# Patient Record
Sex: FEMALE | Race: WHITE | NOT HISPANIC OR LATINO | Employment: OTHER | URBAN - METROPOLITAN AREA
[De-identification: names, ages, dates, MRNs, and addresses within clinical notes are randomized per-mention and may not be internally consistent; named-entity substitution may affect disease eponyms.]

---

## 2017-01-17 ENCOUNTER — OFFICE VISIT (OUTPATIENT)
Dept: DERMATOLOGY | Facility: CLINIC | Age: 67
End: 2017-01-17
Payer: COMMERCIAL

## 2017-01-17 VITALS — HEART RATE: 74 BPM | DIASTOLIC BLOOD PRESSURE: 81 MMHG | SYSTOLIC BLOOD PRESSURE: 158 MMHG | OXYGEN SATURATION: 96 %

## 2017-01-17 DIAGNOSIS — L82.1 SK (SEBORRHEIC KERATOSIS): ICD-10-CM

## 2017-01-17 DIAGNOSIS — L91.0 HYPERTROPHIC SCAR: Primary | ICD-10-CM

## 2017-01-17 DIAGNOSIS — Z85.828 HISTORY OF SKIN CANCER: ICD-10-CM

## 2017-01-17 DIAGNOSIS — L81.4 LENTIGO: ICD-10-CM

## 2017-01-17 PROCEDURE — 99213 OFFICE O/P EST LOW 20 MIN: CPT | Mod: 25 | Performed by: DERMATOLOGY

## 2017-01-17 PROCEDURE — 11900 INJECT SKIN LESIONS </W 7: CPT | Performed by: DERMATOLOGY

## 2017-01-17 RX ORDER — LOSARTAN POTASSIUM 50 MG/1
50 TABLET ORAL 2 TIMES DAILY
COMMUNITY
Start: 2017-01-06 | End: 2024-02-05

## 2017-01-17 NOTE — PATIENT INSTRUCTIONS
Proper skin care from Dr. Saab- [facility]     Eliminate harsh soaps, i.e. Dial, Zest, Marla Spring;   Use mild soaps such as Cetaphil or Dove Sensitive Skin   Avoid hot or cold showers   After showering, lightly dry off.    Aggressive use of a moisturizer (including Vanicream, Cetaphil, Aquafor or Cerave)   We recommend using a tub that needs to be scooped out, not a pump. This has more of an oil base. It will hold moisture in your skin much better than a water base moisturizer. The ones recommended are non- pore clogging.       If you have any questions call [phone] and follow the prompts to Dr. Saab's office.

## 2017-01-17 NOTE — NURSING NOTE
"Initial /81 mmHg  Pulse 74  SpO2 96% Estimated body mass index is 26.64 kg/(m^2) as calculated from the following:    Height as of 3/22/16: 1.676 m (5' 6\").    Weight as of 3/22/16: 74.844 kg (165 lb). .      "

## 2017-01-17 NOTE — MR AVS SNAPSHOT
After Visit Summary   1/17/2017    Siomara Hansen    MRN: [id number]           Patient Information     Date Of Birth          1950        Visit Information        Provider Department      1/17/2017 1:15 PM Markus Saab MD [facility]        Today's Diagnoses     Hypertrophic scar    -  1     SK (seborrheic keratosis)         Lentigo         History of skin cancer           Care Instructions    Proper skin care from Dr. Saab- [facility]     Eliminate harsh soaps, i.e. Dial, Zest, [place];   Use mild soaps such as Cetaphil or Dove Sensitive Skin   Avoid hot or cold showers   After showering, lightly dry off.    Aggressive use of a moisturizer (including Vanicream, Cetaphil, Aquafor or Cerave)   We recommend using a tub that needs to be scooped out, not a pump. This has more of an oil base. It will hold moisture in your skin much better than a water base moisturizer. The ones recommended are non- pore clogging.       If you have any questions call [phone] and follow the prompts to Dr. Saab's office.           Follow-ups after your visit        Your next 10 appointments already scheduled     Apr 10, 2017  2:30 PM   Return Visit with Markus Saab MD   [facility] ([facility])    [address] [address] [phone]              Who to contact     If you have questions or need follow up information about today's clinic visit or your schedule please contact [facility] directly at [phone].  Normal or non-critical lab and imaging results will be communicated to you by MyChart, letter or phone within 4 business days after the clinic has received the results. If you do not hear from us within 7 days, please contact the clinic through MyChart or phone. If you have a critical or abnormal lab result, we will notify you by phone as soon as possible.  Submit refill requests through  [employer] or call your pharmacy and they will forward the refill request to us. Please allow 3 business days for your refill to be completed.          Additional Information About Your Visit        [email or web address] Information     [employer] gives you secure access to your electronic health record. If you see a primary care provider, you can also send messages to your care team and make appointments. If you have questions, please call your primary care clinic.  If you do not have a primary care provider, please call [phone] and they will assist you.        Care EveryWhere ID     This is your Care EveryWhere ID. This could be used by other organizations to access your [place] medical records  [id number]        Your Vitals Were     Pulse Pulse Oximetry                74 96%           Blood Pressure from Last 3 Encounters:   01/17/17 158/81   10/10/16 152/89   08/03/16 145/95    Weight from Last 3 Encounters:   03/22/16 74.844 kg (165 lb)   10/21/13 74.844 kg (165 lb)   06/12/13 74.39 kg (164 lb)              We Performed the Following     INJECTION INTO SKIN LESIONS <=7     KENALOG PER 10 MG        Primary Care Provider    None       No address on file        Thank you!     Thank you for choosing [facility]  for your care. Our goal is always to provide you with excellent care. Hearing back from our patients is one way we can continue to improve our services. Please take a few minutes to complete the written survey that you may receive in the mail after your visit with us. Thank you!             Your Updated Medication List - Protect others around you: Learn how to safely use, store and throw away your medicines at www.disposemymeds.org.          This list is accurate as of: 1/17/17  1:19 PM.  Always use your most recent med list.                   Brand Name Dispense Instructions for use    CALCIUM 1200 PO      Take  by mouth.       celecoxib 100 MG capsule    celeBREX    180 capsule    Take 1 capsule (100 mg)  by mouth 2 times daily as needed for moderate pain       fish oil-omega-3 fatty acids 1000 MG capsule      Take 2 g by mouth daily.       FLAX PO      Take  by mouth.       HYDROcodone-acetaminophen 5-325 MG per tablet    NORCO    20 tablet    Take 1 tablet by mouth daily as needed for pain.       losartan 50 MG tablet    COZAAR         * omeprazole 20 MG tablet     30 tablet    Take 1 tablet (20 mg) by mouth daily Take 30-60 minutes before a meal.       * omeprazole 20 MG CR capsule    priLOSEC    30 capsule    Take 1 capsule (20 mg) by mouth daily 30-60 minutes before a meal  DUE FOR APPT       ONE-A-DAY 50 PLUS PO      Take  by mouth.       PERDIEM OR      Take  by mouth.       valACYclovir 500 MG tablet    VALTREX    6 tablet    Take 1 tablet by mouth 2 times daily.       * Notice:  This list has 2 medication(s) that are the same as other medications prescribed for you. Read the directions carefully, and ask your doctor or other care provider to review them with you.

## 2017-01-17 NOTE — PROGRESS NOTES
Siomara Hansen is a 66 year old year old female patient here today for itching and tender spot on surgical site on right clavicle.   Patient states this has been present for months.  Patient reports the following symptoms:  Itching and tender.  Patient reports the following previous treatments none.  Patient reports the following modifying factors none.  Associated symptoms: none.  Patient has no other skin complaints today.  Remainder of the HPI, Meds, PMH, Allergies, FH, and SH was reviewed in chart.    Pertinent Hx:   Non-melanoma skin cancer    Past Medical History   Diagnosis Date     Breast cancer (H) 2000     Colon polyp      exam every 5 years     Basal cell carcinoma        Past Surgical History   Procedure Laterality Date     Masectomy[  02/2000     Bilateral     Gyn surgery  1986     D & C        Family History   Problem Relation Age of Onset     CANCER Mother      Lung     Breast Cancer Mother      CANCER Father      Braine     CANCER Brother      Bladder     HEART DISEASE Brother      Breast Cancer Sister      Breast Cancer Sister      CANCER Brother      Lung     Melanoma No family hx of        Social History     Social History     Marital Status: Single     Spouse Name: N/A     Number of Children: N/A     Years of Education: N/A     Occupational History     Not on file.     Social History Main Topics     Smoking status: Former Smoker -- 0.50 packs/day for 15 years     Types: Cigarettes     Quit date: 01/05/1984     Smokeless tobacco: Never Used     Alcohol Use: Yes      Comment: Rare     Drug Use: No     Sexual Activity: No     Other Topics Concern     Not on file     Social History Narrative       Outpatient Encounter Prescriptions as of 1/17/2017   Medication Sig Dispense Refill     losartan (COZAAR) 50 MG tablet        celecoxib (CELEBREX) 100 MG capsule Take 1 capsule (100 mg) by mouth 2 times daily as needed for moderate pain 180 capsule 3     omeprazole (PRILOSEC) 20 MG capsule Take 1 capsule  (20 mg) by mouth daily 30-60 minutes before a meal  DUE FOR APPT 30 capsule 0     omeprazole 20 MG tablet Take 1 tablet (20 mg) by mouth daily Take 30-60 minutes before a meal. 30 tablet 5     HYDROcodone-acetaminophen 5-325 MG per tablet Take 1 tablet by mouth daily as needed for pain. 20 tablet 0     valACYclovir (VALTREX) 500 MG tablet Take 1 tablet by mouth 2 times daily. 6 tablet 3     Flaxseed, Linseed, (FLAX PO) Take  by mouth.       Senna-Psyllium (PERDIEM OR) Take  by mouth.       Calcium Carbonate-Vit D-Min (CALCIUM 1200 PO) Take  by mouth.       Multiple Vitamins-Minerals (ONE-A-DAY 50 PLUS PO) Take  by mouth.       fish oil-omega-3 fatty acids (FISH OIL) 1000 MG capsule Take 2 g by mouth daily.       No facility-administered encounter medications on file as of 1/17/2017.             Review Of Systems  Skin: As above  Eyes: negative  Ears/Nose/Throat: negative  Respiratory: No shortness of breath, dyspnea on exertion, cough, or hemoptysis  Cardiovascular: negative  Gastrointestinal: negative  Genitourinary: negative  Musculoskeletal: negative  Neurologic: negative  Psychiatric: negative  Hematologic/Lymphatic/Immunologic: negative  Endocrine: negative      O:   NAD, WDWN, Alert & Oriented, Mood & Affect wnl, Vitals stable   Here today alone   /81 mmHg  Pulse 74  SpO2 96%   General appearance normal   Vitals stable   Alert, oriented and in no acute distress     Stuck on papules and brown macules on trunk and ext   R clavicle firm skin colored plaque       The remainder of expanded problem focused exam was unremarkable; the following areas were examined:  scalp/hair, conjunctiva/lids, face, neck, lips      Eyes: Conjunctivae/lids:Normal     ENT: Lips, buccal mucosa, tongue: normal    MSK:Normal    Cardiovascular: peripheral edema none    Pulm: Breathing Normal    Neuro/Psych: Orientation:Normal; Mood/Affect:Normal      A/P:  1. Hx of non-melanoma skin cancer, seborrheic keratosis, lentigo  2.  Hypertrophic scar  IL TAC: PGACAC discussed.  Risks including but not limited to injection site reaction, bruising, no resolution.  All questions answered and entertained to patient s satisfaction.  Informed consent obtained.  IL TAC in concentration of 40mg/ml was injected ID to KAMERON sun.  Total injected was  0.1 ml.  Patient tolerated without complications and given wound care instructions, including not to move product around.  Return in 4 weeks for follow-up and possible additional IL TAC.      BENIGN LESIONS DISCUSSED WITH PATIENT:  I discussed the specifics of tumor, prognosis, and genetics of benign lesions.  I explained that treatment of these lesions would be purely cosmetic and not medically neccessary.  I discussed with patient different removal options including excision, cautery and /or laser.      Nature and genetics of benign skin lesions dicussed with patient.  Signs and Symptoms of skin cancer discussed with patient.  Patient encouraged to perform monthly skin exams.  UV precautions reviewed with patient.  Skin care regimen reviewed with patient: Eliminate harsh soaps, i.e. Dial, zest, irsih spring; Mild soaps such as Cetaphil or Dove sensitive skin, avoid hot or cold showers, aggressive use of emollients including vanicream, cetaphil or cerave discussed with patient.    Risks of non-melanoma skin cancer discussed with patient   Return to clinic 2 months

## 2018-10-18 ENCOUNTER — OFFICE VISIT (OUTPATIENT)
Dept: DERMATOLOGY | Facility: CLINIC | Age: 68
End: 2018-10-18
Payer: COMMERCIAL

## 2018-10-18 VITALS — HEART RATE: 75 BPM | SYSTOLIC BLOOD PRESSURE: 117 MMHG | DIASTOLIC BLOOD PRESSURE: 68 MMHG | OXYGEN SATURATION: 92 %

## 2018-10-18 DIAGNOSIS — L81.4 LENTIGO: ICD-10-CM

## 2018-10-18 DIAGNOSIS — L82.0 INFLAMED SEBORRHEIC KERATOSIS: ICD-10-CM

## 2018-10-18 DIAGNOSIS — D48.5 NEOPLASM OF UNCERTAIN BEHAVIOR OF SKIN: Primary | ICD-10-CM

## 2018-10-18 DIAGNOSIS — D22.9 NEVUS: ICD-10-CM

## 2018-10-18 DIAGNOSIS — D18.00 ANGIOMA: ICD-10-CM

## 2018-10-18 DIAGNOSIS — L82.1 SEBORRHEIC KERATOSIS: ICD-10-CM

## 2018-10-18 DIAGNOSIS — Z85.828 HISTORY OF BASAL CELL CARCINOMA OF SKIN: ICD-10-CM

## 2018-10-18 PROCEDURE — 11101 HC DESTRUCT BENIGN LESION, UP TO 14: CPT | Performed by: PHYSICIAN ASSISTANT

## 2018-10-18 PROCEDURE — 11100 HC BIOPSY SKIN/SUBQ/MUC MEM, SINGLE LESION: CPT | Mod: 59 | Performed by: PHYSICIAN ASSISTANT

## 2018-10-18 PROCEDURE — 17110 DESTRUCTION B9 LES UP TO 14: CPT | Mod: 51 | Performed by: PHYSICIAN ASSISTANT

## 2018-10-18 PROCEDURE — 88305 TISSUE EXAM BY PATHOLOGIST: CPT | Mod: TC | Performed by: PHYSICIAN ASSISTANT

## 2018-10-18 PROCEDURE — 99203 OFFICE O/P NEW LOW 30 MIN: CPT | Mod: 25 | Performed by: PHYSICIAN ASSISTANT

## 2018-10-18 RX ORDER — CITALOPRAM HYDROBROMIDE 20 MG/1
20 TABLET ORAL
COMMUNITY
Start: 2018-10-11 | End: 2024-02-05

## 2018-10-18 RX ORDER — KETOCONAZOLE 20 MG/G
CREAM TOPICAL
COMMUNITY
Start: 2018-03-29 | End: 2023-05-04

## 2018-10-18 RX ORDER — ACYCLOVIR 200 MG/1
CAPSULE ORAL
COMMUNITY
Start: 2018-04-24 | End: 2023-05-04

## 2018-10-18 RX ORDER — ALBUTEROL SULFATE 90 UG/1
1-2 AEROSOL, METERED RESPIRATORY (INHALATION)
COMMUNITY
Start: 2018-06-25 | End: 2023-09-11

## 2018-10-18 NOTE — PROGRESS NOTES
HPI:   Siomara Hansen is a 68 year old female who presents for Full skin cancer screening.  chief complaint  Last Skin Exam: 1 year ago      1st Baseline: no  Personal HX of Skin Cancer: Yes BCC    Personal HX of Malignant Melanoma: no   Family HX of Skin Cancer / Malignant Melanoma: no  Personal HX of Atypical Moles:   no  Risk factors: history of frequent sun exposure  New / Changing lesions:no  Social History:   On review of systems, there are no further skin complaints, patient is feeling otherwise well.  See patient intake sheet.  ROS of the following were done and are negative: Constitutional, Eyes, Ears, Nose,   Mouth, Throat, Cardiovascular, Respiratory, GI, Genitourinary, Musculoskeletal,   Psychiatric, Endocrine, Allergic/Immunologic.    PHYSICAL EXAM:   /68  Pulse 75  SpO2 92%  Skin exam performed as follows: Type 2 skin. Mood appropriate  Alert and Oriented X 3. Well developed, well nourished in no distress.  General appearance: Normal  Head including face: Normal  Eyes: conjunctiva and lids: Normal  Mouth: Lips, teeth, gums: Normal  Neck: Normal  Chest-breast/axillae: Normal  Back: Normal  Spleen and liver: Normal  Cardiovascular: Exam of peripheral vascular system by observation for swelling, varicosities, edema: Normal  Genitalia: groin, buttocks: Normal  Extremities: digits/nails (clubbing): Normal  Eccrine and Apocrine glands: Normal  Right upper extremity: Normal  Left upper extremity: Normal  Right lower extremity: Normal  Left lower extremity: Normal  Skin: Scalp and body hair: See below    Pt deferred exam of breasts, groin, buttocks: No    Other physical findings:  1. Multiple pigmented macules on extremities and trunk  2. Multiple pigmented macules on face, trunk and extremities  3. Multiple vascular papules on trunk, arms and legs  4. Multiple scattered keratotic plaques  5. 4 mm pink shiny papule on the left posterior shoulder  6. 6 mm pink macule on the right posterior shoulder  7.  Inflamed keratotic papule on right upper inner arm x 1; left shoulder x 1, right cheek x 1       Except as noted above, no other signs of skin cancer or melanoma.     ASSESSMENT/PLAN:   Benign Full skin cancer screening today. . Patient with history of BCC  Advised on monthly self exams and 1 year  Patient Education: Appropriate brochures given.    Multiple benign appearing nevi on arms, legs and trunk. Discussed ABCDEs of melanoma and sunscreen.   Multiple lentigos on arms, legs and trunk. Advised benign, no treatment needed.  Multiple scattered angiomas. Advised benign, no treatment needed.   Seborrheic keratosis on arms, legs and trunk. Advised benign, no treatment needed.  R/o BCC on the left posterior shoulder, right posterior shoulder. Shave bx in typical fashion .  Area cleaned with betadyne and anesthetized with 1% lidocaine with epi .  Dermablade used to remove the lesion and sent to pathology. Bleeding was cauterized. Pt tolerated procedure well.  Inflamed seborrheic keratosis on right upper inner arm x 1; left shoulder x 1, right cheek x 1. As physically tender cryosurgery performed. Advised on post op care.   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 Advised to make an appt with Dr Saab to discuss options for revision.           Follow-up: pending path/fse/PRN sooner    1.) Patient was asked about new and changing moles. YES  2.) Patient received a complete physical skin examination: YES  3.) Patient was counseled to perform a monthly self skin examination: YES  Scribed By: Suellen Small, MS, PAGLENN

## 2018-10-18 NOTE — MR AVS SNAPSHOT
After Visit Summary   10/18/2018    Siomara Hansen    MRN: [id number]           Patient Information     Date Of Birth          1950        Visit Information        Provider Department      10/18/2018 12:45 PM Suellen Small PA-C [facility]        Care Instructions          Wound Care Instructions     FOR SUPERFICIAL WOUNDS     [facility] [phone]    [facility] [phone]  Right posterior shoulder, Left posterior shoulder.                       AFTER 24 HOURS YOU SHOULD REMOVE THE BANDAGE AND BEGIN DAILY DRESSING CHANGES AS FOLLOWS:     1) Remove Dressing.     2) Clean and dry the area with tap water using a Q-tip or sterile gauze pad.     3) Apply Vaseline, Aquaphor, Polysporin ointment or Bacitracin ointment over entire wound.  Do NOT use Neosporin ointment.     4) Cover the wound with a band-aid, or a sterile non-stick gauze pad and micropore paper tape      REPEAT THESE INSTRUCTIONS AT LEAST ONCE A DAY UNTIL THE WOUND HAS COMPLETELY HEALED.    It is an old wives tale that a wound heals better when it is exposed to air and allowed to dry out. The wound will heal faster with a better cosmetic result if it is kept moist with ointment and covered with a bandage.    **Do not let the wound dry out.**      Supplies Needed:      *Cotton tipped applicators (Q-tips)    *Polysporin Ointment or Bacitracin Ointment (NOT NEOSPORIN)    *Band-aids or non-stick gauze pads and micropore paper tape.      PATIENT INFORMATION:    During the healing process you will notice a number of changes. All wounds develop a small halo of redness surrounding the wound.  This means healing is occurring. Severe itching with extensive redness usually indicates sensitivity to the ointment or bandage tape used to dress the wound.  You should call our office if this develops.      Swelling  and/or discoloration around your surgical site is common, particularly when performed around  the eye.    All wounds normally drain.  The larger the wound the more drainage there will be.  After 7-10 days, you will notice the wound beginning to shrink and new skin will begin to grow.  The wound is healed when you can see skin has formed over the entire area.  A healed wound has a healthy, shiny look to the surface and is red to dark pink in color to normalize.  Wounds may take approximately 4-6 weeks to heal.  Larger wounds may take 6-8 weeks.  After the wound is healed you may discontinue dressing changes.    You may experience a sensation of tightness as your wound heals. This is normal and will gradually subside.    Your healed wound may be sensitive to temperature changes. This sensitivity improves with time, but if you re having a lot of discomfort, try to avoid temperature extremes.    Patients frequently experience itching after their wound appears to have healed because of the continue healing under the skin.  Plain Vaseline will help relieve the itching.        POSSIBLE COMPLICATIONS    BLEEDIN. Leave the bandage in place.  2. Use tightly rolled up gauze or a cloth to apply direct pressure over the bandage for 30  minutes.  3. Reapply pressure for an additional 30 minutes if necessary  4. Use additional gauze and tape to maintain pressure once the bleeding has stopped.    WOUND CARE INSTRUCTIONS   FOR CRYOSURGERY   This area treated with liquid nitrogen should form a blister (areas treated may or may not blister-skin may just turn dark and slough off). You do not need to bandage the area unless a blister forms and breaks (which may be a few days). When the blister breaks, begin daily dressing changes as follows:  1) Clean and dry the area with tap water using clean Q-tip or sterile gauze pad.   2) Apply Polysporin ointment or Bacitracin ointment over entire wound. Do NOT use Neosporin ointment.   3) Cover the wound with a band-aid or sterile non-stick gauze pad and micropore paper tape.    REPEAT THESE INSTRUCTIONS AT LEAST ONCE A DAY UNTIL THE WOUND HAS COMPLETELY HEALED.   It is an old wives tale that a wound heals better when it is exposed to air and allowed to dry out. The wound will heal faster with a better cosmetic result if it is kept moist with ointment and covered with a bandage.   Do not let the wound dry out.   IMPORTANT INFORMATION ON REVERSE SIDE   Supplies Needed:   *Cotton tipped applicators (Q-tips)   *Polysporin ointment or Bacitracin ointment (NOT NEOSPORIN)   *Band-aids, or non stick gauze pads and micropore paper tape   PATIENT INFORMATION   During the healing process you will notice a number of changes. All wounds develop a small halo of redness surrounding the wound. This means healing is occurring. Severe itching with extensive redness usually indicates sensitivity to the ointment or bandage tape used to dress the wound. You should call our office if this develops.   Swelling and/or discoloration around your surgical site is common, particularly when performed around the eye.   All wounds normally drain. The larger the wound the more drainage there will be. After 7-10 days, you will notice the wound beginning to shrink and new skin will begin to grow. The wound is healed when you can see skin has formed over the entire area. A healed wound has a healthy, shiny look to the surface and is red to dark pink in color to normalize. Wounds may take approximately 4-6 weeks to heal. Larger wounds may take 6-8 weeks. After the wound is healed you may discontinue dressing changes.   You may experience a sensation of tightness as your wound heals. This is normal and will gradually subside.   Your healed wound may be sensitive to temperature changes. This sensitivity improves with time, but if you re having a lot of discomfort, try to avoid temperature extremes.   Patients frequently experience itching after their wound appears to have healed because of the continue healing under the  skin. Plain Vaseline will help relieve the itching.                   Follow-ups after your visit        Who to contact     If you have questions or need follow up information about today's clinic visit or your schedule please contact [facility] directly at [phone].  Normal or non-critical lab and imaging results will be communicated to you by MyChart, letter or phone within 4 business days after the clinic has received the results. If you do not hear from us within 7 days, please contact the clinic through [employer] or phone. If you have a critical or abnormal lab result, we will notify you by phone as soon as possible.  Submit refill requests through [employer] or call your pharmacy and they will forward the refill request to us. Please allow 3 business days for your refill to be completed.          Additional Information About Your Visit        MyChart Information     [employer] gives you secure access to your electronic health record. If you see a primary care provider, you can also send messages to your care team and make appointments. If you have questions, please call your primary care clinic.  If you do not have a primary care provider, please call [phone] and they will assist you.        Care EveryWhere ID     This is your Care EveryWhere ID. This could be used by other organizations to access your [place] medical records  [id number]        Your Vitals Were     Pulse Pulse Oximetry                75 92%           Blood Pressure from Last 3 Encounters:   10/18/18 117/68   01/17/17 158/81   10/10/16 152/89    Weight from Last 3 Encounters:   03/22/16 74.8 kg (165 lb)   10/21/13 74.8 kg (165 lb)   06/12/13 74.4 kg (164 lb)              Today, you had the following     No orders found for display       Primary Care Provider Office Phone # Fax #    Ludy Landrum [phone]       [facility] [address]        Equal Access to Services      SEMAJ DON : Hadii aad ku attila Mckeonali, waaxda luqadaha, qaybta kaalmada adeafshin, niles cherise inocentemago eubanks florentinitzel paniagua . So [facility] [phone].    ATENCIÓN: Si priyankala ashly, tiene a barahona disposición servicios gratuitos de asistencia lingüística. Hyacinthame al [phone].    We comply with applicable federal civil rights laws and [place] laws. We do not discriminate on the basis of race, color, national origin, age, disability, sex, sexual orientation, or gender identity.            Thank you!     Thank you for choosing [facility]  for your care. Our goal is always to provide you with excellent care. Hearing back from our patients is one way we can continue to improve our services. Please take a few minutes to complete the written survey that you may receive in the mail after your visit with us. Thank you!             Your Updated Medication List - Protect others around you: Learn how to safely use, store and throw away your medicines at www.disposemymeds.org.          This list is accurate as of 10/18/18  1:00 PM.  Always use your most recent med list.                   Brand Name Dispense Instructions for use Diagnosis    acyclovir 200 MG capsule    ZOVIRAX          albuterol 108 (90 Base) MCG/ACT inhaler    PROAIR HFA/PROVENTIL HFA/VENTOLIN HFA     Inhale 1-2 puffs into the lungs        CALCIUM 1200 PO      Take  by mouth.        citalopram 20 MG tablet    celeXA     Take 20 mg by mouth        diclofenac 1 % Gel topical gel    VOLTAREN     Apply nickel size amount, 4g, to left hand and neck 2 times daily        fish oil-omega-3 fatty acids 1000 MG capsule      Take 2 g by mouth daily.        FLAX PO      Take  by mouth.        ketoconazole 2 % cream    NIZORAL     Apply thin coat to forehead and bridge of nose        losartan 50 MG tablet    COZAAR      Hypertrophic scar, SK (seborrheic keratosis), Lentigo, History of skin cancer       omeprazole 20 MG tablet     30 tablet    Take 1 tablet  (20 mg) by mouth daily Take 30-60 minutes before a meal.    GERD (gastroesophageal reflux disease)       PERDIEM OR      Take  by mouth.        valACYclovir 500 MG tablet    VALTREX    6 tablet    Take 1 tablet by mouth 2 times daily.    HSV-2 (herpes simplex virus 2) infection

## 2018-10-18 NOTE — PATIENT INSTRUCTIONS
Wound Care Instructions     FOR SUPERFICIAL WOUNDS     [facility] [phone]    [facility] [phone]  Right posterior shoulder, Left posterior shoulder.                       AFTER 24 HOURS YOU SHOULD REMOVE THE BANDAGE AND BEGIN DAILY DRESSING CHANGES AS FOLLOWS:     1) Remove Dressing.     2) Clean and dry the area with tap water using a Q-tip or sterile gauze pad.     3) Apply Vaseline, Aquaphor, Polysporin ointment or Bacitracin ointment over entire wound.  Do NOT use Neosporin ointment.     4) Cover the wound with a band-aid, or a sterile non-stick gauze pad and micropore paper tape      REPEAT THESE INSTRUCTIONS AT LEAST ONCE A DAY UNTIL THE WOUND HAS COMPLETELY HEALED.    It is an old wives tale that a wound heals better when it is exposed to air and allowed to dry out. The wound will heal faster with a better cosmetic result if it is kept moist with ointment and covered with a bandage.    **Do not let the wound dry out.**      Supplies Needed:      *Cotton tipped applicators (Q-tips)    *Polysporin Ointment or Bacitracin Ointment (NOT NEOSPORIN)    *Band-aids or non-stick gauze pads and micropore paper tape.      PATIENT INFORMATION:    During the healing process you will notice a number of changes. All wounds develop a small halo of redness surrounding the wound.  This means healing is occurring. Severe itching with extensive redness usually indicates sensitivity to the ointment or bandage tape used to dress the wound.  You should call our office if this develops.      Swelling  and/or discoloration around your surgical site is common, particularly when performed around the eye.    All wounds normally drain.  The larger the wound the more drainage there will be.  After 7-10 days, you will notice the wound beginning to shrink and new skin will begin to grow.  The wound is healed when you can see skin has formed over the entire area.  A healed wound has a healthy, shiny  look to the surface and is red to dark pink in color to normalize.  Wounds may take approximately 4-6 weeks to heal.  Larger wounds may take 6-8 weeks.  After the wound is healed you may discontinue dressing changes.    You may experience a sensation of tightness as your wound heals. This is normal and will gradually subside.    Your healed wound may be sensitive to temperature changes. This sensitivity improves with time, but if you re having a lot of discomfort, try to avoid temperature extremes.    Patients frequently experience itching after their wound appears to have healed because of the continue healing under the skin.  Plain Vaseline will help relieve the itching.        POSSIBLE COMPLICATIONS    BLEEDIN. Leave the bandage in place.  2. Use tightly rolled up gauze or a cloth to apply direct pressure over the bandage for 30  minutes.  3. Reapply pressure for an additional 30 minutes if necessary  4. Use additional gauze and tape to maintain pressure once the bleeding has stopped.    WOUND CARE INSTRUCTIONS   FOR CRYOSURGERY   This area treated with liquid nitrogen should form a blister (areas treated may or may not blister-skin may just turn dark and slough off). You do not need to bandage the area unless a blister forms and breaks (which may be a few days). When the blister breaks, begin daily dressing changes as follows:  1) Clean and dry the area with tap water using clean Q-tip or sterile gauze pad.   2) Apply Polysporin ointment or Bacitracin ointment over entire wound. Do NOT use Neosporin ointment.   3) Cover the wound with a band-aid or sterile non-stick gauze pad and micropore paper tape.   REPEAT THESE INSTRUCTIONS AT LEAST ONCE A DAY UNTIL THE WOUND HAS COMPLETELY HEALED.   It is an old wives tale that a wound heals better when it is exposed to air and allowed to dry out. The wound will heal faster with a better cosmetic result if it is kept moist with ointment and covered with a bandage.    Do not let the wound dry out.   IMPORTANT INFORMATION ON REVERSE SIDE   Supplies Needed:   *Cotton tipped applicators (Q-tips)   *Polysporin ointment or Bacitracin ointment (NOT NEOSPORIN)   *Band-aids, or non stick gauze pads and micropore paper tape   PATIENT INFORMATION   During the healing process you will notice a number of changes. All wounds develop a small halo of redness surrounding the wound. This means healing is occurring. Severe itching with extensive redness usually indicates sensitivity to the ointment or bandage tape used to dress the wound. You should call our office if this develops.   Swelling and/or discoloration around your surgical site is common, particularly when performed around the eye.   All wounds normally drain. The larger the wound the more drainage there will be. After 7-10 days, you will notice the wound beginning to shrink and new skin will begin to grow. The wound is healed when you can see skin has formed over the entire area. A healed wound has a healthy, shiny look to the surface and is red to dark pink in color to normalize. Wounds may take approximately 4-6 weeks to heal. Larger wounds may take 6-8 weeks. After the wound is healed you may discontinue dressing changes.   You may experience a sensation of tightness as your wound heals. This is normal and will gradually subside.   Your healed wound may be sensitive to temperature changes. This sensitivity improves with time, but if you re having a lot of discomfort, try to avoid temperature extremes.   Patients frequently experience itching after their wound appears to have healed because of the continue healing under the skin. Plain Vaseline will help relieve the itching.

## 2018-10-18 NOTE — LETTER
[facility]  [address] [address]  Phone: [phone]       October 25, 2018         Siomara Hansen  [address]  [place] [address]      Dear Siomara:  You are scheduled for Mohs Surgery on  November 28th @ 7:30 am.  AND  Dec 5th @ 8:00am.    Please check in at Dermatology Clinic.   (2nd Floor, last  Clinic on right up staircase or elevator -past OB/GYN clinic)    You don't need to arrive more than 5-10 minutes prior to your appointment time.     Be sure to eat a good breakfast and bathe and wash your hair prior to Surgery.    If you are taking any anti-coagulants that are prescribed by your Doctor (such as Coumadin/warfarin, Plavix, Aspirin, Ibuprofen), please continue taking them.     However, If you are taking anti-coagulants over the counter without  a Doctor's order for a Medical condition, please discontinue them 10 days prior to Procedure.      Please wear loose comfortable clothing as it could possibly be 4-6 hours until your surgery is completed depending upon how many layers of tissue need to be removed.     Wi-fi access is available.         Thank you,      Markus Saab MD  [phone]

## 2018-10-18 NOTE — LETTER
10/18/2018         RE: Siomara Hansen  [address]  [place] [address]        Dear Colleague,    Thank you for referring your patient, Siomara Hansen, to the [facility]. Please see a copy of my visit note below.    HPI:   Siomara Hansen is a 68 year old female who presents for Full skin cancer screening.  chief complaint  Last Skin Exam: 1 year ago      1st Baseline: no  Personal HX of Skin Cancer: Yes BCC    Personal HX of Malignant Melanoma: no   Family HX of Skin Cancer / Malignant Melanoma: no  Personal HX of Atypical Moles:   no  Risk factors: history of frequent sun exposure  New / Changing lesions:no  Social History:   On review of systems, there are no further skin complaints, patient is feeling otherwise well.  See patient intake sheet.  ROS of the following were done and are negative: Constitutional, Eyes, Ears, Nose,   Mouth, Throat, Cardiovascular, Respiratory, GI, Genitourinary, Musculoskeletal,   Psychiatric, Endocrine, Allergic/Immunologic.    PHYSICAL EXAM:   /68  Pulse 75  SpO2 92%  Skin exam performed as follows: Type 2 skin. Mood appropriate  Alert and Oriented X 3. Well developed, well nourished in no distress.  General appearance: Normal  Head including face: Normal  Eyes: conjunctiva and lids: Normal  Mouth: Lips, teeth, gums: Normal  Neck: Normal  Chest-breast/axillae: Normal  Back: Normal  Spleen and liver: Normal  Cardiovascular: Exam of peripheral vascular system by observation for swelling, varicosities, edema: Normal  Genitalia: groin, buttocks: Normal  Extremities: digits/nails (clubbing): Normal  Eccrine and Apocrine glands: Normal  Right upper extremity: Normal  Left upper extremity: Normal  Right lower extremity: Normal  Left lower extremity: Normal  Skin: Scalp and body hair: See below    Pt deferred exam of breasts, groin, buttocks: No    Other physical findings:  1. Multiple pigmented macules on extremities and trunk  2. Multiple pigmented  macules on face, trunk and extremities  3. Multiple vascular papules on trunk, arms and legs  4. Multiple scattered keratotic plaques  5. 4 mm pink shiny papule on the left posterior shoulder  6. 6 mm pink macule on the right posterior shoulder  7. Inflamed keratotic papule on right upper inner arm x 1; left shoulder x 1, right cheek x 1       Except as noted above, no other signs of skin cancer or melanoma.     ASSESSMENT/PLAN:   Benign Full skin cancer screening today. . Patient with history of BCC  Advised on monthly self exams and 1 year  Patient Education: Appropriate brochures given.    Multiple benign appearing nevi on arms, legs and trunk. Discussed ABCDEs of melanoma and sunscreen.   Multiple lentigos on arms, legs and trunk. Advised benign, no treatment needed.  Multiple scattered angiomas. Advised benign, no treatment needed.   Seborrheic keratosis on arms, legs and trunk. Advised benign, no treatment needed.  R/o BCC on the left posterior shoulder, right posterior shoulder. Shave bx in typical fashion .  Area cleaned with betadyne and anesthetized with 1% lidocaine with epi .  Dermablade used to remove the lesion and sent to pathology. Bleeding was cauterized. Pt tolerated procedure well.  Inflamed seborrheic keratosis on right upper inner arm x 1; left shoulder x 1, right cheek x 1. As physically tender cryosurgery performed. Advised on post op care.   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 Advised to make an appt with Dr Saab to discuss options for revision.           Follow-up: pending path/fse/PRN sooner    1.) Patient was asked about new and changing moles. YES  2.) Patient received a complete physical skin examination: YES  3.) Patient was counseled to perform a monthly self skin examination: YES  Scribed By: Suellen Small, MS, PAGLENN      Again, thank you for allowing me to participate in the care of your patient.        Sincerely,        Suellen Small PA-C

## 2018-10-18 NOTE — NURSING NOTE
"Initial /68  Pulse 75  SpO2 92% Estimated body mass index is 26.63 kg/(m^2) as calculated from the following:    Height as of 3/22/16: 1.676 m (5' 6\").    Weight as of 3/22/16: 74.8 kg (165 lb). .    Lawanda Scales LPN    "

## 2018-10-24 LAB — COPATH REPORT: NORMAL

## 2018-11-28 ENCOUNTER — OFFICE VISIT (OUTPATIENT)
Dept: DERMATOLOGY | Facility: CLINIC | Age: 68
End: 2018-11-28
Payer: COMMERCIAL

## 2018-11-28 VITALS — SYSTOLIC BLOOD PRESSURE: 129 MMHG | DIASTOLIC BLOOD PRESSURE: 72 MMHG | HEART RATE: 77 BPM | OXYGEN SATURATION: 98 %

## 2018-11-28 DIAGNOSIS — C44.612 BASAL CELL CARCINOMA OF RIGHT SHOULDER: Primary | ICD-10-CM

## 2018-11-28 PROCEDURE — 11602 EXC TR-EXT MAL+MARG 1.1-2 CM: CPT | Performed by: DERMATOLOGY

## 2018-11-28 PROCEDURE — 88331 PATH CONSLTJ SURG 1 BLK 1SPC: CPT | Performed by: DERMATOLOGY

## 2018-11-28 NOTE — PROGRESS NOTES
Siomara Hansen is a 68 year old year old female patient here today for evaluation and managment of basal cell carcinoma on right shoulder.  Patient reports the following modifying factors none.  Associated symptoms: none.  Patient has no other skin complaints today.  Remainder of the HPI, Meds, PMH, Allergies, FH, and SH was reviewed in chart.      Past Medical History:   Diagnosis Date     Basal cell carcinoma      Breast cancer (H) 2000     Colon polyp     exam every 5 years       Past Surgical History:   Procedure Laterality Date     GYN SURGERY  1986    D & C     masectomy[  02/2000    Bilateral        Family History   Problem Relation Age of Onset     Cancer Mother      Lung     Breast Cancer Mother      Cancer Father      Braine     Cancer Brother      Bladder     Heart Disease Brother      Breast Cancer Sister      Breast Cancer Sister      Cancer Brother      Lung     Melanoma No family hx of        Social History     Social History     Marital status: Single     Spouse name: N/A     Number of children: N/A     Years of education: N/A     Occupational History     Not on file.     Social History Main Topics     Smoking status: Former Smoker     Packs/day: 0.50     Years: 15.00     Types: Cigarettes     Quit date: 1/5/1984     Smokeless tobacco: Never Used     Alcohol use Yes      Comment: Rare     Drug use: No     Sexual activity: No     Other Topics Concern     Not on file     Social History Narrative       Outpatient Encounter Prescriptions as of 11/28/2018   Medication Sig Dispense Refill     acyclovir (ZOVIRAX) 200 MG capsule        albuterol (PROAIR HFA/PROVENTIL HFA/VENTOLIN HFA) 108 (90 Base) MCG/ACT inhaler Inhale 1-2 puffs into the lungs       Calcium Carbonate-Vit D-Min (CALCIUM 1200 PO) Take  by mouth.       citalopram (CELEXA) 20 MG tablet Take 20 mg by mouth       diclofenac (VOLTAREN) 1 % GEL topical gel Apply nickel size amount, 4g, to left hand and neck 2 times daily       fish oil-omega-3  fatty acids (FISH OIL) 1000 MG capsule Take 2 g by mouth daily.       Flaxseed, Linseed, (FLAX PO) Take  by mouth.       ketoconazole (NIZORAL) 2 % cream Apply thin coat to forehead and bridge of nose       losartan (COZAAR) 50 MG tablet        omeprazole 20 MG tablet Take 1 tablet (20 mg) by mouth daily Take 30-60 minutes before a meal. 30 tablet 5     Senna-Psyllium (PERDIEM OR) Take  by mouth.       valACYclovir (VALTREX) 500 MG tablet Take 1 tablet by mouth 2 times daily. (Patient not taking: Reported on 10/18/2018) 6 tablet 3     No facility-administered encounter medications on file as of 11/28/2018.              Review Of Systems  Skin: As above  Eyes: negative  Ears/Nose/Throat: negative  Respiratory: No shortness of breath, dyspnea on exertion, cough, or hemoptysis  Cardiovascular: negative  Gastrointestinal: negative  Genitourinary: negative  Musculoskeletal: negative  Neurologic: negative  Psychiatric: negative  Hematologic/Lymphatic/Immunologic: negative  Endocrine: negative      O:   NAD, WDWN, Alert & Oriented, Mood & Affect wnl, Vitals stable   Here today alone   /72 (BP Location: Left arm, Patient Position: Chair, Cuff Size: Adult Regular)  Pulse 77  SpO2 98%   General appearance normal   Vitals stable   Alert, oriented and in no acute distress     R shoulder 6mm scaly papule       Eyes: Conjunctivae/lids:Normal     ENT: Lips, buccal mucosa, tongue: normal    MSK:Normal    Cardiovascular: peripheral edema none    Pulm: Breathing Normal    Neuro/Psych: Orientation:Normal; Mood/Affect:Normal      MICRO:   R shouldeR:Unremarkable epidermis with parallel bundles of cellular collagen within the superficial dermis.  No concerning areas for malignancy.     A/P:  1. R shoulder basal cell carcinoma   EXCISION OF BASAL CELL CARCINOMA, Margins confirmed with FROZEN SECTIONS AND Second intent: After thorough discussion of PGACAC, consent obtained, anesthesia and prep, the margins of the lesion were  identified and an elliptical incision was made encompassing the lesion with 4mm margin. The incisions were made through the skin and down to and including the superficial dermis.  The lesion was removed en bloc and submitted for frozen section pathologic review. Clear margins obtained (1.2cm).    REPAIR SECOND INTENT: We discussed the options for wound management in full with the patient including risks/benefits/possible outcomes. Decision made to allow the wound to heal by second intention. EBL minimal; complications none; wound care routine.  The patient was discharged in good condition and will return in one month or prn for wound evaluation.       BENIGN LESIONS DISCUSSED WITH PATIENT:  I discussed the specifics of tumor, prognosis, and genetics of benign lesions.  I explained that treatment of these lesions would be purely cosmetic and not medically neccessary.  I discussed with patient different removal options including excision, cautery and /or laser.      Nature and genetics of benign skin lesions dicussed with patient.  Signs and Symptoms of skin cancer discussed with patient.  Patient encouraged to perform monthly skin exams.  UV precautions reviewed with patient.  Patient to follow up with Primary Care provider regarding elevated blood pressure.  Skin care regimen reviewed with patient: Eliminate harsh soaps, i.e. Dial, zest, irsih spring; Mild soaps such as Cetaphil or Dove sensitive skin, avoid hot or cold showers, aggressive use of emollients including vanicream, cetaphil or cerave discussed with patient.    Risks of non-melanoma skin cancer discussed with patient   Return to clinic 6 months  Patient to follow up with Primary Care provider regarding elevated blood pressure.

## 2018-11-28 NOTE — MR AVS SNAPSHOT
After Visit Summary   2018    Siomara Hansen    MRN: [id number]           Patient Information     Date Of Birth          1950        Visit Information        Provider Department      2018 7:30 AM Markus Saab MD [facility]        Care Instructions    Open Wound Care     for ____Right shoulder__________        ? No strenuous activity for 48 hours    ? Take Tylenol as needed for discomfort.                                                .         ? Do not drink alcoholic beverages for 48 hours.    ? Keep the pressure bandage in place for 24 hours. If the bandage becomes blood tinged or loose, reinforce it with gauze and tape.        (Refer to the reverse side of this page for management of bleeding).    ? Remove bandage in 24 hours and begin wound care as follows:     1. Clean area with tap water using a Q tip or gauze pad, (shower / bathe normally)  2. Dry wound with Q tip or gauze pad  3. Apply Aquaphor, Vaseline, Polysporin or Bacitracin Ointment with a Q tip    Do NOT use Neosporin Ointment *  4. Cover the wound with a band-aid or nonstick gauze pad and paper tape.  5. Repeat wound care once a day until wound is completely healed.    It is an old wives tale that a wound heals better when it is exposed to air and allowed to dry out. The wound will heal faster with a better cosmetic result if it is kept moist with ointment and covered with a bandage.  Do not let the wound dry out.      Supplies Needed:                Qtips or gauze pads                Polysporin or Bacitracin Ointment                Bandaids or nonstick gauze pads and paper tape    Wound care kits and brown paper tape are available for purchase at   the pharmacy.    BLEEDIN. Use tightly rolled up gauze or cloth to apply direct pressure over the bandage for 20   minutes.  2. Reapply pressure for an additional 20 minutes if necessary  3. Call the office or go to the nearest emergency  room if pressure fails to stop the bleeding.  4. Use additional gauze and tape to maintain pressure once the bleeding has stopped.  5. Begin wound care 24 hours after surgery as directed.                  WOUND HEALING    1. One week after surgery a pink / red halo will form around the outside of the wound.   This is new skin.  2. The center of the wound will appear yellowish white and produce some drainage.  3. The pink halo will slowly migrate in toward the center of the wound until the wound is covered with new shiny pink skin.  4. There will be no more drainage when the wound is completely healed.  5. It will take six months to one year for the redness to fade.  6. The scar may be itchy, tight and sensitive to extreme temperatures for a year after the surgery.  7. Massaging the area several times a day for several minutes after the wound is completely healed will help the scar soften and normalize faster. Begin massage only after healing is complete.      In case of emergency call: Dr Saab: [phone]     [facility]: [phone]    [facility]: [phone]              Follow-ups after your visit        Your next 10 appointments already scheduled     Dec 05, 2018  8:00 AM CST   [facility]    [address] [address] [phone]              Who to contact     If you have questions or need follow up information about today's clinic visit or your schedule please contact [facility] directly at [phone].  Normal or non-critical lab and imaging results will be communicated to you by MyChart, letter or phone within 4 business days after the clinic has received the results. If you do not hear from us within 7 days, please contact the clinic through MyChart or phone. If you have a critical or abnormal lab result, we will notify you by phone as soon as possible.  Submit  refill requests through [employer] or call your pharmacy and they will forward the refill request to us. Please allow 3 business days for your refill to be completed.          Additional Information About Your Visit        [employer] Information     [employer] gives you secure access to your electronic health record. If you see a primary care provider, you can also send messages to your care team and make appointments. If you have questions, please call your primary care clinic.  If you do not have a primary care provider, please call [phone] and they will assist you.        Care EveryWhere ID     This is your Care EveryWhere ID. This could be used by other organizations to access your [place] medical records  [id number]        Your Vitals Were     Pulse Pulse Oximetry                77 98%           Blood Pressure from Last 3 Encounters:   11/28/18 129/72   10/18/18 117/68   01/17/17 158/81    Weight from Last 3 Encounters:   03/22/16 74.8 kg (165 lb)   10/21/13 74.8 kg (165 lb)   06/12/13 74.4 kg (164 lb)              Today, you had the following     No orders found for display       Primary Care Provider Office Phone # Fax #    Ludy HOLDEN Tadeo Diallo [phone]       [facility] [address] [address]        Equal Access to Services     SEMAJ DON AH: Hadii armin ku hadasho Soomaali, waaxda luqadaha, qaybta kaalmada adeegyada, waxay cherise mario. So [facility] [phone].    ATENCIÓN: Si habla español, tiene a barahona disposición servicios gratuitos de asistencia lingüística. Llame al [phone].    We comply with applicable federal civil rights laws and [place] laws. We do not discriminate on the basis of race, color, national origin, age, disability, sex, sexual orientation, or gender identity.            Thank you!     Thank you for choosing [facility]  for your care. Our goal is always to provide you with excellent care. Hearing back from our  patients is one way we can continue to improve our services. Please take a few minutes to complete the written survey that you may receive in the mail after your visit with us. Thank you!             Your Updated Medication List - Protect others around you: Learn how to safely use, store and throw away your medicines at www.disposemymeds.org.          This list is accurate as of 11/28/18  9:02 AM.  Always use your most recent med list.                   Brand Name Dispense Instructions for use Diagnosis    acyclovir 200 MG capsule    ZOVIRAX          albuterol 108 (90 Base) MCG/ACT inhaler    PROAIR HFA/PROVENTIL HFA/VENTOLIN HFA     Inhale 1-2 puffs into the lungs        CALCIUM 1200 PO      Take  by mouth.        citalopram 20 MG tablet    celeXA     Take 20 mg by mouth        diclofenac 1 % topical gel    VOLTAREN     Apply nickel size amount, 4g, to left hand and neck 2 times daily        fish oil-omega-3 fatty acids 1000 MG capsule      Take 2 g by mouth daily.        FLAX PO      Take  by mouth.        ketoconazole 2 % external cream    NIZORAL     Apply thin coat to forehead and bridge of nose        losartan 50 MG tablet    COZAAR      Hypertrophic scar, SK (seborrheic keratosis), Lentigo, History of skin cancer       omeprazole 20 MG tablet     30 tablet    Take 1 tablet (20 mg) by mouth daily Take 30-60 minutes before a meal.    GERD (gastroesophageal reflux disease)       PERDIEM OR      Take  by mouth.        valACYclovir 500 MG tablet    VALTREX    6 tablet    Take 1 tablet by mouth 2 times daily.    HSV-2 (herpes simplex virus 2) infection

## 2018-11-28 NOTE — LETTER
11/28/2018         RE: Siomara Hansen  [address] [place] [id number]        Dear Colleague,    Thank you for referring your patient, Siomara Hansen, to the [facility]. Please see a copy of my visit note below.    Siomara Hansen is a 68 year old year old female patient here today for evaluation and managment of basal cell carcinoma on right shoulder.  Patient reports the following modifying factors none.  Associated symptoms: none.  Patient has no other skin complaints today.  Remainder of the HPI, Meds, PMH, Allergies, FH, and SH was reviewed in chart.      Past Medical History:   Diagnosis Date     Basal cell carcinoma      Breast cancer (H) 2000     Colon polyp     exam every 5 years       Past Surgical History:   Procedure Laterality Date     GYN SURGERY  1986    D & C     masectomy[  02/2000    Bilateral        Family History   Problem Relation Age of Onset     Cancer Mother      Lung     Breast Cancer Mother      Cancer Father      Braine     Cancer Brother      Bladder     Heart Disease Brother      Breast Cancer Sister      Breast Cancer Sister      Cancer Brother      Lung     Melanoma No family hx of        Social History     Social History     Marital status: Single     Spouse name: N/A     Number of children: N/A     Years of education: N/A     Occupational History     Not on file.     Social History Main Topics     Smoking status: Former Smoker     Packs/day: 0.50     Years: 15.00     Types: Cigarettes     Quit date: 1/5/1984     Smokeless tobacco: Never Used     Alcohol use Yes      Comment: Rare     Drug use: No     Sexual activity: No     Other Topics Concern     Not on file     Social History Narrative       Outpatient Encounter Prescriptions as of 11/28/2018   Medication Sig Dispense Refill     acyclovir (ZOVIRAX) 200 MG capsule        albuterol (PROAIR HFA/PROVENTIL HFA/VENTOLIN HFA) 108 (90 Base) MCG/ACT inhaler Inhale 1-2 puffs into the lungs       Calcium  Carbonate-Vit D-Min (CALCIUM 1200 PO) Take  by mouth.       citalopram (CELEXA) 20 MG tablet Take 20 mg by mouth       diclofenac (VOLTAREN) 1 % GEL topical gel Apply nickel size amount, 4g, to left hand and neck 2 times daily       fish oil-omega-3 fatty acids (FISH OIL) 1000 MG capsule Take 2 g by mouth daily.       Flaxseed, Linseed, (FLAX PO) Take  by mouth.       ketoconazole (NIZORAL) 2 % cream Apply thin coat to forehead and bridge of nose       losartan (COZAAR) 50 MG tablet        omeprazole 20 MG tablet Take 1 tablet (20 mg) by mouth daily Take 30-60 minutes before a meal. 30 tablet 5     Senna-Psyllium (PERDIEM OR) Take  by mouth.       valACYclovir (VALTREX) 500 MG tablet Take 1 tablet by mouth 2 times daily. (Patient not taking: Reported on 10/18/2018) 6 tablet 3     No facility-administered encounter medications on file as of 11/28/2018.              Review Of Systems  Skin: As above  Eyes: negative  Ears/Nose/Throat: negative  Respiratory: No shortness of breath, dyspnea on exertion, cough, or hemoptysis  Cardiovascular: negative  Gastrointestinal: negative  Genitourinary: negative  Musculoskeletal: negative  Neurologic: negative  Psychiatric: negative  Hematologic/Lymphatic/Immunologic: negative  Endocrine: negative      O:   NAD, WDWN, Alert & Oriented, Mood & Affect wnl, Vitals stable   Here today alone   /72 (BP Location: Left arm, Patient Position: Chair, Cuff Size: Adult Regular)  Pulse 77  SpO2 98%   General appearance normal   Vitals stable   Alert, oriented and in no acute distress     R shoulder 6mm scaly papule       Eyes: Conjunctivae/lids:Normal     ENT: Lips, buccal mucosa, tongue: normal    MSK:Normal    Cardiovascular: peripheral edema none    Pulm: Breathing Normal    Neuro/Psych: Orientation:Normal; Mood/Affect:Normal      MICRO:   R shouldeR:Unremarkable epidermis with parallel bundles of cellular collagen within the superficial dermis.  No concerning areas for malignancy.      A/P:  1. R shoulder basal cell carcinoma   EXCISION OF BASAL CELL CARCINOMA, Margins confirmed with FROZEN SECTIONS AND Second intent: After thorough discussion of PGACAC, consent obtained, anesthesia and prep, the margins of the lesion were identified and an elliptical incision was made encompassing the lesion with 4mm margin. The incisions were made through the skin and down to and including the superficial dermis.  The lesion was removed en bloc and submitted for frozen section pathologic review. Clear margins obtained (1.2cm).    REPAIR SECOND INTENT: We discussed the options for wound management in full with the patient including risks/benefits/possible outcomes. Decision made to allow the wound to heal by second intention. EBL minimal; complications none; wound care routine.  The patient was discharged in good condition and will return in one month or prn for wound evaluation.       BENIGN LESIONS DISCUSSED WITH PATIENT:  I discussed the specifics of tumor, prognosis, and genetics of benign lesions.  I explained that treatment of these lesions would be purely cosmetic and not medically neccessary.  I discussed with patient different removal options including excision, cautery and /or laser.      Nature and genetics of benign skin lesions dicussed with patient.  Signs and Symptoms of skin cancer discussed with patient.  Patient encouraged to perform monthly skin exams.  UV precautions reviewed with patient.  Patient to follow up with Primary Care provider regarding elevated blood pressure.  Skin care regimen reviewed with patient: Eliminate harsh soaps, i.e. Dial, zest, irsih spring; Mild soaps such as Cetaphil or Dove sensitive skin, avoid hot or cold showers, aggressive use of emollients including vanicream, cetaphil or cerave discussed with patient.    Risks of non-melanoma skin cancer discussed with patient   Return to clinic 6 months  Patient to follow up with Primary Care provider regarding elevated  blood pressure.        Again, thank you for allowing me to participate in the care of your patient.        Sincerely,        Makrus Saab MD

## 2018-11-28 NOTE — NURSING NOTE
"Chief Complaint   Patient presents with     Derm Problem     MOHS       Initial /72 (BP Location: Left arm, Patient Position: Chair, Cuff Size: Adult Regular)  Pulse 77  SpO2 98% Estimated body mass index is 26.63 kg/(m^2) as calculated from the following:    Height as of 3/22/16: 1.676 m (5' 6\").    Weight as of 3/22/16: 74.8 kg (165 lb).  Medications and allergies reviewed.    Lacie GREGORY, CHUCK    "

## 2018-11-28 NOTE — PATIENT INSTRUCTIONS
Open Wound Care     for ____Right shoulder__________        ? No strenuous activity for 48 hours    ? Take Tylenol as needed for discomfort.                                                .         ? Do not drink alcoholic beverages for 48 hours.    ? Keep the pressure bandage in place for 24 hours. If the bandage becomes blood tinged or loose, reinforce it with gauze and tape.        (Refer to the reverse side of this page for management of bleeding).    ? Remove bandage in 24 hours and begin wound care as follows:     1. Clean area with tap water using a Q tip or gauze pad, (shower / bathe normally)  2. Dry wound with Q tip or gauze pad  3. Apply Aquaphor, Vaseline, Polysporin or Bacitracin Ointment with a Q tip    Do NOT use Neosporin Ointment *  4. Cover the wound with a band-aid or nonstick gauze pad and paper tape.  5. Repeat wound care once a day until wound is completely healed.    It is an old wives tale that a wound heals better when it is exposed to air and allowed to dry out. The wound will heal faster with a better cosmetic result if it is kept moist with ointment and covered with a bandage.  Do not let the wound dry out.      Supplies Needed:                Qtips or gauze pads                Polysporin or Bacitracin Ointment                Bandaids or nonstick gauze pads and paper tape    Wound care kits and brown paper tape are available for purchase at   the pharmacy.    BLEEDIN. Use tightly rolled up gauze or cloth to apply direct pressure over the bandage for 20   minutes.  2. Reapply pressure for an additional 20 minutes if necessary  3. Call the office or go to the nearest emergency room if pressure fails to stop the bleeding.  4. Use additional gauze and tape to maintain pressure once the bleeding has stopped.  5. Begin wound care 24 hours after surgery as directed.                  WOUND HEALING    1. One week after surgery a pink / red halo will form around the outside of the wound.    This is new skin.  2. The center of the wound will appear yellowish white and produce some drainage.  3. The pink halo will slowly migrate in toward the center of the wound until the wound is covered with new shiny pink skin.  4. There will be no more drainage when the wound is completely healed.  5. It will take six months to one year for the redness to fade.  6. The scar may be itchy, tight and sensitive to extreme temperatures for a year after the surgery.  7. Massaging the area several times a day for several minutes after the wound is completely healed will help the scar soften and normalize faster. Begin massage only after healing is complete.      In case of emergency call: Dr Saab: [phone]     [facility]: [phone]    [facility][1]: [phone]

## 2018-12-05 ENCOUNTER — OFFICE VISIT (OUTPATIENT)
Dept: DERMATOLOGY | Facility: CLINIC | Age: 68
End: 2018-12-05
Payer: COMMERCIAL

## 2018-12-05 VITALS — DIASTOLIC BLOOD PRESSURE: 71 MMHG | OXYGEN SATURATION: 97 % | SYSTOLIC BLOOD PRESSURE: 120 MMHG | HEART RATE: 67 BPM

## 2018-12-05 DIAGNOSIS — C44.619 BASAL CELL CARCINOMA OF LEFT SHOULDER: Primary | ICD-10-CM

## 2018-12-05 PROCEDURE — 88331 PATH CONSLTJ SURG 1 BLK 1SPC: CPT | Performed by: DERMATOLOGY

## 2018-12-05 PROCEDURE — 13120 CMPLX RPR S/A/L 1.1-2.5 CM: CPT | Mod: 79 | Performed by: DERMATOLOGY

## 2018-12-05 PROCEDURE — 11602 EXC TR-EXT MAL+MARG 1.1-2 CM: CPT | Mod: 79 | Performed by: DERMATOLOGY

## 2018-12-05 NOTE — MR AVS SNAPSHOT
After Visit Summary   2018    Siomara Hansen    MRN: [id number]           Patient Information     Date Of Birth          1950        Visit Information        Provider Department      2018 8:00 AM Markus Saab MD [facility]        Today's Diagnoses     Basal cell carcinoma of left shoulder    -  1      Care Instructions    Open Wound Care     for _left post shoulder_        ? No strenuous activity for 48 hours    ? Take Tylenol as needed for discomfort.                                                .         ? Do not drink alcoholic beverages for 48 hours.    ? Keep the pressure bandage in place for 24 hours. If the bandage becomes blood tinged or loose, reinforce it with gauze and tape.        (Refer to the reverse side of this page for management of bleeding).    ? Remove bandage in 24 hours and begin wound care as follows:     1. Clean area with tap water using a Q tip or gauze pad, (shower / bathe normally)  2. Dry wound with Q tip or gauze pad  3. Apply Aquaphor, Vaseline, Polysporin or Bacitracin Ointment with a Q tip    Do NOT use Neosporin Ointment *  4. Cover the wound with a band-aid or nonstick gauze pad and paper tape.  5. Repeat wound care once a day until wound is completely healed.    It is an old wives tale that a wound heals better when it is exposed to air and allowed to dry out. The wound will heal faster with a better cosmetic result if it is kept moist with ointment and covered with a bandage.  Do not let the wound dry out.      Supplies Needed:                Qtips or gauze pads                Polysporin or Bacitracin Ointment                Bandaids or nonstick gauze pads and paper tape    Wound care kits and brown paper tape are available for purchase at   the pharmacy.    BLEEDIN. Use tightly rolled up gauze or cloth to apply direct pressure over the bandage for 20   minutes.  2. Reapply pressure for an additional 20 minutes if  necessary  3. Call the office or go to the nearest emergency room if pressure fails to stop the bleeding.  4. Use additional gauze and tape to maintain pressure once the bleeding has stopped.  5. Begin wound care 24 hours after surgery as directed.                  WOUND HEALING    1. One week after surgery a pink / red halo will form around the outside of the wound.   This is new skin.  2. The center of the wound will appear yellowish white and produce some drainage.  3. The pink halo will slowly migrate in toward the center of the wound until the wound is covered with new shiny pink skin.  4. There will be no more drainage when the wound is completely healed.  5. It will take six months to one year for the redness to fade.  6. The scar may be itchy, tight and sensitive to extreme temperatures for a year after the surgery.  7. Massaging the area several times a day for several minutes after the wound is completely healed will help the scar soften and normalize faster. Begin massage only after healing is complete.      In case of emergency call: Dr Saab: [phone]     [facility]: [phone]    [facility]: [phone]            Follow-ups after your visit        Your next 10 appointments already scheduled     2019 11:00 AM CST   [facility]us [facility]ab [facility]   [facility]    [address] [address] [phone]              Who to contact     If you have questions or need follow up information about today's clinic visit or your schedule please contact [facility] directly at [phone].  Normal or non-critical lab and imaging results will be communicated to you by MyChart, letter or phone within 4 business days after the clinic has received the results. If you do not hear from us within 7 days, please contact the clinic through MyChart or phone. If you have a critical or abnormal lab  result, we will notify you by phone as soon as possible.  Submit refill requests through [employer] or call your pharmacy and they will forward the refill request to us. Please allow 3 business days for your refill to be completed.          Additional Information About Your Visit        [employer] Information     [employer] gives you secure access to your electronic health record. If you see a primary care provider, you can also send messages to your care team and make appointments. If you have questions, please call your primary care clinic.  If you do not have a primary care provider, please call [phone] and they will assist you.        Care EveryWhere ID     This is your Care EveryWhere ID. This could be used by other organizations to access your [place] medical records  [id number]        Your Vitals Were     Pulse Pulse Oximetry                67 97%           Blood Pressure from Last 3 Encounters:   12/05/18 120/71   11/28/18 129/72   10/18/18 117/68    Weight from Last 3 Encounters:   03/22/16 74.8 kg (165 lb)   10/21/13 74.8 kg (165 lb)   06/12/13 74.4 kg (164 lb)              We Performed the Following     [address] - EXC MALIG SKIN LESION TRUNK/ARM/LEG 1.1-2.0 CM     CL FROZEN SECTION FIRST SPEC        Primary Care Provider Office Phone # Fax #    Ludy HOLDEN GERALDINE Tadeo [phone]       [address] [address]        Equal Access to Services     [facility]: Hadii aad ku hadasho Soomaali, waaxda luqadaha, qaybta kaalmada adeegyada, niles paniagua . So [facility] [phone].    ATENCIÓN: Si habla español, tiene a barahona disposición servicios gratuitos de asistencia lingüística. Llame al [phone].    We comply with applicable federal civil rights laws and [place] laws. We do not discriminate on the basis of race, color, national origin, age, disability, sex, sexual orientation, or gender identity.            Thank you!     Thank you for choosing  [facility]  for your care. Our goal is always to provide you with excellent care. Hearing back from our patients is one way we can continue to improve our services. Please take a few minutes to complete the written survey that you may receive in the mail after your visit with us. Thank you!             Your Updated Medication List - Protect others around you: Learn how to safely use, store and throw away your medicines at www.disposemymeds.org.          This list is accurate as of 12/5/18  9:16 AM.  Always use your most recent med list.                   Brand Name Dispense Instructions for use Diagnosis    acyclovir 200 MG capsule    ZOVIRAX          albuterol 108 (90 Base) MCG/ACT inhaler    PROAIR HFA/PROVENTIL HFA/VENTOLIN HFA     Inhale 1-2 puffs into the lungs        CALCIUM 1200 PO      Take  by mouth.        citalopram 20 MG tablet    celeXA     Take 20 mg by mouth        diclofenac 1 % topical gel    VOLTAREN     Apply nickel size amount, 4g, to left hand and neck 2 times daily        fish oil-omega-3 fatty acids 1000 MG capsule      Take 2 g by mouth daily.        FLAX PO      Take  by mouth.        ketoconazole 2 % external cream    NIZORAL     Apply thin coat to forehead and bridge of nose        losartan 50 MG tablet    COZAAR      Hypertrophic scar, SK (seborrheic keratosis), Lentigo, History of skin cancer       omeprazole 20 MG tablet     30 tablet    Take 1 tablet (20 mg) by mouth daily Take 30-60 minutes before a meal.    GERD (gastroesophageal reflux disease)       PERDIEM OR      Take  by mouth.        valACYclovir 500 MG tablet    VALTREX    6 tablet    Take 1 tablet by mouth 2 times daily.    HSV-2 (herpes simplex virus 2) infection

## 2018-12-05 NOTE — LETTER
12/5/2018         RE: Siomara Hansen  [address] [place] [id number]        Dear Colleague,    Thank you for referring your patient, Siomara Hansen, to the [facility]. Please see a copy of my visit note below.    Siomara Hansen is a 68 year old year old female patient here today for e of basal cell carcinoma on left shoulder. Patient has no other skin complaints today.  Remainder of the HPI, Meds, PMH, Allergies, FH, and SH was reviewed in chart.      Past Medical History:   Diagnosis Date     Basal cell carcinoma      Breast cancer (H) 2000     Colon polyp     exam every 5 years       Past Surgical History:   Procedure Laterality Date     GYN SURGERY  1986    D & C     masectomy[  02/2000    Bilateral        Family History   Problem Relation Age of Onset     Cancer Mother      Lung     Breast Cancer Mother      Cancer Father      Braine     Cancer Brother      Bladder     Heart Disease Brother      Breast Cancer Sister      Breast Cancer Sister      Cancer Brother      Lung     Melanoma No family hx of        Social History     Social History     Marital status: Single     Spouse name: N/A     Number of children: N/A     Years of education: N/A     Occupational History     Not on file.     Social History Main Topics     Smoking status: Former Smoker     Packs/day: 0.50     Years: 15.00     Types: Cigarettes     Quit date: 1/5/1984     Smokeless tobacco: Never Used     Alcohol use Yes      Comment: Rare     Drug use: No     Sexual activity: No     Other Topics Concern     Not on file     Social History Narrative       Outpatient Encounter Prescriptions as of 12/5/2018   Medication Sig Dispense Refill     acyclovir (ZOVIRAX) 200 MG capsule        albuterol (PROAIR HFA/PROVENTIL HFA/VENTOLIN HFA) 108 (90 Base) MCG/ACT inhaler Inhale 1-2 puffs into the lungs       Calcium Carbonate-Vit D-Min (CALCIUM 1200 PO) Take  by mouth.       citalopram (CELEXA) 20 MG tablet Take 20 mg by mouth        diclofenac (VOLTAREN) 1 % GEL topical gel Apply nickel size amount, 4g, to left hand and neck 2 times daily       fish oil-omega-3 fatty acids (FISH OIL) 1000 MG capsule Take 2 g by mouth daily.       Flaxseed, Linseed, (FLAX PO) Take  by mouth.       ketoconazole (NIZORAL) 2 % cream Apply thin coat to forehead and bridge of nose       losartan (COZAAR) 50 MG tablet        omeprazole 20 MG tablet Take 1 tablet (20 mg) by mouth daily Take 30-60 minutes before a meal. 30 tablet 5     Senna-Psyllium (PERDIEM OR) Take  by mouth.       valACYclovir (VALTREX) 500 MG tablet Take 1 tablet by mouth 2 times daily. (Patient not taking: Reported on 10/18/2018) 6 tablet 3     No facility-administered encounter medications on file as of 12/5/2018.              Review Of Systems  Skin: As above  Eyes: negative  Ears/Nose/Throat: negative  Respiratory: No shortness of breath, dyspnea on exertion, cough, or hemoptysis  Cardiovascular: negative  Gastrointestinal: negative  Genitourinary: negative  Musculoskeletal: negative  Neurologic: negative  Psychiatric: negative  Hematologic/Lymphatic/Immunologic: negative  Endocrine: negative      O:   NAD, WDWN, Alert & Oriented, Mood & Affect wnl, Vitals stable   Here today alone   /71  Pulse 67  SpO2 97%   General appearance normal   Vitals stable   Alert, oriented and in no acute distress     L shoulder 6mm scaly papule       Eyes: Conjunctivae/lids:Normal     ENT: Lips, buccal mucosa, tongue: normal    MSK:Normal    Cardiovascular: peripheral edema none    Pulm: Breathing Normal    Lymph Nodes: No Head and Neck Lymphadenopathy     Neuro/Psych: Orientation:Normal; Mood/Affect:Normal      MICRO:   L shouldeR:Unremarkable epidermis with parallel bundles of cellular collagen within the superficial dermis.  No concerning areas for malignancy.     A/P:  1. L shoulder basal cell carcinoma   EXCISION OF BENIGN LESION AND COMPLEX: After thorough discussion of PGACAC, consent obtained,  anesthesia and prep, the margins of the lesion were identified and an elliptical incision was made encompassing the lesion. The incisions were made through the skin and down to and including the subcutaneous tissue. The lesion was removed en bloc and submitted for perms  pathologic review. The wound edges were widely undermined until adequate tissue mobility was obtained. hemostasis was achieved. The wound edges were then closed in a layered fashion, being careful not to leave any dead space. Postoperative length was 1.1 cm.   EBL minimal; complications none; wound care routine. The patient was discharged in good condition and will return in one week for wound evaluation.  BENIGN LESIONS DISCUSSED WITH PATIENT:  I discussed the specifics of tumor, prognosis, and genetics of benign lesions.  I explained that treatment of these lesions would be purely cosmetic and not medically neccessary.  I discussed with patient different removal options including excision, cautery and /or laser.      Nature and genetics of benign skin lesions dicussed with patient.  Signs and Symptoms of skin cancer discussed with patient.  ABCDEs of melanoma reviewed with patient.  Patient encouraged to perform monthly skin exams.  UV precautions reviewed with patient.  Patient to follow up with Primary Care provider regarding elevated blood pressure.  Skin care regimen reviewed with patient: Eliminate harsh soaps, i.e. Dial, zest, irsih spring; Mild soaps such as Cetaphil or Dove sensitive skin, avoid hot or cold showers, aggressive use of emollients including vanicream, cetaphil or cerave discussed with patient.    Risks of non-melanoma skin cancer discussed with patient   Return to clinic 6 months        Again, thank you for allowing me to participate in the care of your patient.        Sincerely,        Markus Saab MD

## 2018-12-05 NOTE — PROGRESS NOTES
Siomara Hansen is a 68 year old year old female patient here today for e of basal cell carcinoma on left shoulder. Patient has no other skin complaints today.  Remainder of the HPI, Meds, PMH, Allergies, FH, and SH was reviewed in chart.      Past Medical History:   Diagnosis Date     Basal cell carcinoma      Breast cancer (H) 2000     Colon polyp     exam every 5 years       Past Surgical History:   Procedure Laterality Date     GYN SURGERY  1986    D & C     masectomy[  02/2000    Bilateral        Family History   Problem Relation Age of Onset     Cancer Mother      Lung     Breast Cancer Mother      Cancer Father      Braine     Cancer Brother      Bladder     Heart Disease Brother      Breast Cancer Sister      Breast Cancer Sister      Cancer Brother      Lung     Melanoma No family hx of        Social History     Social History     Marital status: Single     Spouse name: N/A     Number of children: N/A     Years of education: N/A     Occupational History     Not on file.     Social History Main Topics     Smoking status: Former Smoker     Packs/day: 0.50     Years: 15.00     Types: Cigarettes     Quit date: 1/5/1984     Smokeless tobacco: Never Used     Alcohol use Yes      Comment: Rare     Drug use: No     Sexual activity: No     Other Topics Concern     Not on file     Social History Narrative       Outpatient Encounter Prescriptions as of 12/5/2018   Medication Sig Dispense Refill     acyclovir (ZOVIRAX) 200 MG capsule        albuterol (PROAIR HFA/PROVENTIL HFA/VENTOLIN HFA) 108 (90 Base) MCG/ACT inhaler Inhale 1-2 puffs into the lungs       Calcium Carbonate-Vit D-Min (CALCIUM 1200 PO) Take  by mouth.       citalopram (CELEXA) 20 MG tablet Take 20 mg by mouth       diclofenac (VOLTAREN) 1 % GEL topical gel Apply nickel size amount, 4g, to left hand and neck 2 times daily       fish oil-omega-3 fatty acids (FISH OIL) 1000 MG capsule Take 2 g by mouth daily.       Flaxseed, Linseed, (FLAX PO) Take  by  mouth.       ketoconazole (NIZORAL) 2 % cream Apply thin coat to forehead and bridge of nose       losartan (COZAAR) 50 MG tablet        omeprazole 20 MG tablet Take 1 tablet (20 mg) by mouth daily Take 30-60 minutes before a meal. 30 tablet 5     Senna-Psyllium (PERDIEM OR) Take  by mouth.       valACYclovir (VALTREX) 500 MG tablet Take 1 tablet by mouth 2 times daily. (Patient not taking: Reported on 10/18/2018) 6 tablet 3     No facility-administered encounter medications on file as of 12/5/2018.              Review Of Systems  Skin: As above  Eyes: negative  Ears/Nose/Throat: negative  Respiratory: No shortness of breath, dyspnea on exertion, cough, or hemoptysis  Cardiovascular: negative  Gastrointestinal: negative  Genitourinary: negative  Musculoskeletal: negative  Neurologic: negative  Psychiatric: negative  Hematologic/Lymphatic/Immunologic: negative  Endocrine: negative      O:   NAD, WDWN, Alert & Oriented, Mood & Affect wnl, Vitals stable   Here today alone   /71  Pulse 67  SpO2 97%   General appearance normal   Vitals stable   Alert, oriented and in no acute distress     L shoulder 6mm scaly papule       Eyes: Conjunctivae/lids:Normal     ENT: Lips, buccal mucosa, tongue: normal    MSK:Normal    Cardiovascular: peripheral edema none    Pulm: Breathing Normal    Lymph Nodes: No Head and Neck Lymphadenopathy     Neuro/Psych: Orientation:Normal; Mood/Affect:Normal      MICRO:   L shouldeR:Unremarkable epidermis with parallel bundles of cellular collagen within the superficial dermis.  No concerning areas for malignancy.     A/P:  1. L shoulder basal cell carcinoma   EXCISION OF BENIGN LESION AND COMPLEX: After thorough discussion of PGACAC, consent obtained, anesthesia and prep, the margins of the lesion were identified and an elliptical incision was made encompassing the lesion. The incisions were made through the skin and down to and including the subcutaneous tissue. The lesion was removed en  bloc and submitted for perms  pathologic review. The wound edges were widely undermined until adequate tissue mobility was obtained. hemostasis was achieved. The wound edges were then closed in a layered fashion, being careful not to leave any dead space. Postoperative length was 1.1 cm.   EBL minimal; complications none; wound care routine. The patient was discharged in good condition and will return in one week for wound evaluation.  BENIGN LESIONS DISCUSSED WITH PATIENT:  I discussed the specifics of tumor, prognosis, and genetics of benign lesions.  I explained that treatment of these lesions would be purely cosmetic and not medically neccessary.  I discussed with patient different removal options including excision, cautery and /or laser.      Nature and genetics of benign skin lesions dicussed with patient.  Signs and Symptoms of skin cancer discussed with patient.  ABCDEs of melanoma reviewed with patient.  Patient encouraged to perform monthly skin exams.  UV precautions reviewed with patient.  Patient to follow up with Primary Care provider regarding elevated blood pressure.  Skin care regimen reviewed with patient: Eliminate harsh soaps, i.e. Dial, zest, irsih spring; Mild soaps such as Cetaphil or Dove sensitive skin, avoid hot or cold showers, aggressive use of emollients including vanicream, cetaphil or cerave discussed with patient.    Risks of non-melanoma skin cancer discussed with patient   Return to clinic 6 months

## 2018-12-05 NOTE — PATIENT INSTRUCTIONS
Open Wound Care     for _left post shoulder_        ? No strenuous activity for 48 hours    ? Take Tylenol as needed for discomfort.                                                .         ? Do not drink alcoholic beverages for 48 hours.    ? Keep the pressure bandage in place for 24 hours. If the bandage becomes blood tinged or loose, reinforce it with gauze and tape.        (Refer to the reverse side of this page for management of bleeding).    ? Remove bandage in 24 hours and begin wound care as follows:     1. Clean area with tap water using a Q tip or gauze pad, (shower / bathe normally)  2. Dry wound with Q tip or gauze pad  3. Apply Aquaphor, Vaseline, Polysporin or Bacitracin Ointment with a Q tip    Do NOT use Neosporin Ointment *  4. Cover the wound with a band-aid or nonstick gauze pad and paper tape.  5. Repeat wound care once a day until wound is completely healed.    It is an old wives tale that a wound heals better when it is exposed to air and allowed to dry out. The wound will heal faster with a better cosmetic result if it is kept moist with ointment and covered with a bandage.  Do not let the wound dry out.      Supplies Needed:                Qtips or gauze pads                Polysporin or Bacitracin Ointment                Bandaids or nonstick gauze pads and paper tape    Wound care kits and brown paper tape are available for purchase at   the pharmacy.    BLEEDIN. Use tightly rolled up gauze or cloth to apply direct pressure over the bandage for 20   minutes.  2. Reapply pressure for an additional 20 minutes if necessary  3. Call the office or go to the nearest emergency room if pressure fails to stop the bleeding.  4. Use additional gauze and tape to maintain pressure once the bleeding has stopped.  5. Begin wound care 24 hours after surgery as directed.                  WOUND HEALING    1. One week after surgery a pink / red halo will form around the outside of the wound.   This is  new skin.  2. The center of the wound will appear yellowish white and produce some drainage.  3. The pink halo will slowly migrate in toward the center of the wound until the wound is covered with new shiny pink skin.  4. There will be no more drainage when the wound is completely healed.  5. It will take six months to one year for the redness to fade.  6. The scar may be itchy, tight and sensitive to extreme temperatures for a year after the surgery.  7. Massaging the area several times a day for several minutes after the wound is completely healed will help the scar soften and normalize faster. Begin massage only after healing is complete.      In case of emergency call: Dr Saab: [phone]     [facility]: [phone]    [facility]: [phone]

## 2018-12-05 NOTE — NURSING NOTE
"Initial /71  Pulse 67  SpO2 97% Estimated body mass index is 26.63 kg/(m^2) as calculated from the following:    Height as of 3/22/16: 1.676 m (5' 6\").    Weight as of 3/22/16: 74.8 kg (165 lb). .      "

## 2019-01-16 ENCOUNTER — OFFICE VISIT (OUTPATIENT)
Dept: DERMATOLOGY | Facility: CLINIC | Age: 69
End: 2019-01-16
Payer: COMMERCIAL

## 2019-01-16 VITALS — TEMPERATURE: 98.4 F | SYSTOLIC BLOOD PRESSURE: 136 MMHG | HEART RATE: 67 BPM | DIASTOLIC BLOOD PRESSURE: 81 MMHG

## 2019-01-16 DIAGNOSIS — C44.311 BASAL CELL CARCINOMA (BCC) OF DORSUM OF NOSE: Primary | ICD-10-CM

## 2019-01-16 PROCEDURE — 11313 SHAVE SKIN LESION >2.0 CM: CPT | Performed by: DERMATOLOGY

## 2019-01-16 NOTE — PATIENT INSTRUCTIONS
Open Wound Care     for __nose____________        ? No strenuous activity for 48 hours    ? Take Tylenol as needed for discomfort.                                                .         ? Do not drink alcoholic beverages for 48 hours.    ? Keep the pressure bandage in place for 24 hours. If the bandage becomes blood tinged or loose, reinforce it with gauze and tape.        (Refer to the reverse side of this page for management of bleeding).    ? Remove bandage in 24 hours and begin wound care as follows:     1. Clean area with tap water using a Q tip or gauze pad, (shower / bathe normally)  2. Dry wound with Q tip or gauze pad  3. Apply Aquaphor, Vaseline, Polysporin or Bacitracin Ointment with a Q tip    Do NOT use Neosporin Ointment *  4. Cover the wound with a band-aid or nonstick gauze pad and paper tape.  5. Repeat wound care once a day until wound is completely healed.    It is an old wives tale that a wound heals better when it is exposed to air and allowed to dry out. The wound will heal faster with a better cosmetic result if it is kept moist with ointment and covered with a bandage.  Do not let the wound dry out.      Supplies Needed:                Qtips or gauze pads                Polysporin or Bacitracin Ointment                Bandaids or nonstick gauze pads and paper tape    Wound care kits and brown paper tape are available for purchase at   the pharmacy.    BLEEDIN. Use tightly rolled up gauze or cloth to apply direct pressure over the bandage for 20   minutes.  2. Reapply pressure for an additional 20 minutes if necessary  3. Call the office or go to the nearest emergency room if pressure fails to stop the bleeding.  4. Use additional gauze and tape to maintain pressure once the bleeding has stopped.  5. Begin wound care 24 hours after surgery as directed.                  WOUND HEALING    1. One week after surgery a pink / red halo will form around the outside of the wound.   This is new  skin.  2. The center of the wound will appear yellowish white and produce some drainage.  3. The pink halo will slowly migrate in toward the center of the wound until the wound is covered with new shiny pink skin.  4. There will be no more drainage when the wound is completely healed.  5. It will take six months to one year for the redness to fade.  6. The scar may be itchy, tight and sensitive to extreme temperatures for a year after the surgery.  7. Massaging the area several times a day for several minutes after the wound is completely healed will help the scar soften and normalize faster. Begin massage only after healing is complete.      In case of emergency call: Dr Saab: [phone]     [facility]: [phone]    [facility]: [phone]

## 2019-01-16 NOTE — LETTER
2019         RE: Siomara Hansen  [address]  \[facility]\"" [address]        Dear Colleague,    Thank you for referring your patient, Siomara Hansen, to the [facility]. Please see a copy of my visit note below.    Siomara Hansen is a 68 year old year old female patient here today for hx of non-melanoma skin cancer here for abrasion of sebaceous hyperplasia on nose.  Patient reports the following modifying factors none.  Associated symptoms: none.  Patient has no other skin complaints today.  Remainder of the HPI, Meds, PMH, Allergies, FH, and SH was reviewed in chart.      Past Medical History:   Diagnosis Date     Basal cell carcinoma      Breast cancer (H)      Colon polyp     exam every 5 years       Past Surgical History:   Procedure Laterality Date     GYN SURGERY      D & C     masectomy[  2000    Bilateral        Family History   Problem Relation Age of Onset     Cancer Mother         Lung     Breast Cancer Mother      Cancer Father         Braine     Cancer Brother         Bladder     Heart Disease Brother      Breast Cancer Sister      Breast Cancer Sister      Cancer Brother         Lung     Melanoma No family hx of        Social History     Socioeconomic History     Marital status: Single     Spouse name: Not on file     Number of children: Not on file     Years of education: Not on file     Highest education level: Not on file   Social Needs     Financial resource strain: Not on file     Food insecurity - worry: Not on file     Food insecurity - inability: Not on file     Transportation needs - medical: Not on file     Transportation needs - non-medical: Not on file   Occupational History     Not on file   Tobacco Use     Smoking status: Former Smoker     Packs/day: 0.50     Years: 15.00     Pack years: 7.50     Types: Cigarettes     Last attempt to quit: 1984     Years since quittin.0     Smokeless tobacco: Never Used   Substance and Sexual Activity      Alcohol use: Yes     Comment: Rare     Drug use: No     Sexual activity: No   Other Topics Concern     Parent/sibling w/ CABG, MI or angioplasty before 65F 55M? Not Asked   Social History Narrative     Not on file       Outpatient Encounter Medications as of 1/16/2019   Medication Sig Dispense Refill     acyclovir (ZOVIRAX) 200 MG capsule        albuterol (PROAIR HFA/PROVENTIL HFA/VENTOLIN HFA) 108 (90 Base) MCG/ACT inhaler Inhale 1-2 puffs into the lungs       Calcium Carbonate-Vit D-Min (CALCIUM 1200 PO) Take  by mouth.       citalopram (CELEXA) 20 MG tablet Take 20 mg by mouth       diclofenac (VOLTAREN) 1 % GEL topical gel Apply nickel size amount, 4g, to left hand and neck 2 times daily       fish oil-omega-3 fatty acids (FISH OIL) 1000 MG capsule Take 2 g by mouth daily.       Flaxseed, Linseed, (FLAX PO) Take  by mouth.       ketoconazole (NIZORAL) 2 % cream Apply thin coat to forehead and bridge of nose       losartan (COZAAR) 50 MG tablet        omeprazole 20 MG tablet Take 1 tablet (20 mg) by mouth daily Take 30-60 minutes before a meal. 30 tablet 5     Senna-Psyllium (PERDIEM OR) Take  by mouth.       valACYclovir (VALTREX) 500 MG tablet Take 1 tablet by mouth 2 times daily. (Patient not taking: Reported on 10/18/2018) 6 tablet 3     No facility-administered encounter medications on file as of 1/16/2019.              Review Of Systems  Skin: As above  Eyes: negative  Ears/Nose/Throat: negative  Respiratory: No shortness of breath, dyspnea on exertion, cough, or hemoptysis  Cardiovascular: negative  Gastrointestinal: negative  Genitourinary: negative  Musculoskeletal: negative  Neurologic: negative  Psychiatric: negative  Hematologic/Lymphatic/Immunologic: negative  Endocrine: negative      O:   NAD, WDWN, Alert & Oriented, Mood & Affect wnl, Vitals stable   Here today alone   /81   Pulse 67   Temp 98.4  F (36.9  C) (Tympanic)    General appearance normal   Vitals stable   Alert, oriented and  in no acute distress     Nose well healed scar with sebaceous hyperplasia       Eyes: Conjunctivae/lids:Normal     ENT: Lips, buccal mucosa, tongue: normal    MSK:Normal    Cardiovascular: peripheral edema none    Pulm: Breathing Normal    Neuro/Psych: Orientation:Normal; Mood/Affect:Normal      A/P:  1. Hx of non-melanoma skin cancer, sebaceous hyperplasia,   DERMABRASION: After PGACAC discussed with patient, decision for tangential excision and dermabrasion was made. After anesthesia with Lido/Epi/Clinda and prep with hibiclens, hypertrophic areas were tangentially excised and entire cosmetic unit was smoothed 2.5cm. Hemostasis was obtained with pressure. Patient tolerated procedure well. There were no complications and EBL minimal. Patient advised to keep abraded surfaces covered with generous ointment until healed, approximately 2 weeks. Return to office in 3 months or prn.      BENIGN LESIONS DISCUSSED WITH PATIENT:  I discussed the specifics of tumor, prognosis, and genetics of benign lesions.  I explained that treatment of these lesions would be purely cosmetic and not medically neccessary.  I discussed with patient different removal options including excision, cautery and /or laser.      Nature and genetics of benign skin lesions dicussed with patient.  Signs and Symptoms of skin cancer discussed with patient.  Patient encouraged to perform monthly skin exams.  UV precautions reviewed with patient.  Patient to follow up with Primary Care provider regarding elevated blood pressure.  Skin care regimen reviewed with patient: Eliminate harsh soaps, i.e. Dial, zest, irsih spring; Mild soaps such as Cetaphil or Dove sensitive skin, avoid hot or cold showers, aggressive use of emollients including vanicream, cetaphil or cerave discussed with patient.    Risks of non-melanoma skin cancer discussed with patient   Return to clinic 6 months      Again, thank you for allowing me to participate in the care of your  patient.        Sincerely,        Markus Saab MD

## 2019-01-16 NOTE — PROGRESS NOTES
Siomara Hansen is a 68 year old year old female patient here today for hx of non-melanoma skin cancer here for abrasion of sebaceous hyperplasia on nose.  Patient reports the following modifying factors none.  Associated symptoms: none.  Patient has no other skin complaints today.  Remainder of the HPI, Meds, PMH, Allergies, FH, and SH was reviewed in chart.      Past Medical History:   Diagnosis Date     Basal cell carcinoma      Breast cancer (H)      Colon polyp     exam every 5 years       Past Surgical History:   Procedure Laterality Date     GYN SURGERY      D & C     masectomy[  2000    Bilateral        Family History   Problem Relation Age of Onset     Cancer Mother         Lung     Breast Cancer Mother      Cancer Father         Braine     Cancer Brother         Bladder     Heart Disease Brother      Breast Cancer Sister      Breast Cancer Sister      Cancer Brother         Lung     Melanoma No family hx of        Social History     Socioeconomic History     Marital status: Single     Spouse name: Not on file     Number of children: Not on file     Years of education: Not on file     Highest education level: Not on file   Social Needs     Financial resource strain: Not on file     Food insecurity - worry: Not on file     Food insecurity - inability: Not on file     Transportation needs - medical: Not on file     Transportation needs - non-medical: Not on file   Occupational History     Not on file   Tobacco Use     Smoking status: Former Smoker     Packs/day: 0.50     Years: 15.00     Pack years: 7.50     Types: Cigarettes     Last attempt to quit: 1984     Years since quittin.0     Smokeless tobacco: Never Used   Substance and Sexual Activity     Alcohol use: Yes     Comment: Rare     Drug use: No     Sexual activity: No   Other Topics Concern     Parent/sibling w/ CABG, MI or angioplasty before 65F 55M? Not Asked   Social History Narrative     Not on file       Outpatient Encounter  Medications as of 1/16/2019   Medication Sig Dispense Refill     acyclovir (ZOVIRAX) 200 MG capsule        albuterol (PROAIR HFA/PROVENTIL HFA/VENTOLIN HFA) 108 (90 Base) MCG/ACT inhaler Inhale 1-2 puffs into the lungs       Calcium Carbonate-Vit D-Min (CALCIUM 1200 PO) Take  by mouth.       citalopram (CELEXA) 20 MG tablet Take 20 mg by mouth       diclofenac (VOLTAREN) 1 % GEL topical gel Apply nickel size amount, 4g, to left hand and neck 2 times daily       fish oil-omega-3 fatty acids (FISH OIL) 1000 MG capsule Take 2 g by mouth daily.       Flaxseed, Linseed, (FLAX PO) Take  by mouth.       ketoconazole (NIZORAL) 2 % cream Apply thin coat to forehead and bridge of nose       losartan (COZAAR) 50 MG tablet        omeprazole 20 MG tablet Take 1 tablet (20 mg) by mouth daily Take 30-60 minutes before a meal. 30 tablet 5     Senna-Psyllium (PERDIEM OR) Take  by mouth.       valACYclovir (VALTREX) 500 MG tablet Take 1 tablet by mouth 2 times daily. (Patient not taking: Reported on 10/18/2018) 6 tablet 3     No facility-administered encounter medications on file as of 1/16/2019.              Review Of Systems  Skin: As above  Eyes: negative  Ears/Nose/Throat: negative  Respiratory: No shortness of breath, dyspnea on exertion, cough, or hemoptysis  Cardiovascular: negative  Gastrointestinal: negative  Genitourinary: negative  Musculoskeletal: negative  Neurologic: negative  Psychiatric: negative  Hematologic/Lymphatic/Immunologic: negative  Endocrine: negative      O:   NAD, WDWN, Alert & Oriented, Mood & Affect wnl, Vitals stable   Here today alone   /81   Pulse 67   Temp 98.4  F (36.9  C) (Tympanic)    General appearance normal   Vitals stable   Alert, oriented and in no acute distress     Nose well healed scar with sebaceous hyperplasia       Eyes: Conjunctivae/lids:Normal     ENT: Lips, buccal mucosa, tongue: normal    MSK:Normal    Cardiovascular: peripheral edema none    Pulm: Breathing  Normal    Neuro/Psych: Orientation:Normal; Mood/Affect:Normal      A/P:  1. Hx of non-melanoma skin cancer, sebaceous hyperplasia,   DERMABRASION: After PGACAC discussed with patient, decision for tangential excision and dermabrasion was made. After anesthesia with Lido/Epi/Clinda and prep with hibiclens, hypertrophic areas were tangentially excised and entire cosmetic unit was smoothed 2.5cm. Hemostasis was obtained with pressure. Patient tolerated procedure well. There were no complications and EBL minimal. Patient advised to keep abraded surfaces covered with generous ointment until healed, approximately 2 weeks. Return to office in 3 months or prn.      BENIGN LESIONS DISCUSSED WITH PATIENT:  I discussed the specifics of tumor, prognosis, and genetics of benign lesions.  I explained that treatment of these lesions would be purely cosmetic and not medically neccessary.  I discussed with patient different removal options including excision, cautery and /or laser.      Nature and genetics of benign skin lesions dicussed with patient.  Signs and Symptoms of skin cancer discussed with patient.  Patient encouraged to perform monthly skin exams.  UV precautions reviewed with patient.  Patient to follow up with Primary Care provider regarding elevated blood pressure.  Skin care regimen reviewed with patient: Eliminate harsh soaps, i.e. Dial, zest, irsih spring; Mild soaps such as Cetaphil or Dove sensitive skin, avoid hot or cold showers, aggressive use of emollients including vanicream, cetaphil or cerave discussed with patient.    Risks of non-melanoma skin cancer discussed with patient   Return to clinic 6 months

## 2019-01-16 NOTE — NURSING NOTE
"Initial /81   Pulse 67   Temp 98.4  F (36.9  C) (Tympanic)  Estimated body mass index is 26.63 kg/m  as calculated from the following:    Height as of 3/22/16: 1.676 m (5' 6\").    Weight as of 3/22/16: 74.8 kg (165 lb). .    Lawanda Scales LPN    "

## 2019-06-26 ENCOUNTER — OFFICE VISIT (OUTPATIENT)
Dept: DERMATOLOGY | Facility: CLINIC | Age: 69
End: 2019-06-26
Payer: COMMERCIAL

## 2019-06-26 VITALS
HEART RATE: 68 BPM | DIASTOLIC BLOOD PRESSURE: 88 MMHG | BODY MASS INDEX: 26.63 KG/M2 | SYSTOLIC BLOOD PRESSURE: 141 MMHG | HEIGHT: 66 IN

## 2019-06-26 DIAGNOSIS — Z85.828 HISTORY OF BASAL CELL CANCER: ICD-10-CM

## 2019-06-26 DIAGNOSIS — L81.4 LENTIGO: Primary | ICD-10-CM

## 2019-06-26 DIAGNOSIS — D22.9 MULTIPLE BENIGN NEVI: ICD-10-CM

## 2019-06-26 DIAGNOSIS — L82.1 SEBORRHEIC KERATOSIS: ICD-10-CM

## 2019-06-26 DIAGNOSIS — D18.01 CHERRY ANGIOMA: ICD-10-CM

## 2019-06-26 DIAGNOSIS — L82.0 INFLAMED SEBORRHEIC KERATOSIS: ICD-10-CM

## 2019-06-26 PROCEDURE — 99214 OFFICE O/P EST MOD 30 MIN: CPT | Mod: 25 | Performed by: PHYSICIAN ASSISTANT

## 2019-06-26 PROCEDURE — 17110 DESTRUCTION B9 LES UP TO 14: CPT | Performed by: PHYSICIAN ASSISTANT

## 2019-06-26 NOTE — PATIENT INSTRUCTIONS
WOUND CARE INSTRUCTIONS   FOR CRYOSURGERY (areas treated with liquid nitrogen)  This area treated with liquid nitrogen should form a blister (areas treated may or may not blister-skin may just turn dark and slough off). You do not need to bandage the area unless a blister forms and breaks (which may be a few days). When the blister breaks, begin daily dressing changes as follows:   1) Clean and dry the area with tap water using clean Q-tip or sterile gauze pad.   2) Apply Polysporin ointment or Bacitracin ointment over entire wound. Do NOT use Neosporin ointment.   3) Cover the wound with a band-aid or sterile non-stick gauze pad and micropore paper tape.   REPEAT THESE INSTRUCTIONS AT LEAST ONCE A DAY UNTIL THE WOUND HAS COMPLETELY HEALED.   It is an old wives tale that a wound heals better when it is exposed to air and allowed to dry out. The wound will heal faster with a better cosmetic result if it is kept moist with ointment and covered with a bandage.   *Do not let the wound dry out.   IMPORTANT INFORMATION ON REVERSE SIDE   Supplies Needed:   *Cotton tipped applicators (Q-tips)   *Polysporin ointment or Bacitracin ointment (NOT NEOSPORIN)   *Band-aids, or non stick gauze pads and micropore paper tape   PATIENT INFORMATION   During the healing process you will notice a number of changes. All wounds develop a small halo of redness surrounding the wound. This means healing is occurring. Severe itching with extensive redness usually indicates sensitivity to the ointment or bandage tape used to dress the wound. You should call our office if this develops.   Swelling and/or discoloration around your surgical site is common, particularly when performed around the eye.   All wounds normally drain. The larger the wound the more drainage there will be. After 7-10 days, you will notice the wound beginning to shrink and new skin will begin to grow. The wound is healed when you can see skin has formed over the entire  area. A healed wound has a healthy, shiny look to the surface and is red to dark pink in color to normalize. Wounds may take approximately 4-6 weeks to heal. Larger wounds may take 6-8 weeks. After the wound is healed you may discontinue dressing changes.   You may experience a sensation of tightness as your wound heals. This is normal and will gradually subside.   Your healed wound may be sensitive to temperature changes. This sensitivity improves with time, but if you re having a lot of discomfort, try to avoid temperature extremes.   Patients frequently experience itching after their wound appears to have healed because of the continue healing under the skin. Plain Vaseline will help relieve the itching.

## 2019-06-26 NOTE — LETTER
2019         RE: Siomara Hansen  [address]  [facility] [address]        Dear Colleague,    Thank you for referring your patient, Siomara Hansen, to the [facility]. Please see a copy of my visit note below.    Siomara Hansen is a 68 year old year old female patient here today for skin check. She notes a few itchy areas that get irritated by her bra on her shoulders. She denies any pain or bleeding. Notes redness after dermabrasion and mohs procedure on nose. Patient has no other skin complaints today.  Remainder of the HPI, Meds, PMH, Allergies, FH, and SH was reviewed in chart.    Pertinent Hx: History of BCC  Past Medical History:   Diagnosis Date     Basal cell carcinoma      Breast cancer (H)      Colon polyp     exam every 5 years       Past Surgical History:   Procedure Laterality Date     GYN SURGERY      D & C     masectomy[  2000    Bilateral        Family History   Problem Relation Age of Onset     Cancer Mother         Lung     Breast Cancer Mother      Cancer Father         Braine     Cancer Brother         Bladder     Heart Disease Brother      Breast Cancer Sister      Breast Cancer Sister      Cancer Brother         Lung     Melanoma No family hx of        Social History     Socioeconomic History     Marital status: Single     Spouse name: Not on file     Number of children: Not on file     Years of education: Not on file     Highest education level: Not on file   Occupational History     Not on file   Social Needs     Financial resource strain: Not on file     Food insecurity:     Worry: Not on file     Inability: Not on file     Transportation needs:     Medical: Not on file     Non-medical: Not on file   Tobacco Use     Smoking status: Former Smoker     Packs/day: 0.50     Years: 15.00     Pack years: 7.50     Types: Cigarettes     Last attempt to quit: 1984     Years since quittin.4     Smokeless tobacco: Never Used   Substance and Sexual  Activity     Alcohol use: Yes     Comment: Rare     Drug use: No     Sexual activity: Never   Lifestyle     Physical activity:     Days per week: Not on file     Minutes per session: Not on file     Stress: Not on file   Relationships     Social connections:     Talks on phone: Not on file     Gets together: Not on file     Attends [employer] service: Not on file     Active member of club or organization: Not on file     Attends meetings of clubs or organizations: Not on file     Relationship status: Not on file     Intimate partner violence:     Fear of current or ex partner: Not on file     Emotionally abused: Not on file     Physically abused: Not on file     Forced sexual activity: Not on file   Other Topics Concern     Parent/sibling w/ CABG, MI or angioplasty before 65F 55M? Not Asked   Social History Narrative     Not on file       Outpatient Encounter Medications as of 6/26/2019   Medication Sig Dispense Refill     acyclovir (ZOVIRAX) 200 MG capsule        albuterol (PROAIR HFA/PROVENTIL HFA/VENTOLIN HFA) 108 (90 Base) MCG/ACT inhaler Inhale 1-2 puffs into the lungs       Calcium Carbonate-Vit D-Min (CALCIUM 1200 PO) Take  by mouth.       citalopram (CELEXA) 20 MG tablet Take 20 mg by mouth       diclofenac (VOLTAREN) 1 % GEL topical gel Apply nickel size amount, 4g, to left hand and neck 2 times daily       fish oil-omega-3 fatty acids (FISH OIL) 1000 MG capsule Take 2 g by mouth daily.       Flaxseed, Linseed, (FLAX PO) Take  by mouth.       ketoconazole (NIZORAL) 2 % cream Apply thin coat to forehead and bridge of nose       losartan (COZAAR) 50 MG tablet        omeprazole 20 MG tablet Take 1 tablet (20 mg) by mouth daily Take 30-60 minutes before a meal. 30 tablet 5     Senna-Psyllium (PERDIEM OR) Take  by mouth.       valACYclovir (VALTREX) 500 MG tablet Take 1 tablet by mouth 2 times daily. (Patient not taking: Reported on 10/18/2018) 6 tablet 3     No facility-administered encounter medications on file  "as of 6/26/2019.              Review Of Systems  Skin: As above  Eyes: negative  Ears/Nose/Throat: negative  Respiratory: No shortness of breath, dyspnea on exertion, cough, or hemoptysis  Cardiovascular: negative  Gastrointestinal: negative  Genitourinary: negative  Musculoskeletal: negative  Neurologic: negative  Psychiatric: negative  Hematologic/Lymphatic/Immunologic: negative  Endocrine: negative      O:   NAD, WDWN, Alert & Oriented, Mood & Affect wnl, Vitals stable   Here today alone   /88   Pulse 68   Ht 1.676 m (5' 6\")   BMI 26.63 kg/m      General appearance normal   Vitals stable   Alert, oriented and in no acute distress      Well healed scar on nose with some telangiectasia    White papule within scar on nose  Stuck on papules and brown macules on trunk and ext   Red papules on trunk  Brown papules and macules with regular pigment network and borders on torso and extremities      The remainder of the detailed exam was unremarkable; the following areas were examined:  scalp/hair, conjunctiva/lids, face, neck, lips/teeth, oral mucosa/gingiva, chest, back, abdomen, buttocks, digits/nails, RUE, LUE, RLE, LLE.      Eyes: Conjunctivae/lids:Normal     ENT: Lips: normal    MSK:Normal    Cardiovascular: peripheral edema none    Pulm: Breathing Normal    Neuro/Psych: Orientation:Normal; Mood/Affect:Normal  A/P:  1. Inflamed seborrheic keratosis on left posterior shoulder x 3 and right anterior shoulder x 3  LN2:  Treated with LN2 for 5s for 1-2 cycles. Warned risks of blistering, pain, pigment change, scarring, and incomplete resolution.  Advised patient to return if lesions do not completely resolve.  Wound care sheet given.  2. Milia on nose x 1  With patient consent, unroofed and expressed. No charge.  3. Telangiectasia on nose after mohs  Consider IPL with Dr. Saab in fall to help with redness after mohs.  4. Seborrheic keratosis, lentigo, angioma, benign nevi. History of BCC   BENIGN LESIONS " DISCUSSED WITH PATIENT:  I discussed the specifics of tumor, prognosis, and genetics of benign lesions.  I explained that treatment of these lesions would be purely cosmetic and not medically neccessary.  I discussed with patient different removal options including excision, cautery and /or laser.      Nature and genetics of benign skin lesions dicussed with patient.  Signs and Symptoms of skin cancer discussed with patient.  ABCDEs of melanoma reviewed with patient.  Patient encouraged to perform monthly skin exams.  UV precautions reviewed with patient.  Risks of non-melanoma skin cancer discussed with patient   Return to clinic in one year or sooner if needed.   Siomara to follow up with Primary Care provider regarding elevated blood pressure.      Again, thank you for allowing me to participate in the care of your patient.        Sincerely,        Madyson Gray PA-C

## 2019-06-26 NOTE — PROGRESS NOTES
Siomara Hansen is a 68 year old year old female patient here today for skin check. She notes a few itchy areas that get irritated by her bra on her shoulders. She denies any pain or bleeding. Notes redness after dermabrasion and mohs procedure on nose. Patient has no other skin complaints today.  Remainder of the HPI, Meds, PMH, Allergies, FH, and SH was reviewed in chart.    Pertinent Hx: History of BCC  Past Medical History:   Diagnosis Date     Basal cell carcinoma      Breast cancer (H)      Colon polyp     exam every 5 years       Past Surgical History:   Procedure Laterality Date     GYN SURGERY      D & C     masectomy[  2000    Bilateral        Family History   Problem Relation Age of Onset     Cancer Mother         Lung     Breast Cancer Mother      Cancer Father         Braine     Cancer Brother         Bladder     Heart Disease Brother      Breast Cancer Sister      Breast Cancer Sister      Cancer Brother         Lung     Melanoma No family hx of        Social History     Socioeconomic History     Marital status: Single     Spouse name: Not on file     Number of children: Not on file     Years of education: Not on file     Highest education level: Not on file   Occupational History     Not on file   Social Needs     Financial resource strain: Not on file     Food insecurity:     Worry: Not on file     Inability: Not on file     Transportation needs:     Medical: Not on file     Non-medical: Not on file   Tobacco Use     Smoking status: Former Smoker     Packs/day: 0.50     Years: 15.00     Pack years: 7.50     Types: Cigarettes     Last attempt to quit: 1984     Years since quittin.4     Smokeless tobacco: Never Used   Substance and Sexual Activity     Alcohol use: Yes     Comment: Rare     Drug use: No     Sexual activity: Never   Lifestyle     Physical activity:     Days per week: Not on file     Minutes per session: Not on file     Stress: Not on file   Relationships     Social  connections:     Talks on phone: Not on file     Gets together: Not on file     Attends [employer] service: Not on file     Active member of club or organization: Not on file     Attends meetings of clubs or organizations: Not on file     Relationship status: Not on file     Intimate partner violence:     Fear of current or ex partner: Not on file     Emotionally abused: Not on file     Physically abused: Not on file     Forced sexual activity: Not on file   Other Topics Concern     Parent/sibling w/ CABG, MI or angioplasty before 65F 55M? Not Asked   Social History Narrative     Not on file       Outpatient Encounter Medications as of 6/26/2019   Medication Sig Dispense Refill     acyclovir (ZOVIRAX) 200 MG capsule        albuterol (PROAIR HFA/PROVENTIL HFA/VENTOLIN HFA) 108 (90 Base) MCG/ACT inhaler Inhale 1-2 puffs into the lungs       Calcium Carbonate-Vit D-Min (CALCIUM 1200 PO) Take  by mouth.       citalopram (CELEXA) 20 MG tablet Take 20 mg by mouth       diclofenac (VOLTAREN) 1 % GEL topical gel Apply nickel size amount, 4g, to left hand and neck 2 times daily       fish oil-omega-3 fatty acids (FISH OIL) 1000 MG capsule Take 2 g by mouth daily.       Flaxseed, Linseed, (FLAX PO) Take  by mouth.       ketoconazole (NIZORAL) 2 % cream Apply thin coat to forehead and bridge of nose       losartan (COZAAR) 50 MG tablet        omeprazole 20 MG tablet Take 1 tablet (20 mg) by mouth daily Take 30-60 minutes before a meal. 30 tablet 5     Senna-Psyllium (PERDIEM OR) Take  by mouth.       valACYclovir (VALTREX) 500 MG tablet Take 1 tablet by mouth 2 times daily. (Patient not taking: Reported on 10/18/2018) 6 tablet 3     No facility-administered encounter medications on file as of 6/26/2019.              Review Of Systems  Skin: As above  Eyes: negative  Ears/Nose/Throat: negative  Respiratory: No shortness of breath, dyspnea on exertion, cough, or hemoptysis  Cardiovascular: negative  Gastrointestinal:  "negative  Genitourinary: negative  Musculoskeletal: negative  Neurologic: negative  Psychiatric: negative  Hematologic/Lymphatic/Immunologic: negative  Endocrine: negative      O:   NAD, WDWN, Alert & Oriented, Mood & Affect wnl, Vitals stable   Here today alone   /88   Pulse 68   Ht 1.676 m (5' 6\")   BMI 26.63 kg/m     General appearance normal   Vitals stable   Alert, oriented and in no acute distress      Well healed scar on nose with some telangiectasia    White papule within scar on nose  Stuck on papules and brown macules on trunk and ext   Red papules on trunk  Brown papules and macules with regular pigment network and borders on torso and extremities      The remainder of the detailed exam was unremarkable; the following areas were examined:  scalp/hair, conjunctiva/lids, face, neck, lips/teeth, oral mucosa/gingiva, chest, back, abdomen, buttocks, digits/nails, RUE, LUE, RLE, LLE.      Eyes: Conjunctivae/lids:Normal     ENT: Lips: normal    MSK:Normal    Cardiovascular: peripheral edema none    Pulm: Breathing Normal    Neuro/Psych: Orientation:Normal; Mood/Affect:Normal  A/P:  1. Inflamed seborrheic keratosis on left posterior shoulder x 3 and right anterior shoulder x 3  LN2:  Treated with LN2 for 5s for 1-2 cycles. Warned risks of blistering, pain, pigment change, scarring, and incomplete resolution.  Advised patient to return if lesions do not completely resolve.  Wound care sheet given.  2. Milia on nose x 1  With patient consent, unroofed and expressed. No charge.  3. Telangiectasia on nose after mohs  Consider IPL with Dr. Saab in fall to help with redness after mohs.  4. Seborrheic keratosis, lentigo, angioma, benign nevi. History of BCC   BENIGN LESIONS DISCUSSED WITH PATIENT:  I discussed the specifics of tumor, prognosis, and genetics of benign lesions.  I explained that treatment of these lesions would be purely cosmetic and not medically neccessary.  I discussed with patient " different removal options including excision, cautery and /or laser.      Nature and genetics of benign skin lesions dicussed with patient.  Signs and Symptoms of skin cancer discussed with patient.  ABCDEs of melanoma reviewed with patient.  Patient encouraged to perform monthly skin exams.  UV precautions reviewed with patient.  Risks of non-melanoma skin cancer discussed with patient   Return to clinic in one year or sooner if needed.   Siomara to follow up with Primary Care provider regarding elevated blood pressure.

## 2019-07-02 ENCOUNTER — OFFICE VISIT (OUTPATIENT)
Dept: UROLOGY | Facility: CLINIC | Age: 69
End: 2019-07-02
Payer: COMMERCIAL

## 2019-07-02 VITALS
HEART RATE: 80 BPM | DIASTOLIC BLOOD PRESSURE: 80 MMHG | SYSTOLIC BLOOD PRESSURE: 121 MMHG | RESPIRATION RATE: 16 BRPM | OXYGEN SATURATION: 93 %

## 2019-07-02 DIAGNOSIS — N20.0 CALCULUS OF KIDNEY: Primary | ICD-10-CM

## 2019-07-02 PROCEDURE — 99203 OFFICE O/P NEW LOW 30 MIN: CPT | Mod: 25 | Performed by: UROLOGY

## 2019-07-02 PROCEDURE — 51798 US URINE CAPACITY MEASURE: CPT | Performed by: UROLOGY

## 2019-07-02 NOTE — NURSING NOTE
"Chief Complaint   Patient presents with     Consult     hx of kidney stone - former Dr. Petit patient       Initial /80 (BP Location: Left arm, Patient Position: Chair, Cuff Size: Adult Regular)   Pulse 80   Resp 16   SpO2 93%  Estimated body mass index is 26.63 kg/m  as calculated from the following:    Height as of 6/26/19: 1.676 m (5' 6\").    Weight as of 3/22/16: 74.8 kg (165 lb).  Medications and allergies reviewed.    Lacie GREGORY CMA (AAMA)    "

## 2019-07-02 NOTE — PATIENT INSTRUCTIONS
Per physician instructions.    If you have questions or concerns on any instructions given to you by your provider today or if you need to schedule an appointment, you can reach us at [phone].    Thank you!

## 2019-07-03 NOTE — PROGRESS NOTES
Appointment source: New Patient  Patient name: Siomara Hansen  Urology Staff: Markel Jonas MD    Subjective: This is a 68 year old year old female with a history of urolithiasis previously managed by Dr. Petit.    Requesting that care be transferred to [facility].    No current symptoms of renal colic.    CT last year without evidence of stone.    Has had prior percutaneous stone extraction.    Objective:  Examination:    Healthy male  HEENT: anicteric sclera, normal extraocular movements  Respiratory: normal, non labored breathing  Musculoskeletal:Normal muscular movements  Skin normal temperature, no rash  Psychiatric: appropriate affect    Assessment:  History of stone disease without current findings suggesting recurrence    Plan:  Return PRN. No plan to routinely obtain CT studies for stone recurrence. KUB of doubtful value.    Total time 30 minutes, counseling 20 minutes discussing urolithiasis

## 2020-03-02 ENCOUNTER — HEALTH MAINTENANCE LETTER (OUTPATIENT)
Age: 70
End: 2020-03-02

## 2020-10-19 ENCOUNTER — OFFICE VISIT (OUTPATIENT)
Dept: DERMATOLOGY | Facility: CLINIC | Age: 70
End: 2020-10-19
Payer: COMMERCIAL

## 2020-10-19 ENCOUNTER — TELEPHONE (OUTPATIENT)
Dept: DERMATOLOGY | Facility: CLINIC | Age: 70
End: 2020-10-19

## 2020-10-19 VITALS — HEART RATE: 86 BPM | DIASTOLIC BLOOD PRESSURE: 71 MMHG | OXYGEN SATURATION: 98 % | SYSTOLIC BLOOD PRESSURE: 144 MMHG

## 2020-10-19 DIAGNOSIS — L81.4 LENTIGO: ICD-10-CM

## 2020-10-19 DIAGNOSIS — D48.5 NEOPLASM OF UNCERTAIN BEHAVIOR OF SKIN: Primary | ICD-10-CM

## 2020-10-19 DIAGNOSIS — D22.9 NEVUS: ICD-10-CM

## 2020-10-19 DIAGNOSIS — D04.5 SQUAMOUS CELL CARCINOMA IN SITU (SCCIS) OF SKIN OF CHEST: Primary | ICD-10-CM

## 2020-10-19 DIAGNOSIS — D18.01 ANGIOMA OF SKIN: ICD-10-CM

## 2020-10-19 DIAGNOSIS — L82.1 SEBORRHEIC KERATOSIS: ICD-10-CM

## 2020-10-19 DIAGNOSIS — Z85.828 HISTORY OF BASAL CELL CARCINOMA (BCC) OF SKIN: ICD-10-CM

## 2020-10-19 DIAGNOSIS — L82.0 INFLAMED SEBORRHEIC KERATOSIS: ICD-10-CM

## 2020-10-19 PROCEDURE — 99214 OFFICE O/P EST MOD 30 MIN: CPT | Mod: 25 | Performed by: PHYSICIAN ASSISTANT

## 2020-10-19 PROCEDURE — 88331 PATH CONSLTJ SURG 1 BLK 1SPC: CPT | Performed by: DERMATOLOGY

## 2020-10-19 PROCEDURE — 99207 PR NO CHARGE LOS: CPT | Performed by: DERMATOLOGY

## 2020-10-19 PROCEDURE — 17110 DESTRUCTION B9 LES UP TO 14: CPT | Performed by: PHYSICIAN ASSISTANT

## 2020-10-19 PROCEDURE — 11102 TANGNTL BX SKIN SINGLE LES: CPT | Mod: 59 | Performed by: PHYSICIAN ASSISTANT

## 2020-10-19 NOTE — PATIENT INSTRUCTIONS
Wound Care Instructions     FOR SUPERFICIAL WOUNDS     [facility] [phone]    [facility] [phone]                       AFTER 24 HOURS YOU SHOULD REMOVE THE BANDAGE AND BEGIN DAILY DRESSING CHANGES AS FOLLOWS:     1) Remove Dressing.     2) Clean and dry the area with tap water using a Q-tip or sterile gauze pad.     3) Apply Vaseline, Aquaphor, Polysporin ointment or Bacitracin ointment over entire wound.  Do NOT use Neosporin ointment.     4) Cover the wound with a band-aid, or a sterile non-stick gauze pad and micropore paper tape      REPEAT THESE INSTRUCTIONS AT LEAST ONCE A DAY UNTIL THE WOUND HAS COMPLETELY HEALED.    It is an old wives tale that a wound heals better when it is exposed to air and allowed to dry out. The wound will heal faster with a better cosmetic result if it is kept moist with ointment and covered with a bandage.    **Do not let the wound dry out.**      Supplies Needed:      *Cotton tipped applicators (Q-tips)    *Polysporin Ointment or Bacitracin Ointment (NOT NEOSPORIN)    *Band-aids or non-stick gauze pads and micropore paper tape.      PATIENT INFORMATION:    During the healing process you will notice a number of changes. All wounds develop a small halo of redness surrounding the wound.  This means healing is occurring. Severe itching with extensive redness usually indicates sensitivity to the ointment or bandage tape used to dress the wound.  You should call our office if this develops.      Swelling  and/or discoloration around your surgical site is common, particularly when performed around the eye.    All wounds normally drain.  The larger the wound the more drainage there will be.  After 7-10 days, you will notice the wound beginning to shrink and new skin will begin to grow.  The wound is healed when you can see skin has formed over the entire area.  A healed wound has a healthy, shiny look to the surface and is red to dark pink in color  to normalize.  Wounds may take approximately 4-6 weeks to heal.  Larger wounds may take 6-8 weeks.  After the wound is healed you may discontinue dressing changes.    You may experience a sensation of tightness as your wound heals. This is normal and will gradually subside.    Your healed wound may be sensitive to temperature changes. This sensitivity improves with time, but if you re having a lot of discomfort, try to avoid temperature extremes.    Patients frequently experience itching after their wound appears to have healed because of the continue healing under the skin.  Plain Vaseline will help relieve the itching.        POSSIBLE COMPLICATIONS    BLEEDIN. Leave the bandage in place.  2. Use tightly rolled up gauze or a cloth to apply direct pressure over the bandage for 30  minutes.  3. Reapply pressure for an additional 30 minutes if necessary  4. Use additional gauze and tape to maintain pressure once the bleeding has stopped.

## 2020-10-19 NOTE — TELEPHONE ENCOUNTER
Left message for pt to call clinic. She will need one clinic visit with Dr. Saab.  Sadia ALONSO RN BSN PHN  Special[facility] Clin[facility]

## 2020-10-19 NOTE — TELEPHONE ENCOUNTER
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

## 2020-10-19 NOTE — LETTER
10/19/2020         RE: Siomara Hansen  [address] [place] [id number]        Dear Colleague,    Thank you for referring your patient, Siomara Hansen, to the [facility]. Please see a copy of my visit note below.    L chest:There is hyperkeratosis & parakeratosis of the epidermis, with full thickness epidermal involvement by atypical keratinocytes with rare pale vacuolated cells.  Unremarkable dermis.     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        Sincerely,        Markus Saab MD

## 2020-10-19 NOTE — PROGRESS NOTES
L chest:There is hyperkeratosis & parakeratosis of the epidermis, with full thickness epidermal involvement by atypical keratinocytes with rare pale vacuolated cells.  Unremarkable dermis.     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

## 2020-10-19 NOTE — TELEPHONE ENCOUNTER
Spoke with patient and reviewed results. Pt verbalized understanding. Appt made for laser.   Sadia ALONSO RN BSN PHN  Specialty Clinics

## 2020-10-19 NOTE — PROGRESS NOTES
HPI:   Chief complaints: Siomara Hansen is a 70 year old female who presents for Full skin cancer screening to rule out skin cancer   Last Skin Exam: 1 year ago      1st Baseline: no  Personal HX of Skin Cancer: yes BCC   Personal HX of Malignant Melanoma: no   Family HX of Skin Cancer / Malignant Melanoma: no  Personal HX of Atypical Moles:   no  Risk factors: history of sun exposure and burns  New / Changing lesions:yes one painful spot on the chest that will not heal.   Social History: Nice dec'd from covid 19   On review of systems, there are no further skin complaints, patient is feeling otherwise well.  See patient intake sheet.  ROS of the following were done and are negative: Constitutional, Eyes, Ears, Nose,   Mouth, Throat, Cardiovascular, Respiratory, GI, Genitourinary, Musculoskeletal,   Psychiatric, Endocrine, Allergic/Immunologic.    PHYSICAL EXAM:   BP (!) 144/71   Pulse 86   SpO2 98%   Skin exam performed as follows: Type 2 skin. Mood appropriate  Alert and Oriented X 3. Well developed, well nourished in no distress.  General appearance: Normal  Head including face: Normal  Eyes: conjunctiva and lids: Normal  Mouth: Lips, teeth, gums: Normal  Neck: Normal  Chest-breast/axillae: Normal  Back: Normal  Spleen and liver: Normal  Cardiovascular: Exam of peripheral vascular system by observation for swelling, varicosities, edema: Normal  Genitalia: groin, buttocks: Normal  Extremities: digits/nails (clubbing): Normal  Eccrine and Apocrine glands: Normal  Right upper extremity: Normal  Left upper extremity: Normal  Right lower extremity: Normal  Left lower extremity: Normal  Skin: Scalp and body hair: See below    Pt deferred exam of breasts, groin, buttocks: No    Other physical findings:  1. Multiple pigmented macules on extremities and trunk  2. Multiple pigmented macules on face, trunk and extremities  3. Multiple vascular papules on trunk, arms and legs  4. Multiple scattered keratotic plaques  5. 2  mm pink hyperkeratotic papule on the left chest       Except as noted above, no other signs of skin cancer or melanoma.     ASSESSMENT/PLAN:   Benign Full skin cancer screening today. . Patient with history of BCC  Advised on monthly self exams and 1 year  Patient Education: Appropriate brochures given.    1. Multiple benign appearing nevi on arms, legs and trunk. Discussed ABCDEs of melanoma and sunscreen.   2. Multiple lentigos on arms, legs and trunk. Advised benign, no treatment needed.  3. Multiple scattered angiomas. Advised benign, no treatment needed.   4. Seborrheic keratosis on arms, legs and trunk. Advised benign, no treatment needed.  5. ISK r/o SCC on the left chest. Shave biopsy performed.  Area cleaned and anesthetized with 1% lidocaine with epinephrine.  Dermablade used to remove the lesion and sent to pathology. Bleeding was cauterized. Pt tolerated procedure well with no complications.   6. Inflamed seborrheic keratosis on the mid back x 1. As physically tender cryosurgery performed. Advised on post op care.   7. Siomara to follow up with Primary Care provider regarding elevated blood pressure.            Follow-up: pending path/yearly FSE/PRN sooner    1.) Patient was asked about new and changing moles. YES  2.) Patient received a complete physical skin examination: YES  3.) Patient was counseled to perform a monthly self skin examination: YES  Scribed By: Suellen Small MS, PA-C

## 2020-10-19 NOTE — LETTER
10/19/2020         RE: Siomara Hansen  [address]  [facility] [id number]        Dear Colleague,    Thank you for referring your patient, Siomara Hansen, to the [facility]. Please see a copy of my visit note below.    HPI:   Chief complaints: Siomara Hansen is a 70 year old female who presents for Full skin cancer screening to rule out skin cancer   Last Skin Exam: 1 year ago      1st Baseline: no  Personal HX of Skin Cancer: yes BCC   Personal HX of Malignant Melanoma: no   Family HX of Skin Cancer / Malignant Melanoma: no  Personal HX of Atypical Moles:   no  Risk factors: history of sun exposure and burns  New / Changing lesions:yes one painful spot on the chest that will not heal.   Social History: [place] dec'd from covid 19   On review of systems, there are no further skin complaints, patient is feeling otherwise well.  See patient intake sheet.  ROS of the following were done and are negative: Constitutional, Eyes, Ears, Nose,   Mouth, Throat, Cardiovascular, Respiratory, GI, Genitourinary, Musculoskeletal,   Psychiatric, Endocrine, Allergic/Immunologic.    PHYSICAL EXAM:   BP (!) 144/71   Pulse 86   SpO2 98%   Skin exam performed as follows: Type 2 skin. Mood appropriate  Alert and Oriented X 3. Well developed, well nourished in no distress.  General appearance: Normal  Head including face: Normal  Eyes: conjunctiva and lids: Normal  Mouth: Lips, teeth, gums: Normal  Neck: Normal  Chest-breast/axillae: Normal  Back: Normal  Spleen and liver: Normal  Cardiovascular: Exam of peripheral vascular system by observation for swelling, varicosities, edema: Normal  Genitalia: groin, buttocks: Normal  Extremities: digits/nails (clubbing): Normal  Eccrine and Apocrine glands: Normal  Right upper extremity: Normal  Left upper extremity: Normal  Right lower extremity: Normal  Left lower extremity: Normal  Skin: Scalp and body hair: See below    Pt deferred exam of breasts, groin,  buttocks: No    Other physical findings:  1. Multiple pigmented macules on extremities and trunk  2. Multiple pigmented macules on face, trunk and extremities  3. Multiple vascular papules on trunk, arms and legs  4. Multiple scattered keratotic plaques  5. 2 mm pink hyperkeratotic papule on the left chest       Except as noted above, no other signs of skin cancer or melanoma.     ASSESSMENT/PLAN:   Benign Full skin cancer screening today. . Patient with history of BCC  Advised on monthly self exams and 1 year  Patient Education: Appropriate brochures given.    1. Multiple benign appearing nevi on arms, legs and trunk. Discussed ABCDEs of melanoma and sunscreen.   2. Multiple lentigos on arms, legs and trunk. Advised benign, no treatment needed.  3. Multiple scattered angiomas. Advised benign, no treatment needed.   4. Seborrheic keratosis on arms, legs and trunk. Advised benign, no treatment needed.  5. ISK r/o SCC on the left chest. Shave biopsy performed.  Area cleaned and anesthetized with 1% lidocaine with epinephrine.  Dermablade used to remove the lesion and sent to pathology. Bleeding was cauterized. Pt tolerated procedure well with no complications.   6. Inflamed seborrheic keratosis on the mid back x 1. As physically tender cryosurgery performed. Advised on post op care.   7. Siomara to follow up with Primary Care provider regarding elevated blood pressure.            Follow-up: pending path/yearly FSE/PRN sooner    1.) Patient was asked about new and changing moles. YES  2.) Patient received a complete physical skin examination: YES  3.) Patient was counseled to perform a monthly self skin examination: YES  Scribed By: Suellen Small, MS, PAGLENN          Again, thank you for allowing me to participate in the care of your patient.        Sincerely,        Suellen Small PA-C

## 2020-10-26 ENCOUNTER — OFFICE VISIT (OUTPATIENT)
Dept: DERMATOLOGY | Facility: CLINIC | Age: 70
End: 2020-10-26
Payer: COMMERCIAL

## 2020-10-26 VITALS — OXYGEN SATURATION: 99 % | HEART RATE: 78 BPM | DIASTOLIC BLOOD PRESSURE: 77 MMHG | SYSTOLIC BLOOD PRESSURE: 116 MMHG

## 2020-10-26 DIAGNOSIS — D04.5 SQUAMOUS CELL CARCINOMA IN SITU (SCCIS) OF SKIN OF CHEST: Primary | ICD-10-CM

## 2020-10-26 PROCEDURE — 99207 PR NO CHARGE LOS: CPT | Performed by: DERMATOLOGY

## 2020-10-26 PROCEDURE — 17261 DSTRJ MAL LES T/A/L .6-1.0CM: CPT | Performed by: DERMATOLOGY

## 2020-10-26 NOTE — PATIENT INSTRUCTIONS
Wound Care Instructions     FOR SUPERFICIAL WOUNDS     [facility] [phone]    [facility] [phone]                       AFTER 24 HOURS YOU SHOULD REMOVE THE BANDAGE AND BEGIN DAILY DRESSING CHANGES AS FOLLOWS:     1) Remove Dressing.     2) Clean and dry the area with tap water using a Q-tip or sterile gauze pad.     3) Apply Vaseline, Aquaphor, Polysporin ointment or Bacitracin ointment over entire wound.  Do NOT use Neosporin ointment.     4) Cover the wound with a band-aid, or a sterile non-stick gauze pad and micropore paper tape      REPEAT THESE INSTRUCTIONS AT LEAST ONCE A DAY UNTIL THE WOUND HAS COMPLETELY HEALED.    It is an old wives tale that a wound heals better when it is exposed to air and allowed to dry out. The wound will heal faster with a better cosmetic result if it is kept moist with ointment and covered with a bandage.    **Do not let the wound dry out.**      Supplies Needed:      *Cotton tipped applicators (Q-tips)    *Polysporin Ointment or Bacitracin Ointment (NOT NEOSPORIN)    *Band-aids or non-stick gauze pads and micropore paper tape.      PATIENT INFORMATION:    During the healing process you will notice a number of changes. All wounds develop a small halo of redness surrounding the wound.  This means healing is occurring. Severe itching with extensive redness usually indicates sensitivity to the ointment or bandage tape used to dress the wound.  You should call our office if this develops.      Swelling  and/or discoloration around your surgical site is common, particularly when performed around the eye.    All wounds normally drain.  The larger the wound the more drainage there will be.  After 7-10 days, you will notice the wound beginning to shrink and new skin will begin to grow.  The wound is healed when you can see skin has formed over the entire area.  A healed wound has a healthy, shiny look to the surface and is red to dark pink in color  to normalize.  Wounds may take approximately 4-6 weeks to heal.  Larger wounds may take 6-8 weeks.  After the wound is healed you may discontinue dressing changes.    You may experience a sensation of tightness as your wound heals. This is normal and will gradually subside.    Your healed wound may be sensitive to temperature changes. This sensitivity improves with time, but if you re having a lot of discomfort, try to avoid temperature extremes.    Patients frequently experience itching after their wound appears to have healed because of the continue healing under the skin.  Plain Vaseline will help relieve the itching.        POSSIBLE COMPLICATIONS    BLEEDIN. Leave the bandage in place.  2. Use tightly rolled up gauze or a cloth to apply direct pressure over the bandage for 30  minutes.  3. Reapply pressure for an additional 30 minutes if necessary  4. Use additional gauze and tape to maintain pressure once the bleeding has stopped.

## 2020-10-26 NOTE — NURSING NOTE
"Initial /77   Pulse 78   SpO2 99%  Estimated body mass index is 26.63 kg/m  as calculated from the following:    Height as of 6/26/19: 1.676 m (5' 6\").    Weight as of 3/22/16: 74.8 kg (165 lb). .      "

## 2020-10-26 NOTE — LETTER
10/26/2020         RE: Siomara Hansen  [address]  [facility] [address]        Dear Colleague,    Thank you for referring your patient, Siomara Hansen, to the [facility]. Please see a copy of my visit note below.    Siomara Hansen is a 70 year old year old female patient here today for evaluation and managment of sci son chest.  Patient has no other skin complaints today.  Remainder of the HPI, Meds, PMH, Allergies, FH, and SH was reviewed in chart.      Past Medical History:   Diagnosis Date     Basal cell carcinoma      Breast cancer (H)      Colon polyp     exam every 5 years       Past Surgical History:   Procedure Laterality Date     GYN SURGERY      D & C     masectomy[  2000    Bilateral        Family History   Problem Relation Age of Onset     Cancer Mother         Lung     Breast Cancer Mother      Cancer Father         Braine     Cancer Brother         Bladder     Heart Disease Brother      Breast Cancer Sister      Breast Cancer Sister      Cancer Brother         Lung     Melanoma No family hx of        Social History     Socioeconomic History     Marital status: Single     Spouse name: Not on file     Number of children: Not on file     Years of education: Not on file     Highest education level: Not on file   Occupational History     Not on file   Social Needs     Financial resource strain: Not on file     Food insecurity     Worry: Not on file     Inability: Not on file     Transportation needs     Medical: Not on file     Non-medical: Not on file   Tobacco Use     Smoking status: Former Smoker     Packs/day: 0.50     Years: 15.00     Pack years: 7.50     Types: Cigarettes     Quit date: 1984     Years since quittin.8     Smokeless tobacco: Never Used   Substance and Sexual Activity     Alcohol use: Yes     Comment: Rare     Drug use: No     Sexual activity: Never   Lifestyle     Physical activity     Days per week: Not on file     Minutes  per session: Not on file     Stress: Not on file   Relationships     Social connections     Talks on phone: Not on file     Gets together: Not on file     Attends Anabaptist service: Not on file     Active member of club or organization: Not on file     Attends meetings of clubs or organizations: Not on file     Relationship status: Not on file     Intimate partner violence     Fear of current or ex partner: Not on file     Emotionally abused: Not on file     Physically abused: Not on file     Forced sexual activity: Not on file   Other Topics Concern     Parent/sibling w/ CABG, MI or angioplasty before 65F 55M? Not Asked   Social History Narrative     Not on file       Outpatient Encounter Medications as of 10/26/2020   Medication Sig Dispense Refill     acyclovir (ZOVIRAX) 200 MG capsule        albuterol (PROAIR HFA/PROVENTIL HFA/VENTOLIN HFA) 108 (90 Base) MCG/ACT inhaler Inhale 1-2 puffs into the lungs       Calcium Carbonate-Vit D-Min (CALCIUM 1200 PO) Take  by mouth.       citalopram (CELEXA) 20 MG tablet Take 20 mg by mouth       diclofenac (VOLTAREN) 1 % GEL topical gel Apply nickel size amount, 4g, to left hand and neck 2 times daily       fish oil-omega-3 fatty acids (FISH OIL) 1000 MG capsule Take 2 g by mouth daily.       Flaxseed, Linseed, (FLAX PO) Take  by mouth.       ketoconazole (NIZORAL) 2 % cream Apply thin coat to forehead and bridge of nose       losartan (COZAAR) 50 MG tablet        omeprazole 20 MG tablet Take 1 tablet (20 mg) by mouth daily Take 30-60 minutes before a meal. 30 tablet 5     Senna-Psyllium (PERDIEM OR) Take  by mouth.       valACYclovir (VALTREX) 500 MG tablet Take 1 tablet by mouth 2 times daily. 6 tablet 3     No facility-administered encounter medications on file as of 10/26/2020.              Review Of Systems  Skin: As above  Eyes: negative  Ears/Nose/Throat: negative  Respiratory: No shortness of breath, dyspnea on exertion, cough, or hemoptysis  Cardiovascular:  negative  Gastrointestinal: negative  Genitourinary: negative  Musculoskeletal: negative  Neurologic: negative  Psychiatric: negative  Hematologic/Lymphatic/Immunologic: negative  Endocrine: negative      O:   NAD, WDWN, Alert & Oriented, Mood & Affect wnl, Vitals stable   Here today alone   /77   Pulse 78   SpO2 99%    General appearance normal   Vitals stable   Alert, oriented and in no acute distress     Mid chest 6mm scaly papule       Eyes: Conjunctivae/lids:Normal     ENT: Lips, buccal mucosa, tongue: normal    MSK:Normal    Cardiovascular: peripheral edema none    Pulm: Breathing Normal    Neuro/Psych: Orientation:Alert and Orientedx3 ; Mood/Affect:normal       A/P:  1`. 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 Clinical lesion marked under good light. Lesion plus 2mm margin of clinically normal skin treated with curetment x 2 passes. Patient tolerated procedure well. Routine wound care.        Again, thank you for allowing me to participate in the care of your patient.        Sincerely,        Markus Saab MD

## 2020-10-26 NOTE — PROGRESS NOTES
Siomara Hansen is a 70 year old year old female patient here today for evaluation and managment of sci son chest.  Patient has no other skin complaints today.  Remainder of the HPI, Meds, PMH, Allergies, FH, and SH was reviewed in chart.      Past Medical History:   Diagnosis Date     Basal cell carcinoma      Breast cancer (H)      Colon polyp     exam every 5 years       Past Surgical History:   Procedure Laterality Date     GYN SURGERY      D & C     masectomy[  2000    Bilateral        Family History   Problem Relation Age of Onset     Cancer Mother         Lung     Breast Cancer Mother      Cancer Father         Braine     Cancer Brother         Bladder     Heart Disease Brother      Breast Cancer Sister      Breast Cancer Sister      Cancer Brother         Lung     Melanoma No family hx of        Social History     Socioeconomic History     Marital status: Single     Spouse name: Not on file     Number of children: Not on file     Years of education: Not on file     Highest education level: Not on file   Occupational History     Not on file   Social Needs     Financial resource strain: Not on file     Food insecurity     Worry: Not on file     Inability: Not on file     Transportation needs     Medical: Not on file     Non-medical: Not on file   Tobacco Use     Smoking status: Former Smoker     Packs/day: 0.50     Years: 15.00     Pack years: 7.50     Types: Cigarettes     Quit date: 1984     Years since quittin.8     Smokeless tobacco: Never Used   Substance and Sexual Activity     Alcohol use: Yes     Comment: Rare     Drug use: No     Sexual activity: Never   Lifestyle     Physical activity     Days per week: Not on file     Minutes per session: Not on file     Stress: Not on file   Relationships     Social connections     Talks on phone: Not on file     Gets together: Not on file     Attends Religion service: Not on file     Active member of club or organization: Not on file      Attends meetings of clubs or organizations: Not on file     Relationship status: Not on file     Intimate partner violence     Fear of current or ex partner: Not on file     Emotionally abused: Not on file     Physically abused: Not on file     Forced sexual activity: Not on file   Other Topics Concern     Parent/sibling w/ CABG, MI or angioplasty before 65F 55M? Not Asked   Social History Narrative     Not on file       Outpatient Encounter Medications as of 10/26/2020   Medication Sig Dispense Refill     acyclovir (ZOVIRAX) 200 MG capsule        albuterol (PROAIR HFA/PROVENTIL HFA/VENTOLIN HFA) 108 (90 Base) MCG/ACT inhaler Inhale 1-2 puffs into the lungs       Calcium Carbonate-Vit D-Min (CALCIUM 1200 PO) Take  by mouth.       citalopram (CELEXA) 20 MG tablet Take 20 mg by mouth       diclofenac (VOLTAREN) 1 % GEL topical gel Apply nickel size amount, 4g, to left hand and neck 2 times daily       fish oil-omega-3 fatty acids (FISH OIL) 1000 MG capsule Take 2 g by mouth daily.       Flaxseed, Linseed, (FLAX PO) Take  by mouth.       ketoconazole (NIZORAL) 2 % cream Apply thin coat to forehead and bridge of nose       losartan (COZAAR) 50 MG tablet        omeprazole 20 MG tablet Take 1 tablet (20 mg) by mouth daily Take 30-60 minutes before a meal. 30 tablet 5     Senna-Psyllium (PERDIEM OR) Take  by mouth.       valACYclovir (VALTREX) 500 MG tablet Take 1 tablet by mouth 2 times daily. 6 tablet 3     No facility-administered encounter medications on file as of 10/26/2020.              Review Of Systems  Skin: As above  Eyes: negative  Ears/Nose/Throat: negative  Respiratory: No shortness of breath, dyspnea on exertion, cough, or hemoptysis  Cardiovascular: negative  Gastrointestinal: negative  Genitourinary: negative  Musculoskeletal: negative  Neurologic: negative  Psychiatric: negative  Hematologic/Lymphatic/Immunologic: negative  Endocrine: negative      O:   NAD, WDWN, Alert & Oriented, Mood & Affect wnl,  Vitals stable   Here today alone   /77   Pulse 78   SpO2 99%    General appearance normal   Vitals stable   Alert, oriented and in no acute distress     Mid chest 6mm scaly papule       Eyes: Conjunctivae/lids:Normal     ENT: Lips, buccal mucosa, tongue: normal    MSK:Normal    Cardiovascular: peripheral edema none    Pulm: Breathing Normal    Neuro/Psych: Orientation:Alert and Orientedx3 ; Mood/Affect:normal       A/P:  1`. 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 Clinical lesion marked under good light. Lesion plus 2mm margin of clinically normal skin treated with curetment x 2 passes. Patient tolerated procedure well. Routine wound care.

## 2021-04-18 ENCOUNTER — HEALTH MAINTENANCE LETTER (OUTPATIENT)
Age: 71
End: 2021-04-18

## 2021-10-02 ENCOUNTER — HEALTH MAINTENANCE LETTER (OUTPATIENT)
Age: 71
End: 2021-10-02

## 2022-03-19 ENCOUNTER — HEALTH MAINTENANCE LETTER (OUTPATIENT)
Age: 72
End: 2022-03-19

## 2022-05-14 ENCOUNTER — HEALTH MAINTENANCE LETTER (OUTPATIENT)
Age: 72
End: 2022-05-14

## 2022-06-06 ENCOUNTER — OFFICE VISIT (OUTPATIENT)
Dept: DERMATOLOGY | Facility: CLINIC | Age: 72
End: 2022-06-06
Payer: COMMERCIAL

## 2022-06-06 ENCOUNTER — TELEPHONE (OUTPATIENT)
Dept: DERMATOLOGY | Facility: CLINIC | Age: 72
End: 2022-06-06

## 2022-06-06 VITALS — DIASTOLIC BLOOD PRESSURE: 77 MMHG | OXYGEN SATURATION: 95 % | SYSTOLIC BLOOD PRESSURE: 138 MMHG | HEART RATE: 69 BPM

## 2022-06-06 DIAGNOSIS — L82.0 INFLAMED SEBORRHEIC KERATOSIS: ICD-10-CM

## 2022-06-06 DIAGNOSIS — C44.719 BASAL CELL CARCINOMA (BCC) OF LEFT LOWER LEG: Primary | ICD-10-CM

## 2022-06-06 DIAGNOSIS — D48.5 NEOPLASM OF UNCERTAIN BEHAVIOR OF SKIN: ICD-10-CM

## 2022-06-06 DIAGNOSIS — D18.01 ANGIOMA OF SKIN: ICD-10-CM

## 2022-06-06 DIAGNOSIS — L81.4 LENTIGO: ICD-10-CM

## 2022-06-06 DIAGNOSIS — L82.1 SEBORRHEIC KERATOSIS: ICD-10-CM

## 2022-06-06 DIAGNOSIS — D22.9 NEVUS: Primary | ICD-10-CM

## 2022-06-06 PROCEDURE — 17110 DESTRUCTION B9 LES UP TO 14: CPT | Performed by: PHYSICIAN ASSISTANT

## 2022-06-06 PROCEDURE — 88331 PATH CONSLTJ SURG 1 BLK 1SPC: CPT | Performed by: DERMATOLOGY

## 2022-06-06 PROCEDURE — 99213 OFFICE O/P EST LOW 20 MIN: CPT | Mod: 25 | Performed by: PHYSICIAN ASSISTANT

## 2022-06-06 PROCEDURE — 11102 TANGNTL BX SKIN SINGLE LES: CPT | Mod: 59 | Performed by: PHYSICIAN ASSISTANT

## 2022-06-06 NOTE — LETTER
[facility] [place]  [address] [address]  Phone: [phone]    June 7, 2022    Siomara Hansen                                                                                                                 [address]  [place] [address]            Dear Ms. Hansen,    You are scheduled for Mohs Surgery on:     Tuesday July 19 th at 8:00 am.    Please check in at Dermatology Clinic.   (2nd Floor, last  Clinic on right up staircase or elevator -past OB/GYN clinic)    You don't need to arrive more than 5-10 minutes prior to your appointment time.     Be sure to eat a good breakfast and bathe and wash your hair prior to Surgery.    If you are taking any anti-coagulants that are prescribed by your Doctor (such as Coumadin/warfarin, Plavix, Aspirin, Ibuprofen), please continue taking them.     However, If you are taking anti-coagulants over the counter without  a Doctor's order for a Medical condition, please discontinue them 10 days prior to Surgery.      Please wear loose comfortable clothing as it could possibly be 4-6 hours until your surgery is completed depending upon how many layers of tissue need to be removed.     Wi-fi access is available.     Thank you,      Markus Saab MD/ Alicia Bansal RN

## 2022-06-06 NOTE — PROGRESS NOTES
L lower leg:Orthokeratosis of epidermis with a proliferation of nests of basaloid cells, with peripheral palisading and a haphazard arrangement in the center extending into the dermis.  The tumor cells have hyperchromatic nuclei. Poor cytoplasm and intercellular bridging.        L lower leg:bcc schedule excision

## 2022-06-06 NOTE — LETTER
6/6/2022         RE: Siomara Hansen  [address]  [facility] [facility] [address]        Dear Colleague,    Thank you for referring your patient, Siomara Hansen, to the [facility]. Please see a copy of my visit note below.    HPI:   Chief complaints: Siomara Hansen is a 71 year old female who presents for Full skin cancer screening to rule out skin cancer   Last Skin Exam: 1 year ago      1st Baseline: no  Personal HX of Skin Cancer: yes BCC and SCC   Personal HX of Malignant Melanoma: no   Family HX of Skin Cancer / Malignant Melanoma: no  Personal HX of Atypical Moles:   no  Risk factors: history of sun exposure and burns  New / Changing lesions:yes spot on the top of the shoulder  Social History: Nice dec'd from covid 19   On review of systems, there are no further skin complaints, patient is feeling otherwise well.  See patient intake sheet.  ROS of the following were done and are negative: Constitutional, Eyes, Ears, Nose,   Mouth, Throat, Cardiovascular, Respiratory, GI, Genitourinary, Musculoskeletal,   Psychiatric, Endocrine, Allergic/Immunologic.    PHYSICAL EXAM:   /77 (BP Location: Left arm, Cuff Size: Adult Large)   Pulse 69   SpO2 95%   Skin exam performed as follows: Type 2 skin. Mood appropriate  Alert and Oriented X 3. Well developed, well nourished in no distress.  General appearance: Normal  Head including face: Normal  Eyes: conjunctiva and lids: Normal  Mouth: Lips, teeth, gums: Normal  Neck: Normal  Chest-breast/axillae: Normal  Back: Normal  Spleen and liver: Normal  Cardiovascular: Exam of peripheral vascular system by observation for swelling, varicosities, edema: Normal  Genitalia: groin, buttocks: Normal  Extremities: digits/nails (clubbing): Normal  Eccrine and Apocrine glands: Normal  Right upper extremity: Normal  Left upper extremity: Normal  Right lower extremity: Normal  Left lower extremity: Normal  Skin: Scalp and body hair: See below    Pt  deferred exam of breasts, groin, buttocks: No    Other physical findings:  1. Multiple pigmented macules on extremities and trunk  2. Multiple pigmented macules on face, trunk and extremities  3. Multiple vascular papules on trunk, arms and legs  4. Multiple scattered keratotic plaques  5. Inflamed keratotic plaque on the right superior shoulder, left axilla x 2, right axilla x 2  6. 5 mm pink macule on the left lower leg         Except as noted above, no other signs of skin cancer or melanoma.     ASSESSMENT/PLAN:   Benign Full skin cancer screening today. . Patient with history of BCC and SCC   Advised on monthly self exams and 1 year  Patient Education: Appropriate brochures given.    1. Multiple benign appearing nevi on arms, legs and trunk. Discussed ABCDEs of melanoma and sunscreen.   2. Multiple lentigos on arms, legs and trunk. Advised benign, no treatment needed.  3. Multiple scattered angiomas. Advised benign, no treatment needed.   4. Seborrheic keratosis on arms, legs and trunk. Advised benign, no treatment needed.  5. Inflamed seborrheic keratosis on the right superior shoulder, left axilla x 2, right axilla x 2. As physically tender cryosurgery performed. Advised on post op care.   6. R/o superficial BCC on the left lower leg. Shave biopsy performed.  Area cleaned and anesthetized with 1% lidocaine with epinephrine.  Dermablade used to remove the lesion and sent to pathology. Bleeding was cauterized. Pt tolerated procedure well with no complications.                 Follow-up: pending path/yearly FSE/PRN sooner    1.) Patient was asked about new and changing moles. YES  2.) Patient received a complete physical skin examination: YES  3.) Patient was counseled to perform a monthly self skin examination: YES  Scribed By: Suellen Small, MS, ALEX          Again, thank you for allowing me to participate in the care of your patient.        Sincerely,        Suellen Small PA-C

## 2022-06-06 NOTE — PATIENT INSTRUCTIONS
Wound Care Instructions     FOR SUPERFICIAL WOUNDS     [facility]  [phone]                 AFTER 24 HOURS YOU SHOULD REMOVE THE BANDAGE AND BEGIN DAILY DRESSING CHANGES AS FOLLOWS:     1) Remove Dressing.     2) Clean and dry the area with tap water using a Q-tip or sterile gauze pad.     3) Apply Vaseline, Aquaphor, Polysporin ointment or Bacitracin ointment over entire wound.  Do NOT use Neosporin ointment.     4) Cover the wound with a band-aid, or a sterile non-stick gauze pad and micropore paper tape    REPEAT THESE INSTRUCTIONS AT LEAST ONCE A DAY UNTIL THE WOUND HAS COMPLETELY HEALED.    It is an old wives tale that a wound heals better when it is exposed to air and allowed to dry out. The wound will heal faster with a better cosmetic result if it is kept moist with ointment and covered with a bandage.    **Do not let the wound dry out.**    Supplies Needed:      *Cotton tipped applicators (Q-tips)    *Vaseline, Aquaphor, Polysporin or Bacitracin Ointment (NOT NEOSPORIN)    *Band-aids or non-stick gauze pads and micropore paper tape.      PATIENT INFORMATION:    During the healing process you will notice a number of changes. All wounds develop a small halo of redness surrounding the wound.  This means healing is occurring. Severe itching with extensive redness usually indicates sensitivity to the ointment or bandage tape used to dress the wound.  You should call our office if this develops.      Swelling  and/or discoloration around your surgical site is common, particularly when performed around the eye.    All wounds normally drain.  The larger the wound the more drainage there will be.  After 7-10 days, you will notice the wound beginning to shrink and new skin will begin to grow.  The wound is healed when you can see skin has formed over the entire area.  A healed wound has a healthy, shiny look to the surface and is red to dark pink in color to normalize.  Wounds may  take approximately 4-6 weeks to heal.  Larger wounds may take 6-8 weeks.  After the wound is healed you may discontinue dressing changes.    You may experience a sensation of tightness as your wound heals. This is normal and will gradually subside.    Your healed wound may be sensitive to temperature changes. This sensitivity improves with time, but if you re having a lot of discomfort, try to avoid temperature extremes.    Patients frequently experience itching after their wound appears to have healed because of the continue healing under the skin.  Plain Vaseline will help relieve the itching.      POSSIBLE COMPLICATIONS    BLEEDING:    Leave the bandage in place.  Use tightly rolled up gauze or a cloth to apply direct pressure over the bandage for 30  minutes.  Reapply pressure for an additional 30 minutes if necessary  Use additional gauze and tape to maintain pressure once the bleeding has stopped.

## 2022-06-06 NOTE — TELEPHONE ENCOUNTER
----- Message from Markus Saab MD sent at 6/6/2022  2:33 PM CDT -----  L lower leg:bcc schedule excision

## 2022-06-06 NOTE — PROGRESS NOTES
HPI:   Chief complaints: Siomara Hansen is a 71 year old female who presents for Full skin cancer screening to rule out skin cancer   Last Skin Exam: 1 year ago      1st Baseline: no  Personal HX of Skin Cancer: yes BCC and SCC   Personal HX of Malignant Melanoma: no   Family HX of Skin Cancer / Malignant Melanoma: no  Personal HX of Atypical Moles:   no  Risk factors: history of sun exposure and burns  New / Changing lesions:yes spot on the top of the shoulder  Social History: Nice dec'd from covid 19   On review of systems, there are no further skin complaints, patient is feeling otherwise well.  See patient intake sheet.  ROS of the following were done and are negative: Constitutional, Eyes, Ears, Nose,   Mouth, Throat, Cardiovascular, Respiratory, GI, Genitourinary, Musculoskeletal,   Psychiatric, Endocrine, Allergic/Immunologic.    PHYSICAL EXAM:   /77 (BP Location: Left arm, Cuff Size: Adult Large)   Pulse 69   SpO2 95%   Skin exam performed as follows: Type 2 skin. Mood appropriate  Alert and Oriented X 3. Well developed, well nourished in no distress.  General appearance: Normal  Head including face: Normal  Eyes: conjunctiva and lids: Normal  Mouth: Lips, teeth, gums: Normal  Neck: Normal  Chest-breast/axillae: Normal  Back: Normal  Spleen and liver: Normal  Cardiovascular: Exam of peripheral vascular system by observation for swelling, varicosities, edema: Normal  Genitalia: groin, buttocks: Normal  Extremities: digits/nails (clubbing): Normal  Eccrine and Apocrine glands: Normal  Right upper extremity: Normal  Left upper extremity: Normal  Right lower extremity: Normal  Left lower extremity: Normal  Skin: Scalp and body hair: See below    Pt deferred exam of breasts, groin, buttocks: No    Other physical findings:  1. Multiple pigmented macules on extremities and trunk  2. Multiple pigmented macules on face, trunk and extremities  3. Multiple vascular papules on trunk, arms and legs  4. Multiple  scattered keratotic plaques  5. Inflamed keratotic plaque on the right superior shoulder, left axilla x 2, right axilla x 2  6. 5 mm pink macule on the left lower leg         Except as noted above, no other signs of skin cancer or melanoma.     ASSESSMENT/PLAN:   Benign Full skin cancer screening today. . Patient with history of BCC and SCC   Advised on monthly self exams and 1 year  Patient Education: Appropriate brochures given.    1. Multiple benign appearing nevi on arms, legs and trunk. Discussed ABCDEs of melanoma and sunscreen.   2. Multiple lentigos on arms, legs and trunk. Advised benign, no treatment needed.  3. Multiple scattered angiomas. Advised benign, no treatment needed.   4. Seborrheic keratosis on arms, legs and trunk. Advised benign, no treatment needed.  5. Inflamed seborrheic keratosis on the right superior shoulder, left axilla x 2, right axilla x 2. As physically tender cryosurgery performed. Advised on post op care.   6. R/o superficial BCC on the left lower leg. Shave biopsy performed.  Area cleaned and anesthetized with 1% lidocaine with epinephrine.  Dermablade used to remove the lesion and sent to pathology. Bleeding was cauterized. Pt tolerated procedure well with no complications.                 Follow-up: pending path/yearly FSE/PRN sooner    1.) Patient was asked about new and changing moles. YES  2.) Patient received a complete physical skin examination: YES  3.) Patient was counseled to perform a monthly self skin examination: YES  Scribed By: Suellen Small, MS, PAGLENN

## 2022-06-06 NOTE — LETTER
6/6/2022         RE: Siomara Hansen  [address] [place] [id number]        Dear Colleague,    Thank you for referring your patient, Siomara Hansen, to the [facility]. Please see a copy of my visit note below.    L lower leg:Orthokeratosis of epidermis with a proliferation of nests of basaloid cells, with peripheral palisading and a haphazard arrangement in the center extending into the dermis.  The tumor cells have hyperchromatic nuclei. Poor cytoplasm and intercellular bridging.        L lower leg:bcc schedule excision       Again, thank you for allowing me to participate in the care of your patient.        Sincerely,        Markus Saab MD

## 2022-06-07 NOTE — TELEPHONE ENCOUNTER
Patient notified. Patient verbalized understanding. Scheduled for MOHS Surgery. Mohs brochure/Pre-op letter sent via US mail.   Alicia Bansal RN

## 2022-07-19 ENCOUNTER — OFFICE VISIT (OUTPATIENT)
Dept: DERMATOLOGY | Facility: CLINIC | Age: 72
End: 2022-07-19
Payer: COMMERCIAL

## 2022-07-19 VITALS — HEART RATE: 71 BPM | OXYGEN SATURATION: 97 % | DIASTOLIC BLOOD PRESSURE: 86 MMHG | SYSTOLIC BLOOD PRESSURE: 152 MMHG

## 2022-07-19 DIAGNOSIS — C44.719 BASAL CELL CARCINOMA OF LEFT LOWER LEG: Primary | ICD-10-CM

## 2022-07-19 PROCEDURE — 17313 MOHS 1 STAGE T/A/L: CPT | Performed by: DERMATOLOGY

## 2022-07-19 PROCEDURE — 99207 PR NO CHARGE LOS: CPT | Performed by: DERMATOLOGY

## 2022-07-19 ASSESSMENT — PAIN SCALES - GENERAL: PAINLEVEL: NO PAIN (0)

## 2022-07-19 NOTE — PROGRESS NOTES
Siomara Hansen is an extremely pleasant 71 year old year old female patient here today for evaluation and managment of basal cell carcinoma on left shin.  Patient has no other skin complaints today.  Remainder of the HPI, Meds, PMH, Allergies, FH, and SH was reviewed in chart.      Past Medical History:   Diagnosis Date     Basal cell carcinoma      Breast cancer (H)      Colon polyp     exam every 5 years       Past Surgical History:   Procedure Laterality Date     GYN SURGERY      D & C     masectomy[  2000    Bilateral        Family History   Problem Relation Age of Onset     Cancer Mother         Lung     Breast Cancer Mother      Cancer Father         Braine     Cancer Brother         Bladder     Heart Disease Brother      Breast Cancer Sister      Breast Cancer Sister      Cancer Brother         Lung     Melanoma No family hx of        Social History     Socioeconomic History     Marital status: Single     Spouse name: Not on file     Number of children: Not on file     Years of education: Not on file     Highest education level: Not on file   Occupational History     Not on file   Tobacco Use     Smoking status: Former Smoker     Packs/day: 0.50     Years: 15.00     Pack years: 7.50     Types: Cigarettes     Quit date: 1984     Years since quittin.5     Smokeless tobacco: Never Used   Substance and Sexual Activity     Alcohol use: Yes     Comment: Rare     Drug use: No     Sexual activity: Never   Other Topics Concern     Parent/sibling w/ CABG, MI or angioplasty before 65F 55M? Not Asked   Social History Narrative     Not on file     Social Determinants of Health     Financial Resource Strain: Not on file   Food Insecurity: Not on file   Transportation Needs: Not on file   Physical Activity: Not on file   Stress: Not on file   Social Connections: Not on file   Intimate Partner Violence: Not on file   Housing Stability: Not on file       Outpatient Encounter Medications as of 2022    Medication Sig Dispense Refill     acyclovir (ZOVIRAX) 200 MG capsule        albuterol (PROAIR HFA/PROVENTIL HFA/VENTOLIN HFA) 108 (90 Base) MCG/ACT inhaler Inhale 1-2 puffs into the lungs       Calcium Carbonate-Vit D-Min (CALCIUM 1200 PO) Take  by mouth.       citalopram (CELEXA) 20 MG tablet Take 20 mg by mouth       diclofenac (VOLTAREN) 1 % GEL topical gel Apply nickel size amount, 4g, to left hand and neck 2 times daily       fish oil-omega-3 fatty acids 1000 MG capsule Take 2 g by mouth daily.       Flaxseed, Linseed, (FLAX PO) Take  by mouth.       ketoconazole (NIZORAL) 2 % cream        losartan (COZAAR) 50 MG tablet        omeprazole 20 MG tablet Take 1 tablet (20 mg) by mouth daily Take 30-60 minutes before a meal. 30 tablet 5     Senna-Psyllium (PERDIEM OR) Take  by mouth.       valACYclovir (VALTREX) 500 MG tablet Take 1 tablet by mouth 2 times daily. 6 tablet 3     No facility-administered encounter medications on file as of 7/19/2022.             O:   NAD, WDWN, Alert & Oriented, Mood & Affect wnl, Vitals stable   Here today alone   BP (!) 152/86   Pulse 71   SpO2 97%    General appearance normal   Vitals stable   Alert, oriented and in no acute distress     L [id number] pink pearly papule       Eyes: Conjunctivae/lids:Normal     ENT: Lips, buccal mucosa, tongue: normal    MSK:Normal    Cardiovascular: peripheral edema none    Pulm: Breathing Normal    Neuro/Psych: Orientation:Alert and Orientedx3 ; Mood/Affect:normal       A/P:  1. L shin basal cell carcinoma   MOHS:   Location    The rationale for Mohs surgery was discussed with the patient and consent was obtained.  The risks and benefits as well as alternatives to therapy were discussed, in detail.  Specifically, the risks of infection, scarring, bleeding, prolonged wound healing, incomplete removal, allergy to anesthesia, nerve injury and recurrence were addressed.  Indication for Mohs was Location. Prior to the procedure, the treatment site was  clearly identified and, if available, confirmed with previous photos and confirmed by the patient   All components of the Universal Protocol/PAUSE rule were completed.  The Mohs surgeon operated in two distinct and integrated capacities as the surgeon and pathologist.      The area was prepped with Betasept.  A rim of normal appearing skin was marked circumferentially around the lesion.  The area was infiltrated with local anesthesia.  The tumor was first debulked to remove all clinically apparent tumor.  An incision following the standard Mohs approach was done and the specimen was oriented,mapped and placed in 1 block(s).  Each specimen was then chromacoded and processed in the Mohs laboratory using standard Mohs technique and submitted for frozen section histology.  Frozen section analysis showed no residual tumor but CLEAR MARGINS.      The tumor was excised using standard Mohs technique in 1 stages(s).  CLEAR MARGINS OBTAINED and Final defect size was 1.1 cm.     We discussed the options for wound management in full with the patient including risks/benefits/ possible outcomes.        REPAIR SECOND INTENT: We discussed the options for wound management in full with the patient including risks/benefits/possible outcomes. Decision made to allow the wound to heal by second intention. Cautery was used for for hemostasis. EBL minimal; complications none; wound care routine.  The patient was discharged in good condition and will return in one month or prn for wound evaluation.  It was a pleasure speaking to Siomara Hansen today.  Previous clinic notes and pertinent laboratory tests were reviewed prior to Siomara Hansen's visit.  Patient encouraged to perform monthly skin exams.  UV precautions reviewed with patient.  Return to clinic 6 months

## 2022-07-19 NOTE — PATIENT INSTRUCTIONS
Open Wound Care     For left lower leg  No strenuous activity for 48 hours    Take Tylenol as needed for discomfort.                                                .         Do not drink alcoholic beverages for 48 hours.    Keep the pressure bandage in place for 24 hours. If the bandage becomes blood tinged or loose, reinforce it with gauze and tape.        (Refer to the reverse side of this page for management of bleeding).    Remove bandage in 24 hours and begin wound care as follows:     Clean area with tap water using a Q tip or gauze pad, (shower / bathe normally)  Dry wound with Q tip or gauze pad  Apply Aquaphor, Vaseline, Polysporin or Bacitracin Ointment with a Q tip  Do NOT use Neosporin Ointment *  Cover the wound with a band-aid or nonstick gauze pad and paper tape.  Repeat wound care once a day until wound is completely healed.    It is an old wives tale that a wound heals better when it is exposed to air and allowed to dry out. The wound will heal faster with a better cosmetic result if it is kept moist with ointment and covered with a bandage.  Do not let the wound dry out.      Supplies Needed:                Qtips or gauze pads                Polysporin or Bacitracin Ointment                Bandaids or nonstick gauze pads and paper tape    Wound care kits and brown paper tape are available for purchase at   the pharmacy.    BLEEDING:    Use tightly rolled up gauze or cloth to apply direct pressure over the bandage for 20   minutes.  Reapply pressure for an additional 20 minutes if necessary  Call the office or go to the nearest emergency room if pressure fails to stop the bleeding.  Use additional gauze and tape to maintain pressure once the bleeding has stopped.  Begin wound care 24 hours after surgery as directed.                  WOUND HEALING    One week after surgery a pink / red halo will form around the outside of the wound.   This is new skin.  The center of the wound will appear yellowish  white and produce some drainage.  The pink halo will slowly migrate in toward the center of the wound until the wound is covered with new shiny pink skin.  There will be no more drainage when the wound is completely healed.  It will take six months to one year for the redness to fade.  The scar may be itchy, tight and sensitive to extreme temperatures for a year after the surgery.  Massaging the area several times a day for several minutes after the wound is completely healed will help the scar soften and normalize faster. Begin massage only after healing is complete.      In case of emergency call: Dr Saab: [phone]    [facility]: [phone]    [facility]:[phone]

## 2022-09-03 ENCOUNTER — HEALTH MAINTENANCE LETTER (OUTPATIENT)
Age: 72
End: 2022-09-03

## 2023-01-18 NOTE — NURSING NOTE
Active order to obtain bladder scan? Yes   Name of ordering provider:  Dr. Jonsa  Bladder scan preformed post void Yes  Bladder scan revealed 176ML  Provider notified?  Yes    Lacie GREGORY CMA       No

## 2023-05-04 ENCOUNTER — OFFICE VISIT (OUTPATIENT)
Dept: UROLOGY | Facility: CLINIC | Age: 73
End: 2023-05-04
Payer: COMMERCIAL

## 2023-05-04 VITALS — HEART RATE: 76 BPM | OXYGEN SATURATION: 95 % | DIASTOLIC BLOOD PRESSURE: 76 MMHG | SYSTOLIC BLOOD PRESSURE: 128 MMHG

## 2023-05-04 DIAGNOSIS — Z87.442 HISTORY OF KIDNEY STONES: ICD-10-CM

## 2023-05-04 DIAGNOSIS — R39.9 LOWER URINARY TRACT SYMPTOMS (LUTS): Primary | ICD-10-CM

## 2023-05-04 LAB
ALBUMIN UR-MCNC: NEGATIVE MG/DL
APPEARANCE UR: ABNORMAL
BACTERIA #/AREA URNS HPF: ABNORMAL /HPF
BILIRUB UR QL STRIP: NEGATIVE
COLOR UR AUTO: YELLOW
GLUCOSE UR STRIP-MCNC: NEGATIVE MG/DL
HGB UR QL STRIP: ABNORMAL
KETONES UR STRIP-MCNC: NEGATIVE MG/DL
LEUKOCYTE ESTERASE UR QL STRIP: ABNORMAL
MUCOUS THREADS #/AREA URNS LPF: PRESENT /LPF
NITRATE UR QL: NEGATIVE
PH UR STRIP: 6 [PH] (ref 5–7)
RBC #/AREA URNS AUTO: ABNORMAL /HPF
SP GR UR STRIP: <=1.005 (ref 1–1.03)
SQUAMOUS #/AREA URNS AUTO: ABNORMAL /LPF
UROBILINOGEN UR STRIP-ACNC: 0.2 E.U./DL
WBC #/AREA URNS AUTO: ABNORMAL /HPF

## 2023-05-04 PROCEDURE — 81001 URINALYSIS AUTO W/SCOPE: CPT | Performed by: STUDENT IN AN ORGANIZED HEALTH CARE EDUCATION/TRAINING PROGRAM

## 2023-05-04 PROCEDURE — 87086 URINE CULTURE/COLONY COUNT: CPT | Performed by: STUDENT IN AN ORGANIZED HEALTH CARE EDUCATION/TRAINING PROGRAM

## 2023-05-04 PROCEDURE — 51798 US URINE CAPACITY MEASURE: CPT | Performed by: STUDENT IN AN ORGANIZED HEALTH CARE EDUCATION/TRAINING PROGRAM

## 2023-05-04 PROCEDURE — 99203 OFFICE O/P NEW LOW 30 MIN: CPT | Mod: 25 | Performed by: STUDENT IN AN ORGANIZED HEALTH CARE EDUCATION/TRAINING PROGRAM

## 2023-05-04 RX ORDER — VALACYCLOVIR HYDROCHLORIDE 500 MG/1
500 TABLET, FILM COATED ORAL 2 TIMES DAILY
COMMUNITY
Start: 2022-11-01 | End: 2023-05-04

## 2023-05-04 NOTE — NURSING NOTE
"Initial /76 (BP Location: Left arm, Patient Position: Chair, Cuff Size: Adult Regular)   Pulse 76   SpO2 95%  Estimated body mass index is 26.63 kg/m  as calculated from the following:    Height as of 6/26/19: 1.676 m (5' 6\").    Weight as of 3/22/16: 74.8 kg (165 lb). .    Paula Crowder MA on 5/4/2023 at 11:26 AM  "

## 2023-05-04 NOTE — PROGRESS NOTES
Chief Complaint:   Kidney stones         History of Present Illness:   Siomara Hansen is a 72 year old female presenting for an evaluation of kidney stones.     The patient reports a history of kidney stones. They were calcium oxalate and calcium phosphate, per chart review. She underwent PCNL in 2018. She has not had a known recurrence.     She reports abdominal pain, flank pain, intermittent slow stream, and sensation of incomplete bladder emptying. These symptoms are consistent with previous kidney stones. She denies dysuria and gross hematuria. She denies fevers and chills.          Past Medical History:     Past Medical History:   Diagnosis Date     Basal cell carcinoma      Breast cancer (H) 2000     Colon polyp     exam every 5 years            Past Surgical History:     Past Surgical History:   Procedure Laterality Date     GYN SURGERY  1986    D & C     masectomy[  02/2000    Bilateral            Medications     Current Outpatient Medications   Medication     citalopram (CELEXA) 20 MG tablet     losartan (COZAAR) 50 MG tablet     omeprazole 20 MG tablet     valACYclovir (VALTREX) 500 MG tablet     albuterol (PROAIR HFA/PROVENTIL HFA/VENTOLIN HFA) 108 (90 Base) MCG/ACT inhaler     No current facility-administered medications for this visit.            Allergies:   Hydrocortisone, Pollen extract, and Zoster vaccine live         Review of Systems:  From intake questionnaire   Negative 14 system review except as noted on HPI, nurse's note.         Physical Exam:   Patient is a 72 year old  female   Vitals: Blood pressure 128/76, pulse 76, SpO2 95 %.  General Appearance Adult: Alert, no acute distress, oriented.  Lungs: Non-labored breathing.  Heart: No obvious jugular venous distension present.  Neuro: Alert, oriented, speech and mentation normal    PVR: 28 mL           Assessment and Plan:   Assessment: Siomara Hansen is a 72 year old female seen in evaluation for kidney stones. She has a history of  multiple kidney stones requiring PCNL. Her last episode of kidney stones was in 2018.     Today, she reports abdominal pain, flank pain, intermittent slow stream, and sensation of incomplete bladder emptying, which is consistent with previous kidney stone episodes. She will leave a urine sample today and have a non-contrast CT scan.     Plan:  1. UA and urine culture today.   2. CT abdomen pelvis without contrast to evaluate current stone burden. Follow up per results.       aMckenzie Massey PA-C  Department of Urology

## 2023-05-05 LAB — BACTERIA UR CULT: NORMAL

## 2023-05-08 ENCOUNTER — TELEPHONE (OUTPATIENT)
Dept: UROLOGY | Facility: CLINIC | Age: 73
End: 2023-05-08
Payer: COMMERCIAL

## 2023-05-08 NOTE — TELEPHONE ENCOUNTER
Per Mackenzie Massey, urine sample looks good, no infection. Patient informed and told to go ahead and schedule her CT scan.    Paula Crowder MA on 5/8/2023 at 12:49 PM

## 2023-05-08 NOTE — TELEPHONE ENCOUNTER
Reason for Call:  Other returning call    Detailed comments: Patient awaiting lab results before she schedules any imaging. Please reach out to patient when results are available.     Phone Number Patient can be reached at: Home number on file [phone] (home)    Best Time: any    Can we leave a detailed message on this number? YES    Call taken on 5/8/2023 at 9:54 AM by Adam Goss

## 2023-05-21 ENCOUNTER — HOSPITAL ENCOUNTER (OUTPATIENT)
Dept: CT IMAGING | Facility: CLINIC | Age: 73
Discharge: HOME | End: 2023-05-21
Attending: STUDENT IN AN ORGANIZED HEALTH CARE EDUCATION/TRAINING PROGRAM | Admitting: STUDENT IN AN ORGANIZED HEALTH CARE EDUCATION/TRAINING PROGRAM
Payer: COMMERCIAL

## 2023-05-21 DIAGNOSIS — Z87.442 HISTORY OF KIDNEY STONES: ICD-10-CM

## 2023-05-21 PROCEDURE — G1010 CDSM STANSON: HCPCS

## 2023-05-22 ENCOUNTER — TELEPHONE (OUTPATIENT)
Dept: UROLOGY | Facility: CLINIC | Age: 73
End: 2023-05-22
Payer: COMMERCIAL

## 2023-05-22 NOTE — RESULT ENCOUNTER NOTE
Can you call patient and let her know she did not have any kidney stones? There were a few nodules in her lungs as well as a cyst on her right ovary that she needs to follow up with her PCP about (PCP is out of network).

## 2023-05-22 NOTE — TELEPHONE ENCOUNTER
[facility]    Phone Message    May a detailed message be left on voicemail: yes     Reason for Call: Requesting Results   Name/type of test: CT  Date of test: 5/21/23  Was test done at a location other than [facility] (Please fill in the location if not [facility])?: No    The patient returned call regarding results. Please contact patient again. Thank you.     Action Taken: Message routed to:  Other: Urology    Travel Screening: Not Applicable

## 2023-05-22 NOTE — TELEPHONE ENCOUNTER
Called patient back.   Informed of results.   No further questions    Jose LAZARO RN  [facility]

## 2023-06-03 ENCOUNTER — HEALTH MAINTENANCE LETTER (OUTPATIENT)
Age: 73
End: 2023-06-03

## 2023-06-07 ENCOUNTER — OFFICE VISIT (OUTPATIENT)
Dept: FAMILY MEDICINE | Facility: CLINIC | Age: 73
End: 2023-06-07
Payer: COMMERCIAL

## 2023-06-07 ENCOUNTER — PATIENT OUTREACH (OUTPATIENT)
Dept: ONCOLOGY | Facility: CLINIC | Age: 73
End: 2023-06-07

## 2023-06-07 ENCOUNTER — PRE VISIT (OUTPATIENT)
Dept: ONCOLOGY | Facility: CLINIC | Age: 73
End: 2023-06-07

## 2023-06-07 VITALS
WEIGHT: 168 LBS | HEIGHT: 66 IN | BODY MASS INDEX: 27 KG/M2 | SYSTOLIC BLOOD PRESSURE: 110 MMHG | TEMPERATURE: 97 F | HEART RATE: 66 BPM | OXYGEN SATURATION: 98 % | DIASTOLIC BLOOD PRESSURE: 70 MMHG | RESPIRATION RATE: 14 BRPM

## 2023-06-07 DIAGNOSIS — Z13.6 CARDIOVASCULAR SCREENING; LDL GOAL LESS THAN 100: ICD-10-CM

## 2023-06-07 DIAGNOSIS — R94.5 ABNORMAL RESULTS OF LIVER FUNCTION STUDIES: ICD-10-CM

## 2023-06-07 DIAGNOSIS — N83.201 CYST OF RIGHT OVARY: ICD-10-CM

## 2023-06-07 DIAGNOSIS — R91.8 PULMONARY NODULES: Primary | ICD-10-CM

## 2023-06-07 PROBLEM — Z86.19 HISTORY OF SHINGLES: Status: ACTIVE | Noted: 2022-05-09

## 2023-06-07 PROBLEM — I10 ESSENTIAL HYPERTENSION: Status: ACTIVE | Noted: 2018-03-29

## 2023-06-07 PROBLEM — F48.9 MOOD PROBLEM: Status: ACTIVE | Noted: 2019-10-02

## 2023-06-07 PROBLEM — H02.833 DERMATOCHALASIS, BILATERAL: Status: ACTIVE | Noted: 2023-06-07

## 2023-06-07 PROBLEM — Z87.81 HISTORY OF VERTEBRAL COMPRESSION FRACTURE: Status: ACTIVE | Noted: 2023-06-07

## 2023-06-07 PROBLEM — C44.91 BASAL CELL CARCINOMA (BCC): Status: ACTIVE | Noted: 2018-11-09

## 2023-06-07 PROBLEM — H02.836 DERMATOCHALASIS, BILATERAL: Status: ACTIVE | Noted: 2023-06-07

## 2023-06-07 LAB
ALBUMIN SERPL BCG-MCNC: 4.2 G/DL (ref 3.5–5.2)
ALP SERPL-CCNC: 113 U/L (ref 35–104)
ALT SERPL W P-5'-P-CCNC: 21 U/L (ref 10–35)
ANION GAP SERPL CALCULATED.3IONS-SCNC: 13 MMOL/L (ref 7–15)
AST SERPL W P-5'-P-CCNC: 27 U/L (ref 10–35)
BILIRUB DIRECT SERPL-MCNC: <0.2 MG/DL (ref 0–0.3)
BILIRUB SERPL-MCNC: 0.4 MG/DL
BUN SERPL-MCNC: 15.7 MG/DL (ref 8–23)
CALCIUM SERPL-MCNC: 9.3 MG/DL (ref 8.8–10.2)
CHLORIDE SERPL-SCNC: 105 MMOL/L (ref 98–107)
CHOLEST SERPL-MCNC: 207 MG/DL
CREAT SERPL-MCNC: 0.9 MG/DL (ref 0.51–0.95)
DEPRECATED HCO3 PLAS-SCNC: 23 MMOL/L (ref 22–29)
ERYTHROCYTE [DISTWIDTH] IN BLOOD BY AUTOMATED COUNT: 12.9 % (ref 10–15)
GFR SERPL CREATININE-BSD FRML MDRD: 67 ML/MIN/1.73M2
GLUCOSE SERPL-MCNC: 104 MG/DL (ref 70–99)
HCT VFR BLD AUTO: 42 % (ref 35–47)
HDLC SERPL-MCNC: 57 MG/DL
HGB BLD-MCNC: 13.8 G/DL (ref 11.7–15.7)
INR PPP: 0.96 (ref 0.85–1.15)
LDLC SERPL CALC-MCNC: 128 MG/DL
MCH RBC QN AUTO: 31.8 PG (ref 26.5–33)
MCHC RBC AUTO-ENTMCNC: 32.9 G/DL (ref 31.5–36.5)
MCV RBC AUTO: 97 FL (ref 78–100)
NONHDLC SERPL-MCNC: 150 MG/DL
PLATELET # BLD AUTO: 237 10E3/UL (ref 150–450)
POTASSIUM SERPL-SCNC: 4.1 MMOL/L (ref 3.4–5.3)
PROT SERPL-MCNC: 7 G/DL (ref 6.4–8.3)
RBC # BLD AUTO: 4.34 10E6/UL (ref 3.8–5.2)
SODIUM SERPL-SCNC: 141 MMOL/L (ref 136–145)
TRIGL SERPL-MCNC: 109 MG/DL
WBC # BLD AUTO: 7.8 10E3/UL (ref 4–11)

## 2023-06-07 PROCEDURE — 80053 COMPREHEN METABOLIC PANEL: CPT | Performed by: INTERNAL MEDICINE

## 2023-06-07 PROCEDURE — 99204 OFFICE O/P NEW MOD 45 MIN: CPT | Performed by: INTERNAL MEDICINE

## 2023-06-07 PROCEDURE — 82248 BILIRUBIN DIRECT: CPT | Performed by: INTERNAL MEDICINE

## 2023-06-07 PROCEDURE — 85610 PROTHROMBIN TIME: CPT | Performed by: INTERNAL MEDICINE

## 2023-06-07 PROCEDURE — 80061 LIPID PANEL: CPT | Performed by: INTERNAL MEDICINE

## 2023-06-07 PROCEDURE — 85027 COMPLETE CBC AUTOMATED: CPT | Performed by: INTERNAL MEDICINE

## 2023-06-07 PROCEDURE — 36415 COLL VENOUS BLD VENIPUNCTURE: CPT | Performed by: INTERNAL MEDICINE

## 2023-06-07 ASSESSMENT — PAIN SCALES - GENERAL: PAINLEVEL: WORST PAIN (10)

## 2023-06-07 NOTE — TELEPHONE ENCOUNTER
Imaging Received  Kavitha 15, 2023 5:27 PM    Action: Images from [facility] received and resolved to PACS.     RECORDS NEEDED:  Last FIVE years chest imaging pushed from [facility] to PACS--thank you!!    Action June 7, 2023 3:11 PM    Action Taken Spoke to Joya at [facility]. Req for Chest imgs to be push to PACS. Joya confirmed imgs and will push.       RECORDS STATUS - ALL OTHER DIAGNOSIS      RECORDS RECEIVED FROM: [facility]   DATE RECEIVED:    NOTES STATUS DETAILS   OFFICE NOTE from referring provider Epic 6/7/23- Dr. Yesy Tovar   MEDICATION LIST [facility] 6/7/23   LABS     ANYTHING RELATED TO DIAGNOSIS Epic Most recent 6/7/23   IMAGING (NEED IMAGES & REPORT)     CT SCANS PACS 6/13/18- CT Chest   XRAYS PAcs 6/12/18- XR Chest

## 2023-06-07 NOTE — RESULT ENCOUNTER NOTE
The cholesterol is reasonably well controlled.  Kidney function, nonfasting blood sugar, electrolytes, INR, blood counts are all normal.  The liver enzymes are normal except the alkaline phosphatase (often a gallbladder or bone enzyme) is very slightly elevated.  Because the other liver enzymes are normal, this is of no clinical significance and no further work-up is needed.  Due to all normal labs, the CT finding of the cirrhosis is a false positive

## 2023-06-07 NOTE — PROGRESS NOTES
New Patient: Interventional Pulmonary (Lung nodule) Nurse Navigator Note    Referring provider: Yesy Tovar, [facility]     Referred to (specialty): Interventional Pulmonary (Lung nodule)    Requested provider (if applicable): n/a    Date Referral Received: 6/7/2023    Evaluation for :  Lung nodule    Clinical History (per Nurse review of records provided):    **BOOK MARKED**    5/21/23:  CT Abdomen Pelvis w/o Contrast   LOWER CHEST: Indeterminate groundglass nodule within the left lower lobe, which could reflect a low-grade adenocarcinoma. This measures 11 x 14 mm, axial image 5. Additional scattered solid pulmonary nodules are additionally noted, measuring no greater   than 6 to 7 mm.  IMPRESSION:   1.  No appreciable nephroureterolithiasis. No hydronephrosis.  2.  Subtle liver surface nodularity, suggesting underlying cirrhosis.  3.  Bilateral, indeterminate groundglass and solid pulmonary nodules. Largest groundglass nodule measures 14 mm within the left lower lobe. Largest solid pulmonary nodule measures up to 7 mm within the left lower lobe, as well. Follow-up criteria as   listed below; pulmonology consultation could also be considered for further evaluation and management.        Jun 13 2018:  CT CHEST WO  [facility]   Impression    1. No acute pulmonary abnormality. No pneumonia.   2. Stable, small bilateral pulmonary nodules, likely benign.   Please note that all CT scans at this facility use dose modulation,   iterative reconstruction, and/or weight-based dosing when appropriate to   reduce radiation dose to as low as reasonably achievable.       Records Location (Care Everywhere, Media, etc.): Epic     RECORDS NEEDED: Last FIVE years chest imaging pushed from [facility] to PACS--thank you!!     Additional testing needed prior to consult: CT Chest and PFT;s

## 2023-06-07 NOTE — PROGRESS NOTES
Assessment & Plan     Pulmonary nodules -most concerning finding as it is grown significantly since previous CT 5 years ago.  Referral to the lung nodule clinic given her history of breast cancer and the large increase in size.  I do not feel that serial monitoring as the initial step is appropriate without expert consultation  - Adult Oncology/Hematology  Referral; Future    Cyst of right ovary -not significantly changed in size.  Recommend nonurgent ultrasound  - US Pelvic Complete with Transvaginal; Future    Abnormal results of liver function studies -likely false positive.  BMI slightly elevated, would not expect significant fatty liver given normal liver enzymes in the past.  If lab testing is normal, this is likely false positive  - Hepatic panel (Albumin, ALT, AST, Bili, Alk Phos, TP); Future  - CBC with platelets; Future  - Basic metabolic panel  (Ca, Cl, CO2, Creat, Gluc, K, Na, BUN); Future  - INR; Future  - Hepatic panel (Albumin, ALT, AST, Bili, Alk Phos, TP)  - CBC with platelets  - Basic metabolic panel  (Ca, Cl, CO2, Creat, Gluc, K, Na, BUN)  - INR    CARDIOVASCULAR SCREENING; LDL GOAL LESS THAN 100  - Lipid panel reflex to direct LDL Non-fasting; Future  - Lipid panel reflex to direct LDL Non-fasting      Patient Instructions   Right ovarian cyst  1. Recommend updating the ultrasound. Radiology test was ordered.  Please call [phone] to schedule.    Lung nodule  1. Referral to lung nodule clinic - they will call you to schedule    Cirrhosis?  1. Will check lab tests today; if all normal, then the abnormality seen on the CT scan is 'false positive' and you do not have cirrhosis      Yesy Tovar, DO  [facility]    Karen Stringer is a 72 year old, presenting for the following health issues:  Follow Up (CT done 5/21/23/Would like suggestion who you think would be good for PCP ) and Health Maintenance (Will do AWV with new PCP )        6/7/2023     7:27  "AM   Additional Questions   Roomed by henna regan   Accompanied by self         2023     7:27 AM   Patient Reported Additional Medications   Patient reports taking the following new medications none     HPI     Chief Complaint   Patient presents with     Follow Up     CT done 23  Would like suggestion who you think would be good for PCP      Health Maintenance     Will do AWV with new PCP      Establish care - was not told my practice is closed    ?cirrhosis:  --very rare EtOH use; no prior history of heavy EtOH use  --mild hyperTG    Lung nodules:  --she reports seen on prior images  --this year \" Indeterminate groundglass nodule within the left lower lobe, which could reflect a low-grade adenocarcinoma. This measures 11 x 14 mm, axial image 5. Additional scattered solid pulmonary nodules are additionally noted, measuring no greater   than 6 to 7 mm.\"  --in 2018 \"The previously-described multiple, small pulmonary nodules are unchanged   and are visible on image 25 of series 2 in the posterior aspect of the   right lower lobe, where there is a 3 mm noncalcified nodule. Additional   noncalcified 4-5 mm nodules are seen on images 44 and 45 within the left   lung adjacent to the major fissure, and small nodules are present in the   inferior left lower lobe on image 62 and image 59. Small area of   ground-glass opacity right lung on image 19 is stable, stable nodule,   pleural-based in the lateral inferior right middle lobe on image 66. \"  --several family members  of lung cancer  --former smoker, quit 40 years ago;  Estimates 1/2 ppd for ~15 yrs.    Right ovary  --abnormality seen; she reports seen on prior images    COMPARISON:   Pelvic ultrasound 2018.     FINDINGS:   The uterus measures 2.2 x 3.1 x 5 cm. Myometrial calcifications are seen. The endometrium is normal measuring 3 mm.   The left ovary is not visualized. No left ovarian mass or cyst is seen.   The right ovary measures 2.5 x 2.6 x 2.9 " cm. It contains a cyst which measures approximately 2.1 x 2.4 x 2.6 cm.   Color Doppler shows blood flow in the right ovary.   There is no free fluid.     Impression:   Right ovarian cyst measures approximately 2.1 x 2.4 x 2.6 cm. Considering the difference in measuring technique, this cyst appears to be stable or at most minimally increased in size since 09/27/2018.           CT 5/2023    FINDINGS:       Absence of intravenous contrast limits the sensitivity of this examination for detection of infectious/inflammatory change, post traumatic abnormalities, vascular abnormalities, and visceral lesions.     LOWER CHEST: Indeterminate groundglass nodule within the left lower lobe, which could reflect a low-grade adenocarcinoma. This measures 11 x 14 mm, axial image 5. Additional scattered solid pulmonary nodules are additionally noted, measuring no greater   than 6 to 7 mm.     Bilateral breast implants. Atherosclerosis of coronary arteries.     HEPATOBILIARY: Subtle liver surface nodularity and widening the periportal spaces, suggesting underlying cirrhosis.     Cholelithiasis.     No intrahepatic or intrahepatic biliary ductal dilatation.     PANCREAS: Normal attenuation. No peripancreatic inflammatory fat stranding.     SPLEEN: Normal attenuation. Normal size.     ADRENAL GLANDS: Normal.     KIDNEYS: No hydronephrosis.     No nephroureterolithiasis.     Urinary bladder is unremarkable.     PELVIC ORGANS: Indeterminate right adnexal cystic lesion, measuring up to 3.1 cm. Nonemergent pelvic sonography follow-up recommended.     BOWEL: No evidence of acute gastrointestinal inflammation or obstruction. Normal appendix.     No intraperitoneal free fluid or free air.     LYMPH NODES: No suspicious abdominal or pelvic lymphadenopathy.     VASCULATURE: No abdominal aortic aneurysm. Mild-to-moderate atherosclerotic calcifications.     MUSCULOSKELETAL: No suspicious abnormality.     OTHER: No additionally significant  abnormalities.                                                                      IMPRESSION:   1.  No appreciable nephroureterolithiasis. No hydronephrosis.  2.  Subtle liver surface nodularity, suggesting underlying cirrhosis.  3.  Bilateral, indeterminate groundglass and solid pulmonary nodules. Largest groundglass nodule measures 14 mm within the left lower lobe. Largest solid pulmonary nodule measures up to 7 mm within the left lower lobe, as well. Follow-up criteria as   listed below; pulmonology consultation could also be considered for further evaluation and management.     2017 Fleischner Society Recommendations for Solid Pulmonary Nodules     Nodule size greater than 6 mm up to 8 mm:     Single nodule:     Low risk: CT at 6-12 months, then consider CT at 18-24 months  High risk: CT at 6-12 months, then CT at 18-24 months     Multiple nodules:     Low risk: CT at 3-6 months, then consider CT at 18-24 months  High risk: CT at 3-6 months, then CT at 18-24 months        MANAGEMENT OF SUBSOLID PULMONARY NODULES            NONSOLID (GROUND GLASS) NODULES     SOLITARY  <6mm.......................... No followup                                      (Track None)   >=6............................12 mo, 36 mo, 60 mo                                      (Track 12, Track 24, Track 24)               MULTIPLE  <6mm..........................6 mo, 24 mo, 48 mo                                      (Track 6, Track 18, Track 24)  >=6.............................6 mo, 24 mo, 48 mo                                      (Track 6, Track 18, Track 24)        [Recommend Follow Up: Consideration of pulmonary consultation and CT imaging follow-up, as detailed above.]     This report will be copied to the [facility] to ensure a provider acknowledges the finding.       Current Outpatient Medications   Medication Sig Dispense Refill     albuterol (PROAIR HFA/PROVENTIL HFA/VENTOLIN HFA) 108 (90 Base) MCG/ACT inhaler Inhale  "1-2 puffs into the lungs       citalopram (CELEXA) 20 MG tablet Take 20 mg by mouth       losartan (COZAAR) 50 MG tablet Take 50 mg by mouth 2 times daily       omeprazole 20 MG tablet Take 1 tablet (20 mg) by mouth daily Take 30-60 minutes before a meal. 30 tablet 5     valACYclovir (VALTREX) 500 MG tablet Take 1 tablet by mouth 2 times daily. 6 tablet 3           Review of Systems   Constitutional, HEENT, cardiovascular, pulmonary, gi and gu systems are negative, except as otherwise noted.      Objective    /70 (BP Location: Right arm, Patient Position: Sitting, Cuff Size: Adult Regular)   Pulse 66   Temp 97  F (36.1  C) (Tympanic)   Resp 14   Ht 1.676 m (5' 5.98\")   Wt 76.2 kg (168 lb)   SpO2 98%   BMI 27.13 kg/m    Body mass index is 27.13 kg/m .  Physical Exam   GENERAL APPEARANCE: healthy, alert and no distress  RESP: lungs clear to auscultation - no rales, rhonchi or wheezes  CV: regular rates and rhythm, normal S1 S2, no S3 or S4 and no murmur, click or rub  ABDOMEN: soft, nontender, without hepatosplenomegaly or masses and bowel sounds normal                    "

## 2023-06-07 NOTE — PATIENT INSTRUCTIONS
Right ovarian cyst  Recommend updating the ultrasound. Radiology test was ordered.  Please call [phone] to schedule.    Lung nodule  Referral to lung nodule clinic - they will call you to schedule    Cirrhosis?  Will check lab tests today; if all normal, then the abnormality seen on the CT scan is 'false positive' and you do not have cirrhosis

## 2023-06-08 PROBLEM — C50.919 MALIGNANT NEOPLASM OF FEMALE BREAST (H): Status: ACTIVE | Noted: 2023-06-08

## 2023-06-08 RX ORDER — CHLORAL HYDRATE 500 MG
CAPSULE ORAL
COMMUNITY
End: 2023-09-11

## 2023-06-08 RX ORDER — VITAMIN E 268 MG
CAPSULE ORAL
COMMUNITY
End: 2023-09-11

## 2023-06-14 ENCOUNTER — HOSPITAL ENCOUNTER (OUTPATIENT)
Dept: RESPIRATORY THERAPY | Facility: CLINIC | Age: 73
Discharge: HOME | End: 2023-06-14
Attending: NURSE PRACTITIONER | Admitting: NURSE PRACTITIONER
Payer: COMMERCIAL

## 2023-06-14 DIAGNOSIS — R91.8 PULMONARY NODULES: ICD-10-CM

## 2023-06-14 PROCEDURE — 94375 RESPIRATORY FLOW VOLUME LOOP: CPT

## 2023-06-14 PROCEDURE — 94726 PLETHYSMOGRAPHY LUNG VOLUMES: CPT

## 2023-06-14 PROCEDURE — 94729 DIFFUSING CAPACITY: CPT

## 2023-06-14 NOTE — PROGRESS NOTES
Thank you for completing pulmonary function testing today.  All results will be scanned into your epic results for your doctor to review.  Please resume taking all your current prescribed medications and diet as directed by your provider.   If you have not heard from your provider about your testing within two weeks and do not have a follow-up appointment scheduled with them please contact your provider about any questions you have concerning your testing.   Thank you  [facility]

## 2023-06-16 LAB
DLCOCOR-%PRED-PRE: 64 %
DLCOCOR-PRE: 12.81 ML/MIN/MMHG
DLCOUNC-%PRED-PRE: 65 %
DLCOUNC-PRE: 12.89 ML/MIN/MMHG
DLCOUNC-PRED: 19.73 ML/MIN/MMHG
ERV-%PRED-PRE: 29 %
ERV-PRE: 0.22 L
ERV-PRED: 0.76 L
EXPTIME-PRE: 7.34 SEC
FEF2575-%PRED-PRE: 142 %
FEF2575-PRE: 2.5 L/SEC
FEF2575-PRED: 1.75 L/SEC
FEFMAX-%PRED-PRE: 96 %
FEFMAX-PRE: 5.54 L/SEC
FEFMAX-PRED: 5.72 L/SEC
FEV1-%PRED-PRE: 101 %
FEV1-PRE: 2.14 L
FEV1FEV6-PRE: 83 %
FEV1FEV6-PRED: 79 %
FEV1FVC-PRE: 83 %
FEV1FVC-PRED: 78 %
FEV1SVC-PRE: 75 %
FEV1SVC-PRED: 69 %
FIFMAX-PRE: 5.69 L/SEC
FRCPLETH-%PRED-PRE: 94 %
FRCPLETH-PRE: 2.88 L
FRCPLETH-PRED: 3.04 L
FVC-%PRED-PRE: 94 %
FVC-PRE: 2.57 L
FVC-PRED: 2.72 L
IC-%PRED-PRE: 115 %
IC-PRE: 2.62 L
IC-PRED: 2.26 L
RVPLETH-%PRED-PRE: 122 %
RVPLETH-PRE: 2.65 L
RVPLETH-PRED: 2.16 L
TLCPLETH-%PRED-PRE: 103 %
TLCPLETH-PRE: 5.5 L
TLCPLETH-PRED: 5.3 L
VA-%PRED-PRE: 86 %
VA-PRE: 4.19 L
VC-%PRED-PRE: 92 %
VC-PRE: 2.84 L
VC-PRED: 3.07 L

## 2023-06-23 ENCOUNTER — HOSPITAL ENCOUNTER (OUTPATIENT)
Dept: CT IMAGING | Facility: CLINIC | Age: 73
Discharge: HOME | End: 2023-06-23
Attending: INTERNAL MEDICINE
Payer: COMMERCIAL

## 2023-06-23 ENCOUNTER — HOSPITAL ENCOUNTER (OUTPATIENT)
Dept: ULTRASOUND IMAGING | Facility: CLINIC | Age: 73
Discharge: HOME | End: 2023-06-23
Attending: INTERNAL MEDICINE
Payer: COMMERCIAL

## 2023-06-23 DIAGNOSIS — R91.8 PULMONARY NODULES: ICD-10-CM

## 2023-06-23 DIAGNOSIS — N83.201 CYST OF RIGHT OVARY: ICD-10-CM

## 2023-06-23 PROCEDURE — 71260 CT THORAX DX C+: CPT

## 2023-06-23 PROCEDURE — 250N000009 HC RX 250: Performed by: INTERNAL MEDICINE

## 2023-06-23 PROCEDURE — 250N000011 HC RX IP 250 OP 636: Performed by: INTERNAL MEDICINE

## 2023-06-23 PROCEDURE — 76830 TRANSVAGINAL US NON-OB: CPT

## 2023-06-23 RX ORDER — IOPAMIDOL 755 MG/ML
82 INJECTION, SOLUTION INTRAVASCULAR ONCE
Status: COMPLETED | OUTPATIENT
Start: 2023-06-23 | End: 2023-06-23

## 2023-06-23 RX ADMIN — SODIUM CHLORIDE 61 ML: 9 INJECTION, SOLUTION INTRAVENOUS at 13:29

## 2023-06-23 RX ADMIN — IOPAMIDOL 82 ML: 755 INJECTION, SOLUTION INTRAVENOUS at 13:29

## 2023-06-23 NOTE — LETTER
June 28, 2023      Siomara BHARGAVI Jade  [address]  [place] [facility] [address]        Dear ,    We are writing to inform you of your test results.    The right ovary shows a simple 3.7 cm cyst.  Recommend follow-up ultrasound in 6 months     Resulted Orders   US Pelvic Complete with Transvaginal    Narrative    US PELVIC TRANSABDOMINAL AND TRANSVAGINAL 6/23/2023 2:25 PM    CLINICAL HISTORY: Cyst of right ovary  TECHNIQUE: Transabdominal scans were performed. Endovaginal ultrasound  was performed to better visualize the adnexa.    COMPARISON: CT 5/21/2023    FINDINGS:    UTERUS: 5.3 x 3.4 x 1.8 cm. Normal in size and position with no  masses. Peripheral vascular calcification noted in the uterus.    ENDOMETRIUM: 2 mm. Normal smooth endometrium.    RIGHT OVARY: 3.7 x 3.5 x 2.4 cm cm. Simple-appearing cyst within the  right ovary measuring up to 3.7 cm. No definite septations or mural  nodularity.     LEFT OVARY: Nonvisualized, likely due to overlying bowel gas.    No significant free fluid.      Impression    IMPRESSION:  1.  Simple-appearing cyst in the right ovary measuring up to 3.7 cm,  recommend follow-up ultrasound in 6 months given size.      Postmenopausal asymptomatic simple cyst:    </= 1 cm: Normal, no follow-up.    >1cm to </= 3 cm: Clinically inconsequential finding. No follow-up  needed.    >3 to </=5 cm: Benign simple cyst. Clinically inconsequential finding.  Follow-up pelvic ultrasound in 6 months recommended.    >5 cm: Benign simple cyst. Follow-up pelvic ultrasound in 6 months  recommended.    REFERENCE:  Management of Asymptomatic Ovarian and Other Adnexal Cysts Imaged at  US: Society of Radiologists in Ultrasound Consensus Conference  Statement. Radiology September 2010; [phone].    Simple Adnexal Cysts: SRU Consensus Conference Update on Follow-up and  Reporting. Radiology September 2019. [phone].    JORGE CAMPBELL MD         SYSTEM ID:  [id number]       If you have any  questions or concerns, please call the clinic at the number listed above.       Sincerely,      Yesy Tovar, DO

## 2023-06-27 PROBLEM — N83.201 CYST OF RIGHT OVARY: Status: ACTIVE | Noted: 2023-06-27

## 2023-06-27 NOTE — PROGRESS NOTES
"I rec'd the following message from Ellie, RNCC:  Pt had technical issues this am with the appt and was unable to connect w Isael. Will have to be rescheduled in person ):       Sent the following urgent IB to the IP (Interventional Pulmonology) team:  Dr. Kirkland,     I received word that patient had technical difficulties and you were unable to meet with her today.   and I need to add her on somewhere \"in person\".     Next available is in one month.     Would this be ok?     6/23/2023:   IMPRESSION:   1.  There are numerous bilateral pure groundglass nodules/opacities   with increase in size 3. Findings are concerning for primary lung   malignancy such as adenocarcinoma in situ.   2.  Development of reticular and groundglass opacity in the right   middle lobe measuring 20 x 7 mm, indeterminate between an additional   site of low-grade pulmonary malignancy, interstitial fibrotic change,   infectious/inflammatory nodule.   3.  Slight increase in size of left lower lobe solid nodule measuring   4 mm, previously 2 mm, indeterminate.   4.  Multiple additional stable groundglass and solid pulmonary   nodules, as detailed above.   5.  Mild emphysema.   6.  No thoracic lymphadenopathy.   7.  Development of mild L3 compression deformity without bony   retropulsion. Correlation with point tenderness is recommended.   8.  Findings suggestive of chronic hepatic liver disease. Clinical   correlation recommended.     "

## 2023-06-29 NOTE — PROGRESS NOTES
"I sent a second \"urgent\" IB message to the team asking for an \"inperson\" sooner appointment.  CC'd Lawanda as I am out of office.  "

## 2023-07-10 ENCOUNTER — MYC MEDICAL ADVICE (OUTPATIENT)
Dept: PULMONOLOGY | Facility: CLINIC | Age: 73
End: 2023-07-10
Payer: COMMERCIAL

## 2023-07-11 ENCOUNTER — TELEPHONE (OUTPATIENT)
Dept: PULMONOLOGY | Facility: CLINIC | Age: 73
End: 2023-07-11
Payer: COMMERCIAL

## 2023-07-12 ENCOUNTER — PATIENT OUTREACH (OUTPATIENT)
Dept: ONCOLOGY | Facility: CLINIC | Age: 73
End: 2023-07-12
Payer: COMMERCIAL

## 2023-07-12 NOTE — PROGRESS NOTES
Multiple attempts have been made to schedule pt for an IP consult.  She had a virtual appointment with Dr. Kirkland on 6/27 that could not be completed because pt had technical difficulties.  On 7/11 she was offered three different appt dates/times to meet with Dr. Quintana in person at the [facility], which she declined all three.  She is refusing any in person or video visits and states she will onlt do a telephone visit which we are not able to do.  She is currently scheduled to meet with Dr. Cipriano Sheppard from Pulmonology at the [facility] on 8/8.  IP team is aware and approved.

## 2023-08-08 ENCOUNTER — OFFICE VISIT (OUTPATIENT)
Dept: PULMONOLOGY | Facility: CLINIC | Age: 73
End: 2023-08-08
Payer: COMMERCIAL

## 2023-08-08 VITALS
BODY MASS INDEX: 27.32 KG/M2 | HEART RATE: 76 BPM | DIASTOLIC BLOOD PRESSURE: 70 MMHG | OXYGEN SATURATION: 97 % | WEIGHT: 170 LBS | SYSTOLIC BLOOD PRESSURE: 138 MMHG | HEIGHT: 66 IN

## 2023-08-08 DIAGNOSIS — R91.8 PULMONARY NODULES: Primary | ICD-10-CM

## 2023-08-08 PROCEDURE — 99205 OFFICE O/P NEW HI 60 MIN: CPT | Performed by: INTERNAL MEDICINE

## 2023-08-08 ASSESSMENT — ENCOUNTER SYMPTOMS
CHILLS: 0
FEVER: 0
COUGH: 1
WHEEZING: 0
SPUTUM PRODUCTION: 0
SHORTNESS OF BREATH: 0
WEIGHT LOSS: 0
HEMOPTYSIS: 0

## 2023-08-08 NOTE — PROGRESS NOTES
Pulmonary Clinic  New Patient Evaluation    Name: Siomara Hansen MRN: [id number]     Age: 72 year old   YOB: 1950             HPI:   CC: Pulmonary nodules    Siomara Hansen is a 72 year old female with H/O breast cancer discovered in 1999 status post bilateral mastectomy and lymph node dissection in 2000 who presents with multiple incidentally identified groundglass nodules.    She reports that she feels overall pretty well.  She broke her foot last year so she has had decreased mobility during recovery.  She also is having some pain in her contralateral hip which has decreased her mobility.  She feels that she has lost a bit of endurance and has gained some weight.    She does have a mild occasional cough which she attributes to indoor allergies.  The cough only occurs when she is in her apartment, not when out and about.  She has no dyspnea or wheezing.    She also complains of recurrent otitis externa.    Exposure History:   Tobacco: Started around age 15. Around 1 ppd, Quit around age 32. However, she worked at the [employer] with significant second hand exposure. Quit that job in 2013.  Other inhaled substances (Vaping, hookah. 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  Family Hx: Mother and half brother on fathers side passed from lung cancer. Mother was a smoker and also had emphysema but passed after shortly after a lobectomy.           Past Medical History:     Past Medical History:   Diagnosis Date    Basal cell carcinoma     Breast cancer (H) 2000    Colon polyp     exam every 5 years             Past Surgical History:      Past Surgical History:   Procedure Laterality Date    GYN SURGERY  1986    D & C    masectomy[  02/2000    Bilateral             Social History:     Social History     Socioeconomic History    Marital status: Single     Spouse name: Not on file    Number of children: Not on file    Years of education: Not on file     Highest education level: Not on file   Occupational History    Not on file   Tobacco Use    Smoking status: Former     Packs/day: 0.50     Years: 15.00     Pack years: 7.50     Types: Cigarettes     Quit date: 1984     Years since quittin.6    Smokeless tobacco: Never   Substance and Sexual Activity    Alcohol use: Yes     Comment: Rare    Drug use: No    Sexual activity: Never   Other Topics Concern    Parent/sibling w/ CABG, MI or angioplasty before 65F 55M? Not Asked   Social History Narrative    Not on file     Social Determinants of Health     Financial Resource Strain: Not on file   Food Insecurity: Not on file   Transportation Needs: Not on file   Physical Activity: Not on file   Stress: Not on file   Social Connections: Not on file   Intimate Partner Violence: Not on file   Housing Stability: Not on file            Family History:     Family History   Problem Relation Age of Onset    Cancer Mother         Lung    Breast Cancer Mother     Cancer Father         Braine    Cancer Brother         Bladder    Heart Disease Brother     Breast Cancer Sister     Breast Cancer Sister     Cancer Brother         Lung    Melanoma No family hx of              Immunizations:     Immunization History   Administered Date(s) Administered    COVID-19 Bivalent 18+ (Moderna) 10/07/2022    COVID-19 Monovalent 18+ (Moderna) 2021, 2021, 2022    FLU 6-35 months 2013    Flu, Unspecified 10/07/2013    [id number] Novel Flu 02/10/2010    Influenza (High Dose) 3 valent vaccine 2014, 2015, 10/14/2016, 2017, 10/23/2018, 10/02/2019    Influenza (IIV3) PF 2009, 2011, 2013, 10/15/2013    Influenza Vaccine 65+ (Fluzone HD) 2020, 2021, 2022    Influenza Vaccine >6 months (Alfuria,Fluzone) 10/07/2013    Pneumo Conj 13-V (2010&after) 2014    Pneumococcal 23 valent 2015    TDAP Vaccine (Adacel) 2012    Zoster recombinant adjuvanted (SHINGRIX)  "08/21/2021, 12/01/2021    Zoster vaccine, live 04/10/2014             Allergies:     Allergies   Allergen Reactions    Hydrocortisone      Erythema      Pollen Extract      Itching eyes    Zoster Vaccine Live      Did get red rash at injection site that lasted for about 3 days.             Medications:   albuterol (PROAIR HFA/PROVENTIL HFA/VENTOLIN HFA) 108 (90 Base) MCG/ACT inhaler, Inhale 1-2 puffs into the lungs (Patient not taking: Reported on 6/27/2023)  Calcium Carb-Cholecalciferol (CALCIUM 600-D PO),   citalopram (CELEXA) 20 MG tablet, Take 20 mg by mouth  fish oil-omega-3 fatty acids (OMEGA-3 FISH OIL) 1000 MG capsule,   Flaxseed, Linseed, (FLAXSEED OIL) 1000 MG CAPS,   losartan (COZAAR) 50 MG tablet, Take 50 mg by mouth 2 times daily  Multiple Vitamin (MULTIVITAMIN ADULT PO),   omeprazole 20 MG tablet, Take 1 tablet (20 mg) by mouth daily Take 30-60 minutes before a meal.  OTHER MEDICAL SUPPLIES, For personal use. (Patient not taking: Reported on 6/27/2023)  valACYclovir (VALTREX) 500 MG tablet, Take 1 tablet by mouth 2 times daily.    No current facility-administered medications on file prior to visit.           Review of Systems:     Review of Systems   Constitutional:  Negative for chills, fever, malaise/fatigue and weight loss.   Respiratory:  Positive for cough. Negative for hemoptysis, sputum production, shortness of breath and wheezing.               Exam:   /70   Pulse 76   Ht 1.676 m (5' 6\")   Wt 77.1 kg (170 lb)   SpO2 97%   BMI 27.44 kg/m      Physical Exam  Vitals and nursing note reviewed.   Constitutional:       Appearance: She is obese.   HENT:      Right Ear: Tympanic membrane, ear canal and external ear normal.      Left Ear: Tympanic membrane, ear canal and external ear normal.   Cardiovascular:      Rate and Rhythm: Normal rate and regular rhythm.   Pulmonary:      Effort: Pulmonary effort is normal.      Breath sounds: Normal breath sounds. No wheezing, rhonchi or rales. "   Neurological:      Mental Status: She is alert.       Fingernails without clubbing         Data:     PFT: 6/14/23    The FVC, FEV1, and FEV1/FVC ratio are within normal limits.  The lung volumes within normal limits.  The diffusion capacity is reduced.    IMPRESSION:  Normal spirometry and lung volumes.  Moderate diffusion defect      CT chest 6/23/2023  1.  There are numerous bilateral pure groundglass nodules/opacities  with increase in size 3. Findings are concerning for primary lung  malignancy such as adenocarcinoma in situ.  2.  Development of reticular and groundglass opacity in the right  middle lobe measuring 20 x 7 mm, indeterminate between an additional  site of low-grade pulmonary malignancy, interstitial fibrotic change,  infectious/inflammatory nodule.  3.  Slight increase in size of left lower lobe solid nodule measuring  4 mm, previously 2 mm, indeterminate.  4.  Multiple additional stable groundglass and solid pulmonary  nodules, as detailed above.  5.  Mild emphysema.  6.  No thoracic lymphadenopathy.  7.  Development of mild L3 compression deformity without bony  retropulsion. Correlation with point tenderness is recommended.  8.  Findings suggestive of chronic hepatic liver disease. Clinical  correlation recommended.      CT abdomen pelvis 5/21/2023  LOWER CHEST: Indeterminate groundglass nodule within the left lower lobe, which could reflect a low-grade adenocarcinoma. This measures 11 x 14 mm, axial image 5. Additional scattered solid pulmonary nodules are additionally noted, measuring no greater   than 6 to 7 mm.  IMPRESSION:   1.  No appreciable nephroureterolithiasis. No hydronephrosis.  2.  Subtle liver surface nodularity, suggesting underlying cirrhosis.  3.  Bilateral, indeterminate groundglass and solid pulmonary nodules. Largest groundglass nodule measures 14 mm within the left lower lobe. Largest solid pulmonary nodule measures up to 7 mm within the left lower lobe, as well.  Follow-up criteria as   listed below; pulmonology consultation could also be considered for further evaluation and management.      CT abdomen pelvis stone protocol 9/20/2018  Lower Chest: Normal heart size. No pericardial effusion. Lung bases   essentially clear. Stable benign subpleural nodules.           All studies listed here were independently reviewed and interpreted by me today.          Assessment and Plan:     ## Pulmonary nodules, multiple, semisolid  This patient has a history of nephrolithiasis and underwent CT abdomen pelvis on 5/21/2023 to assess stone burden.  That CT identified multiple groundglass nodules the largest of which measured 11 x 14 mm.  This led to a dedicated chest CT on 6/23/2023 which identified several large groundglass opacities concerning for possible malignancy versus infection.  Unfortunately, the only comparison imaging we have is an abdominal CT from 2018 which did not show any nodules.  She has a history of breast cancer status post bilateral mastectomy and lymph node dissection in 2020 with clean margins and negative nodes per the patient.  This was performed at [facility], but unfortunately we do not have records in [facility] going back that far.  The nodules could represent new primary carcinoma, or infection/inflammation.  She has a mild cough when she is in her apartment which she attributes to dust allergy, but no other respiratory symptoms.  She has no other concerning symptoms of malignancy such as unexplained fatigue/malaise, or weight loss.  Of note she states that if there is concern for malignancy she is unlikely to pursue biopsy because everyone she knows who has had a biopsy has then experienced spread of their malignancy.  We discussed the natural history of malignancies and their progression in detail and answered all of her questions best my ability.    -Plan to discuss at nodule conference      ## Moderate diffusion defect  PFTs from June 2023 revealed a  moderate diffusion defect, otherwise normal.  The patient has no dyspnea or other respiratory concerns.  -We will continue to follow      ## Recurrent otitis externa  Advised to use over-the-counter isopropyl alcohol eardrops after bathing to dry the ear canals more rapidly.          Return to clinic: 6 months    I personally spent 88 minutes on the date of the encounter doing chart review, history and exam, documentation and further activities per the note.    Cipriano Sheppard M.D.  Pulmonary & Critical Care  Pager: Click Here to page      The above note was dictated using voice recognition software and may include typographical errors. Please contact the author for any clarifications.

## 2023-08-18 ENCOUNTER — TELEPHONE (OUTPATIENT)
Dept: PULMONOLOGY | Facility: CLINIC | Age: 73
End: 2023-08-18

## 2023-08-18 NOTE — TELEPHONE ENCOUNTER
Reason for Call:  Other Questions regarding alternative treatment for lung nodules.    Detailed comments: Siomara saw Dr Sheppard on 8/8/23. Siomara is gainst having biopsies and during her appointment Dr Sheppard said since she is against biopsies he was going to consult with some of his colleges regarding alternative options to treat her lung nodules. She would like to know if Dr Ro has any information yet. Please call.    Phone Number Patient can be reached at: Home number on file [phone] (home)    Best Time:     Can we leave a detailed message on this number? YES    Call taken on 8/18/2023 at 9:58 AM by Maribel Puga

## 2023-09-11 ENCOUNTER — OFFICE VISIT (OUTPATIENT)
Dept: FAMILY MEDICINE | Facility: CLINIC | Age: 73
End: 2023-09-11
Payer: COMMERCIAL

## 2023-09-11 VITALS
DIASTOLIC BLOOD PRESSURE: 80 MMHG | BODY MASS INDEX: 27 KG/M2 | OXYGEN SATURATION: 98 % | HEIGHT: 66 IN | WEIGHT: 168 LBS | SYSTOLIC BLOOD PRESSURE: 142 MMHG | HEART RATE: 77 BPM | TEMPERATURE: 97.6 F | RESPIRATION RATE: 18 BRPM

## 2023-09-11 DIAGNOSIS — G89.29 CHRONIC LOW BACK PAIN, UNSPECIFIED BACK PAIN LATERALITY, UNSPECIFIED WHETHER SCIATICA PRESENT: Primary | ICD-10-CM

## 2023-09-11 DIAGNOSIS — J43.9 MILD EMPHYSEMA (H): ICD-10-CM

## 2023-09-11 DIAGNOSIS — M54.50 CHRONIC LOW BACK PAIN, UNSPECIFIED BACK PAIN LATERALITY, UNSPECIFIED WHETHER SCIATICA PRESENT: Primary | ICD-10-CM

## 2023-09-11 PROBLEM — C50.919 MALIGNANT NEOPLASM OF FEMALE BREAST (H): Status: RESOLVED | Noted: 2023-06-08 | Resolved: 2023-09-11

## 2023-09-11 PROCEDURE — 99214 OFFICE O/P EST MOD 30 MIN: CPT | Performed by: FAMILY MEDICINE

## 2023-09-11 RX ORDER — NAPROXEN 500 MG/1
500 TABLET ORAL 2 TIMES DAILY WITH MEALS
COMMUNITY
Start: 2023-09-06 | End: 2023-09-29

## 2023-09-11 RX ORDER — HYDROCODONE BITARTRATE AND ACETAMINOPHEN 5; 325 MG/1; MG/1
1 TABLET ORAL 2 TIMES DAILY PRN
Qty: 30 TABLET | Refills: 0 | Status: SHIPPED | OUTPATIENT
Start: 2023-09-11 | End: 2023-09-20

## 2023-09-11 ASSESSMENT — PAIN SCALES - GENERAL: PAINLEVEL: SEVERE PAIN (6)

## 2023-09-11 NOTE — NURSING NOTE
"Chief Complaint   Patient presents with    Back Pain     BP (!) 142/80 (Cuff Size: Adult Regular)   Pulse 77   Temp 97.6  F (36.4  C) (Tympanic)   Resp 18   Ht 1.676 m (5' 6\")   Wt 76.2 kg (168 lb)   LMP  (LMP Unknown)   SpO2 98%   BMI 27.12 kg/m   Estimated body mass index is 27.12 kg/m  as calculated from the following:    Height as of this encounter: 1.676 m (5' 6\").    Weight as of this encounter: 76.2 kg (168 lb).  Patient presents to the clinic using No DME      Health Maintenance that is potentially due pending provider review:    Health Maintenance Due   Topic Date Due    DEXA  Never done    ANNUAL REVIEW OF HM ORDERS  Never done    ADVANCE CARE PLANNING  Never done    COPD ACTION PLAN  Never done    HEPATITIS C SCREENING  Never done    MEDICARE ANNUAL WELLNESS VISIT  08/23/2014    MAMMO SCREENING  10/09/2014                  "

## 2023-09-11 NOTE — PROGRESS NOTES
Assessment & Plan     Chronic low back pain, unspecified back pain laterality, unspecified whether sciatica present  Differentials discussed in detail including degenerative disc disease.  Using Tylenol and NSAIDs without any significant relief.  Norco prescribed considering severity of symptoms.  MRI lumbar spine ordered for further evaluation.  Patient will be starting physical therapy in coming days.  Spine  referral placed as well.  Follow-up in 4 to 6 weeks or earlier if needed.  - HYDROcodone-acetaminophen (NORCO) 5-325 MG tablet; Take 1 tablet by mouth 2 times daily as needed for pain  - Spine  Referral; Future    Mild emphysema (H), pulmonary nodules  Recent imaging results reviewed, concerning for primary lung cancer.  Reminded patient to schedule follow-up appointment with interventional pulmonologist      Rafael Mcdonald MD  [facility]    Karen Stringer is a 73 year old, presenting for the following health issues:  Back Pain      History of Present Illness       Back Pain:  She presents for follow up of back pain. Patient's back pain is a chronic (had injections in 2021) problem.  Location of back pain:  Right lower back, left lower back and right middle of back  Description of back pain: burning, cramping, sharp and stabbing  Back pain spreads: right thigh and left thigh    Since last visit, how has back pain changed: gradually worsening  Since patient first noticed back pain, pain is: rapidly worsening  Does back pain interfere with her job:  Yes       She eats 0-1 servings of fruits and vegetables daily.She consumes 0 sweetened beverage(s) daily.She exercises with enough effort to increase her heart rate 9 or less minutes per day.  She exercises with enough effort to increase her heart rate 3 or less days per week.   She is taking medications regularly.         Review of Systems   Constitutional, HEENT, cardiovascular, pulmonary, GI, ,  "musculoskeletal, neuro, skin, endocrine and psych systems are negative, except as otherwise noted.      Objective    BP (!) 142/80 (Cuff Size: Adult Regular)   Pulse 77   Temp 97.6  F (36.4  C) (Tympanic)   Resp 18   Ht 1.676 m (5' 6\")   Wt 76.2 kg (168 lb)   LMP  (LMP Unknown)   SpO2 98%   BMI 27.12 kg/m    Body mass index is 27.12 kg/m .  Physical Exam   GENERAL: alert and no distress  EYES: Eyes grossly normal to inspection, PERRL and conjunctivae and sclerae normal  NECK: no adenopathy, no asymmetry, masses, or scars and thyroid normal to palpation  RESP: lungs clear to auscultation - no rales, rhonchi or wheezes  CV: regular rates and rhythm, normal S1 S2, no S3 or S4, and no murmur, click or rub  ABDOMEN: soft, nontender  MS: Subjective low back pain, no spinal/paraspinal tenderness, skin discoloration or swelling noted, SLR equivocal bilaterally, gait antalgic, normal lower extremities strength, sensation to touch and pressure intact  SKIN: no suspicious lesions or rashes  NEURO: Normal strength and tone, mentation intact and speech normal  PSYCH: mentation appears normal, affect normal/bright                "

## 2023-09-14 ENCOUNTER — TRANSCRIBE ORDERS (OUTPATIENT)
Dept: OTHER | Age: 73
End: 2023-09-14

## 2023-09-14 DIAGNOSIS — R91.8 LUNG NODULES: Primary | ICD-10-CM

## 2023-09-15 ENCOUNTER — PATIENT OUTREACH (OUTPATIENT)
Dept: ONCOLOGY | Facility: CLINIC | Age: 73
End: 2023-09-15
Payer: COMMERCIAL

## 2023-09-15 NOTE — PROGRESS NOTES
New Patient Oncology Nurse Navigator Note     Referring provider: Dr. Mcdonald     Referring Clinic/Organization: [facility]  Referred to: Interventional Pulmonology  Requested provider (if applicable): First available - did not specify   Referral Received: 09/14/23       Evaluation for :   Diagnosis   R91.8 (ICD-10-CM) - Lung nodules     Referral updates and Plan:   New patient referral received for Siomara for Interventional pulmonology. Per chart review, pt is established with Dr. Sheppard for following her lung nodules, seen on 8/8/23. Will forward to [place] pulmonology to schedule follow up. Canceling referral.     9/18/23: Per chart review, [facility] scheduling scheduled Siomara for first available with IP, which is Dr. Stauffer 10/19.     JEREMY IssaN, RN, OCN  [facility] Oncology Nurse Navigator  [phone] / [phone]

## 2023-09-17 ENCOUNTER — HOSPITAL ENCOUNTER (OUTPATIENT)
Dept: MRI IMAGING | Facility: CLINIC | Age: 73
Discharge: HOME | End: 2023-09-17
Attending: FAMILY MEDICINE | Admitting: FAMILY MEDICINE
Payer: COMMERCIAL

## 2023-09-17 DIAGNOSIS — G89.29 CHRONIC LOW BACK PAIN, UNSPECIFIED BACK PAIN LATERALITY, UNSPECIFIED WHETHER SCIATICA PRESENT: ICD-10-CM

## 2023-09-17 DIAGNOSIS — M54.50 CHRONIC LOW BACK PAIN, UNSPECIFIED BACK PAIN LATERALITY, UNSPECIFIED WHETHER SCIATICA PRESENT: ICD-10-CM

## 2023-09-17 PROCEDURE — 72148 MRI LUMBAR SPINE W/O DYE: CPT

## 2023-09-19 ENCOUNTER — MYC MEDICAL ADVICE (OUTPATIENT)
Dept: FAMILY MEDICINE | Facility: CLINIC | Age: 73
End: 2023-09-19
Payer: COMMERCIAL

## 2023-09-19 DIAGNOSIS — M54.50 CHRONIC LOW BACK PAIN, UNSPECIFIED BACK PAIN LATERALITY, UNSPECIFIED WHETHER SCIATICA PRESENT: ICD-10-CM

## 2023-09-19 DIAGNOSIS — G89.29 CHRONIC LOW BACK PAIN, UNSPECIFIED BACK PAIN LATERALITY, UNSPECIFIED WHETHER SCIATICA PRESENT: ICD-10-CM

## 2023-09-20 RX ORDER — HYDROCODONE BITARTRATE AND ACETAMINOPHEN 5; 325 MG/1; MG/1
1 TABLET ORAL 2 TIMES DAILY PRN
Qty: 30 TABLET | Refills: 0 | Status: SHIPPED | OUTPATIENT
Start: 2023-09-20 | End: 2023-10-16

## 2023-09-26 ASSESSMENT — ENCOUNTER SYMPTOMS
MUSCLE WEAKNESS: 1
JOINT SWELLING: 1
RECTAL PAIN: 0
ARTHRALGIAS: 1
CONSTIPATION: 1
BLOATING: 1
BOWEL INCONTINENCE: 0
DIARRHEA: 0
BACK PAIN: 1
MUSCLE CRAMPS: 1
NAUSEA: 0
BLOOD IN STOOL: 0
STIFFNESS: 1
NECK PAIN: 0
VOMITING: 0
JAUNDICE: 0
MYALGIAS: 1
ABDOMINAL PAIN: 1
HEARTBURN: 0

## 2023-09-29 ENCOUNTER — HOSPITAL ENCOUNTER (OUTPATIENT)
Dept: GENERAL RADIOLOGY | Facility: CLINIC | Age: 73
Discharge: HOME | End: 2023-09-29
Attending: NURSE PRACTITIONER | Admitting: NURSE PRACTITIONER
Payer: COMMERCIAL

## 2023-09-29 ENCOUNTER — OFFICE VISIT (OUTPATIENT)
Dept: PHYSICAL MEDICINE AND REHAB | Facility: CLINIC | Age: 73
End: 2023-09-29
Attending: FAMILY MEDICINE
Payer: COMMERCIAL

## 2023-09-29 ENCOUNTER — TELEPHONE (OUTPATIENT)
Dept: NEUROSURGERY | Facility: CLINIC | Age: 73
End: 2023-09-29

## 2023-09-29 VITALS
SYSTOLIC BLOOD PRESSURE: 138 MMHG | HEART RATE: 99 BPM | DIASTOLIC BLOOD PRESSURE: 70 MMHG | BODY MASS INDEX: 27 KG/M2 | WEIGHT: 168 LBS | HEIGHT: 66 IN

## 2023-09-29 DIAGNOSIS — G89.29 CHRONIC LOW BACK PAIN, UNSPECIFIED BACK PAIN LATERALITY, UNSPECIFIED WHETHER SCIATICA PRESENT: ICD-10-CM

## 2023-09-29 DIAGNOSIS — M54.50 CHRONIC LOW BACK PAIN, UNSPECIFIED BACK PAIN LATERALITY, UNSPECIFIED WHETHER SCIATICA PRESENT: ICD-10-CM

## 2023-09-29 DIAGNOSIS — S32.030A CLOSED COMPRESSION FRACTURE OF L3 LUMBAR VERTEBRA, INITIAL ENCOUNTER (H): ICD-10-CM

## 2023-09-29 DIAGNOSIS — S32.010A COMPRESSION FRACTURE OF L1 VERTEBRA, INITIAL ENCOUNTER (H): Primary | ICD-10-CM

## 2023-09-29 DIAGNOSIS — S32.010A CLOSED COMPRESSION FRACTURE OF BODY OF L1 VERTEBRA (H): Primary | ICD-10-CM

## 2023-09-29 DIAGNOSIS — S32.010A COMPRESSION FRACTURE OF L1 VERTEBRA, INITIAL ENCOUNTER (H): ICD-10-CM

## 2023-09-29 PROCEDURE — 72100 X-RAY EXAM L-S SPINE 2/3 VWS: CPT

## 2023-09-29 PROCEDURE — 99204 OFFICE O/P NEW MOD 45 MIN: CPT | Performed by: NURSE PRACTITIONER

## 2023-09-29 RX ORDER — OXYCODONE HYDROCHLORIDE 5 MG/1
5 TABLET ORAL EVERY 6 HOURS PRN
Qty: 20 TABLET | Refills: 0 | Status: SHIPPED | OUTPATIENT
Start: 2023-09-29 | End: 2023-10-04

## 2023-09-29 ASSESSMENT — PAIN SCALES - GENERAL: PAINLEVEL: EXTREME PAIN (9)

## 2023-09-29 NOTE — LETTER
9/29/2023         RE: Siomara Hansen  [address]  [facility] [address]        Dear Colleague,    Thank you for referring your patient, Siomara Hansen, to the [facility]. Please see a copy of my visit note below.    ASSESSMENT: Siomara Hansen is a 73 year old female who presents for consultation at the request of PCP Rafael Mcdonald, who presents today for new patient evaluation of:    -L1 and L3 compression fractures    Patient is neurologically intact on exam. No myelopathic or red flag symptoms.  The lumbar point tenderness does correspond with areas of fractures.  We reviewed her lumbar MRI images.  She has mild acute compression fractures of L1 and L3. L1 shows 4 mm of superior aspect retropulsion, but she has a widely patent canal.  Looking back at her previous CT abdomen in May, these fractures were not present.  However, CT chest in June showed some height loss of L3.  Nonetheless, both fractures on 9/17 did appear acute on MRI.  Recommended utilizing a brace via orthotics, activity restrictions, bone density scan, continued pain control.  She reports no significant relief with Norco, I will switch her to oxycodone.  She is aware that further refills of this would need to come from her primary.  We did talk about the potential of vertebroplasty if fractures lose additional height, or if pain remains uncontrolled.  We will check upright lumbar x-rays after she obtains her brace and decide plan.  We will call her with results.        9/26/2023    12:09 PM   OSWESTRY DISABILITY INDEX   Count 9   Sum 30   Oswestry Score (%) 66.67 %            Diagnoses and all orders for this visit:  Compression fracture of L1 vertebra, initial encounter (H)  -     Orthotics and Prosthetics DME Other (Comments) (custom vs aspen tlso for L1 and L3 fractures)  -     XR Lumbar Spine 2/3 Views; Future  -     DX Hip/Pelvis/Spine; Future  -     oxyCODONE (ROXICODONE) 5 MG tablet;  Take 1 tablet (5 mg) by mouth every 6 hours as needed for pain  Chronic low back pain, unspecified back pain laterality, unspecified whether sciatica present  -     Spine  Referral  Closed compression fracture of L3 lumbar vertebra, initial encounter (H)  -     Orthotics and Prosthetics DME Other (Comments) (custom vs aspen tlso for L1 and L3 fractures)  -     XR Lumbar Spine 2/3 Views; Future  -     DX Hip/Pelvis/Spine; Future  -     oxyCODONE (ROXICODONE) 5 MG tablet; Take 1 tablet (5 mg) by mouth every 6 hours as needed for pain      PLAN:  Reviewed spine anatomy and disease process. Discussed diagnosis and treatment options with the patient today. A shared decision making model was used.  The patient's values and choices were respected. The following represents what was discussed and decided upon by the provider and the patient.      -DIAGNOSTIC TESTS:  Images were personally reviewed and interpreted and explained to patient today using a spine model.   -I ordered a DXA today  -- I ordered upright lumbar x-rays today.  If height loss appears stable of both fractures, would routinely plan for next upright lumbar x-rays in 4 to 6 weeks.    -PHYSICAL THERAPY:    -Would recommend ultimately physical therapy to treat her chronic lower back pain, but in the setting of her acute fractures, will defer this for now until they have healed.  After healing is complete, would recommend referral to PT.     -MEDICATIONS:    -She will stop Norco and switch to oxycodone.  We talked about side effects  -Recommended over-the-counter Tylenol as directed as needed as well for the pain  Discussed multiple medication options today with patient. Discussed risks, side effects, and proper use of medications. Patient verbalized understanding.    -INTERVENTIONS:   Would not recommend injections in the setting of her acute fractures.    -PATIENT EDUCATION:  Total time of 40 minutes, on the day of service, spent with the patient,  reviewing the chart, placing orders, and documenting.   -Today we also discussed the pros and cons of the current treatment plan.    -FOLLOW-UP:   We will review lumbar x-rays and decide plan.  If stable, would recommend routine follow-up in 4 to 6 weeks with new x-rays.    Advised patient to call the Spine Center if symptoms worsen, new numbness or weakness develops in the legs, or if they develop new or worsening problems controlling bladder or bowel function.   ______________________________________________________________________    SUBJECTIVE:    HPI:  Siomara Hansen  Is a 73 year old female history of breast cancer, emphysema, GERD, hypertension, and hyperlipidemia who presents today for new patient evaluation of low back pain     She has had low back pain on and off for many years which got worse as of approximately a month ago after lifting an AC unit and hearing a pop.  She describes sharp, throbbing, intense pain midline lower back.  It radiates into the muscles surrounding.  It is worse with standing, walking, bending.  It minimally improves with taking Norco.  She had previously tried taking ibuprofen, Tylenol, Flexeril, naproxen without relief.  She also went to the chiropractor twice.  After the first time, she was apparently in severe pain after an adjustment while on what sounds like an inversion table. The second time, there was not much done as far as manipulation.  She has been seen by urgent care on 9/4 and by primary care on 9/6 and 9/11.  She was referred for a lumbar MRI by her primary care on 9/11 and given Norco at that time due to the severity of her pain, and was referred to the spine center.  She did start physical therapy on 9/19    Her pain has been worsening slowly.  She feels like with walking sometimes her legs might give out to the severity of the pain.  She denies any leg pain, numbness, weakness, change in bowel or bladder control.    Her last DEXA scan was in 2016, and was  normal    She previously had steroid injections in 2021.  She has not had any spinal surgeries    She is here with her nephew today    -Treatment to Date:     -Medications:  -Tylenol  -Ibuprofen  -Naproxen  -Norco   -Flexeril    Current Outpatient Medications   Medication     citalopram (CELEXA) 20 MG tablet     HYDROcodone-acetaminophen (NORCO) 5-325 MG tablet     losartan (COZAAR) 50 MG tablet     omeprazole 20 MG tablet     oxyCODONE (ROXICODONE) 5 MG tablet     valACYclovir (VALTREX) 500 MG tablet     naproxen (NAPROSYN) 500 MG tablet     No current facility-administered medications for this visit.       Allergies   Allergen Reactions     Hydrocortisone      Erythema       Pollen Extract      Itching eyes     Zoster Vaccine Live      Did get red rash at injection site that lasted for about 3 days.       Past Medical History:   Diagnosis Date     Basal cell carcinoma      Breast cancer (H) 2000     Colon polyp     exam every 5 years        Patient Active Problem List   Diagnosis     CARDIOVASCULAR SCREENING; LDL GOAL LESS THAN 160     Chronic low back pain     Gastroesophageal reflux disease     Mixed hyperlipidemia     Angioma     SK (seborrheic keratosis)     History of skin cancer     Senile sebaceous gland hyperplasia     Lentigo     Basal cell carcinoma (BCC)     Constipation     Dermatochalasis, bilateral     Essential hypertension     History of shingles     Mild emphysema (H)     Mood problem     Nephrolithiasis     Osteopenia     History of vertebral compression fracture     Hyperlipidemia     Cyst of right ovary       Past Surgical History:   Procedure Laterality Date     GYN SURGERY  1986    D & C     masectomy[  02/2000    Bilateral       Family History   Problem Relation Age of Onset     Cancer Mother         Lung     Breast Cancer Mother      Cancer Father         Braine     Cancer Brother         Bladder     Heart Disease Brother      Breast Cancer Sister      Breast Cancer Sister      Cancer  "Brother         Lung     Melanoma No family hx of        Reviewed past medical, surgical, and family history with patient found on new patient intake packet located in EMR Media tab.       ROS: Negative.  Specifically negative for bowel/bladder dysfunction, balance changes, headache, dizziness, foot drop, fevers, chills, appetite changes, nausea/vomiting, unexplained weight loss. Otherwise 13 systems reviewed are negative. Please see the patient's intake questionnaire from today for details.    OBJECTIVE:  /70   Pulse 99   Ht 5' 6\" (1.676 m)   Wt 168 lb (76.2 kg)   LMP  (LMP Unknown)   BMI 27.12 kg/m      PHYSICAL EXAMINATION:    --CONSTITUTIONAL:  Vital signs as above.  No acute distress.  The patient is well nourished and well groomed.  Appears in significant discomfort  --PSYCHIATRIC:  Appropriate mood and affect. The patient is awake, alert, oriented to person, place, time and answering questions appropriately with clear speech.    --SKIN:  Skin over the face, bilateral lower extremities, and posterior torso is clean, dry, intact without rashes.    --RESPIRATORY: Normal rhythm and effort. No abnormal accessory muscle breathing patterns noted.   --ABDOMINAL:  Non-distended.    --GROSS MOTOR: Gait is non-antalgic.  Arises with pain from a seated position.  Did not attempt heel or toe walking or tandem due to pain.     --LOWER EXTREMITY MOTOR TESTING:  Hip flexion: right 5/5, left 5/5  Quads: right 5/5, left 5/5  Hamstrings: right 5/5, left 5/5  Dorsiflexion: right 5/5, left 5/5  Plantar flexion: right 5/5, left 5/5    Great toe MTP extension/EHL: right 5/5, left 5/5    --NEUROLOGICAL:  2/4 patellar and achilles reflexes bilaterally. Sensation to light touch is intact throughout both lower extremities. Babinski is negative. No clonus.    --STANDING EXAMINATION:  Normal lumbar lordosis noted, no lateral shift.    --MUSCULOSKELETAL: Lumbar spine inspection reveals no evidence of deformity.  Range of motion " was not tested    Positive point tenderness to palpation lumbar spine over L1 and approximately L3 areas.  Positive mild bilateral paraspinal musculature tenderness.     Straight leg raising is negative.    --SACROILIAC JOINT:  One finger point test was negative. Negative SI joint tenderness to palpation bilaterally.    --VASCULAR:  Bilateral lower extremities are warm without any discoloration.  There is no pitting edema of the bilateral lower extremities.    RESULTS:   Prior medical records from [facility]where were reviewed today.    Imaging: Spine imaging was personally reviewed and interpreted today. The images were shown to the patient and the findings were explained using a spine model.      MR Lumbar Spine w/o Contrast    Result Date: 9/18/2023  EXAM: MR LUMBAR SPINE W/O CONTRAST LOCATION: [facility] DATE: 9/17/2023 INDICATION:  Chronic low back pain, unspecified back pain laterality, unspecified whether sciatica present, Chronic low back pain, unspecified back pain laterality, unspecified whether sciatica present COMPARISON: None. TECHNIQUE: Routine Lumbar Spine MRI without IV contrast. FINDINGS: Nomenclature is based on 5 lumbar type vertebral bodies. T12 ribs are considered hypoplastic. Normal alignment. Acute superior endplate compression fracture at L1. There is 25% height loss with 4 mm retropulsion. Acute superior endplate compression fracture at L3. There is 10% height loss without significant retropulsion. There is Modic type I endplate change at L4-L5. Normal distal spinal cord and cauda equina with conus medullaris at T12-L1. No extraspinal abnormality. Unremarkable visualized bony pelvis. T12-L1: Disc desiccation and disc height loss. Retropulsion. Normal facets. No spinal canal stenosis. Mild bilateral neural foraminal stenosis. L1-L2: Disc desiccation and disc height loss. Diffuse disc bulge. Bilateral facet hypertrophy. No spinal canal  stenosis. Mild bilateral neural foraminal stenosis. L2-L3: Disc desiccation and disc height loss. Diffuse disc bulge. Bilateral facet hypertrophy. No spinal canal or foraminal stenosis. L3-L4: Disc desiccation and disc height loss. Diffuse disc bulge. Bilateral facet hypertrophy. No spinal canal or foraminal stenosis. L4-L5: Disc desiccation and disc height loss. Diffuse disc bulge. Bilateral facet hypertrophy. No spinal canal stenosis. Mild bilateral neural foraminal stenosis. L5-S1: Normal disc height and signal. No herniation. Normal facets moderate bilateral facet hypertrophy.. No spinal canal or neural foraminal stenosis.     IMPRESSION: 1.  Acute 25% superior endplate compression fracture at L1 with 4 mm retropulsion producing mild T12-L1 neural foraminal stenosis bilaterally. 2.  Acute 10% superior plate compression fracture at L3. 3.  Multilevel disc degeneration and facet hypertrophy without significant spinal canal stenosis. Scattered mild neural foraminal stenoses. 4.  Modic type I endplate change at L4-L5.    CT CHEST W CONTRAST 6/23/2023 1:40 PM     CLINICAL HISTORY: CT abd/pelvis showed:  Largest groundglass nodule  measures 14 mm within the left lower lobe.; Pulmonary nodules  TECHNIQUE: CT chest with IV contrast. Multiplanar reformats were  obtained. Dose reduction techniques were used.     CONTRAST: 82 mL [id number]     COMPARISON: CT abdomen and pelvis May 21, 2023 CT chest June 13, 2018     FINDINGS:   LUNGS AND PLEURA: There are numerous noncalcified groundglass  nodules/opacities in both lungs such as seen: Left lower lobe  measuring 16 x 11 mm series 4-172, previously 9 x 9 mm. Posterior left  upper lobe measuring 9 x 7 mm series 4-70, previously 6 x 5 mm.  Lateral right upper lobe measuring 10 x 5 mm 4-68, previously 10 x 5  mm. Posterior right upper lobe measuring 12 x 7 mm 4-98, previously 9  x 4 mm.     There are additional subcentimeter ill-defined groundglass opacities  in both lungs.      There are several subcentimeter bilateral noncalcified pulmonary  nodules such as seen : Stable posterior left lower lobe nodule  measuring 4 mm series 4-198, previously 4 mm. Stable posterior left  lower lobe nodule measuring 4 mm series 4-187.     Slight increase in size of posterior left lower lobe solid  noncalcified nodule measuring 4 mm series 4-198, previously 2 mm.  There are stable subcentimeter perifissural nodules in both lungs.  Development of reticular and groundglass opacity in the perifissural  right middle lobe measuring 20 x 7 mm series 4-156.     No pleural effusion, pneumothorax or focal consolidation. Mild     Pulmonary emphysema. Bibasilar predominant coarse subpleural  reticulation consistent with fibrotic change.     MEDIASTINUM/AXILLAE: Heart is normal without pericardial effusion.  Coronary artery calcifications are noted. The thoracic aorta is normal  in caliber. No axillary, mediastinal or hilar lymphadenopathy. Right  axillary lymph node dissection.     UPPER ABDOMEN: Bilateral renal cortical scarring. Cholelithiasis.  Nodular contour to the surface of the liver.     MUSCULOSKELETAL: Bilateral mastectomies and reconstruction utilizing  implants. There is partial capsular calcification of the left implant.  Right axillary lymph node dissection surgical clips. Development of  chronic. Mild superior endplate T5 compression deformity without bony  retropulsion. Stable mild T6, T7 and T8 superior endplate compression  deformities without bony retropulsion. Development of mild superior  end plate compression deformity at L3 without bony retropulsion.                                                                      IMPRESSION:   1.  There are numerous bilateral pure groundglass nodules/opacities  with increase in size 3. Findings are concerning for primary lung  malignancy such as adenocarcinoma in situ.  2.  Development of reticular and groundglass opacity in the right  middle lobe  measuring 20 x 7 mm, indeterminate between an additional  site of low-grade pulmonary malignancy, interstitial fibrotic change,  infectious/inflammatory nodule.  3.  Slight increase in size of left lower lobe solid nodule measuring  4 mm, previously 2 mm, indeterminate.  4.  Multiple additional stable groundglass and solid pulmonary  nodules, as detailed above.  5.  Mild emphysema.  6.  No thoracic lymphadenopathy.  7.  Development of mild L3 compression deformity without bony  retropulsion. Correlation with point tenderness is recommended.  8.  Findings suggestive of chronic hepatic liver disease. Clinical  correlation recommended.     IBRAHIMA MENDIOLA MD         SYSTEM ID:  [id number]      Addendum for purposes of additional follow-up recommendations.      A 3.1 cm right adnexal cystic lesion is atypical for the postmenopausal population. Nonemergent pelvic sonography follow-up is recommended.   Addended by Jose Lopez MD on 5/21/2023  7:41 PM     Study Result    Narrative & Impression   EXAM: CT ABDOMEN PELVIS W/O CONTRAST  LOCATION: [facility]  DATE/TIME: 5/21/2023 2:12 PM CDT     INDICATION: Nephrolithiasis; evaluate renal stone burden. No reported patient complaints.  COMPARISON: None available.  TECHNIQUE: CT scan of the abdomen and pelvis was performed without IV contrast. Multiplanar reformats were obtained. Dose reduction techniques were used.  CONTRAST: None.     FINDINGS:       Absence of intravenous contrast limits the sensitivity of this examination for detection of infectious/inflammatory change, post traumatic abnormalities, vascular abnormalities, and visceral lesions.     LOWER CHEST: Indeterminate groundglass nodule within the left lower lobe, which could reflect a low-grade adenocarcinoma. This measures 11 x 14 mm, axial image 5. Additional scattered solid pulmonary nodules are additionally noted, measuring no greater   than 6 to 7 mm.     Bilateral breast  implants. Atherosclerosis of coronary arteries.     HEPATOBILIARY: Subtle liver surface nodularity and widening the periportal spaces, suggesting underlying cirrhosis.     Cholelithiasis.     No intrahepatic or intrahepatic biliary ductal dilatation.     PANCREAS: Normal attenuation. No peripancreatic inflammatory fat stranding.     SPLEEN: Normal attenuation. Normal size.     ADRENAL GLANDS: Normal.     KIDNEYS: No hydronephrosis.     No nephroureterolithiasis.     Urinary bladder is unremarkable.     PELVIC ORGANS: Indeterminate right adnexal cystic lesion, measuring up to 3.1 cm. Nonemergent pelvic sonography follow-up recommended.     BOWEL: No evidence of acute gastrointestinal inflammation or obstruction. Normal appendix.     No intraperitoneal free fluid or free air.     LYMPH NODES: No suspicious abdominal or pelvic lymphadenopathy.     VASCULATURE: No abdominal aortic aneurysm. Mild-to-moderate atherosclerotic calcifications.     MUSCULOSKELETAL: No suspicious abnormality.     OTHER: No additionally significant abnormalities.                                                                      IMPRESSION:   1.  No appreciable nephroureterolithiasis. No hydronephrosis.  2.  Subtle liver surface nodularity, suggesting underlying cirrhosis.  3.  Bilateral, indeterminate groundglass and solid pulmonary nodules. Largest groundglass nodule measures 14 mm within the left lower lobe. Largest solid pulmonary nodule measures up to 7 mm within the left lower lobe, as well. Follow-up criteria as   listed below; pulmonology consultation could also be considered for further evaluation and management.     2017 Fleischner Society Recommendations for Solid Pulmonary Nodules     Nodule size greater than 6 mm up to 8 mm:     Single nodule:     Low risk: CT at 6-12 months, then consider CT at 18-24 months  High risk: CT at 6-12 months, then CT at 18-24 months     Multiple nodules:     Low risk: CT at 3-6 months, then consider  CT at 18-24 months  High risk: CT at 3-6 months, then CT at 18-24 months        MANAGEMENT OF SUBSOLID PULMONARY NODULES            NONSOLID (GROUND GLASS) NODULES     SOLITARY  <6mm.......................... No followup                                      (Track None)   >=6............................12 mo, 36 mo, 60 mo                                      (Track 12, Track 24, Track 24)               MULTIPLE  <6mm..........................6 mo, 24 mo, 48 mo                                      (Track 6, Track 18, Track 24)  >=6.............................6 mo, 24 mo, 48 mo                                      (Track 6, Track 18, Track 24)        [Recommend Follow Up: Consideration of pulmonary consultation and CT imaging follow-up, as detailed above.]     This report will be copied to the [facility] to ensure a provider acknowledges the finding.               Kely Silvestre FNP-C  [facility]  O. [phone]             Again, thank you for allowing me to participate in the care of your patient.        Sincerely,        MELISSA Bowling CNP

## 2023-09-29 NOTE — TELEPHONE ENCOUNTER
"Also per provider, \"Please let Siomara know that her L1 and L3 fractures on her new Xrays look the same as on her MRI. She has not had any worsening height loss. This is excellent. Continue the brace and activity restrictions. I would recommend returning in approx 4-6 wks for recheck with new upright lumbar Xrays within a few days prior to this appt. She can get those done at [place] if she would like.\"    Attempted to reach patient again. Unable to leave message as patient does not have voicemail box set up.   "

## 2023-09-29 NOTE — TELEPHONE ENCOUNTER
[facility] Call Center    Phone Message    May a detailed message be left on voicemail: yes     Reason for Call: Other: Janel calling from [facility]. She is calling as she is out of the      oxyCODONE (ROXICODONE) 5 MG tablet   She is also out of percocet.     Action Taken: Other: message sent to team     Travel Screening: Not Applicable

## 2023-09-29 NOTE — PATIENT INSTRUCTIONS
*You have a spinal fracture and a Back Brace has been ordered to help control your pain by taking pressure off the fracture, to limit spinal mobility to stabilize the fracture so it does not worsen and compress further while it is vulnerable and healing.    Please contact [facility] ([phone]) to schedule an appointment for the brace fitting.   The brace needs to fit snug in order for it to be effective.   It can take 3-6 months before a fracture heals and can cause discomfort up to a year after the event.  You should wear the brace at all timed when upright and out of bed during the day. You can take the brace off for bathing/showering, at bedtime, and if you are taking a nap or lying down during the day.   If the brace is uncomfortable or does not fit properly, call the facility you received the brace from and they can help to re-fit it to be as comfortable as possible.     Be sure to schedule your *Bone Density Testing (DEXA scan)* was ordered today to evaluate bone health post fracture. You will be called to schedule this and it should be scheduled within the next couple of months.       ~Please call our [facility] Nurse Navigation line [phone] with any questions or concerns about your treatment plan, if symptoms worsen and you would like to be seen urgently, or if you develop any new numbness in the legs, or have new problems controlling bladder and bowel function.       *Stop your norco.  Try oxycodone 5mg Q6hrs as needed for pain. The narcotic medication prescribed today is for SEVERE pain only. If your pain is not severe, do not take it. Try other non-narcotic medications first.  Future refills will need to come from your primary care.  Discussed the risks (eg, addiction, overdose, worsening pain, death) verses benefit of opioid use with patient today. Explained that this medication will not be a long term solution to ongoing pain. Discussed using lowest effective dose and  the importance of other measures for pain management including PT, other non-opioid medications, behavioral treatments, and other procedure options.    You can take tylenol 650mg Q6hrs as well for your pain      Imaging (Xray) has been ordered today for you to have done after you get your brace  Radiology will call you to schedule. Please call below if you do not hear from them in the next couple of days.     [facility] Radiology Scheduling:  Please call [phone] to schedule your image(s) (select option #1).    There are 3 different locations:    [facility]  [address] [place] [address] Imaging - [place]  [address]   [place] [id number]       ~Please call our [facility] Nurse Navigation line [phone] with any questions or concerns about your treatment plan, if symptoms worsen and you would like to be seen urgently, or if you have any new or worsening numbness, weakness, or problems controlling bladder and bowel function.  ~You are also welcome to contact Kely Silvestre via [email or web address], but please be aware that responses to [email or web address] message may take 2-3 days due to the high volume of patients seen in clinic.

## 2023-09-29 NOTE — PROGRESS NOTES
ASSESSMENT: Siomara Hansen is a 73 year old female who presents for consultation at the request of PCP Rafael Mcdonald, who presents today for new patient evaluation of:    -L1 and L3 compression fractures    Patient is neurologically intact on exam. No myelopathic or red flag symptoms.  The lumbar point tenderness does correspond with areas of fractures.  We reviewed her lumbar MRI images.  She has mild acute compression fractures of L1 and L3. L1 shows 4 mm of superior aspect retropulsion, but she has a widely patent canal.  Looking back at her previous CT abdomen in May, these fractures were not present.  However, CT chest in June showed some height loss of L3.  Nonetheless, both fractures on 9/17 did appear acute on MRI.  Recommended utilizing a brace via orthotics, activity restrictions, bone density scan, continued pain control.  She reports no significant relief with Norco, I will switch her to oxycodone.  She is aware that further refills of this would need to come from her primary.  We did talk about the potential of vertebroplasty if fractures lose additional height, or if pain remains uncontrolled.  We will check upright lumbar x-rays after she obtains her brace and decide plan.  We will call her with results.        9/26/2023    12:09 PM   OSWESTRY DISABILITY INDEX   Count 9   Sum 30   Oswestry Score (%) 66.67 %            Diagnoses and all orders for this visit:  Compression fracture of L1 vertebra, initial encounter (H)  -     [facility] DME Other (Comments) (custom vs aspen tlso for L1 and L3 fractures)  -     XR Lumbar Spine 2/3 Views; Future  -     DX Hip/Pelvis/Spine; Future  -     oxyCODONE (ROXICODONE) 5 MG tablet; Take 1 tablet (5 mg) by mouth every 6 hours as needed for pain  Chronic low back pain, unspecified back pain laterality, unspecified whether sciatica present  -     Spine  Referral  Closed compression fracture of L3 lumbar vertebra, initial encounter (H)  -      Orthotics and Prosthetics DME Other (Comments) (custom vs aspen tlso for L1 and L3 fractures)  -     XR Lumbar Spine 2/3 Views; Future  -     DX Hip/Pelvis/Spine; Future  -     oxyCODONE (ROXICODONE) 5 MG tablet; Take 1 tablet (5 mg) by mouth every 6 hours as needed for pain      PLAN:  Reviewed spine anatomy and disease process. Discussed diagnosis and treatment options with the patient today. A shared decision making model was used.  The patient's values and choices were respected. The following represents what was discussed and decided upon by the provider and the patient.      -DIAGNOSTIC TESTS:  Images were personally reviewed and interpreted and explained to patient today using a spine model.   -I ordered a DXA today  -- I ordered upright lumbar x-rays today.  If height loss appears stable of both fractures, would routinely plan for next upright lumbar x-rays in 4 to 6 weeks.    -PHYSICAL THERAPY:    -Would recommend ultimately physical therapy to treat her chronic lower back pain, but in the setting of her acute fractures, will defer this for now until they have healed.  After healing is complete, would recommend referral to PT.     -MEDICATIONS:    -She will stop Norco and switch to oxycodone.  We talked about side effects  -Recommended over-the-counter Tylenol as directed as needed as well for the pain  Discussed multiple medication options today with patient. Discussed risks, side effects, and proper use of medications. Patient verbalized understanding.    -INTERVENTIONS:   Would not recommend injections in the setting of her acute fractures.    -PATIENT EDUCATION:  Total time of 40 minutes, on the day of service, spent with the patient, reviewing the chart, placing orders, and documenting.   -Today we also discussed the pros and cons of the current treatment plan.    -FOLLOW-UP:   We will review lumbar x-rays and decide plan.  If stable, would recommend routine follow-up in 4 to 6 weeks with new  x-rays.    Advised patient to call the Spine Center if symptoms worsen, new numbness or weakness develops in the legs, or if they develop new or worsening problems controlling bladder or bowel function.   ______________________________________________________________________    SUBJECTIVE:    HPI:  Siomara Hansen  Is a 73 year old female history of breast cancer, emphysema, GERD, hypertension, and hyperlipidemia who presents today for new patient evaluation of low back pain     She has had low back pain on and off for many years which got worse as of approximately a month ago after lifting an AC unit and hearing a pop.  She describes sharp, throbbing, intense pain midline lower back.  It radiates into the muscles surrounding.  It is worse with standing, walking, bending.  It minimally improves with taking Norco.  She had previously tried taking ibuprofen, Tylenol, Flexeril, naproxen without relief.  She also went to the chiropractor twice.  After the first time, she was apparently in severe pain after an adjustment while on what sounds like an inversion table. The second time, there was not much done as far as manipulation.  She has been seen by urgent care on 9/4 and by primary care on 9/6 and 9/11.  She was referred for a lumbar MRI by her primary care on 9/11 and given Norco at that time due to the severity of her pain, and was referred to the spine center.  She did start physical therapy on 9/19    Her pain has been worsening slowly.  She feels like with walking sometimes her legs might give out to the severity of the pain.  She denies any leg pain, numbness, weakness, change in bowel or bladder control.    Her last DEXA scan was in 2016, and was normal    She previously had steroid injections in 2021.  She has not had any spinal surgeries    She is here with her nephew today    -Treatment to Date:     -Medications:  -Tylenol  -Ibuprofen  -Naproxen  -Norco   -Flexeril    Current Outpatient Medications    Medication    citalopram (CELEXA) 20 MG tablet    HYDROcodone-acetaminophen (NORCO) 5-325 MG tablet    losartan (COZAAR) 50 MG tablet    omeprazole 20 MG tablet    oxyCODONE (ROXICODONE) 5 MG tablet    valACYclovir (VALTREX) 500 MG tablet    naproxen (NAPROSYN) 500 MG tablet     No current facility-administered medications for this visit.       Allergies   Allergen Reactions    Hydrocortisone      Erythema      Pollen Extract      Itching eyes    Zoster Vaccine Live      Did get red rash at injection site that lasted for about 3 days.       Past Medical History:   Diagnosis Date    Basal cell carcinoma     Breast cancer (H) 2000    Colon polyp     exam every 5 years        Patient Active Problem List   Diagnosis    CARDIOVASCULAR SCREENING; LDL GOAL LESS THAN 160    Chronic low back pain    Gastroesophageal reflux disease    Mixed hyperlipidemia    Angioma    SK (seborrheic keratosis)    History of skin cancer    Senile sebaceous gland hyperplasia    Lentigo    Basal cell carcinoma (BCC)    Constipation    Dermatochalasis, bilateral    Essential hypertension    History of shingles    Mild emphysema (H)    Mood problem    Nephrolithiasis    Osteopenia    History of vertebral compression fracture    Hyperlipidemia    Cyst of right ovary       Past Surgical History:   Procedure Laterality Date    GYN SURGERY  1986    D & C    masectomy[  02/2000    Bilateral       Family History   Problem Relation Age of Onset    Cancer Mother         Lung    Breast Cancer Mother     Cancer Father         Braine    Cancer Brother         Bladder    Heart Disease Brother     Breast Cancer Sister     Breast Cancer Sister     Cancer Brother         Lung    Melanoma No family hx of        Reviewed past medical, surgical, and family history with patient found on new patient intake packet located in EMR Media tab.       ROS: Negative.  Specifically negative for bowel/bladder dysfunction, balance changes, headache, dizziness, foot drop,  "fevers, chills, appetite changes, nausea/vomiting, unexplained weight loss. Otherwise 13 systems reviewed are negative. Please see the patient's intake questionnaire from today for details.    OBJECTIVE:  /70   Pulse 99   Ht 5' 6\" (1.676 m)   Wt 168 lb (76.2 kg)   LMP  (LMP Unknown)   BMI 27.12 kg/m      PHYSICAL EXAMINATION:    --CONSTITUTIONAL:  Vital signs as above.  No acute distress.  The patient is well nourished and well groomed.  Appears in significant discomfort  --PSYCHIATRIC:  Appropriate mood and affect. The patient is awake, alert, oriented to person, place, time and answering questions appropriately with clear speech.    --SKIN:  Skin over the face, bilateral lower extremities, and posterior torso is clean, dry, intact without rashes.    --RESPIRATORY: Normal rhythm and effort. No abnormal accessory muscle breathing patterns noted.   --ABDOMINAL:  Non-distended.    --GROSS MOTOR: Gait is non-antalgic.  Arises with pain from a seated position.  Did not attempt heel or toe walking or tandem due to pain.     --LOWER EXTREMITY MOTOR TESTING:  Hip flexion: right 5/5, left 5/5  Quads: right 5/5, left 5/5  Hamstrings: right 5/5, left 5/5  Dorsiflexion: right 5/5, left 5/5  Plantar flexion: right 5/5, left 5/5    Great toe MTP extension/EHL: right 5/5, left 5/5    --NEUROLOGICAL:  2/4 patellar and achilles reflexes bilaterally. Sensation to light touch is intact throughout both lower extremities. Babinski is negative. No clonus.    --STANDING EXAMINATION:  Normal lumbar lordosis noted, no lateral shift.    --MUSCULOSKELETAL: Lumbar spine inspection reveals no evidence of deformity.  Range of motion was not tested    Positive point tenderness to palpation lumbar spine over L1 and approximately L3 areas.  Positive mild bilateral paraspinal musculature tenderness.     Straight leg raising is negative.    --SACROILIAC JOINT:  One finger point test was negative. Negative SI joint tenderness to palpation " bilaterally.    --VASCULAR:  Bilateral lower extremities are warm without any discoloration.  There is no pitting edema of the bilateral lower extremities.    RESULTS:   Prior medical records from [facility] and Care Everywhere were reviewed today.    Imaging: Spine imaging was personally reviewed and interpreted today. The images were shown to the patient and the findings were explained using a spine model.      MR Lumbar Spine w/o Contrast    Result Date: 9/18/2023  EXAM: MR LUMBAR SPINE W/O CONTRAST LOCATION: [facility] DATE: 9/17/2023 INDICATION:  Chronic low back pain, unspecified back pain laterality, unspecified whether sciatica present, Chronic low back pain, unspecified back pain laterality, unspecified whether sciatica present COMPARISON: None. TECHNIQUE: Routine Lumbar Spine MRI without IV contrast. FINDINGS: Nomenclature is based on 5 lumbar type vertebral bodies. T12 ribs are considered hypoplastic. Normal alignment. Acute superior endplate compression fracture at L1. There is 25% height loss with 4 mm retropulsion. Acute superior endplate compression fracture at L3. There is 10% height loss without significant retropulsion. There is Modic type I endplate change at L4-L5. Normal distal spinal cord and cauda equina with conus medullaris at T12-L1. No extraspinal abnormality. Unremarkable visualized bony pelvis. T12-L1: Disc desiccation and disc height loss. Retropulsion. Normal facets. No spinal canal stenosis. Mild bilateral neural foraminal stenosis. L1-L2: Disc desiccation and disc height loss. Diffuse disc bulge. Bilateral facet hypertrophy. No spinal canal stenosis. Mild bilateral neural foraminal stenosis. L2-L3: Disc desiccation and disc height loss. Diffuse disc bulge. Bilateral facet hypertrophy. No spinal canal or foraminal stenosis. L3-L4: Disc desiccation and disc height loss. Diffuse disc bulge. Bilateral facet hypertrophy. No spinal canal or foraminal  stenosis. L4-L5: Disc desiccation and disc height loss. Diffuse disc bulge. Bilateral facet hypertrophy. No spinal canal stenosis. Mild bilateral neural foraminal stenosis. L5-S1: Normal disc height and signal. No herniation. Normal facets moderate bilateral facet hypertrophy.. No spinal canal or neural foraminal stenosis.     IMPRESSION: 1.  Acute 25% superior endplate compression fracture at L1 with 4 mm retropulsion producing mild T12-L1 neural foraminal stenosis bilaterally. 2.  Acute 10% superior plate compression fracture at L3. 3.  Multilevel disc degeneration and facet hypertrophy without significant spinal canal stenosis. Scattered mild neural foraminal stenoses. 4.  Modic type I endplate change at L4-L5.    CT CHEST W CONTRAST 6/23/2023 1:40 PM     CLINICAL HISTORY: CT abd/pelvis showed:  Largest groundglass nodule  measures 14 mm within the left lower lobe.; Pulmonary nodules  TECHNIQUE: CT chest with IV contrast. Multiplanar reformats were  obtained. Dose reduction techniques were used.     CONTRAST: 82 mL [id number]     COMPARISON: CT abdomen and pelvis May 21, 2023 CT chest June 13, 2018     FINDINGS:   LUNGS AND PLEURA: There are numerous noncalcified groundglass  nodules/opacities in both lungs such as seen: Left lower lobe  measuring 16 x 11 mm series 4-172, previously 9 x 9 mm. Posterior left  upper lobe measuring 9 x 7 mm series 4-70, previously 6 x 5 mm.  Lateral right upper lobe measuring 10 x 5 mm 4-68, previously 10 x 5  mm. Posterior right upper lobe measuring 12 x 7 mm 4-98, previously 9  x 4 mm.     There are additional subcentimeter ill-defined groundglass opacities  in both lungs.     There are several subcentimeter bilateral noncalcified pulmonary  nodules such as seen : Stable posterior left lower lobe nodule  measuring 4 mm series 4-198, previously 4 mm. Stable posterior left  lower lobe nodule measuring 4 mm series 4-187.     Slight increase in size of posterior left lower lobe  solid  noncalcified nodule measuring 4 mm series 4-198, previously 2 mm.  There are stable subcentimeter perifissural nodules in both lungs.  Development of reticular and groundglass opacity in the perifissural  right middle lobe measuring 20 x 7 mm series 4-156.     No pleural effusion, pneumothorax or focal consolidation. Mild     Pulmonary emphysema. Bibasilar predominant coarse subpleural  reticulation consistent with fibrotic change.     MEDIASTINUM/AXILLAE: Heart is normal without pericardial effusion.  Coronary artery calcifications are noted. The thoracic aorta is normal  in caliber. No axillary, mediastinal or hilar lymphadenopathy. Right  axillary lymph node dissection.     UPPER ABDOMEN: Bilateral renal cortical scarring. Cholelithiasis.  Nodular contour to the surface of the liver.     MUSCULOSKELETAL: Bilateral mastectomies and reconstruction utilizing  implants. There is partial capsular calcification of the left implant.  Right axillary lymph node dissection surgical clips. Development of  chronic. Mild superior endplate T5 compression deformity without bony  retropulsion. Stable mild T6, T7 and T8 superior endplate compression  deformities without bony retropulsion. Development of mild superior  end plate compression deformity at L3 without bony retropulsion.                                                                      IMPRESSION:   1.  There are numerous bilateral pure groundglass nodules/opacities  with increase in size 3. Findings are concerning for primary lung  malignancy such as adenocarcinoma in situ.  2.  Development of reticular and groundglass opacity in the right  middle lobe measuring 20 x 7 mm, indeterminate between an additional  site of low-grade pulmonary malignancy, interstitial fibrotic change,  infectious/inflammatory nodule.  3.  Slight increase in size of left lower lobe solid nodule measuring  4 mm, previously 2 mm, indeterminate.  4.  Multiple additional stable  groundglass and solid pulmonary  nodules, as detailed above.  5.  Mild emphysema.  6.  No thoracic lymphadenopathy.  7.  Development of mild L3 compression deformity without bony  retropulsion. Correlation with point tenderness is recommended.  8.  Findings suggestive of chronic hepatic liver disease. Clinical  correlation recommended.     IBRAHIMA MENDIOLA MD         SYSTEM ID:  [id number]      Addendum for purposes of additional follow-up recommendations.      A 3.1 cm right adnexal cystic lesion is atypical for the postmenopausal population. Nonemergent pelvic sonography follow-up is recommended.   Addended by Jose Lopez MD on 5/21/2023  7:41 PM     Study Result    Narrative & Impression   EXAM: CT ABDOMEN PELVIS W/O CONTRAST  LOCATION: [facility]  DATE/TIME: 5/21/2023 2:12 PM CDT     INDICATION: Nephrolithiasis; evaluate renal stone burden. No reported patient complaints.  COMPARISON: None available.  TECHNIQUE: CT scan of the abdomen and pelvis was performed without IV contrast. Multiplanar reformats were obtained. Dose reduction techniques were used.  CONTRAST: None.     FINDINGS:       Absence of intravenous contrast limits the sensitivity of this examination for detection of infectious/inflammatory change, post traumatic abnormalities, vascular abnormalities, and visceral lesions.     LOWER CHEST: Indeterminate groundglass nodule within the left lower lobe, which could reflect a low-grade adenocarcinoma. This measures 11 x 14 mm, axial image 5. Additional scattered solid pulmonary nodules are additionally noted, measuring no greater   than 6 to 7 mm.     Bilateral breast implants. Atherosclerosis of coronary arteries.     HEPATOBILIARY: Subtle liver surface nodularity and widening the periportal spaces, suggesting underlying cirrhosis.     Cholelithiasis.     No intrahepatic or intrahepatic biliary ductal dilatation.     PANCREAS: Normal attenuation. No peripancreatic  inflammatory fat stranding.     SPLEEN: Normal attenuation. Normal size.     ADRENAL GLANDS: Normal.     KIDNEYS: No hydronephrosis.     No nephroureterolithiasis.     Urinary bladder is unremarkable.     PELVIC ORGANS: Indeterminate right adnexal cystic lesion, measuring up to 3.1 cm. Nonemergent pelvic sonography follow-up recommended.     BOWEL: No evidence of acute gastrointestinal inflammation or obstruction. Normal appendix.     No intraperitoneal free fluid or free air.     LYMPH NODES: No suspicious abdominal or pelvic lymphadenopathy.     VASCULATURE: No abdominal aortic aneurysm. Mild-to-moderate atherosclerotic calcifications.     MUSCULOSKELETAL: No suspicious abnormality.     OTHER: No additionally significant abnormalities.                                                                      IMPRESSION:   1.  No appreciable nephroureterolithiasis. No hydronephrosis.  2.  Subtle liver surface nodularity, suggesting underlying cirrhosis.  3.  Bilateral, indeterminate groundglass and solid pulmonary nodules. Largest groundglass nodule measures 14 mm within the left lower lobe. Largest solid pulmonary nodule measures up to 7 mm within the left lower lobe, as well. Follow-up criteria as   listed below; pulmonology consultation could also be considered for further evaluation and management.     2017 Fleischner Society Recommendations for Solid Pulmonary Nodules     Nodule size greater than 6 mm up to 8 mm:     Single nodule:     Low risk: CT at 6-12 months, then consider CT at 18-24 months  High risk: CT at 6-12 months, then CT at 18-24 months     Multiple nodules:     Low risk: CT at 3-6 months, then consider CT at 18-24 months  High risk: CT at 3-6 months, then CT at 18-24 months        MANAGEMENT OF SUBSOLID PULMONARY NODULES            NONSOLID (GROUND GLASS) NODULES     SOLITARY  <6mm.......................... No followup                                      (Track None)    >=6............................12 mo, 36 mo, 60 mo                                      (Track 12, Track 24, Track 24)               MULTIPLE  <6mm..........................6 mo, 24 mo, 48 mo                                      (Track 6, Track 18, Track 24)  >=6.............................6 mo, 24 mo, 48 mo                                      (Track 6, Track 18, Track 24)        [Recommend Follow Up: Consideration of pulmonary consultation and CT imaging follow-up, as detailed above.]     This report will be copied to the [facility] to ensure a provider acknowledges the finding.               Kely Silvestre FNP-C  [facility]  O. [phone]

## 2023-09-29 NOTE — TELEPHONE ENCOUNTER
Please ask Siomara if there is another pharmacy she would like me to send her prescription to instead

## 2023-10-03 DIAGNOSIS — R91.8 PULMONARY NODULES: Primary | ICD-10-CM

## 2023-10-03 RX ORDER — MECLIZINE HCL 12.5 MG 12.5 MG/1
TABLET ORAL
COMMUNITY
Start: 2023-08-10 | End: 2024-02-01

## 2023-10-03 RX ORDER — CYCLOBENZAPRINE HCL 10 MG
10 TABLET ORAL
COMMUNITY
Start: 2023-09-04 | End: 2023-10-16

## 2023-10-04 ENCOUNTER — TELEPHONE (OUTPATIENT)
Dept: PHYSICAL MEDICINE AND REHAB | Facility: CLINIC | Age: 73
End: 2023-10-04
Payer: COMMERCIAL

## 2023-10-04 DIAGNOSIS — S32.010A COMPRESSION FRACTURE OF L1 VERTEBRA, INITIAL ENCOUNTER (H): ICD-10-CM

## 2023-10-04 DIAGNOSIS — S32.030A CLOSED COMPRESSION FRACTURE OF L3 LUMBAR VERTEBRA, INITIAL ENCOUNTER (H): ICD-10-CM

## 2023-10-04 RX ORDER — OXYCODONE HYDROCHLORIDE 5 MG/1
5 TABLET ORAL EVERY 6 HOURS PRN
Qty: 14 TABLET | Refills: 0 | Status: SHIPPED | OUTPATIENT
Start: 2023-10-04 | End: 2023-10-09

## 2023-10-04 NOTE — TELEPHONE ENCOUNTER
Phone call to patient to discuss results and recommendations. Instructed to continue with bracing and activity restrictions. Also informed she will need follow up x-rays and then a follow up appointment a couple days later with PSP. Transferred to scheduling to make appointment.      She is given number to make her x-ray appointment in [place] again for a couple days before the scheduled follow up. PSP to order the lumbar x-rays she would like patient to get to follow up on fractures.     *Patient having her DEXA scan tomorrow.

## 2023-10-04 NOTE — TELEPHONE ENCOUNTER
PSP:  MELISSA Bowling, CNP   Last clinic visit:  9/29/23  Reason for call: medication prescription   Clinical information:  Patient was given 6 pills as that was what she was told they had. Shipment was due in on Monday, but didn't arrive. Was told she would need to get a new prescription for the remaining pills.   Advice given to patient: Will call and speak with staff to check if they now have Oxycodone. PSP will be notified a Rx will need to be sent. Patient would like to be notified when this is completed.   Provider to address: Rx for the remaining pills on for original prescription she was given on 9/29/23.      Pharmacy was called after spoke with patient. Spoke with the pharmacist Adriana. She states their new supply/allotment for the month did come in yesterday. Please send a prescription for the rest of or new prescription. *Patient has only been able to take 1 pain pill daily.

## 2023-10-04 NOTE — TELEPHONE ENCOUNTER
Patient was called to inform that the remaining tablets for her prescription were ordered again and sent to her pharmacy. She states she has already picked them up.     Explained that she will need an appointment for further medication refills. Transferred to scheduling to make video follow up appointment.

## 2023-10-05 ENCOUNTER — HOSPITAL ENCOUNTER (OUTPATIENT)
Dept: BONE DENSITY | Facility: CLINIC | Age: 73
Discharge: HOME | End: 2023-10-05
Attending: NURSE PRACTITIONER | Admitting: NURSE PRACTITIONER
Payer: COMMERCIAL

## 2023-10-05 DIAGNOSIS — S32.010A COMPRESSION FRACTURE OF L1 VERTEBRA, INITIAL ENCOUNTER (H): ICD-10-CM

## 2023-10-05 DIAGNOSIS — S32.030A CLOSED COMPRESSION FRACTURE OF L3 LUMBAR VERTEBRA, INITIAL ENCOUNTER (H): ICD-10-CM

## 2023-10-05 PROCEDURE — 77080 DXA BONE DENSITY AXIAL: CPT

## 2023-10-05 NOTE — TELEPHONE ENCOUNTER
Pt called to schedule 4 week follow-up x ray appt at [place] but reports no order there. Please place order and then nurse navigation will let her know so that she may call and get scheduled.

## 2023-10-06 ENCOUNTER — TELEPHONE (OUTPATIENT)
Dept: PHYSICAL MEDICINE AND REHAB | Facility: CLINIC | Age: 73
End: 2023-10-06
Payer: COMMERCIAL

## 2023-10-06 NOTE — TELEPHONE ENCOUNTER
----- Message from MELISSA Clement CNP sent at 10/6/2023 12:17 PM CDT -----  Please let Siomara know that her dexa scan showed that she has osteopenia (or low bone density). I would recommend following up with her primary care provider for a discussion about options for treatment and monitoring in light of her lumbar fractures.   
Phone call to patient to review results and provider's recommendations. Results given and explained. Informed patient that MELISSA Bowling CNP is recommending she follow up with her PCP for further discussion on the osteopenia, treatment options and monitoring. Stated understanding. She reports she has an appointment in a couple of weeks with PCP and will discuss then.   
show

## 2023-10-09 ENCOUNTER — VIRTUAL VISIT (OUTPATIENT)
Dept: PHYSICAL MEDICINE AND REHAB | Facility: CLINIC | Age: 73
End: 2023-10-09
Payer: COMMERCIAL

## 2023-10-09 DIAGNOSIS — S32.030A CLOSED COMPRESSION FRACTURE OF L3 LUMBAR VERTEBRA, INITIAL ENCOUNTER (H): ICD-10-CM

## 2023-10-09 DIAGNOSIS — S32.010A COMPRESSION FRACTURE OF L1 VERTEBRA, INITIAL ENCOUNTER (H): ICD-10-CM

## 2023-10-09 PROCEDURE — 99443 PR PHYSICIAN TELEPHONE EVALUATION 21-30 MIN: CPT | Mod: 95 | Performed by: NURSE PRACTITIONER

## 2023-10-09 RX ORDER — OXYCODONE HYDROCHLORIDE 5 MG/1
5 TABLET ORAL EVERY 6 HOURS PRN
Qty: 14 TABLET | Refills: 0 | Status: SHIPPED | OUTPATIENT
Start: 2023-10-09 | End: 2024-01-02

## 2023-10-09 ASSESSMENT — PAIN SCALES - GENERAL: PAINLEVEL: MODERATE PAIN (4)

## 2023-10-09 NOTE — LETTER
10/9/2023         RE: Siomara Hansen  [address]  [place] [address]        Dear Colleague,    Thank you for referring your patient, Siomara Hansen, to the [facility]. Please see a copy of my visit note below.      ASSESSMENT: Siomara Hansen is a 73 year old female who presents for consultation at the request of PCP Rafael Mcdonald, who presents today for telephone visit for    -L1 and L3 compression fractures    Siomara feels like her back pain is overall improving with the use of brace.  She has been wearing it as directed.  She denies any leg pain, numbness, weakness, changes in bowel or bladder control.  We reviewed her upright x-rays, which showed stable L1 and L3 fractures without worsening retropulsion and upright alignment.  We reviewed her DEXA scan, which shows osteopenia.  I encouraged her to discuss further with her primary care provider next week and consider prescription treatment.  We talked about pain control for her fractures.  I will refill her oxycodone for her to take sparingly as needed, with an over-the-counter stool softener.  She can continue Tylenol over-the-counter as directed.  She will try adding a small amount of sparing ibuprofen and will discuss this with her primary care at her upcoming visit next week.  Last creatinine was normal in June.  Given that she is clinically improving, I would recommend continuing brace whenever seated or walking, proceeding with planned x-rays in 3 weeks and follow-up in clinic for an exam.  We discussed red flag symptoms for which she should call sooner.  If worsening height loss on x-rays, or ongoing pain control concerns, would refer at that point for IR consult for vertebroplasty.  She agrees with plan            9/26/2023    12:09 PM   OSWESTRY DISABILITY INDEX   Count 9   Sum 30   Oswestry Score (%) 66.67 %            Diagnoses and all orders for this visit:  Compression fracture of L1 vertebra,  initial encounter (H)  -     oxyCODONE (ROXICODONE) 5 MG tablet; Take 1 tablet (5 mg) by mouth every 6 hours as needed for pain  Closed compression fracture of L3 lumbar vertebra, initial encounter (H)  -     oxyCODONE (ROXICODONE) 5 MG tablet; Take 1 tablet (5 mg) by mouth every 6 hours as needed for pain        PLAN:  Reviewed spine anatomy and disease process. Discussed diagnosis and treatment options with the patient today. A shared decision making model was used.  The patient's values and choices were respected. The following represents what was discussed and decided upon by the provider and the patient.      -DIAGNOSTIC TESTS:  Images were personally reviewed and interpreted and explained to patient today using a spine model.   -We will recheck her fractures in another 3 to 4 weeks as planned, she will call sooner if worsening symptoms    -PHYSICAL THERAPY:    -Would recommend ultimately physical therapy to treat her chronic lower back pain, but in the setting of her acute fractures, will defer this for now until they have healed.  After healing is complete, would recommend referral to PT.     -MEDICATIONS:    -Okay to continue oxycodone sparingly for severe pain only.  Okay to take this with MiraLAX, or Senokot, or Colace as directed over-the-counter for constipation  -Recommended over-the-counter Tylenol as directed as needed as well for the pain  -Okay to take over-the-counter ibuprofen as directed, sparingly, and plan to discuss with primary care next week at your visit  Discussed multiple medication options today with patient. Discussed risks, side effects, and proper use of medications. Patient verbalized understanding.    -INTERVENTIONS:   Would not recommend injections in the setting of her acute fractures.    -PATIENT EDUCATION:  Total time of 33 minutes, on the day of service, spent with the patient, reviewing the chart, placing orders, and documenting.   -Today we also discussed the pros and cons of  the current treatment plan.    -FOLLOW-UP:   As planned in 3 weeks with new x-rays    Advised patient to call the Spine Center if symptoms worsen, new numbness or weakness develops in the legs, or if they develop new or worsening problems controlling bladder or bowel function.   ______________________________________________________________________    SUBJECTIVE:  Siomara feels like she is overall improving with the use of brace.  She has been wearing it as directed.  She still endorses quite a bit of lower back pain with standing and walking and getting in and out of bed, and bending, which improves when she sits down.  She takes breaks often and this helps.  Her pain is a 4 today, at worst a 10, at best a 4.  She has not needed to take the oxycodone in a few days, and has been trying to avoid its use.  She is not taking any other medications for her pain.  Previously Tylenol over-the-counter has not ever helped her.  She has not resumed ibuprofen since the injury and asks about the safety of resuming it today.  She denies any leg pain, numbness, weakness, changes in bowel or bladder control.      Per previous note  HPI:  Siomara Hansen  Is a 73 year old female history of breast cancer, emphysema, GERD, hypertension, and hyperlipidemia who presents today for new patient evaluation of low back pain     She has had low back pain on and off for many years which got worse as of approximately a month ago after lifting an AC unit and hearing a pop.  She describes sharp, throbbing, intense pain midline lower back.  It radiates into the muscles surrounding.  It is worse with standing, walking, bending.  It minimally improves with taking Norco.  She had previously tried taking ibuprofen, Tylenol, Flexeril, naproxen without relief.  She also went to the chiropractor twice.  After the first time, she was apparently in severe pain after an adjustment while on what sounds like an inversion table. The second time, there was not  much done as far as manipulation.  She has been seen by urgent care on 9/4 and by primary care on 9/6 and 9/11.  She was referred for a lumbar MRI by her primary care on 9/11 and given Norco at that time due to the severity of her pain, and was referred to the spine center.  She did start physical therapy on 9/19    Her pain has been worsening slowly.  She feels like with walking sometimes her legs might give out to the severity of the pain.  She denies any leg pain, numbness, weakness, change in bowel or bladder control.    Her last DEXA scan was in 2016, and was normal    She previously had steroid injections in 2021.  She has not had any spinal surgeries    She is here with her nephew today    -Treatment to Date:     -Medications:  -Tylenol  -Ibuprofen  -Naproxen  -Norco   -Flexeril    Current Outpatient Medications   Medication     losartan (COZAAR) 50 MG tablet     meclizine (ANTIVERT) 12.5 MG tablet     omeprazole (PRILOSEC) 20 MG DR capsule     oxyCODONE (ROXICODONE) 5 MG tablet     valACYclovir (VALTREX) 500 MG tablet     citalopram (CELEXA) 20 MG tablet     cyclobenzaprine (FLEXERIL) 10 MG tablet     HYDROcodone-acetaminophen (NORCO) 5-325 MG tablet     No current facility-administered medications for this visit.       Allergies   Allergen Reactions     Hydrocortisone      Erythema       Pollen Extract      Itching eyes     Zoster Vaccine Live      Did get red rash at injection site that lasted for about 3 days.     OBJECTIVE:  LMP  (LMP Unknown)     PHYSICAL EXAMINATION:    --PSYCHIATRIC:  Appropriate mood and affect. The patient is awake, alert, oriented to person, place, time and answering questions appropriately with clear speech.    ---RESPIRATORY: No audible shortness of breath, wheezes or gasp    No additional exam performed given telephone visit    RESULTS:   Prior medical records from [facility] and Care Everywhere were reviewed today.    Imaging was personally reviewed and discussed with  patient      Narrative & Impression   XR LUMBAR SPINE 2/3 VIEWS  9/29/2023 3:07 PM      HISTORY: upright films, L1 and L3 fractures in brace eval worsening or  progressing kyphosis; Compression fracture of L1 vertebra, initial  encounter (H); Closed compression fracture of L3 lumbar vertebra,  initial encounter (H)     COMPARISON: MRI 9/17/2023                                                                       IMPRESSION: Compression fracture findings are redemonstrated involving  the superior endplate of L1, better delineated on recent MRI.  Compression deformity findings of the superior endplate of L3 are much  better seen on MRI. Multilevel loss of disc height. Posterior element  degenerative changes in the inferior lumbar spine. Sacrum and  sacroiliac joints are unremarkable.      JONNIE GAINES MD         SYSTEM ID:  [id number]       Narrative & Impression   EXAM: DX HIP/PELVIS/SPINE  LOCATION: [facility]  DATE: 10/5/2023     INDICATION: Previous vertebral body fracture. History of breast cancer.  DEMOGRAPHICS: Age- 73 years. Gender- Female. Menopausal status- Postmenopausal.  COMPARISON: No prior studies available on the current scanner.  TECHNIQUE: Dual-energy x-ray absorptiometry (DXA) performed with routine technique.     FINDINGS:     DXA RESULTS  -Lumbar Spine: L2-L4: BMD: 0.973 g/cm2. T-score: -2.0. Z-score: -0.6. Degenerative changes of L1 suspected.  -RIGHT Hip Total: BMD: 0.861 g/cm2. T-score: -1.2. Z-score: 0.3.  -RIGHT Hip Femoral neck: BMD: 0.919 g/cm2. T-score: -0.9. Z-score: 0.8.  -LEFT Hip Total: BMD: 0.816 g/cm2. T-score: -1.5. Z-score: -0.1.  -LEFT Hip Femoral neck: BMD: 0.813 g/cm2. T-score: -1.6. Z-score: 0.0.     WHO T-SCORE CRITERIA  -Normal: T score at or above -1 SD  -Osteopenia: T score between -1 and -2.5 SD  -Osteoporosis: T score at or below -2.5 SD     The World Health Organization (WHO) criteria is applicable to perimenopausal females, postmenopausal  females, and men aged 50 years or older.     FRACTURE RISK  -FRAX Results: The 10 year probability of major osteoporotic fracture is 17.4%, and of hip fracture is 3.3%, based on left femoral neck BMD.     RECOMMENDATIONS  Consider treatment if major osteoporotic fracture score is greater than or equal to 20%, or if the hip fracture score is greater than or equal to 3%.                                                                      IMPRESSION: Low bone density (OSTEOPENIA). T score meets the WHO criteria for low bone density (osteopenia) at one or more measured sites. The risk of osteoporotic fracture increases approximately two-fold for each standard deviation decrease in T-score.         MR Lumbar Spine w/o Contrast    Result Date: 9/18/2023  EXAM: MR LUMBAR SPINE W/O CONTRAST LOCATION: [facility] DATE: 9/17/2023 INDICATION:  Chronic low back pain, unspecified back pain laterality, unspecified whether sciatica present, Chronic low back pain, unspecified back pain laterality, unspecified whether sciatica present COMPARISON: None. TECHNIQUE: Routine Lumbar Spine MRI without IV contrast. FINDINGS: Nomenclature is based on 5 lumbar type vertebral bodies. T12 ribs are considered hypoplastic. Normal alignment. Acute superior endplate compression fracture at L1. There is 25% height loss with 4 mm retropulsion. Acute superior endplate compression fracture at L3. There is 10% height loss without significant retropulsion. There is Modic type I endplate change at L4-L5. Normal distal spinal cord and cauda equina with conus medullaris at T12-L1. No extraspinal abnormality. Unremarkable visualized bony pelvis. T12-L1: Disc desiccation and disc height loss. Retropulsion. Normal facets. No spinal canal stenosis. Mild bilateral neural foraminal stenosis. L1-L2: Disc desiccation and disc height loss. Diffuse disc bulge. Bilateral facet hypertrophy. No spinal canal stenosis. Mild bilateral neural  foraminal stenosis. L2-L3: Disc desiccation and disc height loss. Diffuse disc bulge. Bilateral facet hypertrophy. No spinal canal or foraminal stenosis. L3-L4: Disc desiccation and disc height loss. Diffuse disc bulge. Bilateral facet hypertrophy. No spinal canal or foraminal stenosis. L4-L5: Disc desiccation and disc height loss. Diffuse disc bulge. Bilateral facet hypertrophy. No spinal canal stenosis. Mild bilateral neural foraminal stenosis. L5-S1: Normal disc height and signal. No herniation. Normal facets moderate bilateral facet hypertrophy.. No spinal canal or neural foraminal stenosis.     IMPRESSION: 1.  Acute 25% superior endplate compression fracture at L1 with 4 mm retropulsion producing mild T12-L1 neural foraminal stenosis bilaterally. 2.  Acute 10% superior plate compression fracture at L3. 3.  Multilevel disc degeneration and facet hypertrophy without significant spinal canal stenosis. Scattered mild neural foraminal stenoses. 4.  Modic type I endplate change at L4-L5.        33min of discussion spent with patient on telephone call  Provider location at spine center  Patient location at home      Kely HERRMANN-C  [facility]  O. [phone]             Again, thank you for allowing me to participate in the care of your patient.        Sincerely,        MELISSA Bowling CNP

## 2023-10-09 NOTE — PROGRESS NOTES
ASSESSMENT: Siomara Hansen is a 73 year old female who presents for consultation at the request of PCP Rafael Mcdonald, who presents today for telephone visit for    -L1 and L3 compression fractures    Siomara feels like her back pain is overall improving with the use of brace.  She has been wearing it as directed.  She denies any leg pain, numbness, weakness, changes in bowel or bladder control.  We reviewed her upright x-rays, which showed stable L1 and L3 fractures without worsening retropulsion and upright alignment.  We reviewed her DEXA scan, which shows osteopenia.  I encouraged her to discuss further with her primary care provider next week and consider prescription treatment.  We talked about pain control for her fractures.  I will refill her oxycodone for her to take sparingly as needed, with an over-the-counter stool softener.  She can continue Tylenol over-the-counter as directed.  She will try adding a small amount of sparing ibuprofen and will discuss this with her primary care at her upcoming visit next week.  Last creatinine was normal in June.  Given that she is clinically improving, I would recommend continuing brace whenever seated or walking, proceeding with planned x-rays in 3 weeks and follow-up in clinic for an exam.  We discussed red flag symptoms for which she should call sooner.  If worsening height loss on x-rays, or ongoing pain control concerns, would refer at that point for IR consult for vertebroplasty.  She agrees with plan            9/26/2023    12:09 PM   OSWESTRY DISABILITY INDEX   Count 9   Sum 30   Oswestry Score (%) 66.67 %            Diagnoses and all orders for this visit:  Compression fracture of L1 vertebra, initial encounter (H)  -     oxyCODONE (ROXICODONE) 5 MG tablet; Take 1 tablet (5 mg) by mouth every 6 hours as needed for pain  Closed compression fracture of L3 lumbar vertebra, initial encounter (H)  -     oxyCODONE (ROXICODONE) 5 MG tablet; Take 1 tablet (5 mg)  by mouth every 6 hours as needed for pain        PLAN:  Reviewed spine anatomy and disease process. Discussed diagnosis and treatment options with the patient today. A shared decision making model was used.  The patient's values and choices were respected. The following represents what was discussed and decided upon by the provider and the patient.      -DIAGNOSTIC TESTS:  Images were personally reviewed and interpreted and explained to patient today using a spine model.   -We will recheck her fractures in another 3 to 4 weeks as planned, she will call sooner if worsening symptoms    -PHYSICAL THERAPY:    -Would recommend ultimately physical therapy to treat her chronic lower back pain, but in the setting of her acute fractures, will defer this for now until they have healed.  After healing is complete, would recommend referral to PT.     -MEDICATIONS:    -Okay to continue oxycodone sparingly for severe pain only.  Okay to take this with MiraLAX, or Senokot, or Colace as directed over-the-counter for constipation  -Recommended over-the-counter Tylenol as directed as needed as well for the pain  -Okay to take over-the-counter ibuprofen as directed, sparingly, and plan to discuss with primary care next week at your visit  Discussed multiple medication options today with patient. Discussed risks, side effects, and proper use of medications. Patient verbalized understanding.    -INTERVENTIONS:   Would not recommend injections in the setting of her acute fractures.    -PATIENT EDUCATION:  Total time of 33 minutes, on the day of service, spent with the patient, reviewing the chart, placing orders, and documenting.   -Today we also discussed the pros and cons of the current treatment plan.    -FOLLOW-UP:   As planned in 3 weeks with new x-rays    Advised patient to call the Spine Center if symptoms worsen, new numbness or weakness develops in the legs, or if they develop new or worsening problems controlling bladder or  bowel function.   ______________________________________________________________________    SUBJECTIVE:  Siomara feels like she is overall improving with the use of brace.  She has been wearing it as directed.  She still endorses quite a bit of lower back pain with standing and walking and getting in and out of bed, and bending, which improves when she sits down.  She takes breaks often and this helps.  Her pain is a 4 today, at worst a 10, at best a 4.  She has not needed to take the oxycodone in a few days, and has been trying to avoid its use.  She is not taking any other medications for her pain.  Previously Tylenol over-the-counter has not ever helped her.  She has not resumed ibuprofen since the injury and asks about the safety of resuming it today.  She denies any leg pain, numbness, weakness, changes in bowel or bladder control.      Per previous note  HPI:  Siomara Hansen  Is a 73 year old female history of breast cancer, emphysema, GERD, hypertension, and hyperlipidemia who presents today for new patient evaluation of low back pain     She has had low back pain on and off for many years which got worse as of approximately a month ago after lifting an AC unit and hearing a pop.  She describes sharp, throbbing, intense pain midline lower back.  It radiates into the muscles surrounding.  It is worse with standing, walking, bending.  It minimally improves with taking Norco.  She had previously tried taking ibuprofen, Tylenol, Flexeril, naproxen without relief.  She also went to the chiropractor twice.  After the first time, she was apparently in severe pain after an adjustment while on what sounds like an inversion table. The second time, there was not much done as far as manipulation.  She has been seen by urgent care on 9/4 and by primary care on 9/6 and 9/11.  She was referred for a lumbar MRI by her primary care on 9/11 and given Norco at that time due to the severity of her pain, and was referred to the  spine center.  She did start physical therapy on 9/19    Her pain has been worsening slowly.  She feels like with walking sometimes her legs might give out to the severity of the pain.  She denies any leg pain, numbness, weakness, change in bowel or bladder control.    Her last DEXA scan was in 2016, and was normal    She previously had steroid injections in 2021.  She has not had any spinal surgeries    She is here with her nephew today    -Treatment to Date:     -Medications:  -Tylenol  -Ibuprofen  -Naproxen  -Norco   -Flexeril    Current Outpatient Medications   Medication    losartan (COZAAR) 50 MG tablet    meclizine (ANTIVERT) 12.5 MG tablet    omeprazole (PRILOSEC) 20 MG DR capsule    oxyCODONE (ROXICODONE) 5 MG tablet    valACYclovir (VALTREX) 500 MG tablet    citalopram (CELEXA) 20 MG tablet    cyclobenzaprine (FLEXERIL) 10 MG tablet    HYDROcodone-acetaminophen (NORCO) 5-325 MG tablet     No current facility-administered medications for this visit.       Allergies   Allergen Reactions    Hydrocortisone      Erythema      Pollen Extract      Itching eyes    Zoster Vaccine Live      Did get red rash at injection site that lasted for about 3 days.     OBJECTIVE:  LMP  (LMP Unknown)     PHYSICAL EXAMINATION:    --PSYCHIATRIC:  Appropriate mood and affect. The patient is awake, alert, oriented to person, place, time and answering questions appropriately with clear speech.    ---RESPIRATORY: No audible shortness of breath, wheezes or gasp    No additional exam performed given telephone visit    RESULTS:   Prior medical records from [facility] and Care Everywhere were reviewed today.    Imaging was personally reviewed and discussed with patient      Narrative & Impression   XR LUMBAR SPINE 2/3 VIEWS  9/29/2023 3:07 PM      HISTORY: upright films, L1 and L3 fractures in brace eval worsening or  progressing kyphosis; Compression fracture of L1 vertebra, initial  encounter (H); Closed compression fracture of  L3 lumbar vertebra,  initial encounter (H)     COMPARISON: MRI 9/17/2023                                                                       IMPRESSION: Compression fracture findings are redemonstrated involving  the superior endplate of L1, better delineated on recent MRI.  Compression deformity findings of the superior endplate of L3 are much  better seen on MRI. Multilevel loss of disc height. Posterior element  degenerative changes in the inferior lumbar spine. Sacrum and  sacroiliac joints are unremarkable.      JONNIE GAINES MD         SYSTEM ID:  [id number]       Narrative & Impression   EXAM: DX HIP/PELVIS/SPINE  LOCATION: [facility]  DATE: 10/5/2023     INDICATION: Previous vertebral body fracture. History of breast cancer.  DEMOGRAPHICS: Age- 73 years. Gender- Female. Menopausal status- Postmenopausal.  COMPARISON: No prior studies available on the current scanner.  TECHNIQUE: Dual-energy x-ray absorptiometry (DXA) performed with routine technique.     FINDINGS:     DXA RESULTS  -Lumbar Spine: L2-L4: BMD: 0.973 g/cm2. T-score: -2.0. Z-score: -0.6. Degenerative changes of L1 suspected.  -RIGHT Hip Total: BMD: 0.861 g/cm2. T-score: -1.2. Z-score: 0.3.  -RIGHT Hip Femoral neck: BMD: 0.919 g/cm2. T-score: -0.9. Z-score: 0.8.  -LEFT Hip Total: BMD: 0.816 g/cm2. T-score: -1.5. Z-score: -0.1.  -LEFT Hip Femoral neck: BMD: 0.813 g/cm2. T-score: -1.6. Z-score: 0.0.     WHO T-SCORE CRITERIA  -Normal: T score at or above -1 SD  -Osteopenia: T score between -1 and -2.5 SD  -Osteoporosis: T score at or below -2.5 SD     The World Health Organization (WHO) criteria is applicable to perimenopausal females, postmenopausal females, and men aged 50 years or older.     FRACTURE RISK  -FRAX Results: The 10 year probability of major osteoporotic fracture is 17.4%, and of hip fracture is 3.3%, based on left femoral neck BMD.     RECOMMENDATIONS  Consider treatment if major osteoporotic fracture score  is greater than or equal to 20%, or if the hip fracture score is greater than or equal to 3%.                                                                      IMPRESSION: Low bone density (OSTEOPENIA). T score meets the WHO criteria for low bone density (osteopenia) at one or more measured sites. The risk of osteoporotic fracture increases approximately two-fold for each standard deviation decrease in T-score.         MR Lumbar Spine w/o Contrast    Result Date: 9/18/2023  EXAM: MR LUMBAR SPINE W/O CONTRAST LOCATION: [facility] DATE: 9/17/2023 INDICATION:  Chronic low back pain, unspecified back pain laterality, unspecified whether sciatica present, Chronic low back pain, unspecified back pain laterality, unspecified whether sciatica present COMPARISON: None. TECHNIQUE: Routine Lumbar Spine MRI without IV contrast. FINDINGS: Nomenclature is based on 5 lumbar type vertebral bodies. T12 ribs are considered hypoplastic. Normal alignment. Acute superior endplate compression fracture at L1. There is 25% height loss with 4 mm retropulsion. Acute superior endplate compression fracture at L3. There is 10% height loss without significant retropulsion. There is Modic type I endplate change at L4-L5. Normal distal spinal cord and cauda equina with conus medullaris at T12-L1. No extraspinal abnormality. Unremarkable visualized bony pelvis. T12-L1: Disc desiccation and disc height loss. Retropulsion. Normal facets. No spinal canal stenosis. Mild bilateral neural foraminal stenosis. L1-L2: Disc desiccation and disc height loss. Diffuse disc bulge. Bilateral facet hypertrophy. No spinal canal stenosis. Mild bilateral neural foraminal stenosis. L2-L3: Disc desiccation and disc height loss. Diffuse disc bulge. Bilateral facet hypertrophy. No spinal canal or foraminal stenosis. L3-L4: Disc desiccation and disc height loss. Diffuse disc bulge. Bilateral facet hypertrophy. No spinal canal or foraminal  stenosis. L4-L5: Disc desiccation and disc height loss. Diffuse disc bulge. Bilateral facet hypertrophy. No spinal canal stenosis. Mild bilateral neural foraminal stenosis. L5-S1: Normal disc height and signal. No herniation. Normal facets moderate bilateral facet hypertrophy.. No spinal canal or neural foraminal stenosis.     IMPRESSION: 1.  Acute 25% superior endplate compression fracture at L1 with 4 mm retropulsion producing mild T12-L1 neural foraminal stenosis bilaterally. 2.  Acute 10% superior plate compression fracture at L3. 3.  Multilevel disc degeneration and facet hypertrophy without significant spinal canal stenosis. Scattered mild neural foraminal stenoses. 4.  Modic type I endplate change at L4-L5.        33min of discussion spent with patient on telephone call  Provider location at spine center  Patient location at home      Kely HERRMANN-C  [facility]  O. [phone]

## 2023-10-10 NOTE — COMMUNITY RESOURCES LIST (ENGLISH)
10/10/2023   [facility] [employer]  N/A  For questions about this resource list or additional care needs, please contact your primary care clinic or care manager.  Phone: [phone]   Email: N/A   Address: [address] [place] [id number]   Hours: N/A        Financial Stability       Rent and mortgage payment assistance  1  [facility] - [facility] Distance: 0.62 miles      In-Person, Phone/Virtual   [address] [place] [address]  Language: English  Hours: Mon - Fri 8:00 AM - 4:30 PM  Fees: Free   Phone: (502) 597-6396 Website: [email or web address]/departments/health_and_human_services/index.php          Transportation       Transportation to medical appointments  2  [facility] Distance: 22.93 miles      In-Person   [address] [place] [address]  Language: English  Hours: Mon - Fri 8:00 AM - 4:30 PM  Fees: Free   Phone: (229) 317-9331 Email: [email or web address]. Website: [email or web address]/departments/veterans/index.php          Important Numbers & Websites       Emergency Services   911  City Services   311  Poison Control   (270) 407-4176  Suicide Prevention Lifeline   (246) 950-3864 (TALK)  Child Abuse Hotline   (538) 253-7196 (4-A-Child)  Sexual Assault Hotline   (192) 251-8184 (HOPE)  National Runaway Safeline   (817) 381-9606 (RUNAWAY)  All-Options Talkline   (408) 119-8844  Substance Abuse Referral   (797) 348-1157 (HELP)

## 2023-10-16 ENCOUNTER — HOSPITAL ENCOUNTER (OUTPATIENT)
Dept: CT IMAGING | Facility: CLINIC | Age: 73
Discharge: HOME | End: 2023-10-16
Attending: STUDENT IN AN ORGANIZED HEALTH CARE EDUCATION/TRAINING PROGRAM | Admitting: STUDENT IN AN ORGANIZED HEALTH CARE EDUCATION/TRAINING PROGRAM
Payer: COMMERCIAL

## 2023-10-16 ENCOUNTER — OFFICE VISIT (OUTPATIENT)
Dept: FAMILY MEDICINE | Facility: CLINIC | Age: 73
End: 2023-10-16
Payer: COMMERCIAL

## 2023-10-16 VITALS
HEART RATE: 68 BPM | DIASTOLIC BLOOD PRESSURE: 80 MMHG | OXYGEN SATURATION: 98 % | BODY MASS INDEX: 27.16 KG/M2 | WEIGHT: 169 LBS | RESPIRATION RATE: 18 BRPM | TEMPERATURE: 97.8 F | HEIGHT: 66 IN | SYSTOLIC BLOOD PRESSURE: 124 MMHG

## 2023-10-16 DIAGNOSIS — S32.010D COMPRESSION FRACTURE OF L1 VERTEBRA WITH ROUTINE HEALING, SUBSEQUENT ENCOUNTER: ICD-10-CM

## 2023-10-16 DIAGNOSIS — Z13.9 ENCOUNTER FOR SCREENING INVOLVING SOCIAL DETERMINANTS OF HEALTH (SDOH): Primary | ICD-10-CM

## 2023-10-16 DIAGNOSIS — M85.80 OSTEOPENIA, UNSPECIFIED LOCATION: ICD-10-CM

## 2023-10-16 DIAGNOSIS — M54.50 CHRONIC LOW BACK PAIN, UNSPECIFIED BACK PAIN LATERALITY, UNSPECIFIED WHETHER SCIATICA PRESENT: ICD-10-CM

## 2023-10-16 DIAGNOSIS — G89.29 CHRONIC LOW BACK PAIN, UNSPECIFIED BACK PAIN LATERALITY, UNSPECIFIED WHETHER SCIATICA PRESENT: ICD-10-CM

## 2023-10-16 DIAGNOSIS — R91.8 PULMONARY NODULES: ICD-10-CM

## 2023-10-16 PROCEDURE — 99214 OFFICE O/P EST MOD 30 MIN: CPT | Performed by: FAMILY MEDICINE

## 2023-10-16 PROCEDURE — 71250 CT THORAX DX C-: CPT

## 2023-10-16 RX ORDER — HYDROCODONE BITARTRATE AND ACETAMINOPHEN 5; 325 MG/1; MG/1
1 TABLET ORAL 2 TIMES DAILY PRN
Qty: 30 TABLET | Refills: 0 | Status: SHIPPED | OUTPATIENT
Start: 2023-10-16 | End: 2023-11-13

## 2023-10-16 RX ORDER — ALENDRONATE SODIUM 10 MG/1
10 TABLET ORAL
Qty: 90 TABLET | Refills: 1 | Status: SHIPPED | OUTPATIENT
Start: 2023-10-16 | End: 2024-03-27

## 2023-10-16 ASSESSMENT — PAIN SCALES - GENERAL: PAINLEVEL: MODERATE PAIN (4)

## 2023-10-16 NOTE — COMMUNITY RESOURCES LIST (ENGLISH)
10/16/2023   [facility] [employer]  N/A  For questions about this resource list or additional care needs, please contact your primary care clinic or care manager.  Phone: [phone]   Email: N/A   Address: [address] [place] [address]   Hours: N/A        Financial Stability       Rent and mortgage payment assistance  1  [facility] - [facility] Distance: 0.62 miles      In-Person, Phone/Virtual   [address] [place] [address]  Language: English  Hours: Mon - Fri 8:00 AM - 4:30 PM  Fees: Free   Phone: (745) 688-5264 Website: [email or web address]/departments/health_and_human_services/index.php          Transportation       Transportation to medical appointments  2  [place] [facility] Distance: 22.93 miles      In-Person   [address] [place] [address]  Language: English  Hours: Mon - Fri 8:00 AM - 4:30 PM  Fees: Free   Phone: (719) 855-6243 Email: [email or web address]. Website: [email or web address]/departments/veterans/index.[email or web address]          Important Numbers & Websites       Emergency Services   911  City Services   311  Poison Control   (745) 976-6346  Suicide Prevention Lifeline   (739) 637-4014 (TALK)  Child Abuse Hotline   (677) 148-7029 (4-A-Child)  Sexual Assault Hotline   (708) 747-8266 (HOPE)  National Runaway Safeline   (359) 395-6864 (RUNAWAY)  All-Options Talkline   (896) 854-5145  Substance Abuse Referral   (530) 227-7290 (HELP)

## 2023-10-16 NOTE — NURSING NOTE
"Chief Complaint   Patient presents with    Back Pain     /80 (Cuff Size: Adult Regular)   Pulse 68   Temp 97.8  F (36.6  C) (Tympanic)   Resp 18   Ht 1.676 m (5' 6\")   Wt 76.7 kg (169 lb)   LMP  (LMP Unknown)   SpO2 98%   BMI 27.28 kg/m   Estimated body mass index is 27.28 kg/m  as calculated from the following:    Height as of this encounter: 1.676 m (5' 6\").    Weight as of this encounter: 76.7 kg (169 lb).  Patient presents to the clinic using No DME      Health Maintenance that is potentially due pending provider review:    Health Maintenance Due   Topic Date Due    ADVANCE CARE PLANNING  Never done    COPD ACTION PLAN  Never done    HEPATITIS C SCREENING  Never done    RSV VACCINE 60+ (1 - 1-dose 60+ series) Never done    MEDICARE ANNUAL WELLNESS VISIT  08/23/2014    MAMMO SCREENING  10/09/2014    COVID-19 Vaccine (5 - 2023-24 season) 09/01/2023                  "

## 2023-10-16 NOTE — PROGRESS NOTES
Assessment & Plan     Encounter for screening involving social determinants of health (SDoH)  - Primary Care - Care Coordination Referral; Future    Chronic low back pain, unspecified back pain laterality, unspecified whether sciatica present  [facility] prescription refilled  - HYDROcodone-acetaminophen (NORCO) 5-325 MG tablet; Take 1 tablet by mouth 2 times daily as needed for pain    Osteopenia, unspecified location  Recent DEXA scan findings discussed.  Fosamax prescribed and recommended vitamin D 800 international units, calcium 1200 mg daily  - alendronate (FOSAMAX) 10 MG tablet; Take 1 tablet (10 mg) by mouth every morning (before breakfast)    Compression fracture of L1 vertebra with routine healing, subsequent encounter  Using back brace for compression fracture of L1 vertebrae, Norco for pain control and recommended to continue following physical medicine.  - alendronate (FOSAMAX) 10 MG tablet; Take 1 tablet (10 mg) by mouth every morning (before breakfast)      Patient has an appointment with pulmonologist regarding pulmonary nodule this week.      Rafael Mcdonald MD  [facility]      Karen Stringer is a 73 year old, presenting for the following health issues:  Back Pain      History of Present Illness       Back Pain:  She presents for follow up of back pain. Patient's back pain is a chronic problem.  Location of back pain:  Right lower back, left lower back, right hip and left hip  Description of back pain: cramping, fullness, gnawing and stabbing  Back pain spreads: right buttocks and left buttocks    Since patient first noticed back pain, pain is: gradually improving  Does back pain interfere with her job:  Not applicable       She eats 0-1 servings of fruits and vegetables daily.She consumes 0 sweetened beverage(s) daily.She exercises with enough effort to increase her heart rate 9 or less minutes per day.  She exercises with enough effort to increase her heart rate 3 or  "less days per week.   She is taking medications regularly.         Review of Systems   Constitutional, HEENT, cardiovascular, pulmonary, GI, , musculoskeletal, neuro, skin, endocrine and psych systems are negative, except as otherwise noted.      Objective    /80 (Cuff Size: Adult Regular)   Pulse 68   Temp 97.8  F (36.6  C) (Tympanic)   Resp 18   Ht 1.676 m (5' 6\")   Wt 76.7 kg (169 lb)   LMP  (LMP Unknown)   SpO2 98%   BMI 27.28 kg/m    Body mass index is 27.28 kg/m .  Physical Exam   GENERAL: alert and no distress  NECK: no adenopathy, no asymmetry, masses, or scars and thyroid normal to palpation  RESP: lungs clear to auscultation - no rales, rhonchi or wheezes  CV: regular rates and rhythm, normal S1 S2, no S3 or S4, and no murmur, click or rub  ABDOMEN: soft, nontender  MS: Spinal back brace in situ, normal weightbearing, normal lower extremities strength, sensation to touch and pressure intact  NEURO: Normal strength and tone, mentation intact and speech normal  PSYCH: mentation appears normal, affect normal/bright                  "

## 2023-10-18 ENCOUNTER — PATIENT OUTREACH (OUTPATIENT)
Dept: CARE COORDINATION | Facility: CLINIC | Age: 73
End: 2023-10-18
Payer: COMMERCIAL

## 2023-10-18 NOTE — LETTER
[facility] CARE COORDINATION  [address] [facility] [address]    October 18, 2023    Siomara Hansen  [address]  [place] [address]      Dear Siomara,    I am a  clinic community health worker who works with Rafael Mcdonald MD with the [facility]. I wanted to introduce myself and provide you with my contact information to call me with any future support or resource needs.  Below is a description of clinic care coordination and how we can further assist you.       The clinic care coordination team is made up of a registered nurse, , financial resource worker and community health worker who understand the health care system. The goal of clinic care coordination is to help you manage your health and improve access to the health care system. Our team works alongside your provider to assist you in determining your health and social needs. We can help you obtain health care and community resources, providing you with necessary information and education. We can work with you through any barriers and develop a care plan that helps coordinate and strengthen the communication between you and your care team.  Our services are voluntary and are offered without charge to you personally.    Please feel free to contact me regarding care coordination and what we can offer.      We are focused on providing you with the highest-quality healthcare experience possible.    Sincerely,       Kina GARCIA  Community Health Worker  [facility]  Clinic Care Coordination  [facility]  [email or web address]  [address] [employer] [email or web address]   Office: [phone]

## 2023-10-18 NOTE — PROGRESS NOTES
Clinic Care Coordination Contact  Community Health Worker Initial Outreach    Referral reason/notes: SDOH housing - was NOT discussed with pt       CHW Initial Information Gathering:  Referral Source: Other, specify (SDOH Screening)  Preferred Urgent Care: [facility] [place], [phone]  Current living arrangement:: I live in a private home  Type of residence:: Apartment  Informal Support system:: Family  No PCP office visit in Past Year: No  CHW Additional Questions  If ED/Hospital discharge, follow-up appointment scheduled as recommended?: N/A  Medication changes made following ED/Hospital discharge?: N/A  MyChart active?: Yes    Patient accepts CC: No, Patient stated that she is not having housing issues, but that the rent may increase after the first of the year and she will reach out if she needs support/resources. . Patient will be sent Care Coordination introduction letter for future reference. (via [employer])       Kina GARCIA  Community Health Worker  [facility] Care Coordination  [facility]  [email or web address]   Office: [phone]

## 2023-10-19 ENCOUNTER — VIRTUAL VISIT (OUTPATIENT)
Dept: PULMONOLOGY | Facility: CLINIC | Age: 73
End: 2023-10-19
Attending: STUDENT IN AN ORGANIZED HEALTH CARE EDUCATION/TRAINING PROGRAM
Payer: COMMERCIAL

## 2023-10-19 VITALS — WEIGHT: 168 LBS | BODY MASS INDEX: 27 KG/M2 | HEIGHT: 66 IN

## 2023-10-19 DIAGNOSIS — R91.8 LUNG NODULES: ICD-10-CM

## 2023-10-19 PROCEDURE — 99204 OFFICE O/P NEW MOD 45 MIN: CPT | Mod: VID | Performed by: STUDENT IN AN ORGANIZED HEALTH CARE EDUCATION/TRAINING PROGRAM

## 2023-10-19 ASSESSMENT — PAIN SCALES - GENERAL: PAINLEVEL: MILD PAIN (2)

## 2023-10-19 NOTE — LETTER
10/19/2023       RE: Siomara Hansen  [address]  [facility] [address]     Dear Colleague,    Thank you for referring your patient, Siomara Hansen, to the [facility] at [facility]. Please see a copy of my visit note below.        LUNG NODULE & INTERVENTIONAL PULMONARY CLINIC  [facility]    Siomara Hansen MRN# [id number]   Age: 73 year old YOB: 1950     Reason for Consultation: Lung nodule    Requesting Physician: Yesy Tovar DO  [address] [place] [address]     Assessment and Plan:    Siomara Hansen is a 73 year old female who presents for evaluation of  lung nodules.    I reviewed their CT scan from 10/16/23 which reveals stable, diffuse ground glass opacities along with sub 6mm solid nodules. Largest one is 16mm in the LLL. Seems inflammatory in nature, although she is a former smoker and mother  from lung cancer.     No infectious type symptoms, so I don't think a bronchoscopy is warranted.     Plan to repeat CT in 6 months with repeat clinic visit.       Patient indicated understanding and agreed to the plan of care. All questions answered.     Javier Stauffer DO   of Medicine  Interventional Pulmonology  Department of Pulmonary, Allergy, Critical Care and Sleep Medicine   [facility]     History:    Siomara Hansen is a 73 year old female who presents for evaluation of lung nodules.   She takes medicines for anxiety, htn, vertigo, PPI, shingles, osteoporosis.   No cough or sob.   Quit smoking cigarettes 40 years ago. She smoked 15 years, 1/2ppd.   Hx of breast cancer ( removed breast, right), skin caner.   Mother  from lung cancer.  No exposure to asbestosis, radon or uranium.   No exposure to fungus or TB  No hx of lung fibrosis, asthma. She does endorse mild emphysema.   No weight loss or drenching night  "sweats.  No signs of aspiration.    No fevers or chills.     Medications:  Current Outpatient Medications   Medication Sig    alendronate (FOSAMAX) 10 MG tablet Take 1 tablet (10 mg) by mouth every morning (before breakfast)    citalopram (CELEXA) 20 MG tablet Take 20 mg by mouth (Patient not taking: Reported on 10/9/2023)    HYDROcodone-acetaminophen (NORCO) 5-325 MG tablet Take 1 tablet by mouth 2 times daily as needed for pain    losartan (COZAAR) 50 MG tablet Take 50 mg by mouth 2 times daily    meclizine (ANTIVERT) 12.5 MG tablet TAKE 1 TO 2 TABLETS BY MOUTH THREE TIMES DAILY AS NEEDED FOR VERTIGO    omeprazole (PRILOSEC) 20 MG DR capsule     oxyCODONE (ROXICODONE) 5 MG tablet Take 1 tablet (5 mg) by mouth every 6 hours as needed for pain    valACYclovir (VALTREX) 500 MG tablet Take 1 tablet by mouth 2 times daily.     No current facility-administered medications for this visit.         Physical exam:  Ht 1.676 m (5' 6\")   Wt 76.2 kg (168 lb)   LMP  (LMP Unknown)   BMI 27.12 kg/m    Wt Readings from Last 4 Encounters:   10/19/23 76.2 kg (168 lb)   10/16/23 76.7 kg (169 lb)   09/29/23 76.2 kg (168 lb)   09/11/23 76.2 kg (168 lb)     General: Well appearing, non labored breathing  Neuro: Answering questions appropriately  Psych: Normal affect   Again, thank you for allowing me to participate in the care of your patient.      Sincerely,    Javier Stauffer DO    "

## 2023-10-19 NOTE — PROGRESS NOTES
Virtual Visit Details    Type of service:  Video Visit   Video Start Time: 8:27 AM  Video End Time: 8:40a    Originating Location (pt. Location): Home    Distant Location (provider location):  On-site  Platform used for Video Visit: Renata    LUNG NODULE & INTERVENTIONAL PULMONARY CLINIC  [facility]    Siomara Hansen MRN# [id number]   Age: 73 year old YOB: 1950     Reason for Consultation: Lung nodule    Requesting Physician: Yesy Tovar DO  [address] [place] [address]     Assessment and Plan:    Siomara Hansen is a 73 year old female who presents for evaluation of  lung nodules.    I reviewed their CT scan from 10/16/23 which reveals stable, diffuse ground glass opacities along with sub 6mm solid nodules. Largest one is 16mm in the LLL. Seems inflammatory in nature, although she is a former smoker and mother  from lung cancer.     No infectious type symptoms, so I don't think a bronchoscopy is warranted.     Plan to repeat CT in 6 months with repeat clinic visit.       Patient indicated understanding and agreed to the plan of care. All questions answered.     Javier Stauffer DO   of Medicine  Interventional Pulmonology  Department of Pulmonary, Allergy, Critical Care and Sleep Medicine   [facility]     History:    Siomara Hansen is a 73 year old female who presents for evaluation of lung nodules.   She takes medicines for anxiety, htn, vertigo, PPI, shingles, osteoporosis.   No cough or sob.   Quit smoking cigarettes 40 years ago. She smoked 15 years, 1/2ppd.   Hx of breast cancer (1999 removed breast, right), skin caner.   Mother  from lung cancer.  No exposure to asbestosis, radon or uranium.   No exposure to fungus or TB  No hx of lung fibrosis, asthma. She does endorse mild emphysema.   No weight loss or drenching night sweats.  No signs of aspiration.    No fevers or chills.     Medications:  Current  "Outpatient Medications   Medication Sig    alendronate (FOSAMAX) 10 MG tablet Take 1 tablet (10 mg) by mouth every morning (before breakfast)    citalopram (CELEXA) 20 MG tablet Take 20 mg by mouth (Patient not taking: Reported on 10/9/2023)    HYDROcodone-acetaminophen (NORCO) 5-325 MG tablet Take 1 tablet by mouth 2 times daily as needed for pain    losartan (COZAAR) 50 MG tablet Take 50 mg by mouth 2 times daily    meclizine (ANTIVERT) 12.5 MG tablet TAKE 1 TO 2 TABLETS BY MOUTH THREE TIMES DAILY AS NEEDED FOR VERTIGO    omeprazole (PRILOSEC) 20 MG DR capsule     oxyCODONE (ROXICODONE) 5 MG tablet Take 1 tablet (5 mg) by mouth every 6 hours as needed for pain    valACYclovir (VALTREX) 500 MG tablet Take 1 tablet by mouth 2 times daily.     No current facility-administered medications for this visit.         Physical exam:  Ht 1.676 m (5' 6\")   Wt 76.2 kg (168 lb)   LMP  (LMP Unknown)   BMI 27.12 kg/m    Wt Readings from Last 4 Encounters:   10/19/23 76.2 kg (168 lb)   10/16/23 76.7 kg (169 lb)   09/29/23 76.2 kg (168 lb)   09/11/23 76.2 kg (168 lb)     General: Well appearing, non labored breathing  Neuro: Answering questions appropriately  Psych: Normal affect       "

## 2023-10-19 NOTE — NURSING NOTE
Patient confirms medications and allergies are accurate via patients echeck in completion, and or denies any changes since last reviewed/verified.     Is the patient currently in the state of [place]? YES    Visit mode:VIDEO    If the visit is dropped, the patient can be reconnected by: TELEPHONE VISIT: Phone number:   Telephone Information:   Mobile [phone]       Will anyone else be joining the visit? NO  (If patient encounters technical issues they should call [phone])    How would you like to obtain your AVS? MyChart    Are changes needed to the allergy or medication list? No    Reason for visit: RECHECK    Rosanne SOLORIO

## 2023-10-30 ENCOUNTER — HOSPITAL ENCOUNTER (OUTPATIENT)
Dept: GENERAL RADIOLOGY | Facility: CLINIC | Age: 73
Discharge: HOME | End: 2023-10-30
Attending: NURSE PRACTITIONER | Admitting: NURSE PRACTITIONER
Payer: COMMERCIAL

## 2023-10-30 DIAGNOSIS — S32.010A CLOSED COMPRESSION FRACTURE OF BODY OF L1 VERTEBRA (H): ICD-10-CM

## 2023-10-30 DIAGNOSIS — S32.030A CLOSED COMPRESSION FRACTURE OF L3 LUMBAR VERTEBRA, INITIAL ENCOUNTER (H): ICD-10-CM

## 2023-10-30 PROCEDURE — 72100 X-RAY EXAM L-S SPINE 2/3 VWS: CPT

## 2023-11-02 ENCOUNTER — OFFICE VISIT (OUTPATIENT)
Dept: PHYSICAL MEDICINE AND REHAB | Facility: CLINIC | Age: 73
End: 2023-11-02
Payer: COMMERCIAL

## 2023-11-02 VITALS — SYSTOLIC BLOOD PRESSURE: 139 MMHG | DIASTOLIC BLOOD PRESSURE: 65 MMHG | HEART RATE: 86 BPM

## 2023-11-02 DIAGNOSIS — S32.030A CLOSED COMPRESSION FRACTURE OF L3 LUMBAR VERTEBRA, INITIAL ENCOUNTER (H): ICD-10-CM

## 2023-11-02 DIAGNOSIS — S32.010A COMPRESSION FRACTURE OF L1 VERTEBRA, INITIAL ENCOUNTER (H): Primary | ICD-10-CM

## 2023-11-02 PROCEDURE — 99215 OFFICE O/P EST HI 40 MIN: CPT | Performed by: NURSE PRACTITIONER

## 2023-11-02 ASSESSMENT — PAIN SCALES - GENERAL: PAINLEVEL: SEVERE PAIN (6)

## 2023-11-02 NOTE — LETTER
11/2/2023         RE: Siomara Hansen  [address]  [facility] [address]        Dear Colleague,    Thank you for referring your patient, Siomara Hansen, to the [facility]. Please see a copy of my visit note below.    ASSESSMENT: Siomara Hansen is a 73 year old female who presents for consultation at the request of PCP Rafael Mcdonald, who presents today for a follow up patient evaluation of:    -L1 and L3 compression fractures    Patient is neurologically intact on exam and denies any myelopathic or red flag symptoms, however she has experienced no improvement in her lower back pain so far. She in fact complains of progressing pain into both hips. She has been wearing the brace diligently and denies any new injures. We reviewed her new lumbar XR. Her L1 and L3 compression fractures do not look significantly different compared to previous xrays 9/29. I do not see any worsening retropulsion. We reviewed her previous MRI, which reiterates the small amt retropulsion, widely patent canal, and mild park neural foraminal narrowing at L1. I showed her dermatomal distribution of T12/L1 radicular pain using online dermatome man. T12/L1 radiculopathy does potentially fit with her complaints of park hip pain. I would recommend a new MRI for reassurance and rule out worsening nerve compression given her worsening pain. If stable, could be hip related. Unless serious concerns on new MRI, I recommended at this point to see IR for vertebroplasty/kyphoplasty opinion of L1 and L3 given her persistent pain. She asks about risks/benefits/percentage relief with procedure and I encouraged her to discuss further with the IR team. If she experiences lingering chronic lower back pain after fractures have healed, we could pursue MBB/RFA. She would be interested in this. We will call her regarding her new MRI results and decide plan          9/26/2023    12:09 PM   OSWESTRY DISABILITY INDEX    Count 9   Sum 30   Oswestry Score (%) 66.67 %            Diagnoses and all orders for this visit:  Compression fracture of L1 vertebra, initial encounter (H)  -     IR Referral; Future  -     MR Lumbar Spine w/o Contrast; Future  Closed compression fracture of L3 lumbar vertebra, initial encounter (H)  -     IR Referral; Future  -     MR Lumbar Spine w/o Contrast; Future      PLAN:  Reviewed spine anatomy and disease process. Discussed diagnosis and treatment options with the patient today. A shared decision making model was used.  The patient's values and choices were respected. The following represents what was discussed and decided upon by the provider and the patient.      -DIAGNOSTIC TESTS:  Images were personally reviewed and interpreted and explained to patient today using a spine model.   -I ordered a new lumbar MRI today to recheck fracture for further evaluation of her progressing back park hip pain     -PHYSICAL THERAPY:    -After healing is complete, would likely recommend referral to PT.     -MEDICATIONS:    -Recommended over-the-counter Tylenol as directed as needed as well for the pain  -OK to continue PRN norco OR oxycodone as needed for severe pain  -She will follow up with her PCP to ask about particular brand/dose/components of vit D and calcium to be taking  Discussed multiple medication options today with patient. Discussed risks, side effects, and proper use of medications. Patient verbalized understanding.    -INTERVENTIONS:   -referred to the IR department to consider vertebroplasty procedure for L1 and L3  -We talked about the role for lumbar medial branch blocks/radiofrequency ablation if ongoing chronic back pain following treatment of fractures.     -PATIENT EDUCATION:  Total time of 40 minutes, on the day of service, spent with the patient, reviewing the chart, placing orders, and documenting.   -Today we also discussed the pros and cons of the current treatment plan.    -FOLLOW-UP:    We will review lumbar MRI and call patient. If results stable and IR plans vertebroplasty, we can see her back PRN    Advised patient to call the Spine Center if symptoms worsen, new numbness or weakness develops in the legs, or if they develop new or worsening problems controlling bladder or bowel function.   ______________________________________________________________________    SUBJECTIVE:  Siomara reports no change in symptoms. She c/o 6/10 low back pain currently, 10/10 when riding in the car, being on her feet a lot,  bending, and doing chores. Her pain at best is a 4/10 when she is seated. She is having worsening pain into both of her hips which makes it difficult to stand up after prolonged sitting. She has been trying to avoid taking any medication for the pain she takes hydrocodone or oxycodone only when the pain is really bad.  It takes these medications a long time to work and they don't relieve it much. She is concerned about after her fracture healing and wonders if she will still have her baseline chronic low back pain following fracture healing and what her options would be. She does have a history of bilateral hip pain and wonders if she is flaring things up in her hips by doing something. She has been wearing the brace as directed. It was expensive. She reports some frustration with arriving a few mins before 10am for her appt that she was told was at 1020 and now it is 1050. She was told to start calcium supplementation by her PCP but is not sure what dose or exact components she should be taking. She is on fosamax. She denies any radicular leg pain beyond isolated left knee pain. She denies leg numbness, weakness, or change in bowel or bladder control.       HPI:  Siomara Hansen  Is a 73 year old female history of breast cancer, emphysema, GERD, hypertension, and hyperlipidemia who presents today for new patient evaluation of low back pain     She has had low back pain on and off for many  years which got worse as of approximately a month ago after lifting an AC unit and hearing a pop.  She describes sharp, throbbing, intense pain midline lower back.  It radiates into the muscles surrounding.  It is worse with standing, walking, bending.  It minimally improves with taking Norco.  She had previously tried taking ibuprofen, Tylenol, Flexeril, naproxen without relief.  She also went to the chiropractor twice.  After the first time, she was apparently in severe pain after an adjustment while on what sounds like an inversion table. The second time, there was not much done as far as manipulation.  She has been seen by urgent care on 9/4 and by primary care on 9/6 and 9/11.  She was referred for a lumbar MRI by her primary care on 9/11 and given Norco at that time due to the severity of her pain, and was referred to the spine center.  She did start physical therapy on 9/19    Her pain has been worsening slowly.  She feels like with walking sometimes her legs might give out to the severity of the pain.  She denies any leg pain, numbness, weakness, change in bowel or bladder control.    Her last DEXA scan was in 2016, and was normal    She previously had steroid injections in 2021.  She has not had any spinal surgeries    She is here with her nephew today    -Treatment to Date:     -Medications:  -Tylenol  -Ibuprofen  -Naproxen  -Norco   -Flexeril    Current Outpatient Medications   Medication     alendronate (FOSAMAX) 10 MG tablet     citalopram (CELEXA) 20 MG tablet     HYDROcodone-acetaminophen (NORCO) 5-325 MG tablet     losartan (COZAAR) 50 MG tablet     meclizine (ANTIVERT) 12.5 MG tablet     omeprazole (PRILOSEC) 20 MG DR capsule     oxyCODONE (ROXICODONE) 5 MG tablet     valACYclovir (VALTREX) 500 MG tablet     No current facility-administered medications for this visit.       Allergies   Allergen Reactions     Hydrocortisone      Erythema       Pollen Extract      Itching eyes     Zoster Vaccine Live       Did get red rash at injection site that lasted for about 3 days.       Past Medical History:   Diagnosis Date     Basal cell carcinoma      Breast cancer (H) 2000     Colon polyp     exam every 5 years        Patient Active Problem List   Diagnosis     CARDIOVASCULAR SCREENING; LDL GOAL LESS THAN 160     Chronic low back pain     Gastroesophageal reflux disease     Mixed hyperlipidemia     Angioma     SK (seborrheic keratosis)     History of skin cancer     Senile sebaceous gland hyperplasia     Lentigo     Basal cell carcinoma (BCC)     Constipation     Dermatochalasis, bilateral     Essential hypertension     History of shingles     Mild emphysema (H)     Mood problem     Nephrolithiasis     Osteopenia     History of vertebral compression fracture     Hyperlipidemia     Cyst of right ovary     Reviewed past medical, surgical, and family history with patient found on new patient intake packet located in EMR Media tab.         OBJECTIVE:  /65   Pulse 86   LMP  (LMP Unknown)     PHYSICAL EXAMINATION:    --CONSTITUTIONAL:  Vital signs as above.  No acute distress.  The patient is well nourished and well groomed.  Appears in significant discomfort  --PSYCHIATRIC:  Appropriate mood and affect. The patient is awake, alert, oriented to person, place, time and answering questions appropriately with clear speech.    --SKIN:  Skin over the face, bilateral lower extremities, and posterior torso is clean, dry, intact without rashes.    --RESPIRATORY: Normal rhythm and effort. No abnormal accessory muscle breathing patterns noted.   --ABDOMINAL:  Non-distended.    --GROSS MOTOR: Gait is non-antalgic.  Arises with pain from a seated position.      --LOWER EXTREMITY MOTOR TESTING:  Hip flexion: right 5/5, left 5/5  Quads: right 5/5, left 5/5  Hamstrings: right 5/5, left 5/5  Dorsiflexion: right 5/5, left 5/5  Plantar flexion: right 5/5, left 5/5    Great toe MTP extension/EHL: right 5/5, left 5/5    --NEUROLOGICAL:   Sensation to light touch is intact throughout both lower extremities.     Straight leg raising is negative.    --VASCULAR:  Bilateral lower extremities are warm without any discoloration.  There is no pitting edema of the bilateral lower extremities.    RESULTS:   Prior medical records from [facility] Care Everywhere were reviewed today.    Imaging: Spine imaging was personally reviewed and interpreted today. The images were shown to the patient and the findings were explained using a spine model.    Narrative & Impression   XR LUMBAR SPINE TWO-THREE VIEWS  10/30/2023 11:17 AM      HISTORY: Lumbar fractures, surveillance of healing. Evaluate for any  height loss     COMPARISON: Number spine x-ray 9/29/2023.                                                                       IMPRESSION: Superior endplate fracture of L1 vertebral body with loss  of approximately 40% vertebral body height appears similar to prior  x-ray 9/29/2023. Subtle superior endplate deformity of L3 with 10%  loss of vertebral body height, also not significantly changed. No  definite new loss of vertebral body height. Marked degenerative  endplate changes and loss of disc height at L4-L5.     PASTORA COLEMAN MD         SYSTEM ID:  [id number]       Narrative & Impression   EXAM: MR LUMBAR SPINE W/O CONTRAST  LOCATION: [facility]  DATE: 9/17/2023     INDICATION:  Chronic low back pain, unspecified back pain laterality, unspecified whether sciatica present, Chronic low back pain, unspecified back pain laterality, unspecified whether sciatica present  COMPARISON: None.  TECHNIQUE: Routine Lumbar Spine MRI without IV contrast.     FINDINGS:   Nomenclature is based on 5 lumbar type vertebral bodies. T12 ribs are considered hypoplastic. Normal alignment. Acute superior endplate compression fracture at L1. There is 25% height loss with 4 mm retropulsion. Acute superior endplate compression   fracture at L3. There is  10% height loss without significant retropulsion. There is Modic type I endplate change at L4-L5. Normal distal spinal cord and cauda equina with conus medullaris at T12-L1. No extraspinal abnormality. Unremarkable visualized   bony pelvis.     T12-L1: Disc desiccation and disc height loss. Retropulsion. Normal facets. No spinal canal stenosis. Mild bilateral neural foraminal stenosis.      L1-L2: Disc desiccation and disc height loss. Diffuse disc bulge. Bilateral facet hypertrophy. No spinal canal stenosis. Mild bilateral neural foraminal stenosis.     L2-L3: Disc desiccation and disc height loss. Diffuse disc bulge. Bilateral facet hypertrophy. No spinal canal or foraminal stenosis.      L3-L4: Disc desiccation and disc height loss. Diffuse disc bulge. Bilateral facet hypertrophy. No spinal canal or foraminal stenosis.     L4-L5: Disc desiccation and disc height loss. Diffuse disc bulge. Bilateral facet hypertrophy. No spinal canal stenosis. Mild bilateral neural foraminal stenosis.     L5-S1: Normal disc height and signal. No herniation. Normal facets moderate bilateral facet hypertrophy.. No spinal canal or neural foraminal stenosis.                                                                      IMPRESSION:  1.  Acute 25% superior endplate compression fracture at L1 with 4 mm retropulsion producing mild T12-L1 neural foraminal stenosis bilaterally.  2.  Acute 10% superior plate compression fracture at L3.  3.  Multilevel disc degeneration and facet hypertrophy without significant spinal canal stenosis. Scattered mild neural foraminal stenoses.  4.  Modic type I endplate change at L4-L5.     Narrative & Impression   XR LUMBAR SPINE 2/3 VIEWS  9/29/2023 3:07 PM      HISTORY: upright films, L1 and L3 fractures in brace eval worsening or  progressing kyphosis; Compression fracture of L1 vertebra, initial  encounter (H); Closed compression fracture of L3 lumbar vertebra,  initial encounter (H)     COMPARISON:  MRI 9/17/2023                                                                       IMPRESSION: Compression fracture findings are redemonstrated involving  the superior endplate of L1, better delineated on recent MRI.  Compression deformity findings of the superior endplate of L3 are much  better seen on MRI. Multilevel loss of disc height. Posterior element  degenerative changes in the inferior lumbar spine. Sacrum and  sacroiliac joints are unremarkable.      JONNIE GAINES MD         SYSTEM ID:  [id number]       Kely Silvestre P-C  [facility]  O. [phone]             Again, thank you for allowing me to participate in the care of your patient.        Sincerely,        MELISSA Bowling CNP

## 2023-11-02 NOTE — PATIENT INSTRUCTIONS
You were referred to the IR department to consider vertebroplasty procedure for L1 and L3 given your ongoing pain. They should call you to book this appt. If they do not, please call below:  [facility] central scheduling phone number: [phone]     You were referred for a new MRI lumbar spine to recheck your fractures given your worsening bilateral hip pain to rule out any worsening compression of your nerves related to your L1 fracture  If there are concerns about the orientation of your fracture or significant compression of the nerve roots, I would likely recommend you see neurosurgery for further evaluation.      We talked about the role for lumbar medial branch blocks/radiofrequency ablation if ongoing chronic back pain following treatment of fractures        Imaging (Xray, CT, or MRI) has been ordered today.   Radiology will call you to schedule. Please call below if you do not hear from them in the next couple of days.     [facility] Radiology Scheduling:  Please call [phone] to schedule your image(s) (select option #1).    There are 3 different locations:    [address] [facility] [place] [address] [facility] [address]   [place] [address]       ~Please call our [facility] Nurse Navigation line [phone] with any questions or concerns about your treatment plan, if symptoms worsen and you would like to be seen urgently, or if you have any new or worsening numbness, weakness, or problems controlling bladder and bowel function.  ~You are also welcome to contact Kely Silvestre via [employer], but please be aware that responses to [employer] message may take 2-3 days due to the high volume of patients seen in clinic.

## 2023-11-03 NOTE — PROGRESS NOTES
ASSESSMENT: Siomara Hansen is a 73 year old female who presents for consultation at the request of PCP Rafael Mcdonald, who presents today for a follow up patient evaluation of:    -L1 and L3 compression fractures    Patient is neurologically intact on exam and denies any myelopathic or red flag symptoms, however she has experienced no improvement in her lower back pain so far. She in fact complains of progressing pain into both hips. She has been wearing the brace diligently and denies any new injures. We reviewed her new lumbar XR. Her L1 and L3 compression fractures do not look significantly different compared to previous xrays 9/29. I do not see any worsening retropulsion. We reviewed her previous MRI, which reiterates the small amt retropulsion, widely patent canal, and mild park neural foraminal narrowing at L1. I showed her dermatomal distribution of T12/L1 radicular pain using online dermatome man. T12/L1 radiculopathy does potentially fit with her complaints of park hip pain. I would recommend a new MRI for reassurance and rule out worsening nerve compression given her worsening pain. If stable, could be hip related. Unless serious concerns on new MRI, I recommended at this point to see IR for vertebroplasty/kyphoplasty opinion of L1 and L3 given her persistent pain. She asks about risks/benefits/percentage relief with procedure and I encouraged her to discuss further with the IR team. If she experiences lingering chronic lower back pain after fractures have healed, we could pursue MBB/RFA. She would be interested in this. We will call her regarding her new MRI results and decide plan          9/26/2023    12:09 PM   OSWESTRY DISABILITY INDEX   Count 9   Sum 30   Oswestry Score (%) 66.67 %            Diagnoses and all orders for this visit:  Compression fracture of L1 vertebra, initial encounter (H)  -     IR Referral; Future  -     MR Lumbar Spine w/o Contrast; Future  Closed compression fracture of L3  lumbar vertebra, initial encounter (H)  -     IR Referral; Future  -     MR Lumbar Spine w/o Contrast; Future      PLAN:  Reviewed spine anatomy and disease process. Discussed diagnosis and treatment options with the patient today. A shared decision making model was used.  The patient's values and choices were respected. The following represents what was discussed and decided upon by the provider and the patient.      -DIAGNOSTIC TESTS:  Images were personally reviewed and interpreted and explained to patient today using a spine model.   -I ordered a new lumbar MRI today to recheck fracture for further evaluation of her progressing back park hip pain     -PHYSICAL THERAPY:    -After healing is complete, would likely recommend referral to PT.     -MEDICATIONS:    -Recommended over-the-counter Tylenol as directed as needed as well for the pain  -OK to continue PRN norco OR oxycodone as needed for severe pain  -She will follow up with her PCP to ask about particular brand/dose/components of vit D and calcium to be taking  Discussed multiple medication options today with patient. Discussed risks, side effects, and proper use of medications. Patient verbalized understanding.    -INTERVENTIONS:   -referred to the IR department to consider vertebroplasty procedure for L1 and L3  -We talked about the role for lumbar medial branch blocks/radiofrequency ablation if ongoing chronic back pain following treatment of fractures.     -PATIENT EDUCATION:  Total time of 40 minutes, on the day of service, spent with the patient, reviewing the chart, placing orders, and documenting.   -Today we also discussed the pros and cons of the current treatment plan.    -FOLLOW-UP:   We will review lumbar MRI and call patient. If results stable and IR plans vertebroplasty, we can see her back PRN    Advised patient to call the Spine Center if symptoms worsen, new numbness or weakness develops in the legs, or if they develop new or worsening  problems controlling bladder or bowel function.   ______________________________________________________________________    SUBJECTIVE:  Siomara reports no change in symptoms. She c/o 6/10 low back pain currently, 10/10 when riding in the car, being on her feet a lot,  bending, and doing chores. Her pain at best is a 4/10 when she is seated. She is having worsening pain into both of her hips which makes it difficult to stand up after prolonged sitting. She has been trying to avoid taking any medication for the pain she takes hydrocodone or oxycodone only when the pain is really bad.  It takes these medications a long time to work and they don't relieve it much. She is concerned about after her fracture healing and wonders if she will still have her baseline chronic low back pain following fracture healing and what her options would be. She does have a history of bilateral hip pain and wonders if she is flaring things up in her hips by doing something. She has been wearing the brace as directed. It was expensive. She reports some frustration with arriving a few mins before 10am for her appt that she was told was at 1020 and now it is 1050. She was told to start calcium supplementation by her PCP but is not sure what dose or exact components she should be taking. She is on fosamax. She denies any radicular leg pain beyond isolated left knee pain. She denies leg numbness, weakness, or change in bowel or bladder control.       HPI:  Siomara Hansen  Is a 73 year old female history of breast cancer, emphysema, GERD, hypertension, and hyperlipidemia who presents today for new patient evaluation of low back pain     She has had low back pain on and off for many years which got worse as of approximately a month ago after lifting an AC unit and hearing a pop.  She describes sharp, throbbing, intense pain midline lower back.  It radiates into the muscles surrounding.  It is worse with standing, walking, bending.  It minimally  improves with taking Norco.  She had previously tried taking ibuprofen, Tylenol, Flexeril, naproxen without relief.  She also went to the chiropractor twice.  After the first time, she was apparently in severe pain after an adjustment while on what sounds like an inversion table. The second time, there was not much done as far as manipulation.  She has been seen by urgent care on 9/4 and by primary care on 9/6 and 9/11.  She was referred for a lumbar MRI by her primary care on 9/11 and given Norco at that time due to the severity of her pain, and was referred to the spine center.  She did start physical therapy on 9/19    Her pain has been worsening slowly.  She feels like with walking sometimes her legs might give out to the severity of the pain.  She denies any leg pain, numbness, weakness, change in bowel or bladder control.    Her last DEXA scan was in 2016, and was normal    She previously had steroid injections in 2021.  She has not had any spinal surgeries    She is here with her nephew today    -Treatment to Date:     -Medications:  -Tylenol  -Ibuprofen  -Naproxen  -Norco   -Flexeril    Current Outpatient Medications   Medication    alendronate (FOSAMAX) 10 MG tablet    citalopram (CELEXA) 20 MG tablet    HYDROcodone-acetaminophen (NORCO) 5-325 MG tablet    losartan (COZAAR) 50 MG tablet    meclizine (ANTIVERT) 12.5 MG tablet    omeprazole (PRILOSEC) 20 MG DR capsule    oxyCODONE (ROXICODONE) 5 MG tablet    valACYclovir (VALTREX) 500 MG tablet     No current facility-administered medications for this visit.       Allergies   Allergen Reactions    Hydrocortisone      Erythema      Pollen Extract      Itching eyes    Zoster Vaccine Live      Did get red rash at injection site that lasted for about 3 days.       Past Medical History:   Diagnosis Date    Basal cell carcinoma     Breast cancer (H) 2000    Colon polyp     exam every 5 years        Patient Active Problem List   Diagnosis    CARDIOVASCULAR  SCREENING; LDL GOAL LESS THAN 160    Chronic low back pain    Gastroesophageal reflux disease    Mixed hyperlipidemia    Angioma    SK (seborrheic keratosis)    History of skin cancer    Senile sebaceous gland hyperplasia    Lentigo    Basal cell carcinoma (BCC)    Constipation    Dermatochalasis, bilateral    Essential hypertension    History of shingles    Mild emphysema (H)    Mood problem    Nephrolithiasis    Osteopenia    History of vertebral compression fracture    Hyperlipidemia    Cyst of right ovary     Reviewed past medical, surgical, and family history with patient found on new patient intake packet located in EMR Media tab.         OBJECTIVE:  /65   Pulse 86   LMP  (LMP Unknown)     PHYSICAL EXAMINATION:    --CONSTITUTIONAL:  Vital signs as above.  No acute distress.  The patient is well nourished and well groomed.  Appears in significant discomfort  --PSYCHIATRIC:  Appropriate mood and affect. The patient is awake, alert, oriented to person, place, time and answering questions appropriately with clear speech.    --SKIN:  Skin over the face, bilateral lower extremities, and posterior torso is clean, dry, intact without rashes.    --RESPIRATORY: Normal rhythm and effort. No abnormal accessory muscle breathing patterns noted.   --ABDOMINAL:  Non-distended.    --GROSS MOTOR: Gait is non-antalgic.  Arises with pain from a seated position.      --LOWER EXTREMITY MOTOR TESTING:  Hip flexion: right 5/5, left 5/5  Quads: right 5/5, left 5/5  Hamstrings: right 5/5, left 5/5  Dorsiflexion: right 5/5, left 5/5  Plantar flexion: right 5/5, left 5/5    Great toe MTP extension/EHL: right 5/5, left 5/5    --NEUROLOGICAL:  Sensation to light touch is intact throughout both lower extremities.     Straight leg raising is negative.    --VASCULAR:  Bilateral lower extremities are warm without any discoloration.  There is no pitting edema of the bilateral lower extremities.    RESULTS:   Prior medical records from [facility] Everywhere were reviewed today.    Imaging: Spine imaging was personally reviewed and interpreted today. The images were shown to the patient and the findings were explained using a spine model.    Narrative & Impression   XR LUMBAR SPINE TWO-THREE VIEWS  10/30/2023 11:17 AM      HISTORY: Lumbar fractures, surveillance of healing. Evaluate for any  height loss     COMPARISON: Number spine x-ray 9/29/2023.                                                                       IMPRESSION: Superior endplate fracture of L1 vertebral body with loss  of approximately 40% vertebral body height appears similar to prior  x-ray 9/29/2023. Subtle superior endplate deformity of L3 with 10%  loss of vertebral body height, also not significantly changed. No  definite new loss of vertebral body height. Marked degenerative  endplate changes and loss of disc height at L4-L5.     PASTORA COLEMAN MD         SYSTEM ID:  [id number]       Narrative & Impression   EXAM: MR LUMBAR SPINE W/O CONTRAST  LOCATION: [facility]  DATE: 9/17/2023     INDICATION:  Chronic low back pain, unspecified back pain laterality, unspecified whether sciatica present, Chronic low back pain, unspecified back pain laterality, unspecified whether sciatica present  COMPARISON: None.  TECHNIQUE: Routine Lumbar Spine MRI without IV contrast.     FINDINGS:   Nomenclature is based on 5 lumbar type vertebral bodies. T12 ribs are considered hypoplastic. Normal alignment. Acute superior endplate compression fracture at L1. There is 25% height loss with 4 mm retropulsion. Acute superior endplate compression   fracture at L3. There is 10% height loss without significant retropulsion. There is Modic type I endplate change at L4-L5. Normal distal spinal cord and cauda equina with conus medullaris at T12-L1. No extraspinal abnormality. Unremarkable visualized   bony pelvis.     T12-L1: Disc desiccation and disc height loss.  Retropulsion. Normal facets. No spinal canal stenosis. Mild bilateral neural foraminal stenosis.      L1-L2: Disc desiccation and disc height loss. Diffuse disc bulge. Bilateral facet hypertrophy. No spinal canal stenosis. Mild bilateral neural foraminal stenosis.     L2-L3: Disc desiccation and disc height loss. Diffuse disc bulge. Bilateral facet hypertrophy. No spinal canal or foraminal stenosis.      L3-L4: Disc desiccation and disc height loss. Diffuse disc bulge. Bilateral facet hypertrophy. No spinal canal or foraminal stenosis.     L4-L5: Disc desiccation and disc height loss. Diffuse disc bulge. Bilateral facet hypertrophy. No spinal canal stenosis. Mild bilateral neural foraminal stenosis.     L5-S1: Normal disc height and signal. No herniation. Normal facets moderate bilateral facet hypertrophy.. No spinal canal or neural foraminal stenosis.                                                                      IMPRESSION:  1.  Acute 25% superior endplate compression fracture at L1 with 4 mm retropulsion producing mild T12-L1 neural foraminal stenosis bilaterally.  2.  Acute 10% superior plate compression fracture at L3.  3.  Multilevel disc degeneration and facet hypertrophy without significant spinal canal stenosis. Scattered mild neural foraminal stenoses.  4.  Modic type I endplate change at L4-L5.     Narrative & Impression   XR LUMBAR SPINE 2/3 VIEWS  9/29/2023 3:07 PM      HISTORY: upright films, L1 and L3 fractures in brace eval worsening or  progressing kyphosis; Compression fracture of L1 vertebra, initial  encounter (H); Closed compression fracture of L3 lumbar vertebra,  initial encounter (H)     COMPARISON: MRI 9/17/2023                                                                       IMPRESSION: Compression fracture findings are redemonstrated involving  the superior endplate of L1, better delineated on recent MRI.  Compression deformity findings of the superior endplate of L3 are  much  better seen on MRI. Multilevel loss of disc height. Posterior element  degenerative changes in the inferior lumbar spine. Sacrum and  sacroiliac joints are unremarkable.      JONNIE GAINES MD         SYSTEM ID:  [id number]       Kely HERRMANN-C  [facility]  O. [phone]

## 2023-11-06 ENCOUNTER — HOSPITAL ENCOUNTER (OUTPATIENT)
Dept: MRI IMAGING | Facility: CLINIC | Age: 73
Discharge: HOME | End: 2023-11-06
Attending: NURSE PRACTITIONER | Admitting: NURSE PRACTITIONER
Payer: COMMERCIAL

## 2023-11-06 DIAGNOSIS — S32.010A COMPRESSION FRACTURE OF L1 VERTEBRA, INITIAL ENCOUNTER (H): ICD-10-CM

## 2023-11-06 DIAGNOSIS — S32.030A CLOSED COMPRESSION FRACTURE OF L3 LUMBAR VERTEBRA, INITIAL ENCOUNTER (H): ICD-10-CM

## 2023-11-06 PROCEDURE — 72148 MRI LUMBAR SPINE W/O DYE: CPT

## 2023-11-09 ENCOUNTER — HOSPITAL ENCOUNTER (OUTPATIENT)
Dept: CT IMAGING | Facility: CLINIC | Age: 73
Discharge: HOME | End: 2023-11-09
Attending: NURSE PRACTITIONER | Admitting: NURSE PRACTITIONER
Payer: COMMERCIAL

## 2023-11-09 DIAGNOSIS — S32.030A CLOSED COMPRESSION FRACTURE OF L3 LUMBAR VERTEBRA, INITIAL ENCOUNTER (H): ICD-10-CM

## 2023-11-09 DIAGNOSIS — S32.010A COMPRESSION FRACTURE OF L1 VERTEBRA, INITIAL ENCOUNTER (H): ICD-10-CM

## 2023-11-09 PROCEDURE — 72131 CT LUMBAR SPINE W/O DYE: CPT

## 2023-11-13 ENCOUNTER — TELEPHONE (OUTPATIENT)
Dept: PHYSICAL MEDICINE AND REHAB | Facility: CLINIC | Age: 73
End: 2023-11-13

## 2023-11-13 ENCOUNTER — OFFICE VISIT (OUTPATIENT)
Dept: FAMILY MEDICINE | Facility: CLINIC | Age: 73
End: 2023-11-13
Payer: COMMERCIAL

## 2023-11-13 VITALS
SYSTOLIC BLOOD PRESSURE: 124 MMHG | DIASTOLIC BLOOD PRESSURE: 74 MMHG | HEART RATE: 72 BPM | RESPIRATION RATE: 18 BRPM | BODY MASS INDEX: 27.16 KG/M2 | HEIGHT: 66 IN | OXYGEN SATURATION: 98 % | TEMPERATURE: 97.4 F | WEIGHT: 169 LBS

## 2023-11-13 DIAGNOSIS — S32.010A COMPRESSION FRACTURE OF L1 VERTEBRA, INITIAL ENCOUNTER (H): ICD-10-CM

## 2023-11-13 DIAGNOSIS — G89.29 CHRONIC LOW BACK PAIN, UNSPECIFIED BACK PAIN LATERALITY, UNSPECIFIED WHETHER SCIATICA PRESENT: ICD-10-CM

## 2023-11-13 DIAGNOSIS — Z12.31 VISIT FOR SCREENING MAMMOGRAM: Primary | ICD-10-CM

## 2023-11-13 DIAGNOSIS — S32.030A CLOSED COMPRESSION FRACTURE OF L3 LUMBAR VERTEBRA, INITIAL ENCOUNTER (H): Primary | ICD-10-CM

## 2023-11-13 DIAGNOSIS — M54.50 CHRONIC LOW BACK PAIN, UNSPECIFIED BACK PAIN LATERALITY, UNSPECIFIED WHETHER SCIATICA PRESENT: ICD-10-CM

## 2023-11-13 DIAGNOSIS — S32.010D COMPRESSION FRACTURE OF L1 VERTEBRA WITH ROUTINE HEALING, SUBSEQUENT ENCOUNTER: ICD-10-CM

## 2023-11-13 DIAGNOSIS — S32.030D COMPRESSION FRACTURE OF L3 VERTEBRA WITH ROUTINE HEALING, SUBSEQUENT ENCOUNTER: ICD-10-CM

## 2023-11-13 PROCEDURE — 99213 OFFICE O/P EST LOW 20 MIN: CPT | Performed by: FAMILY MEDICINE

## 2023-11-13 RX ORDER — HYDROCODONE BITARTRATE AND ACETAMINOPHEN 5; 325 MG/1; MG/1
1 TABLET ORAL 2 TIMES DAILY PRN
Qty: 30 TABLET | Refills: 0 | Status: SHIPPED | OUTPATIENT
Start: 2023-11-13 | End: 2023-12-16

## 2023-11-13 ASSESSMENT — PAIN SCALES - GENERAL: PAINLEVEL: MODERATE PAIN (5)

## 2023-11-13 NOTE — NURSING NOTE
"Chief Complaint   Patient presents with    Pain     /74 (Cuff Size: Adult Regular)   Pulse 72   Temp 97.4  F (36.3  C) (Tympanic)   Resp 18   Ht 1.676 m (5' 6\")   Wt 76.7 kg (169 lb)   LMP  (LMP Unknown)   SpO2 98%   BMI 27.28 kg/m   Estimated body mass index is 27.28 kg/m  as calculated from the following:    Height as of this encounter: 1.676 m (5' 6\").    Weight as of this encounter: 76.7 kg (169 lb).  Patient presents to the clinic using No DME      Health Maintenance that is potentially due pending provider review:    Health Maintenance Due   Topic Date Due    ADVANCE CARE PLANNING  Never done    COPD ACTION PLAN  Never done    HEPATITIS C SCREENING  Never done    RSV VACCINE (Pregnancy & 60+) (1 - 1-dose 60+ series) Never done    MEDICARE ANNUAL WELLNESS VISIT  08/23/2014    MAMMO SCREENING  10/09/2014    COVID-19 Vaccine (5 - 2023-24 season) 09/01/2023                  "

## 2023-11-13 NOTE — TELEPHONE ENCOUNTER
Spoke to pt regarding recommendation. Pt verbalized understanding and is agreeable to seeing Dr. Ro or Dr. Bolton. Provided number for  to schedule.

## 2023-11-13 NOTE — TELEPHONE ENCOUNTER
----- Message from MELISSA Clement CNP sent at 11/13/2023  1:11 PM CST -----  Please let Siomara know that I heard back from IR regarding her case and they are unable to offer a vertebroplasty or kyphoplasty because she does not have osteoporosis. I would recommend seeing neurosurgery (Dr Bolton or Dr Ro) given how much persistent back pain she is having, to see what her other options there might be.

## 2023-11-13 NOTE — PROGRESS NOTES
Assessment & Plan     Chronic low back pain, unspecified back pain laterality, unspecified whether sciatica present  Compression fracture of L3 vertebra with routine healing, subsequent encounter  Compression fracture of L1 vertebra with routine healing, subsequent encounter  Patient was diagnosed with L1 and L3 compression fracture in September this year, following physical medicine and using back brace.  Complains of ongoing low back pain, unchanged.  Patient's MRI and CT lumbar spine findings reviewed, recommended to discuss with Kely Silvestre about further management options.  Norco prescription refilled, judicious use stressed.  All questions answered.  - HYDROcodone-acetaminophen (NORCO) 5-325 MG tablet; Take 1 tablet by mouth 2 times daily as needed for pain    Visit for screening mammogram  - MA SCREENING DIGITAL BILAT - Future  (s+30); Future      Rafael Mcdonald MD  [facility]    Karen Stringer is a 73 year old, presenting for the following health issues:  Pain      History of Present Illness       Back Pain:  She presents for follow up of back pain. Patient's back pain is a chronic problem.  Location of back pain:  Right lower back, left lower back, right buttock, left buttock, right hip and left hip  Description of back pain: burning, gnawing and sharp  Back pain spreads: right buttocks, left buttocks, right thigh and left thigh    Since patient first noticed back pain, pain is: gradually worsening  Does back pain interfere with her job:  Yes       She eats 0-1 servings of fruits and vegetables daily.She consumes 0 sweetened beverage(s) daily.She exercises with enough effort to increase her heart rate 9 or less minutes per day.  She exercises with enough effort to increase her heart rate 3 or less days per week.   She is taking medications regularly.       Review of Systems   Constitutional, HEENT, cardiovascular, pulmonary, gi and gu systems are negative, except as  "otherwise noted.        Objective    /74 (Cuff Size: Adult Regular)   Pulse 72   Temp 97.4  F (36.3  C) (Tympanic)   Resp 18   Ht 1.676 m (5' 6\")   Wt 76.7 kg (169 lb)   LMP  (LMP Unknown)   SpO2 98%   BMI 27.28 kg/m    Body mass index is 27.28 kg/m .  Physical Exam   GENERAL: alert and no distress  RESP: lungs clear to auscultation - no rales, rhonchi or wheezes  CV: regular rates and rhythm, normal S1 S2, no S3 or S4, and no murmur, click or rub  MS: Subjective low back pain, mildly tender lumbar spine, no swelling noted, normal lower extremity strength, gait antalgic, back brace in situ, sensation to touch and pressure intact  NEURO: Grossly intact  PSYCH: mentation appears normal, affect normal/bright                "

## 2023-11-14 ENCOUNTER — TELEPHONE (OUTPATIENT)
Dept: NEUROSURGERY | Facility: CLINIC | Age: 73
End: 2023-11-14
Payer: COMMERCIAL

## 2023-11-14 RX ORDER — CALCITONIN SALMON 200 [IU]/.09ML
1 SPRAY, METERED NASAL DAILY
Qty: 3.7 ML | Refills: 0 | Status: SHIPPED | OUTPATIENT
Start: 2023-11-14 | End: 2023-11-21

## 2023-11-14 NOTE — TELEPHONE ENCOUNTER
Please offer prescription for calcitonin for this patient while we wait for a neurosurgery opinion.   I will send to her pharmacy

## 2023-11-14 NOTE — TELEPHONE ENCOUNTER
Date: 11/14/2023  (Provide the date when patient was called)  Provider: Jayjay or Hoang  (with whom patient needs to schedule with)  Detailed message: spoke with patient about r/s appt to JAYNE, patient is stating that it's a referral to Dr. Ro or Dr. Bolton from [facility] if patient is unable to see the recommended provider what is patient supposed to do going forward? Please advise

## 2023-11-14 NOTE — TELEPHONE ENCOUNTER
That is unfortunate. As far as I know, there are no other alternatives to calcitonin itself. we could consider adding relafen 500mg BID, and/or a neuropathic agent such as gabapentin. She did have a narcotic refill yesterday by her PCP for #30 tabs.      Kely Silvestre FNP-C  [facility]  O. [phone]

## 2023-11-14 NOTE — TELEPHONE ENCOUNTER
[facility] Call Center    Phone Message    May a detailed message be left on voicemail: yes     Reason for Call: Other: Pharmacist Calling regarding prescription being over $200 very expensive are there any alternatives?     Action Taken: Other: spine    Travel Screening: Not Applicable

## 2023-11-15 NOTE — TELEPHONE ENCOUNTER
I recommended seeing neurosurgery given the severity/complexity of her L1 burst fracture and her ongoing severe back pain which has not improved with conservative treatment.    She was referred to IR originally, unfortunately they feel she is not a candidate for vertebroplasty given that she does not have osteoporosis.     Her CT also suggests she may have some abnormal signal in her kidneys which I would recommend she follow up with her PCP about as well.

## 2023-11-15 NOTE — TELEPHONE ENCOUNTER
Phone call to patient to discuss PSP's response. Information shared. Patient stated understanding and appreciation for the call back to give her some clarity. Encouraged patient to come with list of questions she would like to ask at neurosurgery appointment so they can be addressed then.     Encouraged patient to follow up with PCP regarding the possible abnormal signal in the kidneys. She asks if this could be kidney stones as she has had them in the past. Encouraged patient to follow up with her PCP to see if further work up (labs, imaging etc) may be needed. Stated understanding.

## 2023-11-17 NOTE — TELEPHONE ENCOUNTER
RECORDS RECEIVED FROM: Referral from Kely Silvestre   REASON FOR VISIT: DX: compression fracture/ back pain      Date of Appt: 11/21/2023   NOTES (FOR ALL VISITS) STATUS DETAILS   OFFICE NOTE from referring provider Internal Referral and notes in chart   OFFICE NOTE from other specialist N/A [facility]- 10/13/2021   DISCHARGE SUMMARY from hospital N/A    DISCHARGE REPORT from the ER N/A    OPERATIVE REPORT N/A    HETAL Virus Labs ([facility] ONLY) N/A    EMG N/A    EEG N/A    MEDICATION LIST N/A    IMAGING  (FOR ALL VISITS)     LUMBAR PUNCTURE N/A    TOMI SCAN (MOVEMENT) N/A    ULTRASOUND (CAROTID BILAT) *VASCULAR* N/A    MRI (HEAD, NECK, SPINE) Internal MRI: 11/2023    CT (HEAD, NECK, SPINE) Internal CT- 11/09/2023

## 2023-11-21 ENCOUNTER — OFFICE VISIT (OUTPATIENT)
Dept: NEUROSURGERY | Facility: CLINIC | Age: 73
End: 2023-11-21
Payer: COMMERCIAL

## 2023-11-21 ENCOUNTER — PRE VISIT (OUTPATIENT)
Dept: NEUROSURGERY | Facility: CLINIC | Age: 73
End: 2023-11-21

## 2023-11-21 ENCOUNTER — TELEPHONE (OUTPATIENT)
Dept: NEUROSURGERY | Facility: CLINIC | Age: 73
End: 2023-11-21

## 2023-11-21 VITALS
DIASTOLIC BLOOD PRESSURE: 75 MMHG | SYSTOLIC BLOOD PRESSURE: 156 MMHG | OXYGEN SATURATION: 96 % | BODY MASS INDEX: 27 KG/M2 | HEART RATE: 65 BPM | WEIGHT: 168 LBS | HEIGHT: 66 IN

## 2023-11-21 DIAGNOSIS — S32.010A CLOSED COMPRESSION FRACTURE OF BODY OF L1 VERTEBRA (H): Primary | ICD-10-CM

## 2023-11-21 DIAGNOSIS — S32.030A CLOSED COMPRESSION FRACTURE OF L3 LUMBAR VERTEBRA, INITIAL ENCOUNTER (H): ICD-10-CM

## 2023-11-21 PROCEDURE — 99213 OFFICE O/P EST LOW 20 MIN: CPT | Performed by: PHYSICIAN ASSISTANT

## 2023-11-21 RX ORDER — LANOLIN ALCOHOL/MO/W.PET/CERES
1 CREAM (GRAM) TOPICAL 2 TIMES DAILY
COMMUNITY

## 2023-11-21 ASSESSMENT — PAIN SCALES - GENERAL: PAINLEVEL: EXTREME PAIN (8)

## 2023-11-21 NOTE — PATIENT INSTRUCTIONS
-follow up with [facility] in [place] regarding hip pain and next steps  -Our office will coordinate follow up with DR. Hudson in [place], [place] to discuss kyphoplasty procedure    Khushbu JUDD [facility]  [address] [address]    Tel [phone]

## 2023-11-21 NOTE — TELEPHONE ENCOUNTER
[facility] schedule follow up with Dr. Hudson at first available to discuss kyphoplasty. DR. Hudson has approved per staff message.

## 2023-11-21 NOTE — NURSING NOTE
"Siomara Hansen is a 73 year old female who presents for:  Chief Complaint   Patient presents with    Consult        Initial Vitals:  BP (!) 156/75   Pulse 65   Ht 5' 6\" (1.676 m)   Wt 168 lb (76.2 kg)   LMP  (LMP Unknown)   SpO2 96%   BMI 27.12 kg/m   Estimated body mass index is 27.12 kg/m  as calculated from the following:    Height as of this encounter: 5' 6\" (1.676 m).    Weight as of this encounter: 168 lb (76.2 kg).. Body surface area is 1.88 meters squared. BP completed using cuff size: regular  Extreme Pain (8)    Antonio Escalona    "

## 2023-11-21 NOTE — PROGRESS NOTES
"NEUROSURGERY CLINIC CONSULT NOTE     DATE OF VISIT: 11/21/2023     SUBJECTIVE:     Siomara Hansen is a pleasant 73 year old female who presents to the clinic today for consultation on L1 and L3 compression fractures. She is referred to the Neurosurgery Clinic by Dr. Kely Silvestre on Spine Team. Pertinent medical history consists of L1 and L3 compression fractures managed conservatively with light duties and bracing. Continues to have ongoing and worsening pain despite conservative measures.     Today, she reports a 3-month history of symptoms. She describes intermittent, sharp, aching pain that initiates in the low lumbar region and intermittently will radiates distally into left buttock and right groin. Has followed by Ortho for right hip pathology and injection which assisted with symptoms x 1 month. This pain is not accompanied by paresthesia, numbness and/or perceived weakness in the same distribution. Prolonged walking, standing and bending, twisting aggravate the symptoms, while alleviation is obtained by rest, medications. Lifting AC unit out of her van in September, 2023 and hearing a \"pop\" is associated with the onset of the pain. There are no bowel or bladder changes. She denies saddle anesthesia. She denies changes in gait, instability, or falling episodes.     She has participated in conservative therapies to include chiropractor, massage, bracing, light activity.     She has had hip pain for several months and is followed by [facility] in [place]. She had injection in her right hip that provided relief for 1 month. [facility] wanted her to obtain MRI of her hips for further evaluation. She has not gotten around to scheduling this. She continues with hip pain.          Current Outpatient Medications:     calcium citrate-vitamin D (CITRACAL) 315-5 MG-MCG TABS per tablet, Take 1 tablet by mouth 2 times daily, Disp: , Rfl:     citalopram (CELEXA) 20 MG tablet, Take 20 mg by mouth, Disp: , Rfl:     " HYDROcodone-acetaminophen (NORCO) 5-325 MG tablet, Take 1 tablet by mouth 2 times daily as needed for pain, Disp: 30 tablet, Rfl: 0    losartan (COZAAR) 50 MG tablet, Take 50 mg by mouth 2 times daily, Disp: , Rfl:     omeprazole (PRILOSEC) 20 MG DR capsule, , Disp: , Rfl:     valACYclovir (VALTREX) 500 MG tablet, Take 1 tablet by mouth 2 times daily., Disp: 6 tablet, Rfl: 3    alendronate (FOSAMAX) 10 MG tablet, Take 1 tablet (10 mg) by mouth every morning (before breakfast) (Patient not taking: Reported on 2023), Disp: 90 tablet, Rfl: 1    meclizine (ANTIVERT) 12.5 MG tablet, TAKE 1 TO 2 TABLETS BY MOUTH THREE TIMES DAILY AS NEEDED FOR VERTIGO (Patient not taking: Reported on 2023), Disp: , Rfl:     oxyCODONE (ROXICODONE) 5 MG tablet, Take 1 tablet (5 mg) by mouth every 6 hours as needed for pain (Patient not taking: Reported on 2023), Disp: 14 tablet, Rfl: 0     Allergies   Allergen Reactions    Hydrocortisone      Erythema      Pollen Extract      Itching eyes    Zoster Vaccine Live      Did get red rash at injection site that lasted for about 3 days.        Past Medical History:   Diagnosis Date    Basal cell carcinoma     Breast cancer (H)     Colon polyp     exam every 5 years        ROS: 10 point review of symptoms are negative other than the symptoms noted above in the HPI.     Family History has been reviewed with the patient, there are no pertinent findings to presenting concern.     Past Surgical History:   Procedure Laterality Date    GYN SURGERY      D & C    masectomy[  2000    Bilateral        Social History     Tobacco Use    Smoking status: Former     Packs/day: 0.50     Years: 15.00     Additional pack years: 0.00     Total pack years: 7.50     Types: Cigarettes     Quit date: 1984     Years since quittin.9     Passive exposure: Past    Smokeless tobacco: Never   Vaping Use    Vaping Use: Never used   Substance Use Topics    Alcohol use: Yes     Comment: Rare  "   Drug use: No        OBJECTIVE:   BP (!) 156/75   Pulse 65   Ht 5' 6\" (1.676 m)   Wt 168 lb (76.2 kg)   LMP  (LMP Unknown)   SpO2 96%   BMI 27.12 kg/m     Body mass index is 27.12 kg/m .     Imaging:   Narrative & Impression   MRI LUMBAR SPINE WITHOUT CONTRAST November 6, 2023 3:04 PM      HISTORY: L1, L3 compression fractures. Retropulsion of L1. Worsening  pain into both hips, rule out worsening fracture. Compression fracture  of L1 vertebra, initial encounter (H). Closed compression fracture of  L3 lumbar vertebra, initial encounter (H).     TECHNIQUE: Multiplanar multisequence MRI of the lumbar spine without  contrast.     COMPARISON: X-rays 10/30/2023.      FINDINGS: Recent L1 vertebral body fracture with mild height loss and  fragment retropulsion contributing to mild spinal canal stenosis.  Edema extends to the bilateral pedicles, presumably reactive. Lumbar  spine CT could be performed if indicated for complete fracture  characterization if indicated. Mild height loss involving the L3  vertebral body with superior endplate Schmorl's node which  demonstrates edema suggestive of recent Schmorl's node. Bone marrow  demonstrates background of scattered degenerative endplate changes.  Conus medullaris is unremarkable terminating at the level of L1  vertebral body. Cauda equina is unremarkable. Bilateral sacroiliac  joint degenerative change. Small areas of indeterminate T2  hyperintense signal within the bilateral kidneys which are  incompletely characterized (dedicated renal ultrasound workup could be  pursued).     Segmental Analysis:      T12-L1: Mild disc height loss. Disc bulge. Mild bilateral facet  arthropathy. Mild bilateral foraminal stenosis. Mild spinal canal  stenosis.      L1-L2: Mild disc height loss. Disc bulge. Mild bilateral facet  arthropathy. No foraminal or spinal canal stenosis.     L2-L3: Mild disc height loss. Minimal bulge. Mild bilateral facet  arthropathy. No foraminal or spinal " canal stenosis.       L3-L4: Mild disc height loss posterior disc bulge. Mild bilateral  facet arthropathy. No foraminal or spinal canal stenosis.     L4-L5: Severe disc height loss. Disc bulge. Moderate bilateral facet  arthropathy. Mild bilateral foraminal stenosis. No spinal canal  stenosis.       L5-S1: Mild disc height loss. Slight uncovering. Severe bilateral  facet arthropathy. No foraminal or spinal canal stenosis.                                                                      IMPRESSION:    1. Recent L1 compression fracture with fragment retropulsion  contributing to mild spinal canal stenosis.  2. Mild height loss involving the L3 vertebral body with superior  endplate Schmorl's node.  3. Multilevel degenerative change.  4. No high-grade stenoses.     SULTANA PARHAM MD         Narrative & Impression   CT LUMBAR SPINE WITHOUT CONTRAST  11/9/2023 3:21 PM      HISTORY: L1 and L3 fractures.     TECHNIQUE: Axial images of the lumbar spine were obtained without  intravenous contrast. Multiplanar reformations were performed.   Radiation dose for this scan was reduced using automated exposure  control, adjustment of the mA and/or kV according to patient size, or  iterative reconstruction technique.      COMPARISON: Lumbar spine MR 11/6/2023 and 9/17/2023.      FINDINGS:  There are 5 lumbar-type vertebral bodies assumed for the  purposes of this dictation.      Bones appear demineralized which limits evaluation. Superior endplate  fractures of the L1 and L3 vertebral bodies are again noted. There is  loss of approximately  60% L1 vertebral body height in its mid aspect.  Mild anterior wedging of the L3 vertebral body with loss of  approximately 25% vertebral body height anteriorly. Marked posterior  displacement of the fractured superior L1 endplate towards the spinal  canal which contributes to mild spinal canal narrowing. Subtle  posterior displacement of the fractured superior L3 endplate towards  the  spinal canal without spinal canal narrowing. There is ill-defined  sclerosis in the superior L1 endplate. Subtle fracture lines with the  superior L1 vertebral body. Fracture lines do not definitively extend  to pedicles at L1 or L3.     Presumed Schmorl's node deformity at the opposing L4 and L5 endplates.  Marked degenerative endplate changes and loss of disc height at L4-L5.  Multiple disc bulges throughout the lumbar spine, most pronounced at  L4-L5. Facet hypertrophy in the lower lumbar spine. No significant  spinal canal narrowing on a degenerative basis. Mild right and  moderate left neural foraminal narrowing at L4-L5.     Visualized paraspinous tissues: Unremarkable.                                                                       IMPRESSION:    1. Fractures of the L1 and L3 vertebral bodies appear similar to prior  MR 11/6/2023 and are likely subacute. Fracture lines do not  definitively extend into the pedicles of L1 or L3.  2. Posterior displacement of fractured superior L1 endplate towards  the spinal canal causes mild spinal canal narrowing. This was also  present on prior.  3. Degenerative changes throughout the lumbar spine as detailed above.  No spinal canal narrowing on a degenerative basis.  4. Bones appear demineralized which limits evaluation for fracture  lines.     PASTORA COLEMAN MD           Full radiological report in chart. Imaging was reviewed with with patient today.     Exam:   CN II-XII grossly intact, alert and appropriate with conversation and following commands.   Gait is non-antalgic. Able to stand on toes and heels without difficulty.   Sensation intact throughout upper extremities.   Moving BUE symmetrically     Lumbar spine is tender to palpation upper lumbar spinous processes and paraspinous muscles  Intact sensation throughout lower extremities.   Bilateral patellar 2/4 and achilles reflex 1/4. +SLR on left, negative on right  +JOHN bilaterally     LE muscle strength   "Right  Left    Iliopsoas (hip flexion)  5/5  5/5, pain limiting   Quad (knee extension)  5/5  5/5    Hamstring (knee flexion)  5/5  5/5    Gastrocnemius (PF)  5/5  5/5    Tibialis Ant. (DF)  5/5  5/5    EHL  5/5  5/5      Negative for clonus.   Calves are soft and non-tender bilaterally.     ASSESSMENT/PLAN:     Siomara Hansen is a pleasant 73 year old female who presents to the clinic today for consultation on L1 and L3 compression fractures. She is referred to the Neurosurgery Clinic by Dr. Kely Silvestre on Spine Team. Pertinent medical history consists of L1 and L3 compression fractures managed conservatively with light duties and bracing. Continues to have ongoing and worsening pain despite conservative measures.     Today, she reports a 3-month history of symptoms. She describes intermittent, sharp, aching pain that initiates in the low lumbar region and intermittently will radiates distally into left buttock. This pain is not accompanied by paresthesia, numbness and/or perceived weakness in the same distribution. Prolonged walking, standing and bending, twisting aggravate the symptoms, while alleviation is obtained by rest, medications. Lifting AC unit out of her van in September, 2023 and hearing a \"pop\" is associated with the onset of the pain. There are no bowel or bladder changes. She denies saddle anesthesia. She denies changes in gait, instability, or falling episodes.     She has participated in conservative therapies to include chiropractor, massage, bracing, light activity.     The patient's most recent imaging was reviewed with her today.     Based on her physical exam, imaging review, and past treatments, we feel that it would be in her best interest to meet with Dr. Hudson to discuss kyphoplasty options. Patient in agreement. Message has been sent to our scheduling staff.     Recommend she follow up with her Ortho team for further hip evaluation.      Respectfully,     Khushbu Day " [facility]  [address]  [address]  [place] [address]    Tel [phone]  Pager [phone]    Exam, imaging, and plan reviewed by Dr. Hudson.

## 2023-11-21 NOTE — LETTER
"    11/21/2023         RE: Siomara Hansen  [address]  [facility] [address]        Dear Colleague,    Thank you for referring your patient, Siomara Hansen, to the [facility] SPINE AND NEUROSURGERY. Please see a copy of my visit note below.    NEUROSURGERY CLINIC CONSULT NOTE     DATE OF VISIT: 11/21/2023     SUBJECTIVE:     Siomara Hansen is a pleasant 73 year old female who presents to the clinic today for consultation on L1 and L3 compression fractures. She is referred to the Neurosurgery Clinic by Dr. Kely Silvestre on Spine Team. Pertinent medical history consists of L1 and L3 compression fractures managed conservatively with light duties and bracing. Continues to have ongoing and worsening pain despite conservative measures.     Today, she reports a 3-month history of symptoms. She describes intermittent, sharp, aching pain that initiates in the low lumbar region and intermittently will radiates distally into left buttock and right groin. Has followed by Ortho for right hip pathology and injection which assisted with symptoms x 1 month. This pain is not accompanied by paresthesia, numbness and/or perceived weakness in the same distribution. Prolonged walking, standing and bending, twisting aggravate the symptoms, while alleviation is obtained by rest, medications. Lifting AC unit out of her van in September, 2023 and hearing a \"pop\" is associated with the onset of the pain. There are no bowel or bladder changes. She denies saddle anesthesia. She denies changes in gait, instability, or falling episodes.     She has participated in conservative therapies to include chiropractor, massage, bracing, light activity.     She has had hip pain for several months and is followed by [facility] in [place]. She had injection in her right hip that provided relief for 1 month. [facility] wanted her to obtain MRI of her hips for further evaluation. She has not gotten around to scheduling this. She continues with hip " pain.          Current Outpatient Medications:      calcium citrate-vitamin D (CITRACAL) 315-5 MG-MCG TABS per tablet, Take 1 tablet by mouth 2 times daily, Disp: , Rfl:      citalopram (CELEXA) 20 MG tablet, Take 20 mg by mouth, Disp: , Rfl:      HYDROcodone-acetaminophen (NORCO) 5-325 MG tablet, Take 1 tablet by mouth 2 times daily as needed for pain, Disp: 30 tablet, Rfl: 0     losartan (COZAAR) 50 MG tablet, Take 50 mg by mouth 2 times daily, Disp: , Rfl:      omeprazole (PRILOSEC) 20 MG DR capsule, , Disp: , Rfl:      valACYclovir (VALTREX) 500 MG tablet, Take 1 tablet by mouth 2 times daily., Disp: 6 tablet, Rfl: 3     alendronate (FOSAMAX) 10 MG tablet, Take 1 tablet (10 mg) by mouth every morning (before breakfast) (Patient not taking: Reported on 11/21/2023), Disp: 90 tablet, Rfl: 1     meclizine (ANTIVERT) 12.5 MG tablet, TAKE 1 TO 2 TABLETS BY MOUTH THREE TIMES DAILY AS NEEDED FOR VERTIGO (Patient not taking: Reported on 11/21/2023), Disp: , Rfl:      oxyCODONE (ROXICODONE) 5 MG tablet, Take 1 tablet (5 mg) by mouth every 6 hours as needed for pain (Patient not taking: Reported on 11/21/2023), Disp: 14 tablet, Rfl: 0     Allergies   Allergen Reactions     Hydrocortisone      Erythema       Pollen Extract      Itching eyes     Zoster Vaccine Live      Did get red rash at injection site that lasted for about 3 days.        Past Medical History:   Diagnosis Date     Basal cell carcinoma      Breast cancer (H) 2000     Colon polyp     exam every 5 years        ROS: 10 point review of symptoms are negative other than the symptoms noted above in the HPI.     Family History has been reviewed with the patient, there are no pertinent findings to presenting concern.     Past Surgical History:   Procedure Laterality Date     GYN SURGERY  1986    D & C     masectomy[  02/2000    Bilateral        Social History     Tobacco Use     Smoking status: Former     Packs/day: 0.50     Years: 15.00     Additional pack years:  "0.00     Total pack years: 7.50     Types: Cigarettes     Quit date: 1984     Years since quittin.9     Passive exposure: Past     Smokeless tobacco: Never   Vaping Use     Vaping Use: Never used   Substance Use Topics     Alcohol use: Yes     Comment: Rare     Drug use: No        OBJECTIVE:   BP (!) 156/75   Pulse 65   Ht 5' 6\" (1.676 m)   Wt 168 lb (76.2 kg)   LMP  (LMP Unknown)   SpO2 96%   BMI 27.12 kg/m     Body mass index is 27.12 kg/m .     Imaging:   Narrative & Impression   MRI LUMBAR SPINE WITHOUT CONTRAST 2023 3:04 PM      HISTORY: L1, L3 compression fractures. Retropulsion of L1. Worsening  pain into both hips, rule out worsening fracture. Compression fracture  of L1 vertebra, initial encounter (H). Closed compression fracture of  L3 lumbar vertebra, initial encounter (H).     TECHNIQUE: Multiplanar multisequence MRI of the lumbar spine without  contrast.     COMPARISON: X-rays 10/30/2023.      FINDINGS: Recent L1 vertebral body fracture with mild height loss and  fragment retropulsion contributing to mild spinal canal stenosis.  Edema extends to the bilateral pedicles, presumably reactive. Lumbar  spine CT could be performed if indicated for complete fracture  characterization if indicated. Mild height loss involving the L3  vertebral body with superior endplate Schmorl's node which  demonstrates edema suggestive of recent Schmorl's node. Bone marrow  demonstrates background of scattered degenerative endplate changes.  Conus medullaris is unremarkable terminating at the level of L1  vertebral body. Cauda equina is unremarkable. Bilateral sacroiliac  joint degenerative change. Small areas of indeterminate T2  hyperintense signal within the bilateral kidneys which are  incompletely characterized (dedicated renal ultrasound workup could be  pursued).     Segmental Analysis:      T12-L1: Mild disc height loss. Disc bulge. Mild bilateral facet  arthropathy. Mild bilateral foraminal " stenosis. Mild spinal canal  stenosis.      L1-L2: Mild disc height loss. Disc bulge. Mild bilateral facet  arthropathy. No foraminal or spinal canal stenosis.     L2-L3: Mild disc height loss. Minimal bulge. Mild bilateral facet  arthropathy. No foraminal or spinal canal stenosis.       L3-L4: Mild disc height loss posterior disc bulge. Mild bilateral  facet arthropathy. No foraminal or spinal canal stenosis.     L4-L5: Severe disc height loss. Disc bulge. Moderate bilateral facet  arthropathy. Mild bilateral foraminal stenosis. No spinal canal  stenosis.       L5-S1: Mild disc height loss. Slight uncovering. Severe bilateral  facet arthropathy. No foraminal or spinal canal stenosis.                                                                      IMPRESSION:    1. Recent L1 compression fracture with fragment retropulsion  contributing to mild spinal canal stenosis.  2. Mild height loss involving the L3 vertebral body with superior  endplate Schmorl's node.  3. Multilevel degenerative change.  4. No high-grade stenoses.     SULTANA PARHAM MD         Narrative & Impression   CT LUMBAR SPINE WITHOUT CONTRAST  11/9/2023 3:21 PM      HISTORY: L1 and L3 fractures.     TECHNIQUE: Axial images of the lumbar spine were obtained without  intravenous contrast. Multiplanar reformations were performed.   Radiation dose for this scan was reduced using automated exposure  control, adjustment of the mA and/or kV according to patient size, or  iterative reconstruction technique.      COMPARISON: Lumbar spine MR 11/6/2023 and 9/17/2023.      FINDINGS:  There are 5 lumbar-type vertebral bodies assumed for the  purposes of this dictation.      Bones appear demineralized which limits evaluation. Superior endplate  fractures of the L1 and L3 vertebral bodies are again noted. There is  loss of approximately  60% L1 vertebral body height in its mid aspect.  Mild anterior wedging of the L3 vertebral body with loss of  approximately  25% vertebral body height anteriorly. Marked posterior  displacement of the fractured superior L1 endplate towards the spinal  canal which contributes to mild spinal canal narrowing. Subtle  posterior displacement of the fractured superior L3 endplate towards  the spinal canal without spinal canal narrowing. There is ill-defined  sclerosis in the superior L1 endplate. Subtle fracture lines with the  superior L1 vertebral body. Fracture lines do not definitively extend  to pedicles at L1 or L3.     Presumed Schmorl's node deformity at the opposing L4 and L5 endplates.  Marked degenerative endplate changes and loss of disc height at L4-L5.  Multiple disc bulges throughout the lumbar spine, most pronounced at  L4-L5. Facet hypertrophy in the lower lumbar spine. No significant  spinal canal narrowing on a degenerative basis. Mild right and  moderate left neural foraminal narrowing at L4-L5.     Visualized paraspinous tissues: Unremarkable.                                                                       IMPRESSION:    1. Fractures of the L1 and L3 vertebral bodies appear similar to prior  MR 11/6/2023 and are likely subacute. Fracture lines do not  definitively extend into the pedicles of L1 or L3.  2. Posterior displacement of fractured superior L1 endplate towards  the spinal canal causes mild spinal canal narrowing. This was also  present on prior.  3. Degenerative changes throughout the lumbar spine as detailed above.  No spinal canal narrowing on a degenerative basis.  4. Bones appear demineralized which limits evaluation for fracture  lines.     PASTORA COLEMAN MD           Full radiological report in chart. Imaging was reviewed with with patient today.     Exam:   CN II-XII grossly intact, alert and appropriate with conversation and following commands.   Gait is non-antalgic. Able to stand on toes and heels without difficulty.   Sensation intact throughout upper extremities.   Moving BUE symmetrically  "    Lumbar spine is tender to palpation upper lumbar spinous processes and paraspinous muscles  Intact sensation throughout lower extremities.   Bilateral patellar 2/4 and achilles reflex 1/4. +SLR on left, negative on right  +JOHN bilaterally     LE muscle strength  Right  Left    Iliopsoas (hip flexion)  5/5  5/5, pain limiting   Quad (knee extension)  5/5  5/5    Hamstring (knee flexion)  5/5  5/5    Gastrocnemius (PF)  5/5  5/5    Tibialis Ant. (DF)  5/5  5/5    EHL  5/5  5/5      Negative for clonus.   Calves are soft and non-tender bilaterally.     ASSESSMENT/PLAN:     Siomara Hansen is a pleasant 73 year old female who presents to the clinic today for consultation on L1 and L3 compression fractures. She is referred to the Neurosurgery Clinic by Dr. Kely Silvestre on Spine Team. Pertinent medical history consists of L1 and L3 compression fractures managed conservatively with light duties and bracing. Continues to have ongoing and worsening pain despite conservative measures.     Today, she reports a 3-month history of symptoms. She describes intermittent, sharp, aching pain that initiates in the low lumbar region and intermittently will radiates distally into left buttock. This pain is not accompanied by paresthesia, numbness and/or perceived weakness in the same distribution. Prolonged walking, standing and bending, twisting aggravate the symptoms, while alleviation is obtained by rest, medications. Lifting AC unit out of her van in September, 2023 and hearing a \"pop\" is associated with the onset of the pain. There are no bowel or bladder changes. She denies saddle anesthesia. She denies changes in gait, instability, or falling episodes.     She has participated in conservative therapies to include chiropractor, massage, bracing, light activity.     The patient's most recent imaging was reviewed with her today.     Based on her physical exam, imaging review, and past treatments, we feel that it would be in " her best interest to meet with Dr. Hudson to discuss kyphoplasty options. Patient in agreement. Message has been sent to our scheduling staff.     Recommend she follow up with her Ortho team for further hip evaluation.      Respectfully,     Khushbu Vergara PA-C  [facility] Neurosurgery  [address] [address]    Tel [phone]  Pager [phone]    Exam, imaging, and plan reviewed by Dr. Hudson.       Again, thank you for allowing me to participate in the care of your patient.        Sincerely,        Khushbu Vergara PA-C

## 2023-11-28 ENCOUNTER — OFFICE VISIT (OUTPATIENT)
Dept: NEUROSURGERY | Facility: CLINIC | Age: 73
End: 2023-11-28
Payer: COMMERCIAL

## 2023-11-28 VITALS — OXYGEN SATURATION: 96 % | SYSTOLIC BLOOD PRESSURE: 129 MMHG | DIASTOLIC BLOOD PRESSURE: 77 MMHG | HEART RATE: 71 BPM

## 2023-11-28 DIAGNOSIS — S32.010A CLOSED COMPRESSION FRACTURE OF BODY OF L1 VERTEBRA (H): Primary | ICD-10-CM

## 2023-11-28 PROCEDURE — 99215 OFFICE O/P EST HI 40 MIN: CPT | Performed by: NEUROLOGICAL SURGERY

## 2023-11-28 ASSESSMENT — PAIN SCALES - GENERAL: PAINLEVEL: EXTREME PAIN (8)

## 2023-11-28 NOTE — NURSING NOTE
Reviewed pre- and post-operative instructions as outlined in the After Visit Summary/Patient Instructions with patient.   Surgery folder was given to patient    Patient Education Topic: Procedure with Dr. Hudson     Learner(s): Patient    Knowledge Level: Basic    Readiness to Learn: Ready    Method:  Verbal explanation    Outcome: Able to verbalize instructions    Barriers to Learning: No barrier    NDI/IRMA: Confirmation of completion within last 6 months    Smoking Status: Former smoker:  0.5 packs/day:  Quit:  1/5/1984    Smoking Cessation handout given to patient: n/a    Pain Clinic: N/A    STD/FMLA: No  Job Description: Not applicable   Time Off: Not applicable      Patient had the opportunity for questions to be answered. Advised Patient to call clinic with any questions/concerns. Gave patient antibacterial soap for pre-surgery skin preparation.

## 2023-11-28 NOTE — PROGRESS NOTES
Ms. Hansen is a 73-year-old woman seen today for intractable upper lumbar pain present for nearly 4 months and increasing in intensity.  History is significant for an injury in August when she was lifting a portable air conditioner.  She experienced a pop in her back with minor to moderate pain complaint.  She was subsequently seen by a chiropractor several weeks later who used a drop table.  She reports that with manipulation of her back she experienced the abrupt onset of severe pain which has been present since this time.  She refused further chiropractic manipulation.  She has been evaluated with a lumbar MRI scan which shows an acute unhealed fracture at L1.  There is a chronic L3 compression fracture noted on the same images on T1 sequences.  There is slight retropulsion at L1 but this is not significant given the size of her spinal canal.  She denies radicular or myelopathic symptoms.  She notes that her pain has been present, constant and increasing over the past 2 months despite the use of her brace and markedly diminished activity level.    On examination, she is focally tender at the thoracolumbar junction and midline posteriorly.  She also complains of some tenderness over her SI joints bilaterally.  There is no midline tenderness over the lower lumbar spine.  She is independently ambulatory and neurologically intact.    Lumbar MRI scan performed without contrast on November 6, 2023 is summarized above.    Assessment: Painful L1 compression fracture most likely related to chiropractic manipulation 3 months ago which has remained symptomatic and lifestyle limiting despite decreased activity as well as the use of a brace.  I reviewed the clinical and radiographic findings with her in detail and discussed management alternatives including both surgical and nonsurgical means of management.  I have recommended proceeding with an L1 kyphoplasty with methylmethacrylate cement.  I believe there is a very good  chance of significantly decreasing or eliminating or mechanical thoracolumbar pain with this procedure.  I discussed the details of the procedure with her at some length including potential risks and benefits.  I told her that the risks include extravasation of cement, injury to the nerves or spinal cord with complications up to including paraplegia, systemic embolization with complications up to death or other serious morbidity, infection, bleeding, etc.  She appeared to have a good understanding at the conclusion of our discussion and wished to proceed with the recommended kyphoplasty procedure as soon as practical.    Approximately 45 minutes in total was spent with the patient the majority reviewing clinical and radiographic findings as well as counseling the patient regarding management alternatives, risks and benefits.  Her questions were answered to her apparent satisfaction.

## 2023-11-28 NOTE — NURSING NOTE
"Siomara Hansen is a 73 year old female who presents for:  Chief Complaint   Patient presents with    RECHECK     Consult Kyphoplasty. Hip pain, and low back pain (feels warm) w back pain in the middle of back.         Initial Vitals:  /77   Pulse 71   LMP  (LMP Unknown)   SpO2 96%  Estimated body mass index is 27.12 kg/m  as calculated from the following:    Height as of 11/21/23: 5' 6\" (1.676 m).    Weight as of 11/21/23: 168 lb (76.2 kg).. There is no height or weight on file to calculate BSA. BP completed using cuff size: regular  Extreme Pain (8)    Nursing Comments:       Abril Johns    "

## 2023-11-28 NOTE — LETTER
11/28/2023         RE: Siomara Hansen  [address]  [facility] [address]        Dear Colleague,    Thank you for referring your patient, Siomara Hansen, to the [facility] NEUROLOGY CLINICS [facility]. Please see a copy of my visit note below.    Ms. Hansen is a 73-year-old woman seen today for intractable upper lumbar pain present for nearly 4 months and increasing in intensity.  History is significant for an injury in August when she was lifting a portable air conditioner.  She experienced a pop in her back with minor to moderate pain complaint.  She was subsequently seen by a chiropractor several weeks later who used a drop table.  She reports that with manipulation of her back she experienced the abrupt onset of severe pain which has been present since this time.  She refused further chiropractic manipulation.  She has been evaluated with a lumbar MRI scan which shows an acute unhealed fracture at L1.  There is a chronic L3 compression fracture noted on the same images on T1 sequences.  There is slight retropulsion at L1 but this is not significant given the size of her spinal canal.  She denies radicular or myelopathic symptoms.  She notes that her pain has been present, constant and increasing over the past 2 months despite the use of her brace and markedly diminished activity level.    On examination, she is focally tender at the thoracolumbar junction and midline posteriorly.  She also complains of some tenderness over her SI joints bilaterally.  There is no midline tenderness over the lower lumbar spine.  She is independently ambulatory and neurologically intact.    Lumbar MRI scan performed without contrast on November 6, 2023 is summarized above.    Assessment: Painful L1 compression fracture most likely related to chiropractic manipulation 3 months ago which has remained symptomatic and lifestyle limiting despite decreased activity as well as the use of a brace.  I reviewed the  clinical and radiographic findings with her in detail and discussed management alternatives including both surgical and nonsurgical means of management.  I have recommended proceeding with an L1 kyphoplasty with methylmethacrylate cement.  I believe there is a very good chance of significantly decreasing or eliminating or mechanical thoracolumbar pain with this procedure.  I discussed the details of the procedure with her at some length including potential risks and benefits.  I told her that the risks include extravasation of cement, injury to the nerves or spinal cord with complications up to including paraplegia, systemic embolization with complications up to death or other serious morbidity, infection, bleeding, etc.  She appeared to have a good understanding at the conclusion of our discussion and wished to proceed with the recommended kyphoplasty procedure as soon as practical.    Approximately 45 minutes in total was spent with the patient the majority reviewing clinical and radiographic findings as well as counseling the patient regarding management alternatives, risks and benefits.  Her questions were answered to her apparent satisfaction.      Again, thank you for allowing me to participate in the care of your patient.        Sincerely,        Ramon Hudson MD

## 2023-11-28 NOTE — PATIENT INSTRUCTIONS
Patient Instructions    Surgery scheduled at [facility] for L1 Kyphoplasty with Dr. Hudson  Pre-Operative  Surgical risks: blood clots in the leg or lung, problems urinating, nerve damage, drainage from the incision, infection,stiffness  Pre-operative physical with primary care physician within 30 days of surgical date.   Likely same day procedure with discharge home day of surgery, may stay for 23 hour observation hospitalization for monitoring.     Shower procedure  Please shower with antimicrobial soap the night before surgery and morning of surgery. Please refer to showering instruction sheet in folder.  Eating/Drinking  Stop all solid foods 8 hours before surgery.  Keep drinking clear liquids until 4 hours before surgery  Clear liquids include water, clear juice, black coffee, or clear tea without milk, Gatorade, clear soda.   Medications  Discontinue Aspirin, NSAIDs (Advil/Ibuprofen, Indocin, Naproxen,Nuprin,Relafen/Nabumetone, Diclofenac,Meloxicam, Aleve, Celebrex) x 7 days prior to surgical date  You can take Tylenol (Acetaminophen) for pain, 1000 mg  Do not exceed 3,000 mg per day   If you are on chronic pain medication (oxycodone, Percocet, hydrocodone, Vicodin, Norco, Dilaudid, morphine, MS Contin, naltrexone, Suboxone, etc) or have a pain contract we will reach out to your pain clinic to gather your most recent records and recommendations for pain management post-op.  Please ask your provider who manages your chronic pain if they require you to schedule an office visit prior to surgery. Continue obtaining your pain medications from your current provider until surgery. Our team will manage your acute post-op pain in the hospital and during the recovery period. Your pain team will continue to manage your chronic pain.  Any other medications prescribed, please discuss with your primary care provider at your pre-operative physical   You may NOT receive the COVID-19 vaccine 72 hours before  or after surgery.    Pain Management  Dealing with pain  As your body heals, you might feel a stabbing, burning, or aching pain. You may also have some numbness.  Everyone feels pain differently, we may ask you to rate your pain using a pain scale. This will let us know how much pain you feel.   Keep in mind that medicine won't take away all of your pain. It helps to try other ways to relax and ease pain.   Things to help with pain  After surgery, we will give you medicine for your pain. These medications work well, but they can make you drowsy, itchy, or sick to your stomach. If we give you narcotics for pain, try to take the pills with food.   For mild to moderate pain, you can take medication such as Tylenol. These can be used with narcotics, but make sure that your narcotic does not contain Tylenol.   Do NOT drive while taking narcotic pain medication  Do NOT drink alcohol while using any pain medication  You can utilize ice as needed (no longer than 20 minutes at one time)  You may resume taking NSAIDs (ex. Ibuprofen, aleve, naproxen) 14 days post op     Incision Care  No submerging incision in water such as pools, hot tubs, baths for at least 8 weeks or until incision is healed  It is okay to shower, just pat the incision dry   Remove dressing as instructed upon discharge  Watch for signs of infection  Redness, swelling, warmth, drainage, and fever of 101 degrees or higher  [facility] [phone].  Activity Restrictions  For the first 6-8 weeks, no lifting > 10 pounds, limited bending, twisting, or overhead reaching.  Take stairs in moderation   Ok to walk as tolerated, take short frequent walks. You may gradually increase the distance as tolerated.   Avoid bed rest and prolonged sitting for longer than 30 minutes (change positions frequently while awake)  No contact sports until after follow up visit  No high impact activities such as; running/jogging, snowmobile or 4 bowen riding or any other  recreational vehicles.  Post-Op Follow Up Appointments  2 week incision check with RN  6 week post op follow up visit with Physician Assistant or Nurse Practitioner   Please call to schedule follow up appointment at [phone]  Resources  If you are currently employed, you will need to be off work for 2-4 weeks for post op recovery and healing.  Please fax any FMLA/short term disability paperwork to [phone]. You may call our clinic when you'd like to return to work and we can provide a work letter.    [facility]  Phone: [phone]  Fax: [phone]

## 2023-11-30 ENCOUNTER — TELEPHONE (OUTPATIENT)
Dept: NEUROSURGERY | Facility: CLINIC | Age: 73
End: 2023-11-30
Payer: COMMERCIAL

## 2023-11-30 ENCOUNTER — TELEPHONE (OUTPATIENT)
Dept: FAMILY MEDICINE | Facility: CLINIC | Age: 73
End: 2023-11-30
Payer: COMMERCIAL

## 2023-11-30 PROBLEM — F11.90 CHRONIC, CONTINUOUS USE OF OPIOIDS: Status: ACTIVE | Noted: 2023-11-30

## 2023-11-30 NOTE — TELEPHONE ENCOUNTER
Patient Quality Outreach    Patient is due for the following:   Chronic Opioid Use -  Treatment Agreement (CSA), Urine Drug Screen, CORAZON-7, and PHQ-9    Next Steps:   Patient has upcoming appointment, these items will be addressed at that time.    Type of outreach:    Chart review performed, no outreach needed.      Questions for provider review:    None           Radha Corral CMA      Will do at next follow up appt

## 2023-11-30 NOTE — TELEPHONE ENCOUNTER
Left a voice message asking the patient to return the call to receive surgery details for 1/11/24.

## 2023-12-04 ENCOUNTER — HOSPITAL ENCOUNTER (OUTPATIENT)
Dept: MRI IMAGING | Facility: CLINIC | Age: 73
Discharge: HOME | End: 2023-12-04
Attending: ORTHOPAEDIC SURGERY | Admitting: ORTHOPAEDIC SURGERY
Payer: COMMERCIAL

## 2023-12-04 DIAGNOSIS — M25.559 HIP PAIN: ICD-10-CM

## 2023-12-04 LAB — RADIOLOGIST FLAGS: NORMAL

## 2023-12-04 PROCEDURE — 73721 MRI JNT OF LWR EXTRE W/O DYE: CPT | Mod: 26 | Performed by: RADIOLOGY

## 2023-12-04 PROCEDURE — 73721 MRI JNT OF LWR EXTRE W/O DYE: CPT | Mod: RT

## 2023-12-12 ENCOUNTER — TELEPHONE (OUTPATIENT)
Dept: FAMILY MEDICINE | Facility: CLINIC | Age: 73
End: 2023-12-12

## 2023-12-12 DIAGNOSIS — N83.201 RIGHT OVARIAN CYST: Primary | ICD-10-CM

## 2023-12-12 DIAGNOSIS — G89.29 CHRONIC LOW BACK PAIN, UNSPECIFIED BACK PAIN LATERALITY, UNSPECIFIED WHETHER SCIATICA PRESENT: ICD-10-CM

## 2023-12-12 DIAGNOSIS — M54.50 CHRONIC LOW BACK PAIN, UNSPECIFIED BACK PAIN LATERALITY, UNSPECIFIED WHETHER SCIATICA PRESENT: ICD-10-CM

## 2023-12-12 RX ORDER — HYDROCODONE BITARTRATE AND ACETAMINOPHEN 5; 325 MG/1; MG/1
1 TABLET ORAL 2 TIMES DAILY PRN
Qty: 30 TABLET | Refills: 0 | Status: CANCELLED | OUTPATIENT
Start: 2023-12-12

## 2023-12-12 NOTE — TELEPHONE ENCOUNTER
Pt called requesting an order for her 6 month transvaginal ultrasound follow up for ovarian cyst. She is due and needs an order placed.    Pt also requesting prescription refill on norco, last OV 11/13/2023, pt will be to be seen 1/2/2024 for pre op visit. Order pended, will route to PCP.    Please send pt [place] message with update.     BRENDA Pierre.

## 2023-12-12 NOTE — TELEPHONE ENCOUNTER
Reviewed in Dr Mcdonald absence today   Order for pelvic US placed   Will need to be seen for refill on her narcotic  Thanks Marilyn Strauss FNP-BC

## 2023-12-16 ENCOUNTER — HOSPITAL ENCOUNTER (OUTPATIENT)
Dept: ULTRASOUND IMAGING | Facility: CLINIC | Age: 73
Discharge: HOME | End: 2023-12-16
Attending: NURSE PRACTITIONER | Admitting: NURSE PRACTITIONER
Payer: COMMERCIAL

## 2023-12-16 DIAGNOSIS — M54.50 CHRONIC LOW BACK PAIN, UNSPECIFIED BACK PAIN LATERALITY, UNSPECIFIED WHETHER SCIATICA PRESENT: ICD-10-CM

## 2023-12-16 DIAGNOSIS — G89.29 CHRONIC LOW BACK PAIN, UNSPECIFIED BACK PAIN LATERALITY, UNSPECIFIED WHETHER SCIATICA PRESENT: ICD-10-CM

## 2023-12-16 DIAGNOSIS — N83.201 RIGHT OVARIAN CYST: ICD-10-CM

## 2023-12-16 PROCEDURE — 76830 TRANSVAGINAL US NON-OB: CPT

## 2023-12-16 RX ORDER — HYDROCODONE BITARTRATE AND ACETAMINOPHEN 5; 325 MG/1; MG/1
1 TABLET ORAL 2 TIMES DAILY PRN
Qty: 30 TABLET | Refills: 0 | Status: ON HOLD | OUTPATIENT
Start: 2023-12-16 | End: 2024-01-11

## 2023-12-18 NOTE — PROGRESS NOTES
Rafael Mcdonald MD  [facility]  Please inform patient that Norco prescription has been refilled.      LM for pt informing [place] reflled, please check at pharmacy.  RD Evans RN

## 2023-12-23 ENCOUNTER — MYC MEDICAL ADVICE (OUTPATIENT)
Dept: NEUROSURGERY | Facility: CLINIC | Age: 73
End: 2023-12-23
Payer: COMMERCIAL

## 2023-12-26 NOTE — TELEPHONE ENCOUNTER
The MRI from November 2023 showed that the L1 vertebral body is still not healed, and therefore would be able to be treated with a kyphoplasty.

## 2023-12-28 ENCOUNTER — TELEPHONE (OUTPATIENT)
Dept: FAMILY MEDICINE | Facility: CLINIC | Age: 73
End: 2023-12-28
Payer: COMMERCIAL

## 2023-12-28 NOTE — TELEPHONE ENCOUNTER
Patient Quality Outreach    Patient is due for the following:   Depression  -  PHQ-9 needed  Chronic Opioid Use -  Treatment Agreement (CSA), Urine Drug Screen, and CORAZON-7    Next Steps:   Patient has upcoming appointment, these items will be addressed at that time.    Type of outreach:    Chart review performed, no outreach needed.      Questions for provider review:    None           Radha Corral CMA

## 2024-01-01 ASSESSMENT — PATIENT HEALTH QUESTIONNAIRE - PHQ9
SUM OF ALL RESPONSES TO PHQ QUESTIONS 1-9: 1
10. IF YOU CHECKED OFF ANY PROBLEMS, HOW DIFFICULT HAVE THESE PROBLEMS MADE IT FOR YOU TO DO YOUR WORK, TAKE CARE OF THINGS AT HOME, OR GET ALONG WITH OTHER PEOPLE: NOT DIFFICULT AT ALL
SUM OF ALL RESPONSES TO PHQ QUESTIONS 1-9: 1

## 2024-01-02 ENCOUNTER — OFFICE VISIT (OUTPATIENT)
Dept: FAMILY MEDICINE | Facility: CLINIC | Age: 74
End: 2024-01-02
Payer: COMMERCIAL

## 2024-01-02 VITALS
WEIGHT: 167 LBS | BODY MASS INDEX: 26.84 KG/M2 | DIASTOLIC BLOOD PRESSURE: 80 MMHG | HEIGHT: 66 IN | RESPIRATION RATE: 18 BRPM | SYSTOLIC BLOOD PRESSURE: 130 MMHG | OXYGEN SATURATION: 99 % | TEMPERATURE: 97.6 F | HEART RATE: 70 BPM

## 2024-01-02 DIAGNOSIS — F11.90 CHRONIC, CONTINUOUS USE OF OPIOIDS: ICD-10-CM

## 2024-01-02 DIAGNOSIS — S32.010A CLOSED COMPRESSION FRACTURE OF BODY OF L1 VERTEBRA (H): ICD-10-CM

## 2024-01-02 DIAGNOSIS — J43.9 MILD EMPHYSEMA (H): ICD-10-CM

## 2024-01-02 DIAGNOSIS — M54.51 VERTEBROGENIC LOW BACK PAIN: ICD-10-CM

## 2024-01-02 DIAGNOSIS — E78.5 HYPERLIPIDEMIA, UNSPECIFIED HYPERLIPIDEMIA TYPE: ICD-10-CM

## 2024-01-02 DIAGNOSIS — Z01.818 PREOP GENERAL PHYSICAL EXAM: Primary | ICD-10-CM

## 2024-01-02 DIAGNOSIS — I10 ESSENTIAL HYPERTENSION: ICD-10-CM

## 2024-01-02 DIAGNOSIS — K21.9 GASTROESOPHAGEAL REFLUX DISEASE, UNSPECIFIED WHETHER ESOPHAGITIS PRESENT: ICD-10-CM

## 2024-01-02 DIAGNOSIS — M81.0 OSTEOPOROSIS, UNSPECIFIED OSTEOPOROSIS TYPE, UNSPECIFIED PATHOLOGICAL FRACTURE PRESENCE: ICD-10-CM

## 2024-01-02 LAB
ANION GAP SERPL CALCULATED.3IONS-SCNC: 9 MMOL/L (ref 7–15)
BUN SERPL-MCNC: 10.6 MG/DL (ref 8–23)
CALCIUM SERPL-MCNC: 9.7 MG/DL (ref 8.8–10.2)
CHLORIDE SERPL-SCNC: 103 MMOL/L (ref 98–107)
CREAT SERPL-MCNC: 0.91 MG/DL (ref 0.51–0.95)
CREAT UR-MCNC: 66 MG/DL
DEPRECATED HCO3 PLAS-SCNC: 28 MMOL/L (ref 22–29)
EGFRCR SERPLBLD CKD-EPI 2021: 66 ML/MIN/1.73M2
ERYTHROCYTE [DISTWIDTH] IN BLOOD BY AUTOMATED COUNT: 12.2 % (ref 10–15)
GLUCOSE SERPL-MCNC: 99 MG/DL (ref 70–99)
HCT VFR BLD AUTO: 40.3 % (ref 35–47)
HGB BLD-MCNC: 14 G/DL (ref 11.7–15.7)
MCH RBC QN AUTO: 32.7 PG (ref 26.5–33)
MCHC RBC AUTO-ENTMCNC: 34.7 G/DL (ref 31.5–36.5)
MCV RBC AUTO: 94 FL (ref 78–100)
PLATELET # BLD AUTO: 224 10E3/UL (ref 150–450)
POTASSIUM SERPL-SCNC: 4.2 MMOL/L (ref 3.4–5.3)
RBC # BLD AUTO: 4.28 10E6/UL (ref 3.8–5.2)
SODIUM SERPL-SCNC: 140 MMOL/L (ref 135–145)
WBC # BLD AUTO: 6.9 10E3/UL (ref 4–11)

## 2024-01-02 PROCEDURE — 93000 ELECTROCARDIOGRAM COMPLETE: CPT | Performed by: FAMILY MEDICINE

## 2024-01-02 PROCEDURE — 36415 COLL VENOUS BLD VENIPUNCTURE: CPT | Performed by: FAMILY MEDICINE

## 2024-01-02 PROCEDURE — 85027 COMPLETE CBC AUTOMATED: CPT | Performed by: FAMILY MEDICINE

## 2024-01-02 PROCEDURE — 99214 OFFICE O/P EST MOD 30 MIN: CPT | Performed by: FAMILY MEDICINE

## 2024-01-02 PROCEDURE — 80048 BASIC METABOLIC PNL TOTAL CA: CPT | Performed by: FAMILY MEDICINE

## 2024-01-02 PROCEDURE — G0481 DRUG TEST DEF 8-14 CLASSES: HCPCS | Performed by: FAMILY MEDICINE

## 2024-01-02 ASSESSMENT — ANXIETY QUESTIONNAIRES
5. BEING SO RESTLESS THAT IT IS HARD TO SIT STILL: NOT AT ALL
2. NOT BEING ABLE TO STOP OR CONTROL WORRYING: NOT AT ALL
4. TROUBLE RELAXING: NOT AT ALL
3. WORRYING TOO MUCH ABOUT DIFFERENT THINGS: NOT AT ALL
6. BECOMING EASILY ANNOYED OR IRRITABLE: NOT AT ALL
IF YOU CHECKED OFF ANY PROBLEMS ON THIS QUESTIONNAIRE, HOW DIFFICULT HAVE THESE PROBLEMS MADE IT FOR YOU TO DO YOUR WORK, TAKE CARE OF THINGS AT HOME, OR GET ALONG WITH OTHER PEOPLE: NOT DIFFICULT AT ALL
1. FEELING NERVOUS, ANXIOUS, OR ON EDGE: SEVERAL DAYS
8. IF YOU CHECKED OFF ANY PROBLEMS, HOW DIFFICULT HAVE THESE MADE IT FOR YOU TO DO YOUR WORK, TAKE CARE OF THINGS AT HOME, OR GET ALONG WITH OTHER PEOPLE?: NOT DIFFICULT AT ALL
GAD7 TOTAL SCORE: 1
7. FEELING AFRAID AS IF SOMETHING AWFUL MIGHT HAPPEN: NOT AT ALL
GAD7 TOTAL SCORE: 1
7. FEELING AFRAID AS IF SOMETHING AWFUL MIGHT HAPPEN: NOT AT ALL

## 2024-01-02 ASSESSMENT — PAIN SCALES - GENERAL: PAINLEVEL: MILD PAIN (2)

## 2024-01-02 NOTE — PROGRESS NOTES
[facility]  [address] [place] [address]  Phone: [phone]  Fax: [phone]  Primary Provider: Rafael Mcdonald  Pre-op Performing Provider: RAFAEL MCDONALD      PREOPERATIVE EVALUATION:  Today's date: 1/2/2024    Siomara is a 73 year old, presenting for the following:  Pre-Op Exam      Surgical Information:  Surgery/Procedure: Lumbar 1 kyphoplasty   Surgery Location: [facility]   Surgeon: Tristan  Surgery Date: 1/11/24  Time of Surgery: 12:45  Where patient plans to recover: At home with family  Fax number for surgical facility: Note does not need to be faxed, will be available electronically in Epic.    Assessment & Plan     The proposed surgical procedure is considered INTERMEDIATE risk.      ICD-10-CM    1. Preop general physical exam  Z01.818       2. Closed compression fracture of body of L1 vertebra (H)  S32.010A       3. Hyperlipidemia, unspecified hyperlipidemia type  E78.5       4. Mild emphysema (H)  J43.9       5. Essential hypertension  I10       6. Chronic, continuous use of opioids  F11.90       7. Gastroesophageal reflux disease, unspecified whether esophagitis present  K21.9       8. Osteoporosis, unspecified osteoporosis type, unspecified pathological fracture presence  M81.0            - No identified additional risk factors other than previously addressed    Antiplatelet or Anticoagulation Medication Instructions:   - Patient is on no antiplatelet or anticoagulation medications.    Additional Medication Instructions:  Patient is to take all scheduled medications on the day of surgery    RECOMMENDATION:  APPROVAL GIVEN to proceed with proposed procedure, without further diagnostic evaluation.      Subjective   HPI related to upcoming procedure:   73-year-old female presents for a preop physical exam.  Patient is scheduled to have lumbar 1 kyphoplasty on January 11, 2024.  She requires evaluation and anesthesia risk assessment prior to undergoing  surgery/procedure.  Patient denies any fever, chills, sore throat, cough, shortness of breath, chest pain, palpitation, diarrhea, constipation, abdominal pain, headache or other relevant systemic symptoms.           12/26/2023     9:15 AM   Preop Questions   1. Have you ever had a heart attack or stroke? No   2. Have you ever had surgery on your heart or blood vessels, such as a stent placement, a coronary artery bypass, or surgery on an artery in your head, neck, heart, or legs? No   3. Do you have chest pain with activity? No   4. Do you have a history of  heart failure? No   5. Do you currently have a cold, bronchitis or symptoms of other infection? No   6. Do you have a cough, shortness of breath, or wheezing? No   7. Do you or anyone in your family have previous history of blood clots? No   8. Do you or does anyone in your family have a serious bleeding problem such as prolonged bleeding following surgeries or cuts? No   9. Have you ever had problems with anemia or been told to take iron pills? No   10. Have you had any abnormal blood loss such as black, tarry or bloody stools, or abnormal vaginal bleeding? No   11. Have you ever had a blood transfusion? No   12. Are you willing to have a blood transfusion if it is medically needed before, during, or after your surgery? Yes   13. Have you or any of your relatives ever had problems with anesthesia? No   14. Do you have sleep apnea, excessive snoring or daytime drowsiness? UNKNOWN -    15. Do you have any artifical heart valves or other implanted medical devices like a pacemaker, defibrillator, or continuous glucose monitor? No   16. Do you have artificial joints? No   17. Are you allergic to latex? No       Preoperative Review of :   reviewed - controlled substances reflected in medication list.      Status of Chronic Conditions:  See problem list for active medical problems.  Problems all longstanding and stable, except as noted/documented.  See ROS for  pertinent symptoms related to these conditions.    Review of Systems  Constitutional, neuro, ENT, endocrine, pulmonary, cardiac, gastrointestinal, genitourinary, musculoskeletal, integument and psychiatric systems are negative, except as otherwise noted.    Patient Active Problem List    Diagnosis Date Noted    Chronic, continuous use of opioids 11/30/2023     Priority: Medium    Cyst of right ovary 06/27/2023     Priority: Medium     6/2023, needs follow-up ultrasound in 6 months      Dermatochalasis, bilateral 06/07/2023     Priority: Medium    History of vertebral compression fracture 06/07/2023     Priority: Medium    History of shingles 05/09/2022     Priority: Medium    Mood problem 10/02/2019     Priority: Medium    Basal cell carcinoma (BCC) 11/09/2018     Priority: Medium    Essential hypertension 03/29/2018     Priority: Medium    Osteopenia 11/06/2016     Priority: Medium    Mild emphysema (H) 08/12/2016     Priority: Medium    Constipation 04/29/2014     Priority: Medium    Nephrolithiasis 04/28/2014     Priority: Medium    Hyperlipidemia 04/28/2014     Priority: Medium    Angioma 10/21/2013     Priority: Medium    SK (seborrheic keratosis) 10/21/2013     Priority: Medium    History of skin cancer 10/21/2013     Priority: Medium    Senile sebaceous gland hyperplasia 10/21/2013     Priority: Medium    Lentigo 10/21/2013     Priority: Medium    Mixed hyperlipidemia 06/12/2013     Priority: Medium     Diagnosis updated by automated process. Provider to review and confirm.      CARDIOVASCULAR SCREENING; LDL GOAL LESS THAN 160 07/02/2012     Priority: Medium    Chronic low back pain 07/02/2012     Priority: Medium    Gastroesophageal reflux disease 07/02/2012     Priority: Medium      Past Medical History:   Diagnosis Date    Basal cell carcinoma     Breast cancer (H) 2000    Colon polyp     exam every 5 years    Hypertension     Dont remember date     Past Surgical History:   Procedure Laterality Date     BIOPSY  1999    BREAST SURGERY  2000    COLONOSCOPY      GYN SURGERY  1986    D & C    masectomy[  2000    Bilateral    ORTHOPEDIC SURGERY       Current Outpatient Medications   Medication Sig Dispense Refill    alendronate (FOSAMAX) 10 MG tablet Take 1 tablet (10 mg) by mouth every morning (before breakfast) 90 tablet 1    calcium citrate-vitamin D (CITRACAL) 315-5 MG-MCG TABS per tablet Take 1 tablet by mouth 2 times daily      citalopram (CELEXA) 20 MG tablet Take 20 mg by mouth      HYDROcodone-acetaminophen (NORCO) 5-325 MG tablet Take 1 tablet by mouth 2 times daily as needed for pain 30 tablet 0    losartan (COZAAR) 50 MG tablet Take 50 mg by mouth 2 times daily      meclizine (ANTIVERT) 12.5 MG tablet       omeprazole (PRILOSEC) 20 MG DR capsule       oxyCODONE (ROXICODONE) 5 MG tablet Take 1 tablet (5 mg) by mouth every 6 hours as needed for pain 14 tablet 0    valACYclovir (VALTREX) 500 MG tablet Take 1 tablet by mouth 2 times daily. 6 tablet 3       Allergies   Allergen Reactions    Hydrocortisone      Erythema      Pollen Extract      Itching eyes    Zoster Vaccine Live      Did get red rash at injection site that lasted for about 3 days.        Social History     Tobacco Use    Smoking status: Former     Packs/day: 0.50     Years: 15.00     Additional pack years: 0.00     Total pack years: 7.50     Types: Cigarettes     Start date: 1965     Quit date: 1984     Years since quittin.0     Passive exposure: Past    Smokeless tobacco: Never   Substance Use Topics    Alcohol use: Not Currently     Comment: Rare     Family History   Problem Relation Age of Onset    Cancer Mother         Lung    Breast Cancer Mother     Cancer Father         Braine    Cancer Brother         Bladder    Heart Disease Brother     Breast Cancer Sister     Breast Cancer Sister     Cancer Brother         Lung    Melanoma No family hx of      History   Drug Use No         Objective  "    /80 (Cuff Size: Adult Regular)   Pulse 70   Temp 97.6  F (36.4  C) (Tympanic)   Resp 18   Ht 1.676 m (5' 6\")   Wt 75.8 kg (167 lb)   LMP  (LMP Unknown)   SpO2 99%   BMI 26.95 kg/m      Physical Exam    GENERAL APPEARANCE: alert and active     EYES: EOMI, PERRL     HENT: ear canals and TM's normal and nose and mouth without ulcers or lesions     NECK: no adenopathy, no asymmetry, masses, or scars and thyroid normal to palpation     RESP: lungs clear to auscultation - no rales, rhonchi or wheezes     CV: regular rates and rhythm, normal S1 S2, no S3 or S4 and no murmur, click or rub     ABDOMEN:  soft, nontender, no HSM or masses and bowel sounds normal     MS: extremities normal- no gross deformities noted, no evidence of inflammation in joints, FROM in all extremities.     SKIN: no suspicious lesions or rashes     NEURO: Normal strength and tone, sensory exam grossly normal, mentation intact and speech normal     PSYCH: mentation appears normal. and affect normal/bright     LYMPHATICS: No cervical adenopathy    Recent Labs   Lab Test 06/07/23  1142   HGB 13.8      INR 0.96      POTASSIUM 4.1   CR 0.90        Diagnostics:  Labs pending at this time.  Results will be reviewed when available.   EKG: Normal sinus rhythm    Revised Cardiac Risk Index (RCRI):  The patient has the following serious cardiovascular risks for perioperative complications:   - No serious cardiac risks = 0 points     RCRI Interpretation: 0 points: Class I (very low risk - 0.4% complication rate)         Signed Electronically by: Rafael Mcdonald MD  Copy of this evaluation report is provided to requesting physician.      "

## 2024-01-02 NOTE — LETTER

## 2024-01-02 NOTE — NURSING NOTE
"Chief Complaint   Patient presents with    Pre-Op Exam     /80 (Cuff Size: Adult Regular)   Pulse 70   Temp 97.6  F (36.4  C) (Tympanic)   Resp 18   Ht 1.676 m (5' 6\")   Wt 75.8 kg (167 lb)   LMP  (LMP Unknown)   SpO2 99%   BMI 26.95 kg/m   Estimated body mass index is 26.95 kg/m  as calculated from the following:    Height as of this encounter: 1.676 m (5' 6\").    Weight as of this encounter: 75.8 kg (167 lb).  Patient presents to the clinic using No DME      Health Maintenance that is potentially due pending provider review:    Health Maintenance Due   Topic Date Due    URINE DRUG SCREEN  Never done    ADVANCE CARE PLANNING  Never done    COPD ACTION PLAN  Never done    CONTROLLED SUBSTANCE AGREEMENT FOR CHRONIC PAIN MANAGEMENT  Never done    RSV VACCINE (Pregnancy & 60+) (1 - 1-dose 60+ series) Never done    MEDICARE ANNUAL WELLNESS VISIT  08/23/2014    MAMMO SCREENING  10/09/2014    DTAP/TDAP/TD IMMUNIZATION (2 - Td or Tdap) 07/19/2022    COVID-19 Vaccine (5 - 2023-24 season) 09/01/2023                  "

## 2024-01-02 NOTE — LETTER
January 9, 2024      Siomara J Jade  [address]  [place] [address]        Dear ,    We are writing to inform you of your test results.    Lab results came back unremarkable.    Urine drug screen positive for opioids, expected.      Let us know if there are any questions.    Resulted Orders   CBC with platelets   Result Value Ref Range    WBC Count 6.9 4.0 - 11.0 10e3/uL    RBC Count 4.28 3.80 - 5.20 10e6/uL    Hemoglobin 14.0 11.7 - 15.7 g/dL    Hematocrit 40.3 35.0 - 47.0 %    MCV 94 78 - 100 fL    MCH 32.7 26.5 - 33.0 pg    MCHC 34.7 31.5 - 36.5 g/dL    RDW 12.2 10.0 - 15.0 %    Platelet Count 224 150 - 450 10e3/uL   Basic metabolic panel  (Ca, Cl, CO2, Creat, Gluc, K, Na, BUN)   Result Value Ref Range    Sodium 140 135 - 145 mmol/L      Comment:      Reference intervals for this test were updated on 09/26/2023 to more accurately reflect our healthy population. There may be differences in the flagging of prior results with similar values performed with this method. Interpretation of those prior results can be made in the context of the updated reference intervals.     Potassium 4.2 3.4 - 5.3 mmol/L    Chloride 103 98 - 107 mmol/L    Carbon Dioxide (CO2) 28 22 - 29 mmol/L    Anion Gap 9 7 - 15 mmol/L    Urea Nitrogen 10.6 8.0 - 23.0 mg/dL    Creatinine 0.91 0.51 - 0.95 mg/dL    GFR Estimate 66 >60 mL/min/1.73m2    Calcium 9.7 8.8 - 10.2 mg/dL    Glucose 99 70 - 99 mg/dL       If you have any questions or concerns, please call the clinic at the number listed above.       Sincerely,      Rafael Mcdonald MD

## 2024-01-04 LAB
DHC UR CFM-MCNC: 92 NG/ML
DHC/CREAT UR: 139 NG/MG {CREAT}
HYDROCODONE UR CFM-MCNC: 391 NG/ML
HYDROCODONE/CREAT UR: 592 NG/MG {CREAT}

## 2024-01-06 ENCOUNTER — MYC MEDICAL ADVICE (OUTPATIENT)
Dept: FAMILY MEDICINE | Facility: CLINIC | Age: 74
End: 2024-01-06
Payer: COMMERCIAL

## 2024-01-06 DIAGNOSIS — K21.9 GERD (GASTROESOPHAGEAL REFLUX DISEASE): ICD-10-CM

## 2024-01-09 NOTE — TELEPHONE ENCOUNTER
Patient had virtual visit on 10/19/23. Plan was to repeat imaging in April 2024 per notes. Will close this encounter.  Kristofer/MA  [facility]

## 2024-01-11 ENCOUNTER — HOSPITAL ENCOUNTER (OUTPATIENT)
Facility: CLINIC | Age: 74
Discharge: HOME | End: 2024-01-11
Attending: NEUROLOGICAL SURGERY | Admitting: NEUROLOGICAL SURGERY
Payer: COMMERCIAL

## 2024-01-11 ENCOUNTER — ANESTHESIA (OUTPATIENT)
Dept: SURGERY | Facility: CLINIC | Age: 74
End: 2024-01-11
Payer: COMMERCIAL

## 2024-01-11 ENCOUNTER — ANESTHESIA EVENT (OUTPATIENT)
Dept: SURGERY | Facility: CLINIC | Age: 74
End: 2024-01-11
Payer: COMMERCIAL

## 2024-01-11 ENCOUNTER — APPOINTMENT (OUTPATIENT)
Dept: GENERAL RADIOLOGY | Facility: CLINIC | Age: 74
End: 2024-01-11
Attending: NEUROLOGICAL SURGERY
Payer: COMMERCIAL

## 2024-01-11 VITALS
RESPIRATION RATE: 16 BRPM | WEIGHT: 165.4 LBS | TEMPERATURE: 98.5 F | HEART RATE: 71 BPM | BODY MASS INDEX: 29.3 KG/M2 | HEIGHT: 63 IN | DIASTOLIC BLOOD PRESSURE: 80 MMHG | OXYGEN SATURATION: 93 % | SYSTOLIC BLOOD PRESSURE: 137 MMHG

## 2024-01-11 DIAGNOSIS — G89.29 CHRONIC LOW BACK PAIN, UNSPECIFIED BACK PAIN LATERALITY, UNSPECIFIED WHETHER SCIATICA PRESENT: ICD-10-CM

## 2024-01-11 DIAGNOSIS — Z98.890 S/P KYPHOPLASTY: Primary | ICD-10-CM

## 2024-01-11 DIAGNOSIS — M54.50 CHRONIC LOW BACK PAIN, UNSPECIFIED BACK PAIN LATERALITY, UNSPECIFIED WHETHER SCIATICA PRESENT: ICD-10-CM

## 2024-01-11 PROCEDURE — 250N000009 HC RX 250: Performed by: NEUROLOGICAL SURGERY

## 2024-01-11 PROCEDURE — 370N000017 HC ANESTHESIA TECHNICAL FEE, PER MIN: Performed by: NEUROLOGICAL SURGERY

## 2024-01-11 PROCEDURE — 250N000011 HC RX IP 250 OP 636: Performed by: NEUROLOGICAL SURGERY

## 2024-01-11 PROCEDURE — 22514 PERQ VERTEBRAL AUGMENTATION: CPT | Performed by: NEUROLOGICAL SURGERY

## 2024-01-11 PROCEDURE — 999N000141 HC STATISTIC PRE-PROCEDURE NURSING ASSESSMENT: Performed by: NEUROLOGICAL SURGERY

## 2024-01-11 PROCEDURE — 250N000025 HC SEVOFLURANE, PER MIN: Performed by: NEUROLOGICAL SURGERY

## 2024-01-11 PROCEDURE — 710N000009 HC RECOVERY PHASE 1, LEVEL 1, PER MIN: Performed by: NEUROLOGICAL SURGERY

## 2024-01-11 PROCEDURE — 272N000001 HC OR GENERAL SUPPLY STERILE: Performed by: NEUROLOGICAL SURGERY

## 2024-01-11 PROCEDURE — 250N000011 HC RX IP 250 OP 636: Performed by: ANESTHESIOLOGY

## 2024-01-11 PROCEDURE — 710N000012 HC RECOVERY PHASE 2, PER MINUTE: Performed by: NEUROLOGICAL SURGERY

## 2024-01-11 PROCEDURE — C1713 ANCHOR/SCREW BN/BN,TIS/BN: HCPCS | Performed by: NEUROLOGICAL SURGERY

## 2024-01-11 PROCEDURE — 250N000011 HC RX IP 250 OP 636: Performed by: NURSE ANESTHETIST, CERTIFIED REGISTERED

## 2024-01-11 PROCEDURE — 250N000009 HC RX 250: Performed by: NURSE ANESTHETIST, CERTIFIED REGISTERED

## 2024-01-11 PROCEDURE — 999N000179 XR SURGERY CARM FLUORO LESS THAN 5 MIN W STILLS

## 2024-01-11 PROCEDURE — 250N000011 HC RX IP 250 OP 636: Performed by: PHYSICIAN ASSISTANT

## 2024-01-11 PROCEDURE — 272N000002 HC OR SUPPLY OTHER OPNP: Performed by: NEUROLOGICAL SURGERY

## 2024-01-11 PROCEDURE — 258N000003 HC RX IP 258 OP 636: Performed by: NURSE ANESTHETIST, CERTIFIED REGISTERED

## 2024-01-11 PROCEDURE — 258N000003 HC RX IP 258 OP 636: Performed by: ANESTHESIOLOGY

## 2024-01-11 PROCEDURE — 360N000084 HC SURGERY LEVEL 4 W/ FLUORO, PER MIN: Performed by: NEUROLOGICAL SURGERY

## 2024-01-11 DEVICE — BONE CEMENT KYPHX HV-R C01A: Type: IMPLANTABLE DEVICE | Site: SPINE LUMBAR | Status: FUNCTIONAL

## 2024-01-11 DEVICE — VERTEBROPLASTY CEMENT W/BONE MIXER C01B: Type: IMPLANTABLE DEVICE | Site: SPINE LUMBAR | Status: FUNCTIONAL

## 2024-01-11 RX ORDER — HYDROCODONE BITARTRATE AND ACETAMINOPHEN 5; 325 MG/1; MG/1
1-2 TABLET ORAL EVERY 6 HOURS PRN
Qty: 30 TABLET | Refills: 0 | Status: SHIPPED | OUTPATIENT
Start: 2024-01-11 | End: 2024-01-19

## 2024-01-11 RX ORDER — HYDROMORPHONE HCL IN WATER/PF 6 MG/30 ML
0.2 PATIENT CONTROLLED ANALGESIA SYRINGE INTRAVENOUS EVERY 5 MIN PRN
Status: DISCONTINUED | OUTPATIENT
Start: 2024-01-11 | End: 2024-01-11 | Stop reason: HOSPADM

## 2024-01-11 RX ORDER — AMOXICILLIN 250 MG
1-2 CAPSULE ORAL 2 TIMES DAILY PRN
Qty: 20 TABLET | Refills: 0 | Status: ON HOLD | OUTPATIENT
Start: 2024-01-11 | End: 2024-03-06

## 2024-01-11 RX ORDER — LIDOCAINE HYDROCHLORIDE 20 MG/ML
INJECTION, SOLUTION INFILTRATION; PERINEURAL PRN
Status: DISCONTINUED | OUTPATIENT
Start: 2024-01-11 | End: 2024-01-11

## 2024-01-11 RX ORDER — OXYCODONE HYDROCHLORIDE 5 MG/1
5 TABLET ORAL
Status: DISCONTINUED | OUTPATIENT
Start: 2024-01-11 | End: 2024-01-11 | Stop reason: HOSPADM

## 2024-01-11 RX ORDER — LABETALOL HYDROCHLORIDE 5 MG/ML
10 INJECTION, SOLUTION INTRAVENOUS
Status: DISCONTINUED | OUTPATIENT
Start: 2024-01-11 | End: 2024-01-11 | Stop reason: HOSPADM

## 2024-01-11 RX ORDER — ONDANSETRON 4 MG/1
4 TABLET, ORALLY DISINTEGRATING ORAL EVERY 30 MIN PRN
Status: DISCONTINUED | OUTPATIENT
Start: 2024-01-11 | End: 2024-01-11 | Stop reason: HOSPADM

## 2024-01-11 RX ORDER — ONDANSETRON 2 MG/ML
4 INJECTION INTRAMUSCULAR; INTRAVENOUS EVERY 30 MIN PRN
Status: DISCONTINUED | OUTPATIENT
Start: 2024-01-11 | End: 2024-01-11 | Stop reason: HOSPADM

## 2024-01-11 RX ORDER — DEXAMETHASONE SODIUM PHOSPHATE 4 MG/ML
INJECTION, SOLUTION INTRA-ARTICULAR; INTRALESIONAL; INTRAMUSCULAR; INTRAVENOUS; SOFT TISSUE PRN
Status: DISCONTINUED | OUTPATIENT
Start: 2024-01-11 | End: 2024-01-11

## 2024-01-11 RX ORDER — OXYCODONE HYDROCHLORIDE 5 MG/1
10 TABLET ORAL
Status: DISCONTINUED | OUTPATIENT
Start: 2024-01-11 | End: 2024-01-11 | Stop reason: HOSPADM

## 2024-01-11 RX ORDER — METHOCARBAMOL 500 MG/1
500 TABLET, FILM COATED ORAL 4 TIMES DAILY PRN
Qty: 30 TABLET | Refills: 0 | Status: SHIPPED | OUTPATIENT
Start: 2024-01-11 | End: 2024-02-01

## 2024-01-11 RX ORDER — CEFAZOLIN SODIUM/WATER 2 G/20 ML
2 SYRINGE (ML) INTRAVENOUS
Status: COMPLETED | OUTPATIENT
Start: 2024-01-11 | End: 2024-01-11

## 2024-01-11 RX ORDER — ONDANSETRON 2 MG/ML
INJECTION INTRAMUSCULAR; INTRAVENOUS PRN
Status: DISCONTINUED | OUTPATIENT
Start: 2024-01-11 | End: 2024-01-11

## 2024-01-11 RX ORDER — FENTANYL CITRATE 50 UG/ML
INJECTION, SOLUTION INTRAMUSCULAR; INTRAVENOUS PRN
Status: DISCONTINUED | OUTPATIENT
Start: 2024-01-11 | End: 2024-01-11

## 2024-01-11 RX ORDER — FENTANYL CITRATE 0.05 MG/ML
50 INJECTION, SOLUTION INTRAMUSCULAR; INTRAVENOUS
Status: DISCONTINUED | OUTPATIENT
Start: 2024-01-11 | End: 2024-01-11 | Stop reason: HOSPADM

## 2024-01-11 RX ORDER — MAGNESIUM HYDROXIDE 1200 MG/15ML
LIQUID ORAL PRN
Status: DISCONTINUED | OUTPATIENT
Start: 2024-01-11 | End: 2024-01-11 | Stop reason: HOSPADM

## 2024-01-11 RX ORDER — FENTANYL CITRATE 0.05 MG/ML
25 INJECTION, SOLUTION INTRAMUSCULAR; INTRAVENOUS EVERY 5 MIN PRN
Status: DISCONTINUED | OUTPATIENT
Start: 2024-01-11 | End: 2024-01-11 | Stop reason: HOSPADM

## 2024-01-11 RX ORDER — CEFAZOLIN SODIUM/WATER 2 G/20 ML
2 SYRINGE (ML) INTRAVENOUS SEE ADMIN INSTRUCTIONS
Status: DISCONTINUED | OUTPATIENT
Start: 2024-01-11 | End: 2024-01-11 | Stop reason: HOSPADM

## 2024-01-11 RX ORDER — SODIUM CHLORIDE, SODIUM LACTATE, POTASSIUM CHLORIDE, CALCIUM CHLORIDE 600; 310; 30; 20 MG/100ML; MG/100ML; MG/100ML; MG/100ML
INJECTION, SOLUTION INTRAVENOUS CONTINUOUS PRN
Status: DISCONTINUED | OUTPATIENT
Start: 2024-01-11 | End: 2024-01-11

## 2024-01-11 RX ORDER — PROPOFOL 10 MG/ML
INJECTION, EMULSION INTRAVENOUS PRN
Status: DISCONTINUED | OUTPATIENT
Start: 2024-01-11 | End: 2024-01-11

## 2024-01-11 RX ORDER — EPHEDRINE SULFATE 50 MG/ML
INJECTION, SOLUTION INTRAMUSCULAR; INTRAVENOUS; SUBCUTANEOUS PRN
Status: DISCONTINUED | OUTPATIENT
Start: 2024-01-11 | End: 2024-01-11

## 2024-01-11 RX ORDER — FENTANYL CITRATE 0.05 MG/ML
50 INJECTION, SOLUTION INTRAMUSCULAR; INTRAVENOUS EVERY 5 MIN PRN
Status: DISCONTINUED | OUTPATIENT
Start: 2024-01-11 | End: 2024-01-11 | Stop reason: HOSPADM

## 2024-01-11 RX ORDER — HYDROMORPHONE HCL IN WATER/PF 6 MG/30 ML
0.4 PATIENT CONTROLLED ANALGESIA SYRINGE INTRAVENOUS EVERY 5 MIN PRN
Status: DISCONTINUED | OUTPATIENT
Start: 2024-01-11 | End: 2024-01-11 | Stop reason: HOSPADM

## 2024-01-11 RX ORDER — KETOROLAC TROMETHAMINE 15 MG/ML
15 INJECTION, SOLUTION INTRAMUSCULAR; INTRAVENOUS ONCE
Status: COMPLETED | OUTPATIENT
Start: 2024-01-11 | End: 2024-01-11

## 2024-01-11 RX ORDER — SODIUM CHLORIDE, SODIUM LACTATE, POTASSIUM CHLORIDE, CALCIUM CHLORIDE 600; 310; 30; 20 MG/100ML; MG/100ML; MG/100ML; MG/100ML
INJECTION, SOLUTION INTRAVENOUS CONTINUOUS
Status: DISCONTINUED | OUTPATIENT
Start: 2024-01-11 | End: 2024-01-11 | Stop reason: HOSPADM

## 2024-01-11 RX ADMIN — PHENYLEPHRINE HYDROCHLORIDE 100 MCG: 10 INJECTION INTRAVENOUS at 14:22

## 2024-01-11 RX ADMIN — PHENYLEPHRINE HYDROCHLORIDE 100 MCG: 10 INJECTION INTRAVENOUS at 13:18

## 2024-01-11 RX ADMIN — SODIUM CHLORIDE, POTASSIUM CHLORIDE, SODIUM LACTATE AND CALCIUM CHLORIDE: 600; 310; 30; 20 INJECTION, SOLUTION INTRAVENOUS at 13:14

## 2024-01-11 RX ADMIN — PHENYLEPHRINE HYDROCHLORIDE 100 MCG: 10 INJECTION INTRAVENOUS at 13:42

## 2024-01-11 RX ADMIN — ONDANSETRON 4 MG: 2 INJECTION INTRAMUSCULAR; INTRAVENOUS at 14:16

## 2024-01-11 RX ADMIN — PHENYLEPHRINE HYDROCHLORIDE 100 MCG: 10 INJECTION INTRAVENOUS at 13:54

## 2024-01-11 RX ADMIN — PHENYLEPHRINE HYDROCHLORIDE 100 MCG: 10 INJECTION INTRAVENOUS at 14:04

## 2024-01-11 RX ADMIN — KETOROLAC TROMETHAMINE 15 MG: 15 INJECTION, SOLUTION INTRAMUSCULAR; INTRAVENOUS at 15:13

## 2024-01-11 RX ADMIN — PHENYLEPHRINE HYDROCHLORIDE 100 MCG: 10 INJECTION INTRAVENOUS at 14:00

## 2024-01-11 RX ADMIN — PHENYLEPHRINE HYDROCHLORIDE 100 MCG: 10 INJECTION INTRAVENOUS at 13:48

## 2024-01-11 RX ADMIN — PHENYLEPHRINE HYDROCHLORIDE 100 MCG: 10 INJECTION INTRAVENOUS at 13:35

## 2024-01-11 RX ADMIN — PHENYLEPHRINE HYDROCHLORIDE 100 MCG: 10 INJECTION INTRAVENOUS at 14:18

## 2024-01-11 RX ADMIN — DEXAMETHASONE SODIUM PHOSPHATE 4 MG: 4 INJECTION, SOLUTION INTRA-ARTICULAR; INTRALESIONAL; INTRAMUSCULAR; INTRAVENOUS; SOFT TISSUE at 13:18

## 2024-01-11 RX ADMIN — FENTANYL CITRATE 50 MCG: 50 INJECTION INTRAMUSCULAR; INTRAVENOUS at 14:09

## 2024-01-11 RX ADMIN — PHENYLEPHRINE HYDROCHLORIDE 100 MCG: 10 INJECTION INTRAVENOUS at 13:37

## 2024-01-11 RX ADMIN — SUGAMMADEX 150 MG: 100 INJECTION, SOLUTION INTRAVENOUS at 14:32

## 2024-01-11 RX ADMIN — Medication 2 G: at 13:22

## 2024-01-11 RX ADMIN — FENTANYL CITRATE 50 MCG: 50 INJECTION INTRAMUSCULAR; INTRAVENOUS at 13:16

## 2024-01-11 RX ADMIN — FENTANYL CITRATE 50 MCG: 50 INJECTION, SOLUTION INTRAMUSCULAR; INTRAVENOUS at 15:20

## 2024-01-11 RX ADMIN — PHENYLEPHRINE HYDROCHLORIDE 100 MCG: 10 INJECTION INTRAVENOUS at 13:33

## 2024-01-11 RX ADMIN — ROCURONIUM BROMIDE 40 MG: 50 INJECTION, SOLUTION INTRAVENOUS at 13:18

## 2024-01-11 RX ADMIN — PROPOFOL 200 MG: 10 INJECTION, EMULSION INTRAVENOUS at 13:18

## 2024-01-11 RX ADMIN — Medication 5 MG: at 14:21

## 2024-01-11 RX ADMIN — FENTANYL CITRATE 50 MCG: 50 INJECTION, SOLUTION INTRAMUSCULAR; INTRAVENOUS at 15:00

## 2024-01-11 RX ADMIN — SODIUM CHLORIDE, POTASSIUM CHLORIDE, SODIUM LACTATE AND CALCIUM CHLORIDE: 600; 310; 30; 20 INJECTION, SOLUTION INTRAVENOUS at 15:44

## 2024-01-11 RX ADMIN — LIDOCAINE HYDROCHLORIDE 60 MG: 20 INJECTION, SOLUTION INFILTRATION; PERINEURAL at 13:18

## 2024-01-11 ASSESSMENT — ENCOUNTER SYMPTOMS
DYSRHYTHMIAS: 0
SEIZURES: 0

## 2024-01-11 ASSESSMENT — ACTIVITIES OF DAILY LIVING (ADL)
ADLS_ACUITY_SCORE: 35
ADLS_ACUITY_SCORE: 33
ADLS_ACUITY_SCORE: 35
ADLS_ACUITY_SCORE: 35

## 2024-01-11 ASSESSMENT — LIFESTYLE VARIABLES: TOBACCO_USE: 1

## 2024-01-11 ASSESSMENT — COPD QUESTIONNAIRES: COPD: 1

## 2024-01-11 NOTE — TELEPHONE ENCOUNTER
Refused RX as patient is currently in patient at [facility].    Julie Behrendt RN

## 2024-01-11 NOTE — ANESTHESIA PREPROCEDURE EVALUATION
Anesthesia Pre-Procedure Evaluation    Patient: Siomara Hansen   MRN: [id number] : 1950        Procedure : Procedure(s):  Lumbar 1 kyphoplasty          Past Medical History:   Diagnosis Date    Basal cell carcinoma     Breast cancer (H)     Colon polyp     exam every 5 years    Hypertension     Dont remember date      Past Surgical History:   Procedure Laterality Date    BIOPSY  1999    BREAST SURGERY  2000    COLONOSCOPY      GYN SURGERY  1986    D & C    masectomy[  2000    Bilateral    ORTHOPEDIC SURGERY        Allergies   Allergen Reactions    Hydrocortisone      Erythema      Pollen Extract      Itching eyes    Zoster Vaccine Live      Did get red rash at injection site that lasted for about 3 days.      Social History     Tobacco Use    Smoking status: Former     Packs/day: 0.50     Years: 15.00     Additional pack years: 0.00     Total pack years: 7.50     Types: Cigarettes     Start date: 1965     Quit date: 1984     Years since quittin.0     Passive exposure: Past    Smokeless tobacco: Never   Substance Use Topics    Alcohol use: Not Currently     Comment: Rare      Wt Readings from Last 1 Encounters:   24 75.8 kg (167 lb)        Anesthesia Evaluation    Type: General.        ROS/MED HX  ENT/Pulmonary:     (+)                tobacco use, Past use,         COPD (emphysema),           (-) asthma and sleep apnea   Neurologic:     (+)    no peripheral neuropathy                         (-) no seizures and no CVA   Cardiovascular:     (+) Dyslipidemia hypertension- -   -  - -                                   (-) CAD and arrhythmias   METS/Exercise Tolerance: 3 - Able to walk 1-2 blocks without stopping    Hematologic:       Musculoskeletal: Comment: L1 vertebral compression fracture;      GI/Hepatic:     (+) GERD, Asymptomatic on medication,                  Renal/Genitourinary:     (+)       Nephrolithiasis ,    (-) renal disease   Endo:     "(-) Type II DM and thyroid disease   Psychiatric/Substance Use:     (+)    H/O chronic opiod use .     Infectious Disease:    (-) Recent Fever   Malignancy:       Other:            Physical Exam    Airway  airway exam normal      Mallampati: II   TM distance: > 3 FB   Neck ROM: full   Mouth opening: > 3 cm    Respiratory Devices and Support         Dental       (+) Edentulous      Cardiovascular   cardiovascular exam normal          Pulmonary   pulmonary exam normal                OUTSIDE LABS:  CBC:   Lab Results   Component Value Date    WBC 6.9 01/02/2024    WBC 7.8 06/07/2023    HGB 14.0 01/02/2024    HGB 13.8 06/07/2023    HCT 40.3 01/02/2024    HCT 42.0 06/07/2023     01/02/2024     06/07/2023     BMP:   Lab Results   Component Value Date     01/02/2024     06/07/2023    POTASSIUM 4.2 01/02/2024    POTASSIUM 4.1 06/07/2023    CHLORIDE 103 01/02/2024    CHLORIDE 105 06/07/2023    CO2 28 01/02/2024    CO2 23 06/07/2023    BUN 10.6 01/02/2024    BUN 15.7 06/07/2023    CR 0.91 01/02/2024    CR 0.90 06/07/2023    GLC 99 01/02/2024     (H) 06/07/2023     COAGS:   Lab Results   Component Value Date    INR 0.96 06/07/2023     POC: No results found for: \"BGM\", \"HCG\", \"HCGS\"  HEPATIC:   Lab Results   Component Value Date    ALBUMIN 4.2 06/07/2023    PROTTOTAL 7.0 06/07/2023    ALT 21 06/07/2023    AST 27 06/07/2023    ALKPHOS 113 (H) 06/07/2023    BILITOTAL 0.4 06/07/2023     OTHER:   Lab Results   Component Value Date    VINEET 9.7 01/02/2024    TSH 3.76 06/07/2013       Anesthesia Plan    ASA Status:  3       Anesthesia Type: General.     - Airway: ETT   Induction: Intravenous.   Maintenance: Balanced.        Consents    Anesthesia Plan(s) and associated risks, benefits, and realistic alternatives discussed. Questions answered and patient/representative(s) expressed understanding.     - Discussed:     - Discussed with:  Patient            Postoperative Care    Pain management: IV " "analgesics.   PONV prophylaxis: Ondansetron (or other 5HT-3), Dexamethasone or Solumedrol     Comments:               Hammad Foreman MD    I have reviewed the pertinent notes and labs in the chart from the past 30 days and (re)examined the patient.  Any updates or changes from those notes are reflected in this note.              # Overweight: Estimated body mass index is 26.95 kg/m  as calculated from the following:    Height as of 1/2/24: 1.676 m (5' 6\").    Weight as of 1/2/24: 75.8 kg (167 lb).      "

## 2024-01-11 NOTE — ANESTHESIA CARE TRANSFER NOTE
Patient: Siomara Hansen    Procedure: Procedure(s):  Lumbar 1 kyphoplasty       Diagnosis: Closed compression fracture of body of L1 vertebra (H) [S32.010A]  Diagnosis Additional Information: No value filed.    Anesthesia Type:   General     Note:    Oropharynx: oropharynx clear of all foreign objects and spontaneously breathing  Level of Consciousness: awake  Oxygen Supplementation: face mask  Level of Supplemental Oxygen (L/min / FiO2): 6  Independent Airway: airway patency satisfactory and stable  Dentition: dentition unchanged  Vital Signs Stable: post-procedure vital signs reviewed and stable  Report to RN Given: handoff report given  Patient transferred to: PACU    Handoff Report: Identifed the Patient, Identified the Reponsible Provider, Reviewed the pertinent medical history, Discussed the surgical course, Reviewed Intra-OP anesthesia mangement and issues during anesthesia, Set expectations for post-procedure period and Allowed opportunity for questions and acknowledgement of understanding      Vitals:  Vitals Value Taken Time   /94 01/11/24 1447   Temp     Pulse 77 01/11/24 1449   Resp 10 01/11/24 1449   SpO2 100 % 01/11/24 1449   Vitals shown include unfiled device data.    Electronically Signed By: MELISSA Hare CRNA  January 11, 2024  2:50 PM

## 2024-01-11 NOTE — ANESTHESIA PROCEDURE NOTES
Airway         Procedure Start/Stop Times: 1/11/2024 1:21 PM  Staff -        Anesthesiologist:  Hammad Foreman MD       CRNA: Homa Parr APRN CRNA       Performed By: CRNA  Consent for Airway        Urgency: elective  Indications and Patient Condition       Indications for airway management: kristi-procedural       Induction type:intravenous       Mask difficulty assessment: 1 - vent by mask    Final Airway Details       Final airway type: endotracheal airway       Successful airway: ETT - single  Endotracheal Airway Details        ETT size (mm): 7.0       Cuffed: yes       Successful intubation technique: direct laryngoscopy       DL Blade Type: MAC 3       Grade View of Cords: 1       Adjucts: stylet       Position: Right       Measured from: lips       Secured at (cm): 20       Bite block used: None    Post intubation assessment        Placement verified by: capnometry, equal breath sounds and chest rise        Number of attempts at approach: 1       Number of other approaches attempted: 0       Secured with: tape       Ease of procedure: easy       Dentition: Intact and Unchanged    Medication(s) Administered   Medication Administration Time: 1/11/2024 1:21 PM

## 2024-01-11 NOTE — DISCHARGE INSTRUCTIONS
Today you received Toradol, an antiinflammatory medication similar to Ibuprofen.  Do not take NSAIDS (Ibuprofen, Advil, Aleve, Naproxen, etc) until follow-up visit.     Same Day Surgery Discharge Instructions for  Sedation and General Anesthesia     It's not unusual to feel dizzy, light-headed or faint for up to 24 hours after surgery or while taking pain medication.  If you have these symptoms: sit for a few minutes before standing and have someone assist you when you get up to walk or use the bathroom.    You should rest and relax for the next 24 hours. We recommend you make arrangements to have an adult stay with you for at least 24 hours after your discharge.  Avoid hazardous and strenuous activity.    DO NOT DRIVE any vehicle or operate mechanical equipment for 24 hours following the end of your surgery.  Even though you may feel normal, your reactions may be affected by the medication you have received.    Do not drink alcoholic beverages for 24 hours following surgery.     Slowly progress to your regular diet as you feel able. It's not unusual to feel nauseated and/or vomit after receiving anesthesia.  If you develop these symptoms, drink clear liquids (apple juice, ginger ale, broth, 7-up, etc. ) until you feel better.  If your nausea and vomiting persists for 24 hours, please notify your surgeon.      All narcotic pain medications, along with inactivity and anesthesia, can cause constipation. Drinking plenty of liquids and increasing fiber intake will help.    For any questions of a medical nature, call your surgeon.    Do not make important decisions for 24 hours.    If you had general anesthesia, you may have a sore throat for a couple of days related to the breathing tube used during surgery.  You may use Cepacol lozenges to help with this discomfort.  If it worsens or if you develop a fever, contact your surgeon.     If you feel your pain is not well managed with the pain medications prescribed by your  surgeon, please contact your surgeon's office to let them know so they can address your concerns.     Discharge Instructions: Using an   Incentive Spirometer    An incentive spirometer is a device that helps you do deep breathing exercises. These exercises will help you breathe better and improve the function of your lungs. The incentive spirometer provides a way for you to take an active part in your recovery.  Follow these steps to use your incentive spirometer:  Inhale normally. Relax and breathe out.  Place your lips tightly around the mouthpiece.  Make sure the device is upright and not tilted.  Breathe in slowly and deeply. Fill your lungs with as much air as you can.   Hold your breath long enough to keep the disk raised for at least 3 seconds while keeping the bead on the right side between the two arrows.   Perform this exercise 10 times every hour while you re awake or as often as your doctor instructs.  If you were also taught coughing exercises, perform them regularly as instructed.          **If you have questions or concerns about your procedure,   call Dr. Hudson **

## 2024-01-11 NOTE — OR NURSING
Discharge instructions reviewed with Megan, daughter in presence of pt. All questions answered. No further concerns at this point. Teach back method used to ensure patient and family/friend understanding. Narcotics sent home with seal in place along with robaxin and stool softener. IV removed. All belongings returned. Pt voided prior to discharge. Pt escorted out of hospital via wheelchair.

## 2024-01-11 NOTE — ANESTHESIA POSTPROCEDURE EVALUATION
Patient: Siomara Hasnen    Procedure: Procedure(s):  Lumbar 1 kyphoplasty       Anesthesia Type:  General    Note:  Disposition: Outpatient   Postop Pain Control: Uneventful            Sign Out: Well controlled pain   PONV: No   Neuro/Psych: Uneventful            Sign Out: Acceptable/Baseline neuro status   Airway/Respiratory: Uneventful            Sign Out: Acceptable/Baseline resp. status   CV/Hemodynamics: Uneventful            Sign Out: Acceptable CV status   Other NRE: NONE   DID A NON-ROUTINE EVENT OCCUR? No           Last vitals:  Vitals Value Taken Time   /72 01/11/24 1630   Temp 36.1  C (97  F) 01/11/24 1445   Pulse 77 01/11/24 1639   Resp 11 01/11/24 1639   SpO2 91 % 01/11/24 1639   Vitals shown include unfiled device data.    Electronically Signed By: Hammad Foreman MD  January 11, 2024  4:50 PM

## 2024-01-11 NOTE — OP NOTE
Name of procedure: L1 kyphoplasty with methylmethacrylate cement    Preoperative diagnosis: Intractable thoracolumbar pain believed secondary to partially healed L1 compression fracture    Postoperative diagnosis: Same    Surgeon: Ramon Hudson MD    First Assistant: None    Material for laboratory for examination: None    EBL: 10 cc    Description of the procedure: Patient was brought to the operating room where she was placed under suitable general endotracheal anesthesia and subsequently positioned in the prone position on an operating room table with chest bolsters.  Her thoracolumbar standard kyphoplasty technique was used to establish a working channel through the pedicles of L1 without particular difficulty.  Inflatable bone taps were then placed and inflated to a pressure of approximately 250 pounds per square inch with expansion of both balloons ultimately.  Balloons were then deflated and removed and the void created were filled with slightly thick and methylmethacrylate cement to a total volume of 7-1/2 cc.  Good fill of the inferior half of the vertebral body was observed.  There was no significant extravasation noted.  Bone filler tubes were then removed and final AP and lateral views were obtained.    It should be noted that directional bone filler tube was utilized on the left side with directional bone filler tubes for placement of cement initially utilized bilaterally with some success.

## 2024-01-15 RX ORDER — NICOTINE POLACRILEX 4 MG/1
20 GUM, CHEWING ORAL DAILY
Qty: 30 TABLET | Refills: 5 | Status: SHIPPED | OUTPATIENT
Start: 2024-01-15 | End: 2024-02-01

## 2024-01-19 ENCOUNTER — MYC REFILL (OUTPATIENT)
Dept: FAMILY MEDICINE | Facility: CLINIC | Age: 74
End: 2024-01-19
Payer: COMMERCIAL

## 2024-01-19 DIAGNOSIS — G89.29 CHRONIC LOW BACK PAIN, UNSPECIFIED BACK PAIN LATERALITY, UNSPECIFIED WHETHER SCIATICA PRESENT: ICD-10-CM

## 2024-01-19 DIAGNOSIS — M54.50 CHRONIC LOW BACK PAIN, UNSPECIFIED BACK PAIN LATERALITY, UNSPECIFIED WHETHER SCIATICA PRESENT: ICD-10-CM

## 2024-01-19 RX ORDER — HYDROCODONE BITARTRATE AND ACETAMINOPHEN 5; 325 MG/1; MG/1
1-2 TABLET ORAL EVERY 6 HOURS PRN
Qty: 30 TABLET | Refills: 0 | Status: SHIPPED | OUTPATIENT
Start: 2024-01-19 | End: 2024-02-01

## 2024-01-19 NOTE — TELEPHONE ENCOUNTER
Patient calling for a refill of Norco.     DOS: 1/11/24  Procedure: L1 kyphoplasty with methylmethacrylate cement   Surgeon: Dr. Hudson  Weeks Post Op: 1 wk 1 day    Current symptom(s): 4/10 pain rating, pain in low back that radiates to right hip. Of note, Patient sees orthopedic for her right hip and has upcoming appt on 1/31.  Current pain management: Norco 2 tabs every 6 hrs -3 tabs left , applying ice     Last fill: 1/11/24 #30  Next visit: 1/25/24    Medication pended for your approval, if appropriate. Pharmacy verified. [facility]    Any patient questions or concerns: no    Informed patient request will be forwarded to care team. Asked JAYNE team to please refill today as patient will run out by the weekend.

## 2024-01-25 ENCOUNTER — ALLIED HEALTH/NURSE VISIT (OUTPATIENT)
Dept: NEUROSURGERY | Facility: CLINIC | Age: 74
End: 2024-01-25
Payer: COMMERCIAL

## 2024-01-25 DIAGNOSIS — Z98.890 S/P KYPHOPLASTY: Primary | ICD-10-CM

## 2024-01-25 PROCEDURE — 99207 PR NO CHARGE NURSE ONLY: CPT

## 2024-01-25 NOTE — PROGRESS NOTES
Post-op Nurse Visit:    Patient seen today per the order of  Ramon Hudson MD .   DOS: 1/11/24  Procedure: Lumbar 1 kyphoplasty      Pain/Neuro Assessment  Still has same preop pain. Manageable with norco   Right Hip is sore, will be working that up soon   Numbness/tingling: none    Strength: Equal strength to bilateral lower extremities. Denies weakness.     Pain Relief Measures:  Norco: 2 every 6 hours, discussed weaning   Tylenol: as needed   Robaxin: none   Ice: as needed     Incision  Incision inspected. Edges well-approximated. No redness, swelling, drainage, or warmth noted. Incision prepped with ChloraPrep and staple(s)  removed without difficulty. Steri-strips applied.    Activity  Following restrictions   Falls:  none  Patient is walking frequently without difficulty.   Denies redness, swelling, or warmth to bilateral calves.   Wearing brace? No    GI/  Difficulty swallowing? No  Patient's appetite is normal  Bowel/bladder problems? No  Taking stool softeners? No     Refills/x-rays/return to work  Refills given at this appointment? No  Sent for x-rays after this appointment? No  Ordered future x-rays? No  Scheduling team notified? na  Return to work discussed at this appointment? na    All of patient's questions addressed today. Patient was instructed to call with any additional questions/concerns.

## 2024-01-25 NOTE — PATIENT INSTRUCTIONS
Instructions for Patient    Incision  Steri-strips were applied today, they will fall off in 7-10 days. Do not to pull them off.   Keep your incision clean and dry at all times.   It is okay to shower, just pat the incision dry   No submerging incision in water such as pools, hot tubs, or baths for at least 8 weeks and until the incision is healed  Do not apply lotions or ointments to incision    Activity  No lifting greater than 10 pounds. No bending, twisting, or overhead reaching.  Walking is the best way to start exercise after surgery. Take short frequent walks. You may gradually increase the distance as tolerated. If you feel pain, decrease your activity, but DO NOT stop walking. Walking will help you gain strength, prevent muscle soreness and spasms, and prevent blood clots.   Avoid bed rest and prolonged sitting for longer than 30 minutes (change positions frequently while awake)  No contact sports or high impact activities such as; running/jogging, snowmobile or 4 bowen riding or any other recreational vehicles until after given clearance at one of your follow up visits    Medications   Refills of pain medication:   Please call the neurosurgery clinic to request 2-3 days before you run out. A nurse will call you back to obtain a pain assessment.   Weaning from narcotic pain medications  When it is time, start weaning by extending the time between doses.   For example, if you're taking 2 tabs every 4 hours, spread it out to 2 tabs over 4.5, 5, 6 hours. At that point you can certainly cut down to 1 tab, then wean to an as needed basis until completely done with them.  Don't take more than 3000mg of Acetaminophen in 24 hours  Ok to begin taking Aspirin and NSAIDs (ex: ibuprofen/Advil)  Encouraged icing for at least 3-4 times throughout the day for 20-30 minutes at a time. Avoid heat to the incision area.   Taking stool softeners regularly can reduce constipation commonly caused by narcotic pain  medications.    Contact clinic or Emergency Room if you develop:   Infection (redness, swelling, warmth, drainage, fever over 101 F)  New injury  Bladder or bowel changes or loss of control    Signs of blood clot:  Swelling and/or warmth in one or both legs  Pain or tenderness in your leg, ankle, foot, or arm   Red or discolored skin     Go to the Emergency Room   If sudden onset of severe headache, weakness, confusion, change in level of consciousness, pain, or loss of movement.  Chest pain  Trouble breathing     Post-operative appointments  6 week     [facility]  [place] [address]  [place] [address]  Telephone:  [phone]  Fax:  [phone]

## 2024-02-05 DIAGNOSIS — Z85.828 HISTORY OF SKIN CANCER: ICD-10-CM

## 2024-02-05 DIAGNOSIS — L82.1 SK (SEBORRHEIC KERATOSIS): ICD-10-CM

## 2024-02-05 DIAGNOSIS — L91.0 HYPERTROPHIC SCAR: ICD-10-CM

## 2024-02-05 DIAGNOSIS — L81.4 LENTIGO: ICD-10-CM

## 2024-02-05 DIAGNOSIS — F11.90 CHRONIC, CONTINUOUS USE OF OPIOIDS: ICD-10-CM

## 2024-02-05 DIAGNOSIS — K21.9 GASTROESOPHAGEAL REFLUX DISEASE, UNSPECIFIED WHETHER ESOPHAGITIS PRESENT: Primary | ICD-10-CM

## 2024-02-05 DIAGNOSIS — F41.9 ANXIETY: ICD-10-CM

## 2024-02-05 RX ORDER — LOSARTAN POTASSIUM 50 MG/1
50 TABLET ORAL 2 TIMES DAILY
Qty: 180 TABLET | Refills: 1 | Status: SHIPPED | OUTPATIENT
Start: 2024-02-05 | End: 2024-08-01

## 2024-02-05 RX ORDER — CITALOPRAM HYDROBROMIDE 20 MG/1
20 TABLET ORAL DAILY
Qty: 90 TABLET | Refills: 1 | Status: SHIPPED | OUTPATIENT
Start: 2024-02-05 | End: 2024-08-07

## 2024-02-17 ENCOUNTER — HEALTH MAINTENANCE LETTER (OUTPATIENT)
Age: 74
End: 2024-02-17

## 2024-02-27 ENCOUNTER — OFFICE VISIT (OUTPATIENT)
Dept: FAMILY MEDICINE | Facility: CLINIC | Age: 74
End: 2024-02-27
Payer: COMMERCIAL

## 2024-02-27 VITALS
WEIGHT: 167 LBS | HEART RATE: 82 BPM | RESPIRATION RATE: 18 BRPM | HEIGHT: 63 IN | SYSTOLIC BLOOD PRESSURE: 130 MMHG | OXYGEN SATURATION: 97 % | BODY MASS INDEX: 29.59 KG/M2 | TEMPERATURE: 97 F | DIASTOLIC BLOOD PRESSURE: 76 MMHG

## 2024-02-27 DIAGNOSIS — K21.9 GASTROESOPHAGEAL REFLUX DISEASE, UNSPECIFIED WHETHER ESOPHAGITIS PRESENT: ICD-10-CM

## 2024-02-27 DIAGNOSIS — Z01.818 PREOP GENERAL PHYSICAL EXAM: Primary | ICD-10-CM

## 2024-02-27 DIAGNOSIS — G89.29 CHRONIC LOW BACK PAIN, UNSPECIFIED BACK PAIN LATERALITY, UNSPECIFIED WHETHER SCIATICA PRESENT: ICD-10-CM

## 2024-02-27 DIAGNOSIS — I10 ESSENTIAL HYPERTENSION: ICD-10-CM

## 2024-02-27 DIAGNOSIS — E78.5 HYPERLIPIDEMIA, UNSPECIFIED HYPERLIPIDEMIA TYPE: ICD-10-CM

## 2024-02-27 DIAGNOSIS — F41.9 ANXIETY: ICD-10-CM

## 2024-02-27 DIAGNOSIS — F11.90 CHRONIC, CONTINUOUS USE OF OPIOIDS: ICD-10-CM

## 2024-02-27 DIAGNOSIS — M54.50 CHRONIC LOW BACK PAIN, UNSPECIFIED BACK PAIN LATERALITY, UNSPECIFIED WHETHER SCIATICA PRESENT: ICD-10-CM

## 2024-02-27 DIAGNOSIS — M16.11 PRIMARY OSTEOARTHRITIS OF RIGHT HIP: ICD-10-CM

## 2024-02-27 DIAGNOSIS — J43.9 MILD EMPHYSEMA (H): ICD-10-CM

## 2024-02-27 DIAGNOSIS — M81.0 OSTEOPOROSIS, UNSPECIFIED OSTEOPOROSIS TYPE, UNSPECIFIED PATHOLOGICAL FRACTURE PRESENCE: ICD-10-CM

## 2024-02-27 PROCEDURE — 99214 OFFICE O/P EST MOD 30 MIN: CPT | Performed by: FAMILY MEDICINE

## 2024-02-27 ASSESSMENT — PAIN SCALES - GENERAL: PAINLEVEL: NO PAIN (0)

## 2024-02-27 NOTE — NURSING NOTE
"Chief Complaint   Patient presents with    Pre-Op Exam     /76 (Cuff Size: Adult Regular)   Pulse 82   Temp 97  F (36.1  C) (Tympanic)   Resp 18   Ht 1.6 m (5' 3\")   Wt 75.8 kg (167 lb)   LMP  (LMP Unknown)   SpO2 97%   BMI 29.58 kg/m   Estimated body mass index is 29.58 kg/m  as calculated from the following:    Height as of this encounter: 1.6 m (5' 3\").    Weight as of this encounter: 75.8 kg (167 lb).  Patient presents to the clinic using No DME      Health Maintenance that is potentially due pending provider review:    Health Maintenance Due   Topic Date Due    ADVANCE CARE PLANNING  Never done    COPD ACTION PLAN  Never done    CONTROLLED SUBSTANCE AGREEMENT FOR CHRONIC PAIN MANAGEMENT  Never done    RSV VACCINE (Pregnancy & 60+) (1 - 1-dose 60+ series) Never done    MAMMO SCREENING  10/09/2013    MEDICARE ANNUAL WELLNESS VISIT  08/23/2014    DTAP/TDAP/TD IMMUNIZATION (2 - Td or Tdap) 07/19/2022    COVID-19 Vaccine (5 - 2023-24 season) 09/01/2023                  "

## 2024-02-27 NOTE — PROGRESS NOTES
Preoperative Evaluation  [facility]  [address] [place] [address]  Phone: [phone]  Fax: [phone]  Primary Provider: Rafael Mcdonald  Pre-op Performing Provider: RAFAEL MCDONALD  Feb 27, 2024   {  Siomara is a 73 year old, presenting for the following:  Pre-Op Exam    Surgical Information  Surgery/Procedure: Total Hip- Right   Surgery Location: [facility]   Surgeon: Konrad  Surgery Date: 3/5/24  Time of Surgery: 2:30  Where patient plans to recover: At home with family  Fax number for surgical facility: Note does not need to be faxed, will be available electronically in Epic.    Assessment & Plan     The proposed surgical procedure is considered INTERMEDIATE risk.    ICD-10-CM    1. Preop general physical exam  Z01.818       2. Primary osteoarthritis of right hip  M16.11       3. Hyperlipidemia, unspecified hyperlipidemia type  E78.5       4. Essential hypertension  I10       5. Mild emphysema (H)  J43.9       6. Osteoporosis, unspecified osteoporosis type, unspecified pathological fracture presence  M81.0       7. Anxiety  F41.9       8. Chronic, continuous use of opioids  F11.90       9. Chronic low back pain, unspecified back pain laterality, unspecified whether sciatica present  M54.50     G89.29       10. Gastroesophageal reflux disease, unspecified whether esophagitis present  K21.9              - No identified additional risk factors other than previously addressed    Antiplatelet or Anticoagulation Medication Instructions   - Patient is on no antiplatelet or anticoagulation medications.    Additional Medication Instructions  Patient is to take all scheduled medications on the day of surgery    Recommendation  APPROVAL GIVEN to proceed with proposed procedure, without further diagnostic evaluation.      Subjective   HPI related to upcoming procedure:   73-year-old female presents for a preop physical exam.  Patient is scheduled to have right hip replaced.   She requires evaluation and anesthesia risk assessment prior to undergoing surgery/procedure.  Patient denies any fever, chills, sore throat, cough, shortness of breath, chest pain, palpitation, diarrhea, constipation, abdominal pain, headache or other relevant systemic symptoms.         2/20/2024     9:26 AM   Preop Questions   1. Have you ever had a heart attack or stroke? No   2. Have you ever had surgery on your heart or blood vessels, such as a stent placement, a coronary artery bypass, or surgery on an artery in your head, neck, heart, or legs? No   3. Do you have chest pain with activity? No   4. Do you have a history of  heart failure? No   5. Do you currently have a cold, bronchitis or symptoms of other infection? No   6. Do you have a cough, shortness of breath, or wheezing? No   7. Do you or anyone in your family have previous history of blood clots? No   8. Do you or does anyone in your family have a serious bleeding problem such as prolonged bleeding following surgeries or cuts? No   9. Have you ever had problems with anemia or been told to take iron pills? No   10. Have you had any abnormal blood loss such as black, tarry or bloody stools, or abnormal vaginal bleeding? No   11. Have you ever had a blood transfusion? No   12. Are you willing to have a blood transfusion if it is medically needed before, during, or after your surgery? Yes   13. Have you or any of your relatives ever had problems with anesthesia? No   14. Do you have sleep apnea, excessive snoring or daytime drowsiness? No   15. Do you have any artifical heart valves or other implanted medical devices like a pacemaker, defibrillator, or continuous glucose monitor? No   16. Do you have artificial joints? No   17. Are you allergic to latex? No       Health Care Directive  Patient does not have a Health Care Directive or Living Will: Discussed advance care planning with patient; information given to patient to review.    Preoperative Review  of    reviewed - controlled substances reflected in medication list.      Status of Chronic Conditions:  See problem list for active medical problems.  Problems all longstanding and stable, except as noted/documented.  See ROS for pertinent symptoms related to these conditions.    Patient Active Problem List    Diagnosis Date Noted    Chronic, continuous use of opioids 11/30/2023     Priority: Medium    Cyst of right ovary 06/27/2023     Priority: Medium     6/2023, needs follow-up ultrasound in 6 months      Dermatochalasis, bilateral 06/07/2023     Priority: Medium    History of vertebral compression fracture 06/07/2023     Priority: Medium    History of shingles 05/09/2022     Priority: Medium    Mood problem 10/02/2019     Priority: Medium    Basal cell carcinoma (BCC) 11/09/2018     Priority: Medium    Essential hypertension 03/29/2018     Priority: Medium    Osteopenia 11/06/2016     Priority: Medium    Mild emphysema (H) 08/12/2016     Priority: Medium    Constipation 04/29/2014     Priority: Medium    Nephrolithiasis 04/28/2014     Priority: Medium    Hyperlipidemia 04/28/2014     Priority: Medium    Angioma 10/21/2013     Priority: Medium    SK (seborrheic keratosis) 10/21/2013     Priority: Medium    History of skin cancer 10/21/2013     Priority: Medium    Senile sebaceous gland hyperplasia 10/21/2013     Priority: Medium    Lentigo 10/21/2013     Priority: Medium    Mixed hyperlipidemia 06/12/2013     Priority: Medium     Diagnosis updated by automated process. Provider to review and confirm.      CARDIOVASCULAR SCREENING; LDL GOAL LESS THAN 160 07/02/2012     Priority: Medium    Chronic low back pain 07/02/2012     Priority: Medium    Gastroesophageal reflux disease 07/02/2012     Priority: Medium      Past Medical History:   Diagnosis Date    Basal cell carcinoma     Breast cancer (H) 2000    Colon polyp     exam every 5 years    Hypertension     Dont remember date     Past Surgical History:    Procedure Laterality Date    BIOPSY  1999    BREAST SURGERY  2000    COLONOSCOPY      GYN SURGERY  1986    D & C    KYPHOPLASTY N/A 2024    Procedure: Lumbar 1 kyphoplasty;  Surgeon: Ramon Hudson MD;  Location: [facility] OR    masectomy[  2000    Bilateral    ORTHOPEDIC SURGERY       Current Outpatient Medications   Medication Sig Dispense Refill    alendronate (FOSAMAX) 10 MG tablet Take 1 tablet (10 mg) by mouth every morning (before breakfast) 90 tablet 1    calcium citrate-vitamin D (CITRACAL) 315-5 MG-MCG TABS per tablet Take 1 tablet by mouth 2 times daily      citalopram (CELEXA) 20 MG tablet Take 1 tablet (20 mg) by mouth daily 90 tablet 1    losartan (COZAAR) 50 MG tablet Take 1 tablet (50 mg) by mouth 2 times daily 180 tablet 1    omeprazole (PRILOSEC) 20 MG DR capsule Take 1 capsule (20 mg) by mouth daily 90 capsule 1    senna-docusate (SENOKOT-S/PERICOLACE) 8.6-50 MG tablet Take 1-2 tablets by mouth 2 times daily as needed for constipation 20 tablet 0    valACYclovir (VALTREX) 500 MG tablet Take 1 tablet by mouth 2 times daily. 6 tablet 3       Allergies   Allergen Reactions    Hydrocortisone      Erythema      Zoster Vaccine Live      Did get red rash at injection site that lasted for about 3 days.        Social History     Tobacco Use    Smoking status: Former     Packs/day: 0.50     Years: 15.00     Additional pack years: 0.00     Total pack years: 7.50     Types: Cigarettes     Start date: 1965     Quit date: 1984     Years since quittin.1     Passive exposure: Past    Smokeless tobacco: Never   Substance Use Topics    Alcohol use: Not Currently     Comment: Rare     Family History   Problem Relation Age of Onset    Cancer Mother         Lung    Breast Cancer Mother     Cancer Father         Braine    Cancer Brother         Bladder    Heart Disease Brother     Breast Cancer Sister     Breast Cancer Sister     Cancer Brother         Lung  "   Melanoma No family hx of      History   Drug Use No         Review of Systems    Review of Systems  Constitutional, neuro, ENT, endocrine, pulmonary, cardiac, gastrointestinal, genitourinary, musculoskeletal, integument and psychiatric systems are negative, except as otherwise noted.    Objective    /76 (Cuff Size: Adult Regular)   Pulse 82   Temp 97  F (36.1  C) (Tympanic)   Resp 18   Ht 1.6 m (5' 3\")   Wt 75.8 kg (167 lb)   LMP  (LMP Unknown)   SpO2 97%   BMI 29.58 kg/m     Estimated body mass index is 29.58 kg/m  as calculated from the following:    Height as of this encounter: 1.6 m (5' 3\").    Weight as of this encounter: 75.8 kg (167 lb).  Physical Exam  GENERAL: alert and no distress  EYES: Eyes grossly normal to inspection, PERRL and conjunctivae and sclerae normal  HENT: normal cephalic/atraumatic, nose and mouth without ulcers or lesions, oropharynx clear, and oral mucous membranes moist  NECK: no adenopathy, no asymmetry, masses, or scars  RESP: lungs clear to auscultation - no rales, rhonchi or wheezes  CV: regular rate and rhythm, normal S1 S2, no S3 or S4, no murmur, click or rub, no peripheral edema  ABDOMEN: soft, nontender, no hepatosplenomegaly, no masses and bowel sounds normal  MS: no gross musculoskeletal defects noted, no edema  SKIN: no suspicious lesions or rashes  NEURO: Normal strength and tone, mentation intact and speech normal  PSYCH: mentation appears normal, affect normal/bright    Recent Labs   Lab Test 01/02/24  1153 06/07/23  1142   HGB 14.0 13.8    237   INR  --  0.96    141   POTASSIUM 4.2 4.1   CR 0.91 0.90        Diagnostics  No labs were ordered during this visit.   EKG: recent EKG normal     Revised Cardiac Risk Index (RCRI)  The patient has the following serious cardiovascular risks for perioperative complications:   - No serious cardiac risks = 0 points     RCRI Interpretation: 0 points: Class I (very low risk - 0.4% complication rate)    Signed " Electronically by: Rafael Mcdonald MD  Copy of this evaluation report is provided to requesting physician.

## 2024-02-27 NOTE — PROGRESS NOTES
02/27/24 1559   Discharge Planning   Patient/Family Anticipates Transition to home with help/services   Living Arrangements   Type of Residence Independent Senior Housing   Once home, are you able to live on one level? Yes   Equipment Currently Used at Home commode chair;grab bar, toilet;grab bar, tub/shower;shower chair;walker, standard   Support System   Support Systems Children;Friends/Neighbors   Do you have someone available to stay with you one or two nights once you are home? Yes

## 2024-03-04 ENCOUNTER — ANESTHESIA EVENT (OUTPATIENT)
Dept: SURGERY | Facility: CLINIC | Age: 74
End: 2024-03-04
Payer: COMMERCIAL

## 2024-03-04 ASSESSMENT — LIFESTYLE VARIABLES: TOBACCO_USE: 1

## 2024-03-04 ASSESSMENT — COPD QUESTIONNAIRES
COPD: 1
CAT_SEVERITY: MILD

## 2024-03-04 NOTE — ANESTHESIA PREPROCEDURE EVALUATION
Anesthesia Pre-Procedure Evaluation    Patient: Siomara Hansen   MRN: [id number] : 1950        Procedure : Procedure(s):  Total Hip Arthroplasty          Past Medical History:   Diagnosis Date    Basal cell carcinoma     Breast cancer (H)     Colon polyp     exam every 5 years    Hypertension     Dont remember date      Past Surgical History:   Procedure Laterality Date    BIOPSY  1999    BREAST SURGERY  2000    COLONOSCOPY      GYN SURGERY  1986    D & C    KYPHOPLASTY N/A 2024    Procedure: Lumbar 1 kyphoplasty;  Surgeon: Ramon Hudson MD;  Location: [facility] OR    masectomy[  2000    Bilateral    ORTHOPEDIC SURGERY        Allergies   Allergen Reactions    Hydrocortisone      Erythema      Zoster Vaccine Live      Did get red rash at injection site that lasted for about 3 days.      Social History     Tobacco Use    Smoking status: Former     Packs/day: 0.50     Years: 15.00     Additional pack years: 0.00     Total pack years: 7.50     Types: Cigarettes     Start date: 1965     Quit date: 1984     Years since quittin.1     Passive exposure: Past    Smokeless tobacco: Never   Substance Use Topics    Alcohol use: Not Currently     Comment: Rare      Wt Readings from Last 1 Encounters:   24 75.8 kg (167 lb)        Anesthesia Evaluation   Pt has had prior anesthetic. Type: General and MAC.        ROS/MED HX  ENT/Pulmonary:     (+)                tobacco use, Past use,  8  Pack-Year Hx,       mild,  COPD,              Neurologic:       Cardiovascular:     (+) Dyslipidemia hypertension- -   -  - -                                 Previous cardiac testing   Echo: Date: Results:    Stress Test:  Date: Results:    ECG Reviewed:  Date:  Results:  Sinus Rhythm   WITHIN NORMAL LIMITS  Cath:  Date: Results:      METS/Exercise Tolerance:     Hematologic:       Musculoskeletal:       GI/Hepatic:     (+) GERD,                  "  Renal/Genitourinary:     (+)       Nephrolithiasis ,       Endo:       Psychiatric/Substance Use:     (+)    H/O chronic opiod use .     Infectious Disease:       Malignancy:   (+) Malignancy, History of Breast and Skin.    Other:      (+)  , H/O Chronic Pain,         Physical Exam    Airway  airway exam normal      Mallampati: I   TM distance: > 3 FB   Neck ROM: full   Mouth opening: > 3 cm    Respiratory Devices and Support         Dental       (+) Minor Abnormalities - some fillings, tiny chips      Cardiovascular   cardiovascular exam normal       Rhythm and rate: regular and normal     Pulmonary   pulmonary exam normal        breath sounds clear to auscultation           OUTSIDE LABS:  CBC:   Lab Results   Component Value Date    WBC 6.9 01/02/2024    WBC 7.8 06/07/2023    HGB 14.0 01/02/2024    HGB 13.8 06/07/2023    HCT 40.3 01/02/2024    HCT 42.0 06/07/2023     01/02/2024     06/07/2023     BMP:   Lab Results   Component Value Date     01/02/2024     06/07/2023    POTASSIUM 4.2 01/02/2024    POTASSIUM 4.1 06/07/2023    CHLORIDE 103 01/02/2024    CHLORIDE 105 06/07/2023    CO2 28 01/02/2024    CO2 23 06/07/2023    BUN 10.6 01/02/2024    BUN 15.7 06/07/2023    CR 0.91 01/02/2024    CR 0.90 06/07/2023    GLC 99 01/02/2024     (H) 06/07/2023     COAGS:   Lab Results   Component Value Date    INR 0.96 06/07/2023     POC: No results found for: \"BGM\", \"HCG\", \"HCGS\"  HEPATIC:   Lab Results   Component Value Date    ALBUMIN 4.2 06/07/2023    PROTTOTAL 7.0 06/07/2023    ALT 21 06/07/2023    AST 27 06/07/2023    ALKPHOS 113 (H) 06/07/2023    BILITOTAL 0.4 06/07/2023     OTHER:   Lab Results   Component Value Date    VINEET 9.7 01/02/2024    TSH 3.76 06/07/2013       Anesthesia Plan    ASA Status:  3    NPO Status:  NPO Appropriate    Anesthesia Type: Spinal.   Induction: Intravenous, Propofol.   Maintenance: TIVA.        Consents    Anesthesia Plan(s) and associated risks, benefits, and " "realistic alternatives discussed. Questions answered and patient/representative(s) expressed understanding.     - Discussed: Risks, Benefits and Alternatives for BOTH SEDATION and the PROCEDURE were discussed     - Discussed with:  Patient      - Extended Intubation/Ventilatory Support Discussed: No.      - Patient is DNR/DNI Status: No     Use of blood products discussed: No .     Postoperative Care       PONV prophylaxis: Ondansetron (or other 5HT-3), Dexamethasone or Solumedrol     Comments:               MELISSA Barrett CRNA    I have reviewed the pertinent notes and labs in the chart from the past 30 days and (re)examined the patient.  Any updates or changes from those notes are reflected in this note.              # Overweight: Estimated body mass index is 29.58 kg/m  as calculated from the following:    Height as of 2/27/24: 1.6 m (5' 3\").    Weight as of 2/27/24: 75.8 kg (167 lb).      "

## 2024-03-05 ENCOUNTER — HOSPITAL ENCOUNTER (OUTPATIENT)
Facility: CLINIC | Age: 74
Discharge: HOME | End: 2024-03-06
Attending: ORTHOPAEDIC SURGERY | Admitting: ORTHOPAEDIC SURGERY
Payer: COMMERCIAL

## 2024-03-05 ENCOUNTER — APPOINTMENT (OUTPATIENT)
Dept: GENERAL RADIOLOGY | Facility: CLINIC | Age: 74
End: 2024-03-05
Attending: ORTHOPAEDIC SURGERY
Payer: COMMERCIAL

## 2024-03-05 ENCOUNTER — ANESTHESIA (OUTPATIENT)
Dept: SURGERY | Facility: CLINIC | Age: 74
End: 2024-03-05
Payer: COMMERCIAL

## 2024-03-05 DIAGNOSIS — Z96.641 STATUS POST RIGHT HIP REPLACEMENT: Primary | ICD-10-CM

## 2024-03-05 PROBLEM — Z96.649 S/P TOTAL HIP ARTHROPLASTY: Status: ACTIVE | Noted: 2024-03-05

## 2024-03-05 LAB — GLUCOSE BLDC GLUCOMTR-MCNC: 88 MG/DL (ref 70–99)

## 2024-03-05 PROCEDURE — 370N000017 HC ANESTHESIA TECHNICAL FEE, PER MIN: Performed by: ORTHOPAEDIC SURGERY

## 2024-03-05 PROCEDURE — 250N000011 HC RX IP 250 OP 636: Performed by: NURSE ANESTHETIST, CERTIFIED REGISTERED

## 2024-03-05 PROCEDURE — 250N000009 HC RX 250

## 2024-03-05 PROCEDURE — 250N000013 HC RX MED GY IP 250 OP 250 PS 637

## 2024-03-05 PROCEDURE — 710N000009 HC RECOVERY PHASE 1, LEVEL 1, PER MIN: Performed by: ORTHOPAEDIC SURGERY

## 2024-03-05 PROCEDURE — 82962 GLUCOSE BLOOD TEST: CPT

## 2024-03-05 PROCEDURE — 250N000011 HC RX IP 250 OP 636

## 2024-03-05 PROCEDURE — 272N000001 HC OR GENERAL SUPPLY STERILE: Performed by: ORTHOPAEDIC SURGERY

## 2024-03-05 PROCEDURE — 250N000013 HC RX MED GY IP 250 OP 250 PS 637: Performed by: ORTHOPAEDIC SURGERY

## 2024-03-05 PROCEDURE — 250N000013 HC RX MED GY IP 250 OP 250 PS 637: Performed by: NURSE ANESTHETIST, CERTIFIED REGISTERED

## 2024-03-05 PROCEDURE — 999N000141 HC STATISTIC PRE-PROCEDURE NURSING ASSESSMENT: Performed by: ORTHOPAEDIC SURGERY

## 2024-03-05 PROCEDURE — 250N000009 HC RX 250: Performed by: ORTHOPAEDIC SURGERY

## 2024-03-05 PROCEDURE — 250N000011 HC RX IP 250 OP 636: Performed by: ORTHOPAEDIC SURGERY

## 2024-03-05 PROCEDURE — 250N000009 HC RX 250: Performed by: NURSE ANESTHETIST, CERTIFIED REGISTERED

## 2024-03-05 PROCEDURE — 999N000065 XR PELVIS PORT 1/2 VIEWS

## 2024-03-05 PROCEDURE — 258N000003 HC RX IP 258 OP 636: Performed by: NURSE ANESTHETIST, CERTIFIED REGISTERED

## 2024-03-05 PROCEDURE — 360N000077 HC SURGERY LEVEL 4, PER MIN: Performed by: ORTHOPAEDIC SURGERY

## 2024-03-05 PROCEDURE — 250N000013 HC RX MED GY IP 250 OP 250 PS 637: Performed by: PHYSICIAN ASSISTANT

## 2024-03-05 PROCEDURE — C1776 JOINT DEVICE (IMPLANTABLE): HCPCS | Performed by: ORTHOPAEDIC SURGERY

## 2024-03-05 DEVICE — PINNACLE HIP SOLUTIONS ALTRX POLYETHYLENE ACETABULAR LINER NEUTRAL 36MM ID 52MM OD
Type: IMPLANTABLE DEVICE | Site: HIP | Status: FUNCTIONAL
Brand: PINNACLE ALTRX

## 2024-03-05 DEVICE — ACTIS DUOFIX HIP PROSTHESIS (FEMORAL STEM 12/14 TAPER CEMENTLESS SIZE 4 STD COLLAR)  CE
Type: IMPLANTABLE DEVICE | Site: HIP | Status: FUNCTIONAL
Brand: ACTIS

## 2024-03-05 DEVICE — PINNACLE POROCOAT ACETABULAR SHELL SECTOR II 52MM OD
Type: IMPLANTABLE DEVICE | Site: HIP | Status: FUNCTIONAL
Brand: PINNACLE POROCOAT

## 2024-03-05 DEVICE — BIOLOX DELTA CERAMIC FEMORAL HEAD +1.5 36MM DIA 12/14 TAPER
Type: IMPLANTABLE DEVICE | Site: HIP | Status: FUNCTIONAL
Brand: BIOLOX DELTA

## 2024-03-05 RX ORDER — ACETAMINOPHEN 325 MG/1
650 TABLET ORAL EVERY 4 HOURS PRN
Status: DISCONTINUED | OUTPATIENT
Start: 2024-03-08 | End: 2024-03-06 | Stop reason: HOSPADM

## 2024-03-05 RX ORDER — PROCHLORPERAZINE MALEATE 5 MG
5 TABLET ORAL EVERY 6 HOURS PRN
Status: DISCONTINUED | OUTPATIENT
Start: 2024-03-05 | End: 2024-03-06 | Stop reason: HOSPADM

## 2024-03-05 RX ORDER — SODIUM CHLORIDE, SODIUM LACTATE, POTASSIUM CHLORIDE, CALCIUM CHLORIDE 600; 310; 30; 20 MG/100ML; MG/100ML; MG/100ML; MG/100ML
INJECTION, SOLUTION INTRAVENOUS CONTINUOUS
Status: DISCONTINUED | OUTPATIENT
Start: 2024-03-05 | End: 2024-03-05 | Stop reason: HOSPADM

## 2024-03-05 RX ORDER — LIDOCAINE 40 MG/G
CREAM TOPICAL
Status: DISCONTINUED | OUTPATIENT
Start: 2024-03-05 | End: 2024-03-05 | Stop reason: HOSPADM

## 2024-03-05 RX ORDER — OXYCODONE HYDROCHLORIDE 5 MG/1
5 TABLET ORAL EVERY 4 HOURS PRN
Status: DISCONTINUED | OUTPATIENT
Start: 2024-03-05 | End: 2024-03-06 | Stop reason: HOSPADM

## 2024-03-05 RX ORDER — HYDROMORPHONE HCL IN WATER/PF 6 MG/30 ML
0.2 PATIENT CONTROLLED ANALGESIA SYRINGE INTRAVENOUS EVERY 5 MIN PRN
Status: DISCONTINUED | OUTPATIENT
Start: 2024-03-05 | End: 2024-03-05 | Stop reason: HOSPADM

## 2024-03-05 RX ORDER — FENTANYL CITRATE 50 UG/ML
50 INJECTION, SOLUTION INTRAMUSCULAR; INTRAVENOUS EVERY 5 MIN PRN
Status: DISCONTINUED | OUTPATIENT
Start: 2024-03-05 | End: 2024-03-05 | Stop reason: HOSPADM

## 2024-03-05 RX ORDER — BUPIVACAINE HYDROCHLORIDE 5 MG/ML
INJECTION, SOLUTION PERINEURAL PRN
Status: DISCONTINUED | OUTPATIENT
Start: 2024-03-05 | End: 2024-03-05 | Stop reason: HOSPADM

## 2024-03-05 RX ORDER — HYDROXYZINE HYDROCHLORIDE 10 MG/1
10 TABLET, FILM COATED ORAL EVERY 6 HOURS PRN
Status: DISCONTINUED | OUTPATIENT
Start: 2024-03-05 | End: 2024-03-05 | Stop reason: HOSPADM

## 2024-03-05 RX ORDER — ONDANSETRON 2 MG/ML
INJECTION INTRAMUSCULAR; INTRAVENOUS PRN
Status: DISCONTINUED | OUTPATIENT
Start: 2024-03-05 | End: 2024-03-05

## 2024-03-05 RX ORDER — PROPOFOL 10 MG/ML
INJECTION, EMULSION INTRAVENOUS CONTINUOUS PRN
Status: DISCONTINUED | OUTPATIENT
Start: 2024-03-05 | End: 2024-03-05

## 2024-03-05 RX ORDER — CEFAZOLIN SODIUM/WATER 2 G/20 ML
2 SYRINGE (ML) INTRAVENOUS SEE ADMIN INSTRUCTIONS
Status: DISCONTINUED | OUTPATIENT
Start: 2024-03-05 | End: 2024-03-05 | Stop reason: HOSPADM

## 2024-03-05 RX ORDER — HYDROXYZINE HYDROCHLORIDE 25 MG/1
25 TABLET, FILM COATED ORAL EVERY 6 HOURS PRN
Qty: 30 TABLET | Refills: 0 | Status: SHIPPED | OUTPATIENT
Start: 2024-03-05 | End: 2024-09-04

## 2024-03-05 RX ORDER — ONDANSETRON 4 MG/1
4 TABLET, ORALLY DISINTEGRATING ORAL EVERY 30 MIN PRN
Status: DISCONTINUED | OUTPATIENT
Start: 2024-03-05 | End: 2024-03-05 | Stop reason: HOSPADM

## 2024-03-05 RX ORDER — LIDOCAINE 40 MG/G
CREAM TOPICAL
Status: DISCONTINUED | OUTPATIENT
Start: 2024-03-05 | End: 2024-03-06 | Stop reason: HOSPADM

## 2024-03-05 RX ORDER — ACETAMINOPHEN 325 MG/1
975 TABLET ORAL ONCE
Status: DISCONTINUED | OUTPATIENT
Start: 2024-03-05 | End: 2024-03-05 | Stop reason: HOSPADM

## 2024-03-05 RX ORDER — NALOXONE HYDROCHLORIDE 0.4 MG/ML
0.1 INJECTION, SOLUTION INTRAMUSCULAR; INTRAVENOUS; SUBCUTANEOUS
Status: DISCONTINUED | OUTPATIENT
Start: 2024-03-05 | End: 2024-03-05 | Stop reason: HOSPADM

## 2024-03-05 RX ORDER — NALOXONE HYDROCHLORIDE 0.4 MG/ML
0.2 INJECTION, SOLUTION INTRAMUSCULAR; INTRAVENOUS; SUBCUTANEOUS
Status: DISCONTINUED | OUTPATIENT
Start: 2024-03-05 | End: 2024-03-06 | Stop reason: HOSPADM

## 2024-03-05 RX ORDER — AMOXICILLIN 250 MG
1-2 CAPSULE ORAL 2 TIMES DAILY PRN
Qty: 30 TABLET | Refills: 0 | Status: SHIPPED | OUTPATIENT
Start: 2024-03-05

## 2024-03-05 RX ORDER — SODIUM CHLORIDE, SODIUM LACTATE, POTASSIUM CHLORIDE, CALCIUM CHLORIDE 600; 310; 30; 20 MG/100ML; MG/100ML; MG/100ML; MG/100ML
INJECTION, SOLUTION INTRAVENOUS CONTINUOUS
Status: DISCONTINUED | OUTPATIENT
Start: 2024-03-05 | End: 2024-03-06 | Stop reason: HOSPADM

## 2024-03-05 RX ORDER — VALACYCLOVIR HYDROCHLORIDE 500 MG/1
500 TABLET, FILM COATED ORAL 2 TIMES DAILY
Status: DISCONTINUED | OUTPATIENT
Start: 2024-03-05 | End: 2024-03-06 | Stop reason: HOSPADM

## 2024-03-05 RX ORDER — PANTOPRAZOLE SODIUM 40 MG/1
40 TABLET, DELAYED RELEASE ORAL
Status: DISCONTINUED | OUTPATIENT
Start: 2024-03-06 | End: 2024-03-06 | Stop reason: HOSPADM

## 2024-03-05 RX ORDER — HYDROMORPHONE HCL IN WATER/PF 6 MG/30 ML
0.4 PATIENT CONTROLLED ANALGESIA SYRINGE INTRAVENOUS
Status: DISCONTINUED | OUTPATIENT
Start: 2024-03-05 | End: 2024-03-06 | Stop reason: HOSPADM

## 2024-03-05 RX ORDER — ONDANSETRON 2 MG/ML
4 INJECTION INTRAMUSCULAR; INTRAVENOUS EVERY 6 HOURS PRN
Status: DISCONTINUED | OUTPATIENT
Start: 2024-03-05 | End: 2024-03-06 | Stop reason: HOSPADM

## 2024-03-05 RX ORDER — CITALOPRAM HYDROBROMIDE 20 MG/1
20 TABLET ORAL DAILY
Status: DISCONTINUED | OUTPATIENT
Start: 2024-03-06 | End: 2024-03-06 | Stop reason: HOSPADM

## 2024-03-05 RX ORDER — POLYETHYLENE GLYCOL 3350 17 G/17G
17 POWDER, FOR SOLUTION ORAL DAILY
Status: DISCONTINUED | OUTPATIENT
Start: 2024-03-06 | End: 2024-03-06 | Stop reason: HOSPADM

## 2024-03-05 RX ORDER — MEPERIDINE HYDROCHLORIDE 25 MG/ML
12.5 INJECTION INTRAMUSCULAR; INTRAVENOUS; SUBCUTANEOUS EVERY 5 MIN PRN
Status: DISCONTINUED | OUTPATIENT
Start: 2024-03-05 | End: 2024-03-05 | Stop reason: HOSPADM

## 2024-03-05 RX ORDER — BISACODYL 10 MG
10 SUPPOSITORY, RECTAL RECTAL DAILY PRN
Status: DISCONTINUED | OUTPATIENT
Start: 2024-03-05 | End: 2024-03-06 | Stop reason: HOSPADM

## 2024-03-05 RX ORDER — HYDROMORPHONE HCL IN WATER/PF 6 MG/30 ML
0.4 PATIENT CONTROLLED ANALGESIA SYRINGE INTRAVENOUS EVERY 5 MIN PRN
Status: DISCONTINUED | OUTPATIENT
Start: 2024-03-05 | End: 2024-03-05 | Stop reason: HOSPADM

## 2024-03-05 RX ORDER — ASPIRIN 325 MG
325 TABLET, DELAYED RELEASE (ENTERIC COATED) ORAL DAILY
Qty: 30 TABLET | Refills: 0 | Status: SHIPPED | OUTPATIENT
Start: 2024-03-05 | End: 2024-03-06

## 2024-03-05 RX ORDER — FENTANYL CITRATE 50 UG/ML
25 INJECTION, SOLUTION INTRAMUSCULAR; INTRAVENOUS EVERY 5 MIN PRN
Status: DISCONTINUED | OUTPATIENT
Start: 2024-03-05 | End: 2024-03-05 | Stop reason: HOSPADM

## 2024-03-05 RX ORDER — ONDANSETRON 2 MG/ML
4 INJECTION INTRAMUSCULAR; INTRAVENOUS EVERY 30 MIN PRN
Status: DISCONTINUED | OUTPATIENT
Start: 2024-03-05 | End: 2024-03-05 | Stop reason: HOSPADM

## 2024-03-05 RX ORDER — GLYCOPYRROLATE 0.2 MG/ML
INJECTION, SOLUTION INTRAMUSCULAR; INTRAVENOUS PRN
Status: DISCONTINUED | OUTPATIENT
Start: 2024-03-05 | End: 2024-03-05

## 2024-03-05 RX ORDER — OXYCODONE HYDROCHLORIDE 5 MG/1
5 TABLET ORAL
Status: DISCONTINUED | OUTPATIENT
Start: 2024-03-05 | End: 2024-03-05 | Stop reason: HOSPADM

## 2024-03-05 RX ORDER — ACETAMINOPHEN 325 MG/1
975 TABLET ORAL EVERY 8 HOURS
Qty: 27 TABLET | Refills: 0 | Status: DISCONTINUED | OUTPATIENT
Start: 2024-03-05 | End: 2024-03-06 | Stop reason: HOSPADM

## 2024-03-05 RX ORDER — GABAPENTIN 100 MG/1
100 CAPSULE ORAL
Status: DISCONTINUED | OUTPATIENT
Start: 2024-03-05 | End: 2024-03-05 | Stop reason: HOSPADM

## 2024-03-05 RX ORDER — ASPIRIN 325 MG
325 TABLET, DELAYED RELEASE (ENTERIC COATED) ORAL DAILY
Status: DISCONTINUED | OUTPATIENT
Start: 2024-03-05 | End: 2024-03-06

## 2024-03-05 RX ORDER — LIDOCAINE HYDROCHLORIDE 20 MG/ML
INJECTION, SOLUTION INFILTRATION; PERINEURAL PRN
Status: DISCONTINUED | OUTPATIENT
Start: 2024-03-05 | End: 2024-03-05

## 2024-03-05 RX ORDER — OXYCODONE HYDROCHLORIDE 5 MG/1
5-10 TABLET ORAL EVERY 4 HOURS PRN
Qty: 26 TABLET | Refills: 0 | Status: SHIPPED | OUTPATIENT
Start: 2024-03-05 | End: 2024-09-04

## 2024-03-05 RX ORDER — CEFAZOLIN SODIUM 1 G/3ML
1 INJECTION, POWDER, FOR SOLUTION INTRAMUSCULAR; INTRAVENOUS EVERY 8 HOURS
Qty: 10 ML | Refills: 0 | Status: COMPLETED | OUTPATIENT
Start: 2024-03-05 | End: 2024-03-06

## 2024-03-05 RX ORDER — OXYCODONE HYDROCHLORIDE 5 MG/1
10 TABLET ORAL EVERY 4 HOURS PRN
Status: DISCONTINUED | OUTPATIENT
Start: 2024-03-05 | End: 2024-03-06 | Stop reason: HOSPADM

## 2024-03-05 RX ORDER — HYDROMORPHONE HCL IN WATER/PF 6 MG/30 ML
0.2 PATIENT CONTROLLED ANALGESIA SYRINGE INTRAVENOUS
Status: DISCONTINUED | OUTPATIENT
Start: 2024-03-05 | End: 2024-03-06 | Stop reason: HOSPADM

## 2024-03-05 RX ORDER — CALCIUM CARBONATE 500 MG/1
500 TABLET, CHEWABLE ORAL 4 TIMES DAILY PRN
Status: DISCONTINUED | OUTPATIENT
Start: 2024-03-05 | End: 2024-03-06 | Stop reason: HOSPADM

## 2024-03-05 RX ORDER — DEXAMETHASONE SODIUM PHOSPHATE 4 MG/ML
INJECTION, SOLUTION INTRA-ARTICULAR; INTRALESIONAL; INTRAMUSCULAR; INTRAVENOUS; SOFT TISSUE PRN
Status: DISCONTINUED | OUTPATIENT
Start: 2024-03-05 | End: 2024-03-05

## 2024-03-05 RX ORDER — NALOXONE HYDROCHLORIDE 0.4 MG/ML
0.4 INJECTION, SOLUTION INTRAMUSCULAR; INTRAVENOUS; SUBCUTANEOUS
Status: DISCONTINUED | OUTPATIENT
Start: 2024-03-05 | End: 2024-03-06 | Stop reason: HOSPADM

## 2024-03-05 RX ORDER — ONDANSETRON 4 MG/1
4 TABLET, ORALLY DISINTEGRATING ORAL EVERY 6 HOURS PRN
Status: DISCONTINUED | OUTPATIENT
Start: 2024-03-05 | End: 2024-03-06 | Stop reason: HOSPADM

## 2024-03-05 RX ORDER — KETAMINE HYDROCHLORIDE 10 MG/ML
INJECTION INTRAMUSCULAR; INTRAVENOUS PRN
Status: DISCONTINUED | OUTPATIENT
Start: 2024-03-05 | End: 2024-03-05

## 2024-03-05 RX ORDER — HYDROXYZINE HYDROCHLORIDE 25 MG/1
25 TABLET, FILM COATED ORAL EVERY 6 HOURS PRN
Status: DISCONTINUED | OUTPATIENT
Start: 2024-03-05 | End: 2024-03-06 | Stop reason: HOSPADM

## 2024-03-05 RX ORDER — ACETAMINOPHEN 325 MG/1
975 TABLET ORAL ONCE
Status: COMPLETED | OUTPATIENT
Start: 2024-03-05 | End: 2024-03-05

## 2024-03-05 RX ORDER — LOSARTAN POTASSIUM 50 MG/1
50 TABLET ORAL 2 TIMES DAILY
Status: DISCONTINUED | OUTPATIENT
Start: 2024-03-05 | End: 2024-03-06 | Stop reason: HOSPADM

## 2024-03-05 RX ORDER — FAMOTIDINE 20 MG/1
20 TABLET, FILM COATED ORAL 2 TIMES DAILY
Status: DISCONTINUED | OUTPATIENT
Start: 2024-03-05 | End: 2024-03-06 | Stop reason: HOSPADM

## 2024-03-05 RX ORDER — ACETAMINOPHEN 325 MG/1
650 TABLET ORAL EVERY 4 HOURS PRN
Status: SHIPPED
Start: 2024-03-05 | End: 2024-09-04

## 2024-03-05 RX ORDER — KETOROLAC TROMETHAMINE 15 MG/ML
15 INJECTION, SOLUTION INTRAMUSCULAR; INTRAVENOUS EVERY 6 HOURS
Status: COMPLETED | OUTPATIENT
Start: 2024-03-05 | End: 2024-03-06

## 2024-03-05 RX ORDER — FENTANYL CITRATE 0.05 MG/ML
INJECTION, SOLUTION INTRAMUSCULAR; INTRAVENOUS PRN
Status: DISCONTINUED | OUTPATIENT
Start: 2024-03-05 | End: 2024-03-05

## 2024-03-05 RX ORDER — METOPROLOL TARTRATE 1 MG/ML
1-2 INJECTION, SOLUTION INTRAVENOUS EVERY 5 MIN PRN
Status: DISCONTINUED | OUTPATIENT
Start: 2024-03-05 | End: 2024-03-05 | Stop reason: HOSPADM

## 2024-03-05 RX ORDER — CEFAZOLIN SODIUM/WATER 2 G/20 ML
2 SYRINGE (ML) INTRAVENOUS
Status: COMPLETED | OUTPATIENT
Start: 2024-03-05 | End: 2024-03-05

## 2024-03-05 RX ORDER — BUPIVACAINE HYDROCHLORIDE 7.5 MG/ML
INJECTION, SOLUTION INTRASPINAL
Status: COMPLETED | OUTPATIENT
Start: 2024-03-05 | End: 2024-03-05

## 2024-03-05 RX ORDER — TRANEXAMIC ACID 650 MG/1
1950 TABLET ORAL ONCE
Status: COMPLETED | OUTPATIENT
Start: 2024-03-05 | End: 2024-03-05

## 2024-03-05 RX ORDER — MAGNESIUM SULFATE HEPTAHYDRATE 40 MG/ML
INJECTION, SOLUTION INTRAVENOUS PRN
Status: DISCONTINUED | OUTPATIENT
Start: 2024-03-05 | End: 2024-03-05

## 2024-03-05 RX ORDER — AMOXICILLIN 250 MG
1 CAPSULE ORAL 2 TIMES DAILY
Status: DISCONTINUED | OUTPATIENT
Start: 2024-03-05 | End: 2024-03-06 | Stop reason: HOSPADM

## 2024-03-05 RX ADMIN — KETOROLAC TROMETHAMINE 15 MG: 15 INJECTION, SOLUTION INTRAMUSCULAR; INTRAVENOUS at 20:00

## 2024-03-05 RX ADMIN — ACETAMINOPHEN 975 MG: 325 TABLET, FILM COATED ORAL at 18:22

## 2024-03-05 RX ADMIN — BENZOCAINE 6 MG-MENTHOL 10 MG LOZENGES 1 LOZENGE: at 22:24

## 2024-03-05 RX ADMIN — Medication 2 G: at 12:16

## 2024-03-05 RX ADMIN — DEXAMETHASONE SODIUM PHOSPHATE 10 MG: 4 INJECTION, SOLUTION INTRA-ARTICULAR; INTRALESIONAL; INTRAMUSCULAR; INTRAVENOUS; SOFT TISSUE at 12:31

## 2024-03-05 RX ADMIN — FAMOTIDINE 20 MG: 20 TABLET, FILM COATED ORAL at 20:00

## 2024-03-05 RX ADMIN — PHENYLEPHRINE HYDROCHLORIDE 50 MCG: 10 INJECTION INTRAVENOUS at 12:58

## 2024-03-05 RX ADMIN — MAGNESIUM SULFATE HEPTAHYDRATE 2 G: 40 INJECTION, SOLUTION INTRAVENOUS at 12:49

## 2024-03-05 RX ADMIN — TRANEXAMIC ACID 1950 MG: 650 TABLET ORAL at 10:33

## 2024-03-05 RX ADMIN — LIDOCAINE HYDROCHLORIDE 0.1 ML: 10 INJECTION, SOLUTION EPIDURAL; INFILTRATION; INTRACAUDAL; PERINEURAL at 10:36

## 2024-03-05 RX ADMIN — MIDAZOLAM 2 MG: 1 INJECTION INTRAMUSCULAR; INTRAVENOUS at 12:11

## 2024-03-05 RX ADMIN — LIDOCAINE HYDROCHLORIDE 30 MG: 20 INJECTION, SOLUTION INFILTRATION; PERINEURAL at 12:30

## 2024-03-05 RX ADMIN — PHENYLEPHRINE HYDROCHLORIDE 100 MCG: 10 INJECTION INTRAVENOUS at 13:11

## 2024-03-05 RX ADMIN — SODIUM CHLORIDE, POTASSIUM CHLORIDE, SODIUM LACTATE AND CALCIUM CHLORIDE: 600; 310; 30; 20 INJECTION, SOLUTION INTRAVENOUS at 13:00

## 2024-03-05 RX ADMIN — BUPIVACAINE HYDROCHLORIDE IN DEXTROSE 1.6 ML: 7.5 INJECTION, SOLUTION SUBARACHNOID at 12:21

## 2024-03-05 RX ADMIN — PROPOFOL 100 MCG/KG/MIN: 10 INJECTION, EMULSION INTRAVENOUS at 12:28

## 2024-03-05 RX ADMIN — ACETAMINOPHEN 975 MG: 325 TABLET, FILM COATED ORAL at 10:33

## 2024-03-05 RX ADMIN — ONDANSETRON 4 MG: 2 INJECTION INTRAMUSCULAR; INTRAVENOUS at 13:26

## 2024-03-05 RX ADMIN — CEFAZOLIN 1 G: 1 INJECTION, POWDER, FOR SOLUTION INTRAMUSCULAR; INTRAVENOUS at 20:00

## 2024-03-05 RX ADMIN — FENTANYL CITRATE 50 MCG: 0.05 INJECTION, SOLUTION INTRAMUSCULAR; INTRAVENOUS at 12:16

## 2024-03-05 RX ADMIN — SENNOSIDES AND DOCUSATE SODIUM 1 TABLET: 8.6; 5 TABLET ORAL at 20:00

## 2024-03-05 RX ADMIN — OXYCODONE HYDROCHLORIDE 10 MG: 5 TABLET ORAL at 15:51

## 2024-03-05 RX ADMIN — KETOROLAC TROMETHAMINE 15 MG: 15 INJECTION, SOLUTION INTRAMUSCULAR; INTRAVENOUS at 14:29

## 2024-03-05 RX ADMIN — BENZOCAINE 6 MG-MENTHOL 10 MG LOZENGES 1 LOZENGE: at 15:44

## 2024-03-05 RX ADMIN — GLYCOPYRROLATE 0.2 MG: 0.2 INJECTION, SOLUTION INTRAMUSCULAR; INTRAVENOUS at 13:12

## 2024-03-05 RX ADMIN — KETAMINE HYDROCHLORIDE 25 MG: 10 INJECTION INTRAMUSCULAR; INTRAVENOUS at 12:28

## 2024-03-05 RX ADMIN — KETAMINE HYDROCHLORIDE 25 MG: 10 INJECTION INTRAMUSCULAR; INTRAVENOUS at 13:27

## 2024-03-05 RX ADMIN — LOSARTAN POTASSIUM 50 MG: 50 TABLET, FILM COATED ORAL at 20:00

## 2024-03-05 RX ADMIN — SODIUM CHLORIDE, POTASSIUM CHLORIDE, SODIUM LACTATE AND CALCIUM CHLORIDE: 600; 310; 30; 20 INJECTION, SOLUTION INTRAVENOUS at 10:35

## 2024-03-05 RX ADMIN — PHENYLEPHRINE HYDROCHLORIDE 100 MCG: 10 INJECTION INTRAVENOUS at 13:31

## 2024-03-05 RX ADMIN — PHENYLEPHRINE HYDROCHLORIDE 50 MCG: 10 INJECTION INTRAVENOUS at 12:54

## 2024-03-05 RX ADMIN — BENZOCAINE 6 MG-MENTHOL 10 MG LOZENGES 1 LOZENGE: at 18:26

## 2024-03-05 RX ADMIN — PHENYLEPHRINE HYDROCHLORIDE 100 MCG: 10 INJECTION INTRAVENOUS at 13:07

## 2024-03-05 RX ADMIN — OXYCODONE HYDROCHLORIDE 10 MG: 5 TABLET ORAL at 22:30

## 2024-03-05 RX ADMIN — FENTANYL CITRATE 50 MCG: 0.05 INJECTION, SOLUTION INTRAMUSCULAR; INTRAVENOUS at 13:02

## 2024-03-05 RX ADMIN — VALACYCLOVIR HYDROCHLORIDE 500 MG: 500 TABLET, FILM COATED ORAL at 20:00

## 2024-03-05 ASSESSMENT — ACTIVITIES OF DAILY LIVING (ADL)
ADLS_ACUITY_SCORE: 18
ADLS_ACUITY_SCORE: 21
ADLS_ACUITY_SCORE: 21
ADLS_ACUITY_SCORE: 18
ADLS_ACUITY_SCORE: 18

## 2024-03-05 NOTE — ANESTHESIA POSTPROCEDURE EVALUATION
Patient: Siomara Hansen    Procedure: Procedure(s):  right Total Hip Arthroplasty       Anesthesia Type:  Spinal    Note:  Disposition: Outpatient   Postop Pain Control: Uneventful            Sign Out: Well controlled pain   PONV: No   Neuro/Psych: Uneventful            Sign Out: Acceptable/Baseline neuro status   Airway/Respiratory: Uneventful            Sign Out: Acceptable/Baseline resp. status   CV/Hemodynamics: Uneventful            Sign Out: Acceptable CV status; No obvious hypovolemia; No obvious fluid overload   Other NRE:    DID A NON-ROUTINE EVENT OCCUR?            Last vitals:  Vitals Value Taken Time   /82 03/05/24 1430   Temp 35.6  C (96.08  F) 03/05/24 1438   Pulse 75 03/05/24 1438   Resp 32 03/05/24 1438   SpO2 95 % 03/05/24 1438   Vitals shown include unfiled device data.    Electronically Signed By: MELISSA Leigh CRNA  March 5, 2024  5:37 PM

## 2024-03-05 NOTE — PROGRESS NOTES
"[facility] [facility] [place]    Patient admitted to room 2309 at approximately 1450 via cart from surgery. Patient was accompanied by transport tech.     Verbal SBAR report received from Dennise prior to patient arrival.     Patient trasferred to bed via air jim. Patient alert and oriented X 3. The patient is not having any pain.  . Admission vital signs: Blood pressure (!) 150/82, pulse 72, temperature (!) 95.9  F (35.5  C), resp. rate 23, height 1.6 m (5' 3\"), weight 75.8 kg (167 lb), SpO2 97%. Patient was oriented to plan of care, call light, bed controls, tv, telephone, bathroom, and visiting hours.     Risk Assessment    The following safety risks were identified during admission: fall and skin. Yellow risk band applied: YES.     Skin Initial Assessment    This writer admitted this patient and completed a full skin assessment and Po score in the Adult PCS flowsheet. Appropriate interventions initiated as needed.     Secondary skin check completed by RN.         Education    Patient has a [place] to Observation order: No  Observation education completed and documented: N/A      Immanuel Lloyd RN      "

## 2024-03-05 NOTE — ANESTHESIA PROCEDURE NOTES
"Intrathecal injection Procedure Note    Pre-Procedure   Staff -        Resident/Fellow: No Doran       CRNA: Alvarez Garcia APRN CRNA       Performed By: CRNA and DENNY       Location: OR       Pre-Anesthestic Checklist: patient identified, IV checked, risks and benefits discussed, informed consent, monitors and equipment checked, pre-op evaluation, at physician/surgeon's request and post-op pain management  Timeout:       Correct Patient: Yes        Correct Procedure: Yes        Correct Site: Yes        Correct Position: Yes   Procedure Documentation  Procedure: intrathecal injection       Patient Position: sitting       Skin prep: Chloraprep       Insertion Site: L3-4. (midline approach).       Needle Gauge: 25.        Needle Length (Inches): 3.5        Spinal Needle Type: Pencan       Introducer used       Introducer: 20 G       # of attempts: 1 and  # of redirects:  1    Assessment/Narrative         Paresthesias: No.       CSF fluid: clear.    Medication(s) Administered   0.75% Hyperbaric Bupivacaine (Intrathecal) - Intrathecal   1.6 mL - 3/5/2024 12:21:00 PM    FOR [facility] ([facility]) ONLY:   Pain Team Contact information: please page the Pain Team Via [employer]. Search \"Pain\". During daytime hours, please page the attending first. At night please page the resident first.      "

## 2024-03-05 NOTE — PROCEDURES
03/05/24    PREOP DIAGNOSIS: Right hip arthritis  POSTOP DIAGNOSIS: Right hip arthritis  PROCEDURE: Right total hip arthroplasty  SURGEON: Ruperto Silvestre   ASSISTANT: Sylvia Atkinson.  The presence of a PA was necessary for positioning, retraction, and safe progression of the case  ANESTHESIA: Spinal with sedation   EBL: 100cc  COMPLICATIONS: None apparent   DISPO: Stable to PACU     IMPLANTS: DePuy Actis size 4 standard offset collared femur, 36mm x +1.5mm ceramic femoral head, 36mm x 52mm neutral polyethylene liner, and 52mm [place] Cup    INDICATIONS: Siomara is a 73 year old female with a history of progressive right hip pain due to end stage arthritis.  She also had fairly significant lower back pain as well.  She had differential injections and we felt her hip was her primary pain generator.  After failing nonoperative management, she elected to proceed with a right MAYE, understanding the risks of infection, damage to vessels or nerves, blood clots, instability, leg length discrepancy, ongoing pain and need for revision surgery.     PROCEDURE: Siomara was met in the preoperative holding area where consent was reviewed and the right hip was marked.  She was then transferred to the operating theater. After a spinal anesthetic was placed, she was placed in a left lateral decubitus position with her right side up. All bony prominences were padded, she was secured with a Stulberg hip positioner, and an axillary roll was placed.  A timeout was performed verifying the correct patient, surgery, and location. She received preoperative antibiotics as well as transexamic acid. The right lower extremity was then prepped and draped in a standard fashion.     We then made an incision in line with a posterior approach to the hip. Dissection was sharply carried down through skin and subcutaneous tissue, and the gluteus fascia incised in line with the incision. After palpating and protecting the sciatic nerve, an east  west retractor was placed. We then released the piriformis and short external rotators and then performed a T Capsulotomy. The hip was dislocated and a neck cut made, approximately 6 mm proximal to the lesser trochanter. The femoral head measured 47mm and showed full thickness chondral wear.    We turned our attention to the acetabulum. After placing anterior and posterior retractors, foveal and labral tissue were removed.  We started with a 47mm reamer and sequentially reamed to a 52, in approximately 40 deg of abduction and 25 deg of anteversion. At that point, we had good bleeding bone circumferentially.  A trial cup was placed with good pressfit followed by our final 52mm cup, again with good press fit, and a neutral liner. We then turned our attention to the femur. After using a cookie cutter and canal finder, we broached to a size 4, keeping the stem in approximatley 10-15 deg of anteversion. At that point, we had excellent rotational stability.  We then placed a standard offset neck with and a variety of different length femoral heads.  We felt the +1.5mm head most appropriate which showed leg lengths felt appropriate, the hip was stable in full extension and maximal external rotation, in the sleeping position, and with flexion to 90 degrees and internal rotation to 80 degrees.     We then removed all trial components and thoroughly irrigated the wound. We placed our final size 4 stem.  We impacted our final head. The wound was instilled with a dilute betadine solution. Capsule and short external rotators were repaired with 0-ethibond, gluteus fascia with #1 stratafix, subdermal sutures with 2-0 vicryl, and a running 3-0 subcuticular monocryl. A sterile dressing followed by an abductor pillow was placed and the patient was transferred to the PACU in stable condition.       POSTOPERATIVE PLAN:   1. WBAT right LE with PT for mobilization  2. DVT prophylaxis: ASA 325mg daily x 30 days  3. Perioperative  antibiotics   4. Follow up in two weeks for a wound check, sooner with questions or concerns    Ruperto Silvestre MD

## 2024-03-05 NOTE — OR NURSING
Glucose 88. Preop meds given. Daughter with pt , voids preop and ready for surgery. Pt states has history of vertigo and is feeling fine today. Just needs to sit before standing. Pain 4/10 hip and back pain.

## 2024-03-05 NOTE — PROVIDER NOTIFICATION
03/05/24 1149   Discharge Planning   Patient/Family Anticipates Transition to home with help/services   Concerns to be Addressed all concerns addressed in this encounter;denies needs/concerns at this time   Living Arrangements   People in Home alone   Type of Residence Independent Senior Housing   Is your private residence a single family home or apartment? Single family home   Number of Stairs, Within Home, Primary none   Stair Railings, Within Home, Primary none   Once home, are you able to live on one level? Yes   Which level? Main Level   Bathroom Shower/Tub Tub/Shower unit   Equipment Currently Used at Home commode chair;grab bar, toilet;grab bar, tub/shower;shower chair;walker, standard   Support System   Support Systems Children;Friends/Neighbors   Do you have someone available to stay with you one or two nights once you are home? Yes   Medical Clearance   Date of Physical 02/27/24   Clinic Name [facility]   Blood   Known Bleeding Disorder or Coagulopathy No   Does the patient have any Mormonism/cultural preferences related to blood products? No   Education   Has the patient scheduled or completed pre-op total joint education, either in class or online, in the last 12 months? No   What day did the patient complete, or plan to complete, pre-op total joint education? 02/27/24   Patient attended total joint pre-op class/received pre-op teaching  email/phone call

## 2024-03-05 NOTE — ANESTHESIA CARE TRANSFER NOTE
Patient: Siomara Hansen    Procedure: Procedure(s):  right Total Hip Arthroplasty       Diagnosis: Arthritis of right hip [M16.11]  Diagnosis Additional Information: No value filed.    Anesthesia Type:   Spinal     Note:    Oropharynx: oropharynx clear of all foreign objects  Level of Consciousness: drowsy  Oxygen Supplementation: room air    Independent Airway: airway patency satisfactory and stable  Dentition: dentition unchanged  Vital Signs Stable: post-procedure vital signs reviewed and stable  Report to RN Given: handoff report given  Patient transferred to: PACU    Handoff Report: Identifed the Patient, Identified the Reponsible Provider, Reviewed the pertinent medical history, Discussed the surgical course, Reviewed Intra-OP anesthesia mangement and issues during anesthesia, Set expectations for post-procedure period and Allowed opportunity for questions and acknowledgement of understanding      Vitals:  Vitals Value Taken Time   /69 03/05/24 1350   Temp 35.5  C (95.9  F) 03/05/24 1353   Pulse 80 03/05/24 1354   Resp 16 03/05/24 1354   SpO2 96 % 03/05/24 1354   Vitals shown include unfiled device data.    Electronically Signed By: MELISSA Ku CRNA  March 5, 2024  1:55 PM

## 2024-03-06 ENCOUNTER — APPOINTMENT (OUTPATIENT)
Dept: PHYSICAL THERAPY | Facility: CLINIC | Age: 74
End: 2024-03-06
Attending: ORTHOPAEDIC SURGERY
Payer: COMMERCIAL

## 2024-03-06 VITALS
TEMPERATURE: 98.2 F | WEIGHT: 167 LBS | OXYGEN SATURATION: 92 % | BODY MASS INDEX: 29.59 KG/M2 | DIASTOLIC BLOOD PRESSURE: 65 MMHG | HEART RATE: 68 BPM | RESPIRATION RATE: 18 BRPM | SYSTOLIC BLOOD PRESSURE: 114 MMHG | HEIGHT: 63 IN

## 2024-03-06 LAB
HGB BLD-MCNC: 10.1 G/DL (ref 11.7–15.7)
HOLD SPECIMEN: NORMAL

## 2024-03-06 PROCEDURE — 97116 GAIT TRAINING THERAPY: CPT | Mod: GP

## 2024-03-06 PROCEDURE — 97161 PT EVAL LOW COMPLEX 20 MIN: CPT | Mod: GP

## 2024-03-06 PROCEDURE — 99204 OFFICE O/P NEW MOD 45 MIN: CPT

## 2024-03-06 PROCEDURE — 36415 COLL VENOUS BLD VENIPUNCTURE: CPT | Performed by: ORTHOPAEDIC SURGERY

## 2024-03-06 PROCEDURE — 250N000011 HC RX IP 250 OP 636: Performed by: ORTHOPAEDIC SURGERY

## 2024-03-06 PROCEDURE — 250N000013 HC RX MED GY IP 250 OP 250 PS 637: Performed by: PHYSICIAN ASSISTANT

## 2024-03-06 PROCEDURE — 97110 THERAPEUTIC EXERCISES: CPT | Mod: GP

## 2024-03-06 PROCEDURE — 85018 HEMOGLOBIN: CPT | Performed by: ORTHOPAEDIC SURGERY

## 2024-03-06 PROCEDURE — 999N000111 HC STATISTIC OT IP EVAL DEFER

## 2024-03-06 PROCEDURE — 250N000013 HC RX MED GY IP 250 OP 250 PS 637: Performed by: ORTHOPAEDIC SURGERY

## 2024-03-06 RX ADMIN — KETOROLAC TROMETHAMINE 15 MG: 15 INJECTION, SOLUTION INTRAMUSCULAR; INTRAVENOUS at 02:01

## 2024-03-06 RX ADMIN — CEFAZOLIN 1 G: 1 INJECTION, POWDER, FOR SOLUTION INTRAMUSCULAR; INTRAVENOUS at 05:46

## 2024-03-06 RX ADMIN — OXYCODONE HYDROCHLORIDE 5 MG: 5 TABLET ORAL at 05:46

## 2024-03-06 RX ADMIN — BENZOCAINE 6 MG-MENTHOL 10 MG LOZENGES 1 LOZENGE: at 05:46

## 2024-03-06 RX ADMIN — FAMOTIDINE 20 MG: 20 TABLET, FILM COATED ORAL at 07:53

## 2024-03-06 RX ADMIN — KETOROLAC TROMETHAMINE 15 MG: 15 INJECTION, SOLUTION INTRAMUSCULAR; INTRAVENOUS at 07:53

## 2024-03-06 RX ADMIN — ACETAMINOPHEN 975 MG: 325 TABLET, FILM COATED ORAL at 02:00

## 2024-03-06 RX ADMIN — SENNOSIDES AND DOCUSATE SODIUM 1 TABLET: 8.6; 5 TABLET ORAL at 07:53

## 2024-03-06 RX ADMIN — ACETAMINOPHEN 975 MG: 325 TABLET, FILM COATED ORAL at 10:17

## 2024-03-06 RX ADMIN — LOSARTAN POTASSIUM 50 MG: 50 TABLET, FILM COATED ORAL at 07:53

## 2024-03-06 RX ADMIN — CITALOPRAM HYDROBROMIDE 20 MG: 20 TABLET, FILM COATED ORAL at 07:53

## 2024-03-06 RX ADMIN — POLYETHYLENE GLYCOL 3350 17 G: 17 POWDER, FOR SOLUTION ORAL at 07:53

## 2024-03-06 RX ADMIN — VALACYCLOVIR HYDROCHLORIDE 500 MG: 500 TABLET, FILM COATED ORAL at 07:53

## 2024-03-06 RX ADMIN — BENZOCAINE 6 MG-MENTHOL 10 MG LOZENGES 1 LOZENGE: at 02:08

## 2024-03-06 RX ADMIN — PANTOPRAZOLE SODIUM 40 MG: 40 TABLET, DELAYED RELEASE ORAL at 05:46

## 2024-03-06 ASSESSMENT — ACTIVITIES OF DAILY LIVING (ADL)
ADLS_ACUITY_SCORE: 25
ADLS_ACUITY_SCORE: 21
ADLS_ACUITY_SCORE: 21
ADLS_ACUITY_SCORE: 25
ADLS_ACUITY_SCORE: 21
ADLS_ACUITY_SCORE: 25

## 2024-03-06 NOTE — PROGRESS NOTES
03/06/24 0751   Appointment Info   Signing Clinician's Name / Credentials (PT) Ginny Montana, PT   Quick Adds   Quick Adds Certification   Living Environment   People in Home alone   Current Living Arrangements apartment   Home Accessibility no concerns   Living Environment Comments daughter plans to stay with her for a week   Self-Care   Equipment Currently Used at Home commode chair;grab bar, toilet;grab bar, tub/shower;shower chair;walker, standard   Fall history within last six months no   Activity/Exercise/Self-Care Comment indep with mobility without device, has walker. indep with ADL's/IADL's   General Information   Onset of Illness/Injury or Date of Surgery 03/05/24   Referring Physician Ruperto Silvestre MD   Patient/Family Therapy Goals Statement (PT) return home today   Pertinent History of Current Problem (include personal factors and/or comorbidities that impact the POC) 73 y.o.female s/p R MAYE performed 3/5, now WBAT.   Weight-Bearing Status - RLE weight-bearing as tolerated  (per orders, no precautions)   Cognition   Affect/Mental Status (Cognition) WFL   Orientation Status (Cognition) oriented x 4   Follows Commands (Cognition) WFL   Pain Assessment   Patient Currently in Pain Yes, see Vital Sign flowsheet  (R hip sore, well managed)   Range of Motion (ROM)   Range of Motion ROM deficits secondary to surgical procedure;ROM deficits secondary to pain   Strength (Manual Muscle Testing)   Strength Comments not formally assessed due to pain from recent surgery   Bed Mobility   Comment, (Bed Mobility) pt sitting in chair, bed mobility not addressed but anticipate no concerns   Transfers   Comment, (Transfers) sit>stand from recliner with Wallace and 2WW, good standing balance   Gait/Stairs (Locomotion)   [place] Level (Gait) supervision   Assistive Device (Gait) walker, front-wheeled   Distance in Feet (Gait) 50   Pattern (Gait) step-to   Deviations/Abnormal Patterns (Gait) antalgic;gait speed  decreased;weight shifting decreased   Maintains Weight-bearing Status (Gait) able to maintain   Clinical Impression   Criteria for Skilled Therapeutic Intervention Yes, treatment indicated   PT Diagnosis (PT) impaired mobility s/p R MAYE   Influenced by the following impairments pain, reduced ROM, LE weakness   Functional limitations due to impairments impaired gait   Clinical Presentation (PT Evaluation Complexity) stable   Clinical Presentation Rationale clinical reasoning   Clinical Decision Making (Complexity) low complexity   Planned Therapy Interventions (PT) cryotherapy;gait training;home exercise program;patient/family education;ROM (range of motion);strengthening;transfer training;progressive activity/exercise;risk factor education;home program guidelines   Risk & Benefits of therapy have been explained evaluation/treatment results reviewed;care plan/treatment goals reviewed;risks/benefits reviewed;current/potential barriers reviewed;participants voiced agreement with care plan;participants included;patient   PT Total Evaluation Time   PT Eval, Low Complexity Minutes (85660) 10   Therapy Certification   Start of care date 03/06/24   Certification date from 03/06/24   Certification date to 03/06/24   Medical Diagnosis s/p R MAYE   Physical Therapy Goals   PT Frequency One time eval and treatment only   PT Predicted Duration/Target Date for Goal Attainment 03/06/24   PT Goals Gait;PT Goal 1   PT: Gait Supervision/stand-by assist;Standard walker;150 feet;Goal Met   PT: Goal 1 Pt will be indep in R MAYE HEP in order to progress aftercares. Goal met.   Interventions   Interventions Quick Adds Gait Training;Therapeutic Procedure   Therapeutic Procedure/Exercise   Ther. Procedure: strength, endurance, ROM, flexibillity Minutes (12364) 15   Treatment Detail/Skilled Intervention reviewed R MAYE HEP following printed handout x10 reps each: ankle pumps, quad sets, glute sets, hamstring sets, heel slides, SAQ, and SLR.  discussed not sittting in low chairs, taking small steps when turning, proper technique for getting in/out of car, and edema management.   Gait Training   Gait Training Minutes (41179) 13   Symptoms Noted During/After Treatment (Gait Training) fatigue   Treatment Detail/Skilled Intervention amb in hallway x100 feet with 2WW and Wallace, progressed to step through pattern with increased distance, only complaints of soreness and good stability throughout.   Distance in Feet 100   [place] Level (Gait Training) stand-by assist   Physical Assistance Level (Gait Training) supervision;verbal cues   Weight Bearing (Gait Training) weight-bearing as tolerated   Assistive Device (Gait Training) standard walker   Gait Analysis Deviations decreased irlanda;decreased stride length;decreased weight-shifting ability   Impairments (Gait Analysis/Training) pain;ROM decreased;strength decreased   PT Discharge Planning   PT Plan d/c PT, goals met   PT Discharge Recommendation (DC Rec) home with assist   PT Rationale for DC Rec pt plans to complete exercises and walk independently, will follow up with surgeon if needs arise, daughter is able to assist as needed   PT Brief overview of current status amb 150 feet with 2WW and Wallace   PT Equipment Needed at Discharge   (has all equipment)   Total Session Time   Timed Code Treatment Minutes 28   Total Session Time (sum of timed and untimed services) 38   [facility]  OUTPATIENT PHYSICAL THERAPY EVALUATION  PLAN OF TREATMENT FOR OUTPATIENT REHABILITATION  (COMPLETE FOR INITIAL CLAIMS ONLY)  Patient's Last Name, First Name, M.I.  YOB: 1950  Siomara Hansen                        Provider's Name  [facility] Medical Record No.  [id number]                             Onset Date:  03/05/24   Start of Care Date:  03/06/24   Type:     _X_PT   ___OT   ___SLP Medical Diagnosis:  s/p R MAYE              PT Diagnosis:   impaired mobility s/p R MAYE Visits from SOC:  1     See note for plan of treatment, functional goals and certification details    I CERTIFY THE NEED FOR THESE SERVICES FURNISHED UNDER        THIS PLAN OF TREATMENT AND WHILE UNDER MY CARE     (Physician co-signature of this document indicates review and certification of the therapy plan).

## 2024-03-06 NOTE — PROGRESS NOTES
"[facility] Orthopaedics Progress Note      Post-operative Day: 1 Day Post-Op    Procedure(s):  Right Total Hip Arthroplasty  Subjective:    Pt reports mild pain in the hip, she already passed therapy and is doing well. She hasn't been passing much flatus yet but has a history of constipation and has a plan for discharge. She feels ready to go home and is going to do independent exercises.     Chest pain, SOB:  no  Nausea, vomiting:  no  Lightheadedness, dizziness:  no  Neuro:  Patient denies new onset numbness or paresthesias      Objective:  Blood pressure 114/65, pulse 68, temperature 98.2  F (36.8  C), temperature source Oral, resp. rate 18, height 1.6 m (5' 3\"), weight 75.8 kg (167 lb), SpO2 92%.    Patient Vitals for the past 24 hrs:   BP Temp Temp src Pulse Resp SpO2 Height Weight   03/06/24 0700 114/65 98.2  F (36.8  C) Oral 68 18 92 % -- --   03/05/24 2339 115/65 98.7  F (37.1  C) Oral 67 16 92 % -- --   03/05/24 2048 131/67 98.6  F (37  C) Oral 76 16 91 % -- --   03/05/24 1501 (!) 164/86 97.6  F (36.4  C) Oral 75 16 92 % -- --   03/05/24 1430 (!) 150/82 (!) 95.9  F (35.5  C) -- 72 23 97 % -- --   03/05/24 1415 132/75 (!) 95.7  F (35.4  C) -- 77 16 97 % -- --   03/05/24 1400 133/65 (!) 95.9  F (35.5  C) -- 80 10 97 % -- --   03/05/24 1350 122/69 (!) 96.1  F (35.6  C) Temporal 83 14 93 % -- --   03/05/24 1006 130/89 99.1  F (37.3  C) Oral 65 16 -- 1.6 m (5' 3\") 75.8 kg (167 lb)       Wt Readings from Last 4 Encounters:   03/05/24 75.8 kg (167 lb)   02/27/24 75.8 kg (167 lb)   01/11/24 75 kg (165 lb 6.4 oz)   01/02/24 75.8 kg (167 lb)       Gen: A&O x 3. NAD. Appears tired, up to the recliner.   Wound status: Covered, Aquacel dressing is c/d/i.  Circulation, motion and sensation: Dorsiflexion/plantarflexion intact and equal bilaterally; distal lower extremity sensation is intact and equal bilaterally. Foot and toes are warm and well perfused.    Swelling: mild  Calf tenderness: calves are soft and non-tender " bilaterally     Pertinent Labs   Lab Results: personally reviewed.     Recent Labs   Lab Test 03/06/24  0556 01/02/24  1153 06/07/23  1142   INR  --   --  0.96   WBC  --  6.9 7.8   HGB 10.1* 14.0 13.8   HCT  --  40.3 42.0   MCV  --  94 97   PLT  --  224 237   NA  --  140 141       Plan:   Continue current cares and rehabilitation.  Anticoagulation protocol: Xarelto 10 mg daily  x 30  days. Pt has ASA allergy.   Pain medications:  oxycodone, toradol, tylenol, and vistaril  Weight bearing status:  WBAT  Disposition:  Plan for discharge to home with outpatient therapy as needed when medically stable and pain is controlled, cleared by therapy. Later today.                Report completed by:  Greg Jane PA-C  Date: 3/6/2024  Time: 9:42 AM

## 2024-03-06 NOTE — DISCHARGE SUMMARY
[facility] Orthopedics Discharge Summary                                  [facility]     BRIDGER RIVERA [id number]   Age: 73 year old  PCP: Rafael Mcdonald, [phone] 1950     Date of Admission:  3/5/2024  Date of Discharge::  3/6/2024  1:08 PM  Discharge Physician:  Greg Jane PA-C    Code status:  Prior    Admission Information:  Admission Diagnosis:  Arthritis of right hip [M16.11]  S/P total hip arthroplasty [Z96.649]    Post-Operative Day: 1 Day Post-Op     Reason for admission:  The patient was admitted for the following:Procedure(s) (LRB):  Right Total Hip Arthroplasty (Right)    Principal Problem:    S/P total hip arthroplasty      Allergies:  Asa [aspirin], Hydrocortisone, and Zoster vaccine live    Following the procedure noted above the patient was transferred to the post-op floor and started on:    Therapy:  physical therapy  Anticoagulation Plan: Initially plan was for Xarelto 10mg daily x 30 days; due to cost of the medication, this was switched after discharge at the [facility] to apixaban 2.5mg BID x 30 days   Pain Management: oxycodone, toradol, tylenol, and vistaril  Weight bearing status: Weight bearing as tolerated     The patient was followed and co-managed by the hospitalist service during the inpatient treatment course  Complications:  None  Consultations:  None     Pertinent Labs   Lab Results: personally reviewed.     Recent Labs   Lab Test 03/06/24  0556 01/02/24  1153 06/07/23  1142   INR  --   --  0.96   WBC  --  6.9 7.8   HGB 10.1* 14.0 13.8   HCT  --  40.3 42.0   MCV  --  94 97   PLT  --  224 237   NA  --  140 141          Discharge Information:  Condition at discharge: Stable  Discharge destination:  Discharged to home     Medications at discharge:  Discharge Medication List as of 3/6/2024 12:25 PM        START taking these medications    Details   acetaminophen (TYLENOL) 325 MG tablet Take 2 tablets (650 mg) by mouth every 4 hours  as needed for other (mild pain), No Print Out      hydrOXYzine HCl (ATARAX) 25 MG tablet Take 1 tablet (25 mg) by mouth every 6 hours as needed for itching or anxiety (with pain, moderate pain), Disp-30 tablet, R-0, E-Prescribe      oxyCODONE (ROXICODONE) 5 MG tablet Take 1-2 tablets (5-10 mg) by mouth every 4 hours as needed for moderate to severe pain, Disp-26 tablet, R-0, E-Prescribe      rivaroxaban ANTICOAGULANT (XARELTO) 10 MG TABS tablet Take 1 tablet (10 mg) by mouth daily (with dinner), Disp-30 tablet, R-0, E-PrescribeX 30 days post op           CONTINUE these medications which have CHANGED    Details   senna-docusate (SENOKOT-S/PERICOLACE) 8.6-50 MG tablet Take 1-2 tablets by mouth 2 times daily as needed for constipation Take while on oral narcotics to prevent or treat constipation., Disp-30 tablet, R-0, E-PrescribeWhile taking narcotics           CONTINUE these medications which have NOT CHANGED    Details   alendronate (FOSAMAX) 10 MG tablet Take 1 tablet (10 mg) by mouth every morning (before breakfast), Disp-90 tablet, R-1, E-Prescribe      calcium citrate-vitamin D (CITRACAL) 315-5 MG-MCG TABS per tablet Take 1 tablet by mouth 2 times daily, Historical      citalopram (CELEXA) 20 MG tablet Take 1 tablet (20 mg) by mouth daily, Disp-90 tablet, R-1, E-Prescribe      losartan (COZAAR) 50 MG tablet Take 1 tablet (50 mg) by mouth 2 times daily, Disp-180 tablet, R-1, E-Prescribe      omeprazole (PRILOSEC) 20 MG DR capsule Take 1 capsule (20 mg) by mouth daily, Disp-90 capsule, R-1, E-Prescribe      valACYclovir (VALTREX) 500 MG tablet Take 1 tablet by mouth 2 times daily., Disp-6 tablet, R-3, E-Prescribe                        Follow-Up Care:  Patient should be seen in the office in 14 days by the Orthopedic Surgeon/Physician Assistant.  Call [phone] for appointment or questions.    Greg Jane PA-C

## 2024-03-06 NOTE — PROGRESS NOTES
[facility] Hospital Medicine Progress Note  Date of Service: 03/06/2024    Assessment & Plan   Siomraa Hansen is a 73 year old female who presented on 3/5/2024 for scheduled Procedure(s):  right Total Hip Arthroplasty by Ruperto Silvestre MD and is being followed by the hospital medicine service for co-management of acute and/or chronic perioperative medical problems.      S/p Procedure(s):  right Total Hip Arthroplasty   1 Day Post-Op    Following plan per orthopedic surgery service;  -Pain control  -Wound cares  -Antibiotic prophylaxis  -Physical/occupational therapy  -DVT prophylaxis    Hypertension    Chronic. Blood pressures reviewed, well controlled. Managed PTA with losartan 50 mg BID  - Continue losartan BID with holding parameters.    Depression    Chronic. Managed PTA with citalopram 20 mg daily.  - Continue citalopram 20 mg daily.     Osteoporosis    Chronic. Managed PTA with alendronate 10 mg.  - Continue alendronate    HSV    Taking prophylactic valacyclovir 500 mg BID.  - Continue valacyclovir BID    DVT Prophylaxis: as per orthopedic surgery service -  mg  Code Status: Full Code    Lines: PIV   Hoffman catheter: None    Discussion: Medically, the patient appears optimized for discharge.    Disposition: Anticipate discharge 03/06/24    Attestation:  I have discussed, or will be discussing, the patient with hospitalist physician, Dr. Pereira.    OFELIA ASTORGA PA-C     Interval History   POD#1: Routine post-operative morning rounds. No overnight events. Appetite intact, tolerating PO without nausea or vomiting. Ambulating well, long distances, feeling steady on feet without lightheadedness or dizziness. Passing flatus without a bowel movement at this time. Urinating without difficulty. Denies fever, chills, chest pain, palpitations, shortness of breath, lightheadedness, nausea, vomiting, abdominal pain, and paresthesias.     Physical Exam   Temp:  [95.7  F (35.4   C)-99.1  F (37.3  C)] 98.2  F (36.8  C)  Pulse:  [65-83] 68  Resp:  [10-23] 18  BP: (114-164)/(65-89) 114/65  SpO2:  [91 %-97 %] 92 %    Weights:   Vitals:    03/05/24 1006   Weight: 75.8 kg (167 lb)    Body mass index is 29.58 kg/m .    General: Alert, oriented, no acute distress, non-toxic appearing.  CV: Regular rate and rhythm. Normal S1 and S2. No S3, S4, or murmurs. Radial pulse regular and strong bilaterally. No lower extremity edema.   Respiratory: Lungs clear to auscultation bilaterally. No wheezes, rhonchi, or crackles. Normal respiratory effort on room air. Speaking in full sentences.  GI: Soft, flat, nondistended, nontender to palpation throughout.  Skin: Warm and dry.  Musculoskeletal: Surgical site exam deferred to orthopedic/surgery provider. Dorsiflexion and plantarflexion intact and symmetrical bilaterally. Lower extremity sensation intact and symmetrical bilaterally. No paraesthesias bilaterally. No calf pain, negative Brooklynn's bilaterally. Aquacel covering right hip clean, dry, and intact.    Data   Recent Labs   Lab 03/06/24  0556 03/05/24  1032   HGB 10.1*  --    GLC  --  88     Recent Labs   Lab 03/05/24  1032   GLC 88      Unresulted Labs Ordered in the Past 30 Days of this Admission       No orders found for last 31 day(s).           Imaging  Recent Results (from the past 24 hour(s))   XR Pelvis Port 1/2 Views    Narrative    XR PELVIS PORT 1/2 VIEWS 3/5/2024 2:13 PM    HISTORY: Status post Hip surgery. Pain.    COMPARISON: None.      Impression    IMPRESSION: Recent postoperative changes of a right total hip  arthroplasty. Components are well aligned. No dislocation. No fracture  identified.    JAYY RODRIGUEZ MD         SYSTEM ID:  [id number]      I reviewed all new labs and imaging results over the last 24 hours. I personally reviewed no images or EKG's today.    Medications    lactated ringers 75 mL/hr at 03/05/24 1455      acetaminophen  975 mg Oral Q8H    citalopram  20 mg Oral Daily     famotidine  20 mg Oral BID    Or    famotidine  20 mg Intravenous BID    losartan  50 mg Oral BID    pantoprazole  40 mg Oral QAM AC    polyethylene glycol  17 g Oral Daily    rivaroxaban ANTICOAGULANT  10 mg Oral Daily with supper    senna-docusate  1 tablet Oral BID    sodium chloride (PF)  3 mL Intracatheter Q8H    valACYclovir  500 mg Oral BID     OFELIA ASTORGA PA-C

## 2024-03-06 NOTE — PROGRESS NOTES
[facility] DISCHARGE NOTE    Patient discharged to home at 1:16 PM via wheel chair. Accompanied by daughter and staff. Discharge instructions reviewed with patient, opportunity offered to ask questions. Prescriptions sent to patients preferred pharmacy. All belongings sent with patient.    Immanuel Lloyd RN

## 2024-03-06 NOTE — PLAN OF CARE
Physical Therapy Discharge Summary    Reason for therapy discharge:    All goals and outcomes met, no further needs identified.    Progress towards therapy goal(s). See goals on Care Plan in [facility] electronic health record for goal details.  Goals met    Therapy recommendation(s):    Continue home exercise program. Pt plans to complete exercises independently and will follow up with surgeon if needs arise.

## 2024-03-06 NOTE — PROGRESS NOTES
Patient vital signs are at baseline Yes:  Patient able to ambulate as they were prior to admission or with assist devices provided by therapies   during their stay: Yes  Patient MUST void prior to discharge: Yes    Patient able to tolerate oral intake: Yes:   Pain has adequate pain control using oral analgesics: Yes    Pt alert and oriented x4 in room. Pt pain managed well with oral oxycodone and scheduled Tylenol. Pt states she just wants everything to go right with surgery post-op. Pt progressing well this shift. Pt has voided, ambulated well in the halls and room.

## 2024-03-06 NOTE — CONSULTS
Care Management Note:    Care Management team received referral from Ortho team to assist pt with discharge planning services post surgical services.    Per IDT rounds, EMR review, and discussion with PT/OT staff, it has been determined that pt will discharge to home. No Therapy needs indicated.     Discussed directly with patient.  Patient in agreement with outpatient therapy plans.      CM alerted [facility] liaison to cancel referral at this time.     [facility] provider group aware of plans.      Care Management will close referral at this time.      RICKY Plata  [facility] [phone]   [facility]  [phone]

## 2024-03-11 DIAGNOSIS — B00.9 HSV-2 (HERPES SIMPLEX VIRUS 2) INFECTION: ICD-10-CM

## 2024-03-11 RX ORDER — VALACYCLOVIR HYDROCHLORIDE 500 MG/1
500 TABLET, FILM COATED ORAL 2 TIMES DAILY
Qty: 6 TABLET | Refills: 3 | Status: SHIPPED | OUTPATIENT
Start: 2024-03-11 | End: 2024-04-20

## 2024-03-26 DIAGNOSIS — S32.010D COMPRESSION FRACTURE OF L1 VERTEBRA WITH ROUTINE HEALING, SUBSEQUENT ENCOUNTER: ICD-10-CM

## 2024-03-26 DIAGNOSIS — M85.80 OSTEOPENIA, UNSPECIFIED LOCATION: ICD-10-CM

## 2024-03-27 RX ORDER — ALENDRONATE SODIUM 10 MG/1
10 TABLET ORAL
Qty: 90 TABLET | Refills: 1 | Status: SHIPPED | OUTPATIENT
Start: 2024-03-27 | End: 2024-09-17

## 2024-04-16 ENCOUNTER — HOSPITAL ENCOUNTER (OUTPATIENT)
Dept: CT IMAGING | Facility: CLINIC | Age: 74
Discharge: HOME | End: 2024-04-16
Attending: STUDENT IN AN ORGANIZED HEALTH CARE EDUCATION/TRAINING PROGRAM | Admitting: STUDENT IN AN ORGANIZED HEALTH CARE EDUCATION/TRAINING PROGRAM
Payer: COMMERCIAL

## 2024-04-16 DIAGNOSIS — R91.8 LUNG NODULES: ICD-10-CM

## 2024-04-16 PROCEDURE — 71250 CT THORAX DX C-: CPT

## 2024-04-18 ENCOUNTER — VIRTUAL VISIT (OUTPATIENT)
Dept: PULMONOLOGY | Facility: CLINIC | Age: 74
End: 2024-04-18
Attending: STUDENT IN AN ORGANIZED HEALTH CARE EDUCATION/TRAINING PROGRAM
Payer: COMMERCIAL

## 2024-04-18 ENCOUNTER — TELEPHONE (OUTPATIENT)
Dept: FAMILY MEDICINE | Facility: CLINIC | Age: 74
End: 2024-04-18

## 2024-04-18 DIAGNOSIS — R91.8 LUNG NODULES: Primary | ICD-10-CM

## 2024-04-18 DIAGNOSIS — B00.9 HSV-2 (HERPES SIMPLEX VIRUS 2) INFECTION: ICD-10-CM

## 2024-04-18 PROCEDURE — 99214 OFFICE O/P EST MOD 30 MIN: CPT | Mod: 95 | Performed by: PHYSICIAN ASSISTANT

## 2024-04-18 NOTE — TELEPHONE ENCOUNTER
Pt says she just picked up Rx for Valacyclovir 500 mg and it was only for #6.  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  She said the last doctor that would order this was Adriana Gabriel from [facility] in [place].

## 2024-04-18 NOTE — NURSING NOTE
Is the patient currently in the state of [place]? YES    Visit mode:VIDEO    If the visit is dropped, the patient can be reconnected by: VIDEO VISIT: Text to cell phone:   Telephone Information:   Mobile [phone]       Will anyone else be joining the visit? NO  (If patient encounters technical issues they should call [phone]    How would you like to obtain your AVS? MyChart    Are changes needed to the allergy or medication list? Pt stated no changes to allergies and Pt stated no med changes    Are refills needed on medications prescribed by this physician? NO    Reason for visit: BIMAL SOLORIO

## 2024-04-18 NOTE — PROGRESS NOTES
Virtual Visit Details    Type of service:  Video Visit   Video Start Time: 10:35 AM  Video End Time:10:47 AM    Originating Location (pt. Location): Home    Distant Location (provider location):  On-site  Platform used for Video Visit: Renata    LUNG NODULE & INTERVENTIONAL PULMONARY CLINIC  [facility]     Siomara Hansen MRN# [id number]   Age: 73 year old YOB: 1950     Reason for Consultation: lung nodule(s)    Requesting Physician: Javier Stauffer,   [facility], [address] [address]       Assessment and Plan:    16mm LLL groundglass nodule  8mm OZZY groundglass nodule  9mm RUL nodule  Initially seen on CT chest 6/2018; LLL groundglass nodule measured 9 x 9 mm, increased to 16 x 11mm and other scattered nodules were increased in size slightly on CT chest 6/2023. CT chest 10/2023 and 4/2024 stable. This completes nearly 12 months of surveillance. Discussed possible etiologies, suspect inflammatory process. She is considered high risk given history of smoking and family history of lung cancer. Recommend follow up CT Chest in 12 months (4/2025), and continue yearly for 5 total years unless interval growth is seen.       Other sub 6mm fissural nodes and scattered nodules stable since 6/2023      Follow up 12 months with repeat CT chest w/o    Raysa Garcia PA-C  Interventional and General Pulmonology  Department of Pulmonary, Allergy, Critical Care and Sleep Medicine   [facility]            History:     Siomara Hansen is a 73 year old female who is here for lung nodule(s). Last seen in clinic with Dr. Stauffer 10/2023.     CT A/P for h/o nephrolithasis identified multiple groundglass nodules, largest of which measured 11 x 14mm. Dedicated CT chest 6/23/2023 demonstrated numerous nodules (largest 11 x 14mm LLL nodule). She was concerned about undergoing a biopsy at that time due to fear of the biopsy causing cancer to spread. Her case was  reviewed at lung nodule conference. Decision was made to continue surveillance. Repeat CT chest 10/2023 stable, seen by Dr. Stauffer who recommended 6 month follow up CT chest.     - No new resp sx or complaints. Denies dyspnea or cough.   - Personal hx of cancer: breast cancer in , s/p bilateral mastectomy and lymph node dissection in   - Family hx of cancer: mother and half brother passed from lung cancer, both were smokers  - Exposure hx: secondhand exposure while working at a [employer]  - Tobacco hx: 1/2 ppd x 15 years, quit ~40 years ago  - My interpretation of the images relevant for this visit includes: 16mm LLL groundglass nodule   - My interpretation of the PFT's relevant for this visit includes: Normal 2023      Other active medical problems include:   - h/o breast cancer as above  - osteoporosis         Past Medical History:      Past Medical History:   Diagnosis Date    Basal cell carcinoma     Breast cancer (H)     Colon polyp     exam every 5 years    Hypertension     Dont remember date           Past Surgical History:      Past Surgical History:   Procedure Laterality Date    ARTHROPLASTY HIP Right 3/5/2024    Procedure: Right Total Hip Arthroplasty;  Surgeon: Ruperto Silvestre MD;  Location: [facility] OR    BIOPSY  1999    BREAST SURGERY  2000    COLONOSCOPY      GYN SURGERY  1986    D & C    KYPHOPLASTY N/A 2024    Procedure: Lumbar 1 kyphoplasty;  Surgeon: Ramon Hudson MD;  Location: [facility] OR    masectomy[  2000    Bilateral    ORTHOPEDIC SURGERY            Social History:     Social History     Tobacco Use    Smoking status: Former     Current packs/day: 0.00     Average packs/day: 0.5 packs/day for 19.0 years (9.5 ttl pk-yrs)     Types: Cigarettes     Start date: 1965     Quit date: 1984     Years since quittin.3     Passive exposure: Past    Smokeless tobacco: Never   Substance Use Topics    Alcohol use: Not Currently      Comment: Rare          Family History:     Family History   Problem Relation Age of Onset    Cancer Mother         Lung    Breast Cancer Mother     Cancer Father         Braine    Cancer Brother         Bladder    Heart Disease Brother     Breast Cancer Sister     Breast Cancer Sister     Cancer Brother         Lung    Melanoma No family hx of            Allergies:      Allergies   Allergen Reactions    Asa [Aspirin] Swelling    Hydrocortisone      Erythema      Zoster Vaccine Live      Did get red rash at injection site that lasted for about 3 days.          Medications:     Current Outpatient Medications   Medication Sig Dispense Refill    acetaminophen (TYLENOL) 325 MG tablet Take 2 tablets (650 mg) by mouth every 4 hours as needed for other (mild pain)      alendronate (FOSAMAX) 10 MG tablet TAKE 1 TABLET BY MOUTH IN THE MORNING BEFORE BREAKFAST 90 tablet 1    calcium citrate-vitamin D (CITRACAL) 315-5 MG-MCG TABS per tablet Take 1 tablet by mouth 2 times daily      citalopram (CELEXA) 20 MG tablet Take 1 tablet (20 mg) by mouth daily 90 tablet 1    hydrOXYzine HCl (ATARAX) 25 MG tablet Take 1 tablet (25 mg) by mouth every 6 hours as needed for itching or anxiety (with pain, moderate pain) 30 tablet 0    losartan (COZAAR) 50 MG tablet Take 1 tablet (50 mg) by mouth 2 times daily 180 tablet 1    omeprazole (PRILOSEC) 20 MG DR capsule Take 1 capsule (20 mg) by mouth daily 90 capsule 1    oxyCODONE (ROXICODONE) 5 MG tablet Take 1-2 tablets (5-10 mg) by mouth every 4 hours as needed for moderate to severe pain 26 tablet 0    rivaroxaban ANTICOAGULANT (XARELTO) 10 MG TABS tablet Take 1 tablet (10 mg) by mouth daily (with dinner) 30 tablet 0    senna-docusate (SENOKOT-S/PERICOLACE) 8.6-50 MG tablet Take 1-2 tablets by mouth 2 times daily as needed for constipation Take while on oral narcotics to prevent or treat constipation. 30 tablet 0    valACYclovir (VALTREX) 500 MG tablet Take 1 tablet (500 mg) by mouth 2 times  daily 6 tablet 3     No current facility-administered medications for this visit.            Physical Exam:   LMP  (LMP Unknown)   Wt Readings from Last 4 Encounters:   03/05/24 75.8 kg (167 lb)   02/27/24 75.8 kg (167 lb)   01/11/24 75 kg (165 lb 6.4 oz)   01/02/24 75.8 kg (167 lb)     General: Well appearing  Lungs: Nonlabored breathing  Neuro: Answering questions appropriately  Psych: Normal affect         Imaging/Lab Data   All laboratory and imaging data reviewed.    No results found for this or any previous visit (from the past 24 hour(s)).

## 2024-04-19 NOTE — TELEPHONE ENCOUNTER
I talked with Siomara.  She is asking to take the the Valacyclovir BID.  This is OK to wait for Dr Tamara Carvajal RN

## 2024-04-20 RX ORDER — VALACYCLOVIR HYDROCHLORIDE 500 MG/1
500 TABLET, FILM COATED ORAL 2 TIMES DAILY
Qty: 60 TABLET | Refills: 3 | Status: SHIPPED | OUTPATIENT
Start: 2024-04-20 | End: 2024-08-07

## 2024-07-06 ENCOUNTER — HEALTH MAINTENANCE LETTER (OUTPATIENT)
Age: 74
End: 2024-07-06

## 2024-07-17 ENCOUNTER — HOSPITAL ENCOUNTER (OUTPATIENT)
Dept: GENERAL RADIOLOGY | Facility: CLINIC | Age: 74
Discharge: HOME | End: 2024-07-17
Attending: PAIN MEDICINE | Admitting: PAIN MEDICINE
Payer: COMMERCIAL

## 2024-07-17 DIAGNOSIS — M25.562 PAIN IN LEFT KNEE: ICD-10-CM

## 2024-07-17 DIAGNOSIS — M25.561 PAIN IN RIGHT KNEE: ICD-10-CM

## 2024-07-17 PROCEDURE — 73560 X-RAY EXAM OF KNEE 1 OR 2: CPT | Mod: 50

## 2024-07-30 DIAGNOSIS — L81.4 LENTIGO: ICD-10-CM

## 2024-07-30 DIAGNOSIS — L82.1 SK (SEBORRHEIC KERATOSIS): ICD-10-CM

## 2024-07-30 DIAGNOSIS — Z85.828 HISTORY OF SKIN CANCER: ICD-10-CM

## 2024-07-30 DIAGNOSIS — L91.0 HYPERTROPHIC SCAR: ICD-10-CM

## 2024-08-01 RX ORDER — LOSARTAN POTASSIUM 50 MG/1
50 TABLET ORAL 2 TIMES DAILY
Qty: 180 TABLET | Refills: 0 | Status: SHIPPED | OUTPATIENT
Start: 2024-08-01

## 2024-08-01 NOTE — TELEPHONE ENCOUNTER
Pending Prescriptions:                       Disp   Refills    losartan (COZAAR) 50 MG tablet [Pharmacy *180 ta*0            Sig: Take 1 tablet by mouth twice daily    Routing refill request to provider for review/approval because:   Medication indicated for associated diagnosis       Mitchel Arias RN

## 2024-08-06 DIAGNOSIS — B00.9 HSV-2 (HERPES SIMPLEX VIRUS 2) INFECTION: ICD-10-CM

## 2024-08-06 DIAGNOSIS — F41.9 ANXIETY: ICD-10-CM

## 2024-08-06 DIAGNOSIS — K21.9 GASTROESOPHAGEAL REFLUX DISEASE, UNSPECIFIED WHETHER ESOPHAGITIS PRESENT: ICD-10-CM

## 2024-08-07 RX ORDER — VALACYCLOVIR HYDROCHLORIDE 500 MG/1
500 TABLET, FILM COATED ORAL 2 TIMES DAILY
Qty: 60 TABLET | Refills: 1 | Status: SHIPPED | OUTPATIENT
Start: 2024-08-07

## 2024-08-07 RX ORDER — CITALOPRAM HYDROBROMIDE 20 MG/1
20 TABLET ORAL DAILY
Qty: 90 TABLET | Refills: 0 | Status: SHIPPED | OUTPATIENT
Start: 2024-08-07

## 2024-08-07 NOTE — TELEPHONE ENCOUNTER
Sent patient [employer] message to update PHQ9/GAD7.     Bailee Kahler on 8/7/2024 at 2:25 PM

## 2024-08-09 ENCOUNTER — PATIENT OUTREACH (OUTPATIENT)
Dept: CARE COORDINATION | Facility: CLINIC | Age: 74
End: 2024-08-09
Payer: COMMERCIAL

## 2024-09-04 ENCOUNTER — OFFICE VISIT (OUTPATIENT)
Dept: FAMILY MEDICINE | Facility: CLINIC | Age: 74
End: 2024-09-04
Payer: COMMERCIAL

## 2024-09-04 VITALS
SYSTOLIC BLOOD PRESSURE: 130 MMHG | DIASTOLIC BLOOD PRESSURE: 62 MMHG | HEIGHT: 63 IN | BODY MASS INDEX: 28.92 KG/M2 | OXYGEN SATURATION: 96 % | WEIGHT: 163.2 LBS | TEMPERATURE: 99.1 F | RESPIRATION RATE: 20 BRPM | HEART RATE: 78 BPM

## 2024-09-04 DIAGNOSIS — I10 ESSENTIAL HYPERTENSION: Primary | ICD-10-CM

## 2024-09-04 DIAGNOSIS — G89.29 CHRONIC LOW BACK PAIN, UNSPECIFIED BACK PAIN LATERALITY, UNSPECIFIED WHETHER SCIATICA PRESENT: ICD-10-CM

## 2024-09-04 DIAGNOSIS — F41.8 DEPRESSION WITH ANXIETY: ICD-10-CM

## 2024-09-04 DIAGNOSIS — M54.50 CHRONIC LOW BACK PAIN, UNSPECIFIED BACK PAIN LATERALITY, UNSPECIFIED WHETHER SCIATICA PRESENT: ICD-10-CM

## 2024-09-04 PROCEDURE — 99214 OFFICE O/P EST MOD 30 MIN: CPT | Mod: 25 | Performed by: FAMILY MEDICINE

## 2024-09-04 PROCEDURE — 90662 IIV NO PRSV INCREASED AG IM: CPT | Performed by: FAMILY MEDICINE

## 2024-09-04 PROCEDURE — G0008 ADMIN INFLUENZA VIRUS VAC: HCPCS | Performed by: FAMILY MEDICINE

## 2024-09-04 ASSESSMENT — PAIN SCALES - GENERAL: PAINLEVEL: SEVERE PAIN (6)

## 2024-09-04 NOTE — PROGRESS NOTES
Assessment & Plan     Essential hypertension  Blood pressure within target goal of less than 140/90.  Will continue losartan, regular walks and healthy diet    Depression with anxiety  Recommended to continue citalopram    Chronic low back pain, unspecified back pain laterality, unspecified whether sciatica present  Chronic low back pain, degenerative disc disease, had steroid injection about 2 weeks ago.  Recommended over-the-counter oral/topical analgesia, well hydration and to continue following pain management      Subjective   Siomara is a 74 year old, presenting for the following health issues:  Recheck Medication        9/4/2024    10:43 AM   Additional Questions   Roomed by Cris VALENTE CMA     History of Present Illness       Reason for visit:  Update medications   She is taking medications regularly.     Chronic/Recurring Back Pain Follow Up  Where is your back pain located? (Select all that apply) low back bilateral  How would you describe your back pain?  dull ache, gnawing, sharp, and shooting  Where does your back pain spread? nowhere  Since your last clinic visit for back pain, how has your pain changed? always present, but gets better and worse  Does your back pain interfere with your job? Not applicable  Since your last visit, have you tried any new treatment? Yes -  met with a pain clinic provider, got a shot for it, but it only worked for about a week    Hypertension Follow-up  Do you check your blood pressure regularly outside of the clinic? No   Are you following a low salt diet? Yes  Are your blood pressures ever more than 140 on the top number (systolic) OR more   than 90 on the bottom number (diastolic), for example 140/90? No    Depression and Anxiety   How are you doing with your depression since your last visit? No change  How are you doing with your anxiety since your last visit?  No change  Are you having other symptoms that might be associated with depression or anxiety? No  Have you had  "a significant life event? No   Do you have any concerns with your use of alcohol or other drugs? No  States her Celexa was prescribed because she was \"real nervous\", but that she does not have depression or anxiety.        Social History     Tobacco Use    Smoking status: Former     Current packs/day: 0.00     Average packs/day: 0.5 packs/day for 19.0 years (9.5 ttl pk-yrs)     Types: Cigarettes     Start date: 1965     Quit date: 1984     Years since quittin.6     Passive exposure: Past    Smokeless tobacco: Never   Vaping Use    Vaping status: Never Used   Substance Use Topics    Alcohol use: Not Currently     Comment: Rare    Drug use: No         2024    11:43 AM 2024     6:20 PM   PHQ   PHQ-9 Total Score 1 0   Q9: Thoughts of better off dead/self-harm past 2 weeks Not at all Not at all         2024    10:52 AM 2024     6:20 PM   CORAZON-7 SCORE   Total Score 1 (minimal anxiety) 0 (minimal anxiety)   Total Score 1 0         Review of Systems  Constitutional, neuro, ENT, endocrine, pulmonary, cardiac, gastrointestinal, genitourinary, musculoskeletal, integument and psychiatric systems are negative, except as otherwise noted.      Objective    /62 (BP Location: Left arm, Patient Position: Sitting, Cuff Size: Adult Regular)   Pulse 78   Temp 99.1  F (37.3  C) (Tympanic)   Resp 20   Ht 1.6 m (5' 3\")   Wt 74 kg (163 lb 3.2 oz)   LMP  (LMP Unknown)   SpO2 96%   BMI 28.91 kg/m    Body mass index is 28.91 kg/m .  Physical Exam   GENERAL: alert and no distress  EYES: Eyes grossly normal to inspection, PERRL and conjunctivae and sclerae normal  NECK: no adenopathy, no asymmetry, masses, or scars  RESP: lungs clear to auscultation - no rales, rhonchi or wheezes  CV: regular rate and rhythm, normal S1 S2, no S3 or S4, no murmur, click or rub, no peripheral edema  ABDOMEN: soft, nontender, no hepatosplenomegaly, no masses and bowel sounds normal  MS: No spinal/paraspinal tenderness, skin " discoloration noted, normal lower extremities strength  NEURO: Normal strength and tone, mentation intact and speech normal  PSYCH: mentation appears normal and anxious        Signed Electronically by: Rafael Mcdonald MD

## 2024-09-17 DIAGNOSIS — M85.80 OSTEOPENIA, UNSPECIFIED LOCATION: ICD-10-CM

## 2024-09-17 DIAGNOSIS — S32.010D COMPRESSION FRACTURE OF L1 VERTEBRA WITH ROUTINE HEALING, SUBSEQUENT ENCOUNTER: ICD-10-CM

## 2024-09-17 RX ORDER — ALENDRONATE SODIUM 10 MG/1
10 TABLET ORAL
Qty: 90 TABLET | Refills: 3 | Status: SHIPPED | OUTPATIENT
Start: 2024-09-17

## 2024-10-19 DIAGNOSIS — B00.9 HSV-2 (HERPES SIMPLEX VIRUS 2) INFECTION: ICD-10-CM

## 2024-10-21 RX ORDER — VALACYCLOVIR HYDROCHLORIDE 500 MG/1
500 TABLET, FILM COATED ORAL 2 TIMES DAILY
Qty: 90 TABLET | Refills: 2 | Status: SHIPPED | OUTPATIENT
Start: 2024-10-21

## 2024-10-26 DIAGNOSIS — L81.4 LENTIGO: ICD-10-CM

## 2024-10-26 DIAGNOSIS — Z85.828 HISTORY OF SKIN CANCER: ICD-10-CM

## 2024-10-26 DIAGNOSIS — L82.1 SK (SEBORRHEIC KERATOSIS): ICD-10-CM

## 2024-10-26 DIAGNOSIS — L91.0 HYPERTROPHIC SCAR: ICD-10-CM

## 2024-10-29 RX ORDER — LOSARTAN POTASSIUM 50 MG/1
50 TABLET ORAL 2 TIMES DAILY
Qty: 180 TABLET | Refills: 0 | Status: SHIPPED | OUTPATIENT
Start: 2024-10-29

## 2024-10-31 DIAGNOSIS — F41.9 ANXIETY: ICD-10-CM

## 2024-10-31 RX ORDER — CITALOPRAM HYDROBROMIDE 20 MG/1
20 TABLET ORAL DAILY
Qty: 90 TABLET | Refills: 1 | Status: SHIPPED | OUTPATIENT
Start: 2024-10-31

## 2025-02-06 ENCOUNTER — OFFICE VISIT (OUTPATIENT)
Dept: DERMATOLOGY | Facility: CLINIC | Age: 75
End: 2025-02-06
Payer: COMMERCIAL

## 2025-02-06 DIAGNOSIS — Z85.828 HISTORY OF SCC (SQUAMOUS CELL CARCINOMA) OF SKIN: ICD-10-CM

## 2025-02-06 DIAGNOSIS — Z85.828 HISTORY OF BASAL CELL CARCINOMA (BCC): ICD-10-CM

## 2025-02-06 DIAGNOSIS — D18.01 ANGIOMA OF SKIN: ICD-10-CM

## 2025-02-06 DIAGNOSIS — D48.5 NEOPLASM OF UNCERTAIN BEHAVIOR OF SKIN: ICD-10-CM

## 2025-02-06 DIAGNOSIS — L81.4 LENTIGO: ICD-10-CM

## 2025-02-06 DIAGNOSIS — L82.1 SEBORRHEIC KERATOSIS: ICD-10-CM

## 2025-02-06 DIAGNOSIS — D22.9 NEVUS: Primary | ICD-10-CM

## 2025-02-06 ASSESSMENT — PAIN SCALES - GENERAL: PAINLEVEL_OUTOF10: NO PAIN (0)

## 2025-02-06 NOTE — PATIENT INSTRUCTIONS
Wound Care Instructions     FOR SUPERFICIAL WOUNDS     [facility]  [phone]                 AFTER 24 HOURS YOU SHOULD REMOVE THE BANDAGE AND BEGIN DAILY DRESSING CHANGES AS FOLLOWS:     1) Remove Dressing.     2) Clean and dry the area with tap water using a Q-tip or sterile gauze pad.     3) Apply Vaseline, Aquaphor, Polysporin ointment or Bacitracin ointment over entire wound.  Do NOT use Neosporin ointment.     4) Cover the wound with a band-aid, or a sterile non-stick gauze pad and micropore paper tape    REPEAT THESE INSTRUCTIONS AT LEAST ONCE A DAY UNTIL THE WOUND HAS COMPLETELY HEALED.    It is an old wives tale that a wound heals better when it is exposed to air and allowed to dry out. The wound will heal faster with a better cosmetic result if it is kept moist with ointment and covered with a bandage.    **Do not let the wound dry out.**    Supplies Needed:      *Cotton tipped applicators (Q-tips)    *Vaseline, Aquaphor, Polysporin or Bacitracin Ointment (NOT NEOSPORIN)    *Band-aids or non-stick gauze pads and micropore paper tape.      PATIENT INFORMATION:    During the healing process you will notice a number of changes. All wounds develop a small halo of redness surrounding the wound.  This means healing is occurring. Severe itching with extensive redness usually indicates sensitivity to the ointment or bandage tape used to dress the wound.  You should call our office if this develops.      Swelling  and/or discoloration around your surgical site is common, particularly when performed around the eye.    All wounds normally drain.  The larger the wound the more drainage there will be.  After 7-10 days, you will notice the wound beginning to shrink and new skin will begin to grow.  The wound is healed when you can see skin has formed over the entire area.  A healed wound has a healthy, shiny look to the surface and is red to dark pink in color to normalize.  Wounds may  take approximately 4-6 weeks to heal.  Larger wounds may take 6-8 weeks.  After the wound is healed you may discontinue dressing changes.    You may experience a sensation of tightness as your wound heals. This is normal and will gradually subside.    Your healed wound may be sensitive to temperature changes. This sensitivity improves with time, but if you re having a lot of discomfort, try to avoid temperature extremes.    Patients frequently experience itching after their wound appears to have healed because of the continue healing under the skin.  Plain Vaseline will help relieve the itching.      POSSIBLE COMPLICATIONS    BLEEDING:    Leave the bandage in place.  Use tightly rolled up gauze or a cloth to apply direct pressure over the bandage for 30  minutes.  Reapply pressure for an additional 30 minutes if necessary  Use additional gauze and tape to maintain pressure once the bleeding has stopped.

## 2025-02-06 NOTE — PROGRESS NOTES
HPI:   Chief complaints: Siomara Hansen is a 74 year old female who presents for Full skin cancer screening to rule out skin cancer   Last Skin Exam: 2.5 years ago      1st Baseline: no  Personal HX of Skin Cancer: yes BCC and SCC   Personal HX of Malignant Melanoma: no   Family HX of Skin Cancer / Malignant Melanoma: no  Personal HX of Atypical Moles:   no  Risk factors: history of sun exposure and burns  New / Changing lesions: none  Social History: had horrible foot/ankle break, and compression fractures in the spine  On review of systems, there are no further skin complaints, patient is feeling otherwise well.  See patient intake sheet.  ROS of the following were done and are negative: Constitutional, Eyes, Ears, Nose,   Mouth, Throat, Cardiovascular, Respiratory, GI, Genitourinary, Musculoskeletal,   Psychiatric, Endocrine, Allergic/Immunologic.    PHYSICAL EXAM:   LMP  (LMP Unknown)   Skin exam performed as follows: Type 2 skin. Mood appropriate  Alert and Oriented X 3. Well developed, well nourished in no distress.  General appearance: Normal  Head including face: Normal  Eyes: conjunctiva and lids: Normal  Mouth: Lips, teeth, gums: Normal  Neck: Normal  Chest-breast/axillae: Normal  Back: Normal  Spleen and liver: Normal  Cardiovascular: Exam of peripheral vascular system by observation for swelling, varicosities, edema: Normal  Genitalia: groin, buttocks: Normal  Extremities: digits/nails (clubbing): Normal  Eccrine and Apocrine glands: Normal  Right upper extremity: Normal  Left upper extremity: Normal  Right lower extremity: Normal  Left lower extremity: Normal  Skin: Scalp and body hair: See below    Pt deferred exam of breasts, groin, buttocks: No    Other physical findings:  1. Multiple pigmented macules on extremities and trunk  2. Multiple pigmented macules on face, trunk and extremities  3. Multiple vascular papules on trunk, arms and legs  4. Multiple scattered keratotic plaques  5. 8 mm pink  papule on the right upper arm  6. 7 mm pink macule on the right posterior shoulder  7. 5 mm pink papule on the central back  8. 6 mm pink papule on the left chest         Except as noted above, no other signs of skin cancer or melanoma.     ASSESSMENT/PLAN:   Benign Full skin cancer screening today. . Patient with history of BCC and SCC   Advised on monthly self exams and 1 year  Patient Education: Appropriate brochures given.    Multiple benign appearing nevi on arms, legs and trunk. Discussed ABCDEs of melanoma and sunscreen.   Multiple lentigos on arms, legs and trunk. Advised benign, no treatment needed.  Multiple scattered angiomas. Advised benign, no treatment needed.   Seborrheic keratosis on arms, legs and trunk. Advised benign, no treatment needed.  R/o BCC on the right upper arm, right posterior shoulder, central back, left chest.Shave biopsy performed.  Area cleaned and anesthetized with 1% lidocaine with epinephrine.  Dermablade used to remove the lesion and sent to pathology. Bleeding was cauterized. Pt tolerated procedure well with no complications.                 Follow-up: yearly FSE/PRN sooner    1.) Patient was asked about new and changing moles. YES  2.) Patient received a complete physical skin examination: YES  3.) Patient was counseled to perform a monthly self skin examination: YES  Scribed By: Suellen Small MS, ALEX

## 2025-02-06 NOTE — NURSING NOTE
Siomara Hansen's chief complaint for this visit includes:  Chief Complaint   Patient presents with    Skin Check     Patient is here for a skin check and has no specific concerns. Patient has hx of BCC.      PCP: Rafael Mcdonald    Referring Provider:  Referred Self, MD  No address on file    LMP  (LMP Unknown)   No Pain (0)        Allergies   Allergen Reactions    Asa [Aspirin] Swelling    Hydrocortisone      Erythema      Zoster Vaccine Live      Did get red rash at injection site that lasted for about 3 days.         Do you need any medication refills at today's visit? No    Gabby Julio MA

## 2025-02-06 NOTE — LETTER
2/6/2025      Siomara Hansen  [address]  [place] [address]      Dear Colleague,    Thank you for referring your patient, Siomara Hansen, to the [facility]. Please see a copy of my visit note below.    HPI:   Chief complaints: Siomara Hansen is a 74 year old female who presents for Full skin cancer screening to rule out skin cancer   Last Skin Exam: 2.5 years ago      1st Baseline: no  Personal HX of Skin Cancer: yes BCC and SCC   Personal HX of Malignant Melanoma: no   Family HX of Skin Cancer / Malignant Melanoma: no  Personal HX of Atypical Moles:   no  Risk factors: history of sun exposure and burns  New / Changing lesions: none  Social History: had horrible foot/ankle break, and compression fractures in the spine  On review of systems, there are no further skin complaints, patient is feeling otherwise well.  See patient intake sheet.  ROS of the following were done and are negative: Constitutional, Eyes, Ears, Nose,   Mouth, Throat, Cardiovascular, Respiratory, GI, Genitourinary, Musculoskeletal,   Psychiatric, Endocrine, Allergic/Immunologic.    PHYSICAL EXAM:   LMP  (LMP Unknown)   Skin exam performed as follows: Type 2 skin. Mood appropriate  Alert and Oriented X 3. Well developed, well nourished in no distress.  General appearance: Normal  Head including face: Normal  Eyes: conjunctiva and lids: Normal  Mouth: Lips, teeth, gums: Normal  Neck: Normal  Chest-breast/axillae: Normal  Back: Normal  Spleen and liver: Normal  Cardiovascular: Exam of peripheral vascular system by observation for swelling, varicosities, edema: Normal  Genitalia: groin, buttocks: Normal  Extremities: digits/nails (clubbing): Normal  Eccrine and Apocrine glands: Normal  Right upper extremity: Normal  Left upper extremity: Normal  Right lower extremity: Normal  Left lower extremity: Normal  Skin: Scalp and body hair: See below    Pt deferred exam of breasts, groin, buttocks: No    Other  physical findings:  1. Multiple pigmented macules on extremities and trunk  2. Multiple pigmented macules on face, trunk and extremities  3. Multiple vascular papules on trunk, arms and legs  4. Multiple scattered keratotic plaques  5. 8 mm pink papule on the right upper arm  6. 7 mm pink macule on the right posterior shoulder  7. 5 mm pink papule on the central back  8. 6 mm pink papule on the left chest         Except as noted above, no other signs of skin cancer or melanoma.     ASSESSMENT/PLAN:   Benign Full skin cancer screening today. . Patient with history of BCC and SCC   Advised on monthly self exams and 1 year  Patient Education: Appropriate brochures given.    Multiple benign appearing nevi on arms, legs and trunk. Discussed ABCDEs of melanoma and sunscreen.   Multiple lentigos on arms, legs and trunk. Advised benign, no treatment needed.  Multiple scattered angiomas. Advised benign, no treatment needed.   Seborrheic keratosis on arms, legs and trunk. Advised benign, no treatment needed.  R/o BCC on the right upper arm, right posterior shoulder, central back, left chest.Shave biopsy performed.  Area cleaned and anesthetized with 1% lidocaine with epinephrine.  Dermablade used to remove the lesion and sent to pathology. Bleeding was cauterized. Pt tolerated procedure well with no complications.                 Follow-up: yearly FSE/PRN sooner    1.) Patient was asked about new and changing moles. YES  2.) Patient received a complete physical skin examination: YES  3.) Patient was counseled to perform a monthly self skin examination: YES  Scribed By: Suellen Small MS, PAGLENN        Again, thank you for allowing me to participate in the care of your patient.        Sincerely,        Suellen Small PA-C    Electronically signed

## 2025-02-10 ENCOUNTER — TELEPHONE (OUTPATIENT)
Dept: DERMATOLOGY | Facility: CLINIC | Age: 75
End: 2025-02-10
Payer: COMMERCIAL

## 2025-02-10 DIAGNOSIS — D48.5 NEOPLASM OF UNCERTAIN BEHAVIOR OF SKIN: Primary | ICD-10-CM

## 2025-02-10 LAB
PATH REPORT.COMMENTS IMP SPEC: ABNORMAL
PATH REPORT.COMMENTS IMP SPEC: ABNORMAL
PATH REPORT.COMMENTS IMP SPEC: YES
PATH REPORT.FINAL DX SPEC: ABNORMAL
PATH REPORT.GROSS SPEC: ABNORMAL
PATH REPORT.MICROSCOPIC SPEC OTHER STN: ABNORMAL
PATH REPORT.RELEVANT HX SPEC: ABNORMAL

## 2025-02-10 NOTE — TELEPHONE ENCOUNTER
----- Message from Suellen Nair sent at 2/10/2025  4:05 PM CST -----  Right upper arm, right posterior shoulder, left chest all superficial BCC - option of excision vs imiquimod cream M-F x 6 weeks.   Central back benign lichenoid keratosis no further treatment

## 2025-02-11 NOTE — TELEPHONE ENCOUNTER
Called patient:  Patient notified and educated on test results   Mohs procedure explained- all questions answered  appointment scheduled- mohs packet mailed.     Thank you,  Rebecca RENTERIA RN  Dermatology   [phone]

## 2025-02-12 ENCOUNTER — OFFICE VISIT (OUTPATIENT)
Dept: DERMATOLOGY | Facility: CLINIC | Age: 75
End: 2025-02-12
Payer: COMMERCIAL

## 2025-02-12 DIAGNOSIS — C44.612 BASAL CELL CARCINOMA (BCC) OF RIGHT UPPER ARM: ICD-10-CM

## 2025-02-12 DIAGNOSIS — C44.612 BASAL CELL CARCINOMA (BCC) OF RIGHT SHOULDER: Primary | ICD-10-CM

## 2025-02-12 PROCEDURE — 88331 PATH CONSLTJ SURG 1 BLK 1SPC: CPT | Performed by: DERMATOLOGY

## 2025-02-12 PROCEDURE — 11602 EXC TR-EXT MAL+MARG 1.1-2 CM: CPT | Mod: 59 | Performed by: DERMATOLOGY

## 2025-02-12 PROCEDURE — 11602 EXC TR-EXT MAL+MARG 1.1-2 CM: CPT | Performed by: DERMATOLOGY

## 2025-02-12 ASSESSMENT — PAIN SCALES - GENERAL: PAINLEVEL_OUTOF10: NO PAIN (0)

## 2025-02-12 NOTE — NURSING NOTE
Chief Complaint   Patient presents with    Derm Problem     Mohs R upper arm BCC        There were no vitals filed for this visit.  Wt Readings from Last 1 Encounters:   09/04/24 74 kg (163 lb 3.2 oz)       Caro Haji LPN .................2/12/2025

## 2025-02-12 NOTE — PATIENT INSTRUCTIONS
Open Wound Care     for ____Right Upper Arm__________        No strenuous activity for 48 hours    Take Tylenol as needed for discomfort.                                                .         Do not drink alcoholic beverages for 48 hours.    Keep the pressure bandage in place for 24 hours. If the bandage becomes blood tinged or loose, reinforce it with gauze and tape.        (Refer to the reverse side of this page for management of bleeding).    Remove bandage in 24 hours and begin wound care as follows:     Clean area with tap water using a Q tip or gauze pad, (shower / bathe normally)  Dry wound with Q tip or gauze pad  Apply Aquaphor, Vaseline, Polysporin or Bacitracin Ointment with a Q tip  Do NOT use Neosporin Ointment *  Cover the wound with a band-aid or nonstick gauze pad and paper tape.  Repeat wound care once a day until wound is completely healed.    It is an old wives tale that a wound heals better when it is exposed to air and allowed to dry out. The wound will heal faster with a better cosmetic result if it is kept moist with ointment and covered with a bandage.  Do not let the wound dry out.      Supplies Needed:                Qtips or gauze pads                Polysporin or Bacitracin Ointment                Bandaids or nonstick gauze pads and paper tape    Wound care kits and brown paper tape are available for purchase at   the pharmacy.    BLEEDING:    Use tightly rolled up gauze or cloth to apply direct pressure over the bandage for 20   minutes.  Reapply pressure for an additional 20 minutes if necessary  Call the office or go to the nearest emergency room if pressure fails to stop the bleeding.  Use additional gauze and tape to maintain pressure once the bleeding has stopped.  Begin wound care 24 hours after surgery as directed.                  WOUND HEALING    One week after surgery a pink / red halo will form around the outside of the wound.   This is new skin.  The center of the wound  will appear yellowish white and produce some drainage.  The pink halo will slowly migrate in toward the center of the wound until the wound is covered with new shiny pink skin.  There will be no more drainage when the wound is completely healed.  It will take six months to one year for the redness to fade.  The scar may be itchy, tight and sensitive to extreme temperatures for a year after the surgery.  Massaging the area several times a day for several minutes after the wound is completely healed will help the scar soften and normalize faster. Begin massage only after healing is complete.      In case of emergency call: Dr Saab: [phone]    [facility]: [phone]    [facility]:[phone]             Proper skin care from [facility]:    -Eliminate harsh soaps as they strip the natural oils from the skin, often resulting in dry itchy skin ( i.e. Dial, Zest, Urdu Spring)  -Use mild soaps such as Cetaphil or Dove Sensitive Skin in the shower. You do not need to use soap on arms, legs, and trunk every time you shower unless visibly soiled.   -Avoid hot or cold showers.  -After showering, lightly dry off and apply moisturizing within 2-3 minutes. This will help trap moisture in the skin.   -Aggressive use of a moisturizer at least 1-2 times a day to the entire body (including -Vanicream, Cetaphil, Aquaphor or Cerave) and moisturize hands after every washing.  -We recommend using moisturizers that come in a tub that needs to be scooped out, not a pump. This has more of an oil base. It will hold moisture in your skin much better than a water base moisturizer. The above recommended are non-pore clogging.      Wear a sunscreen with at least SPF 30 on your face, ears, neck and V of the chest daily. Wear sunscreen on other areas of the body if those areas are exposed to the sun throughout the day. Sunscreens can contain physical and/or chemical blockers. Physical blockers are less  likely to clog pores, these include zinc oxide and titanium dioxide. Reapply every two hour and after swimming.     Sunscreen examples: https://www.ewg.org/sunscreen/    UV radiation  UVA radiation remains constant throughout the day and throughout the year. It is a longer wavelength than UVB and therefore penetrates deeper into the skin leading to immediate and delayed tanning, photoaging, and skin cancer. 70-80% of UVA and UVB radiation occurs between the hours of 10am-2pm.  UVB radiation  UVB radiation causes the most harmful effects and is more significant during the summer months. However, snow and ice can reflect UVB radiation leading to skin damage during the winter months as well. UVB radiation is responsible for tanning, burning, inflammation, delayed erythema (pinkness), pigmentation (brown spots), and skin cancer.     I recommend self monthly full body exams and yearly full body exams with a dermatology provider. If you develop a new or changing lesion please follow up for examination. Most skin cancers are pink and scaly or pink and pearly. However, we do see blue/brown/black skin cancers.  Consider the ABCDEs of melanoma when giving yourself your monthly full body exam ( don't forget the groin, buttocks, feet, toes, etc). A-asymmetry, B-borders, C-color, D-diameter, E-elevation or evolving. If you see any of these changes please follow up in clinic. If you cannot see your back I recommend purchasing a hand held mirror to use with a larger wall mirror.       Checking for Skin Cancer  You can find cancer early by checking your skin each month. There are 3 kinds of skin cancer. They are melanoma, basal cell carcinoma, and squamous cell carcinoma. Doing monthly skin checks is the best way to find new marks or skin changes. Follow the instructions below for checking your skin.   The ABCDEs of checking moles for melanoma   Check your moles or growths for signs of melanoma using ABCDE:   Asymmetry: the sides  of the mole or growth don t match  Border: the edges are ragged, notched, or blurred  Color: the color within the mole or growth varies  Diameter: the mole or growth is larger than 6 mm (size of a pencil eraser)  Evolving: the size, shape, or color of the mole or growth is changing (evolving is not shown in the images below)    Checking for other types of skin cancer  Basal cell carcinoma or squamous cell carcinoma have symptoms such as:     A spot or mole that looks different from all other marks on your skin  Changes in how an area feels, such as itching, tenderness, or pain  Changes in the skin's surface, such as oozing, bleeding, or scaliness  A sore that does not heal  New swelling or redness beyond the border of a mole    Who s at risk?  Anyone can get skin cancer. But you are at greater risk if you have:   Fair skin, light-colored hair, or light-colored eyes  Many moles or abnormal moles on your skin  A history of sunburns from sunlight or tanning beds  A family history of skin cancer  A history of exposure to radiation or chemicals  A weakened immune system  If you have had skin cancer in the past, you are at risk for recurring skin cancer.   How to check your skin  Do your monthly skin checkups in front of a full-length mirror. Check all parts of your body, including your:   Head (ears, face, neck, and scalp)  Torso (front, back, and sides)  Arms (tops, undersides, upper, and lower armpits)  Hands (palms, backs, and fingers, including under the nails)  Buttocks and genitals  Legs (front, back, and sides)  Feet (tops, soles, toes, including under the nails, and between toes)  If you have a lot of moles, take digital photos of them each month. Make sure to take photos both up close and from a distance. These can help you see if any moles change over time.   Most skin changes are not cancer. But if you see any changes in your skin, call your doctor right away. Only he or she can diagnose a problem. If you have  skin cancer, seeing your doctor can be the first step toward getting the treatment that could save your life.   [employer] last reviewed this educational content on 4/1/2019 2000-2020 [employer]. [address] [place] [address], [place] [address]. All rights reserved. This information is not intended as a substitute for professional medical care. Always follow your healthcare professional's instructions.       When should I call my doctor?  If you are worsening or not improving, please, contact us or seek urgent care as noted below.     Who should I call with questions (adults)?    [facility] [phone]  For urgent needs outside of business hours call the [facility] at [phone] and ask for the dermatology resident on call to be paged  If this is a medical emergency and you are unable to reach an ER, Call [phone]      If you need a prescription refill, please contact your pharmacy. Refills are approved or denied by our Physicians during normal business hours, Monday through Friday.  Per office policy, refills will not be granted if you have not been seen within the past year (or sooner depending on the condition).

## 2025-02-12 NOTE — LETTER
2/12/2025      Siomara Hansen  [address]  [place] [address]      Dear Colleague,    Thank you for referring your patient, Siomara Hansen, to the [facility]. Please see a copy of my visit note below.    Surgical Office Location :   [facility]  [address] [place] [address]      Siomara Hansen is an extremely pleasant 74 year old year old female patient here today for evaluation and managment of basal cell carcinoma on right arm and r shoulder.  Patient has no other skin complaints today.  Remainder of the HPI, Meds, PMH, Allergies, FH, and SH was reviewed in chart.      Past Medical History:   Diagnosis Date     Basal cell carcinoma      Breast cancer ([facility]) 2000     Colon polyp     exam every 5 years     Hypertension     Dont remember date       Past Surgical History:   Procedure Laterality Date     ARTHROPLASTY HIP Right 3/5/2024    Procedure: Right Total Hip Arthroplasty;  Surgeon: Ruperto Silvestre MD;  Location: [facility] OR     BIOPSY  December 1999     BREAST SURGERY  February 2000     COLONOSCOPY  2023     GYN SURGERY  01/01/1986    D & C     KYPHOPLASTY N/A 1/11/2024    Procedure: Lumbar 1 kyphoplasty;  Surgeon: Ramon Hudson MD;  Location: [facility] OR     masectomy[  02/01/2000    Bilateral     ORTHOPEDIC SURGERY  2022        Family History   Problem Relation Age of Onset     Cancer Mother         Lung     Breast Cancer Mother      Cancer Father         Braine     Cancer Brother         Bladder     Heart Disease Brother      Breast Cancer Sister      Breast Cancer Sister      Cancer Brother         Lung     Melanoma No family hx of        Social History     Socioeconomic History     Marital status: Single     Spouse name: Not on file     Number of children: Not on file     Years of education: Not on file     Highest education level: Not on file   Occupational History     Not on file   Tobacco Use     Smoking status: Former     Current  packs/day: 0.00     Average packs/day: 0.5 packs/day for 19.0 years (9.5 ttl pk-yrs)     Types: Cigarettes     Start date: 1965     Quit date: 1984     Years since quittin.1     Passive exposure: Past     Smokeless tobacco: Never   Vaping Use     Vaping status: Never Used   Substance and Sexual Activity     Alcohol use: Not Currently     Comment: Rare     Drug use: No     Sexual activity: Not Currently     Partners: Male     Birth control/protection: Pill   Other Topics Concern     Parent/sibling w/ CABG, MI or angioplasty before 65F 55M? No   Social History Narrative     Not on file     Social Drivers of Health     Financial Resource Strain: Low Risk  (2024)    Received from [facility]    Financial Resource Strain      Difficulty of Paying Living Expenses: 3      Difficulty of Paying Living Expenses: Not on file   Food Insecurity: No Food Insecurity (2024)    Received from [facility]    Food Insecurity      Do you worry your food will run out before you are able to buy more?: 1   Transportation Needs: No Transportation Needs (2024)    Received from [facility]    Transportation Needs      Does lack of transportation keep you from medical appointments?: 1      Does lack of transportation keep you from work, meetings or getting things that you need?: 1   Physical Activity: Not on file   Stress: Not on file   Social Connections: Socially Integrated (2024)    Received from [facility]    Social Connections      Do you often feel lonely or isolated from those around you?: 0   Interpersonal Safety: Low Risk  (2024)    Interpersonal Safety      Do you feel physically and emotionally safe where you currently live?: Yes      Within the past 12 months, have you been hit, slapped, kicked or otherwise physically hurt by someone?: No      Within the past 12  months, have you been humiliated or emotionally abused in other ways by your partner or ex-partner?: No   Housing Stability: Low Risk  (12/7/2024)    Received from [facility]    Housing Stability      What is your housing situation today?: 1       Outpatient Encounter Medications as of 2/12/2025   Medication Sig Dispense Refill     alendronate (FOSAMAX) 10 MG tablet TAKE 1 TABLET BY MOUTH IN THE MORNING BEFORE BREAKFAST 90 tablet 3     calcium citrate-vitamin D (CITRACAL) 315-5 MG-MCG TABS per tablet Take 1 tablet by mouth 2 times daily       citalopram (CELEXA) 20 MG tablet Take 1 tablet by mouth once daily 90 tablet 1     losartan (COZAAR) 50 MG tablet Take 1 tablet by mouth twice daily 180 tablet 0     omeprazole (PRILOSEC) 20 MG DR capsule Take 1 capsule by mouth once daily 90 capsule 1     senna-docusate (SENOKOT-S/PERICOLACE) 8.6-50 MG tablet Take 1-2 tablets by mouth 2 times daily as needed for constipation Take while on oral narcotics to prevent or treat constipation. 30 tablet 0     valACYclovir (VALTREX) 500 MG tablet Take 1 tablet by mouth twice daily 90 tablet 2     No facility-administered encounter medications on file as of 2/12/2025.             O:   NAD, WDWN, Alert & Oriented, Mood & Affect wnl, Vitals stable   General appearance normal   Vitals stable   Alert, oriented and in no acute distress     R arm 8mm pink pearly papule  R post shoulder 8mm pink pearly papule       Eyes: Conjunctivae/lids:Normal     ENT: Lips, mucosa: normal    MSK:Normal    Cardiovascular: peripheral edema none    Pulm: Breathing Normal    Neuro/Psych: Orientation:Alert and Orientedx3 ; Mood/Affect:normal       MICRO:   R arm (blue red):Unremarkable epidermis, dermis and superficial subcutis.  No concerning areas for malignancy.    R post shoulder (green red):Unremarkable epidermis, dermis and superficial subcutis.  No concerning areas for malignancy.       A/P:  Basal cell carcinoma   R arm    EXCISION OF BASAL CELL CARCINOMA, Margins confirmed with FROZEN SECTIONS AND Second intent: After thorough discussion of PGACAC, consent obtained, anesthesia and prep, the margins of the lesion were identified and an incision was made encompassing the lesion with 3mm margin. The incisions were made through the skin and down to and including the superficial subcutis.  The lesion was removed en bloc and submitted for frozen section pathologic review. Clear margins obtained (1.4cm).    REPAIR SECOND INTENT: We discussed the options for wound management in full with the patient including risks/benefits/possible outcomes. Decision made to allow the wound to heal by second intention. EBL minimal; complications none; wound care routine.  The patient was discharged in good condition and will return in one month or prn for wound evaluation.   R post shoulder   EXCISION OF BASAL CELL CARCINOMA, Margins confirmed with FROZEN SECTIONS AND Second intent: After thorough discussion of PGACAC, consent obtained, anesthesia and prep, the margins of the lesion were identified and an incision was made encompassing the lesion with 3mm margin. The incisions were made through the skin and down to and including the superficial subcutis.  The lesion was removed en bloc and submitted for frozen section pathologic review. Clear margins obtained (1.4cm).    REPAIR SECOND INTENT: We discussed the options for wound management in full with the patient including risks/benefits/possible outcomes. Decision made to allow the wound to heal by second intention. EBL minimal; complications none; wound care routine.  The patient was discharged in good condition and will return in one month or prn for wound evaluation.     It was a pleasure speaking to Siomara Hansen today.  Previous clinic notes and pertinent laboratory tests were reviewed prior to Siomara Hansen's visit.  Signs and Symptoms of skin cancer discussed with patient.  Patient encouraged  to perform monthly skin exams.  UV precautions reviewed with patient.  Risks of non-melanoma skin cancer discussed with patient   Return to clinic next appt        Again, thank you for allowing me to participate in the care of your patient.        Sincerely,        Markus Saab MD    Electronically signed

## 2025-02-12 NOTE — PROGRESS NOTES
Siomara Hansen is an extremely pleasant 74 year old year old female patient here today for evaluation and managment of basal cell carcinoma on right arm and r shoulder.  Patient has no other skin complaints today.  Remainder of the HPI, Meds, PMH, Allergies, FH, and SH was reviewed in chart.      Past Medical History:   Diagnosis Date    Basal cell carcinoma     Breast cancer (H)     Colon polyp     exam every 5 years    Hypertension     Dont remember date       Past Surgical History:   Procedure Laterality Date    ARTHROPLASTY HIP Right 3/5/2024    Procedure: Right Total Hip Arthroplasty;  Surgeon: Ruperto Silvestre MD;  Location: [facility]    BIOPSY  1999    BREAST SURGERY  2000    COLONOSCOPY      GYN SURGERY  1986    D & C    KYPHOPLASTY N/A 2024    Procedure: Lumbar 1 kyphoplasty;  Surgeon: Ramon Hudson MD;  Location: [facility]    masectomy[  2000    Bilateral    ORTHOPEDIC SURGERY          Family History   Problem Relation Age of Onset    Cancer Mother         Lung    Breast Cancer Mother     Cancer Father         Braine    Cancer Brother         Bladder    Heart Disease Brother     Breast Cancer Sister     Breast Cancer Sister     Cancer Brother         Lung    Melanoma No family hx of        Social History     Socioeconomic History    Marital status: Single     Spouse name: Not on file    Number of children: Not on file    Years of education: Not on file    Highest education level: Not on file   Occupational History    Not on file   Tobacco Use    Smoking status: Former     Current packs/day: 0.00     Average packs/day: 0.5 packs/day for 19.0 years (9.5 ttl pk-yrs)     Types: Cigarettes     Start date: 1965     Quit date: 1984     Years since quittin.1     Passive exposure: Past    Smokeless tobacco: Never   Vaping Use    Vaping status: Never Used   Substance and Sexual Activity    Alcohol use: Not Currently     Comment: Rare    Drug use:  No    Sexual activity: Not Currently     Partners: Male     Birth control/protection: Pill   Other Topics Concern    Parent/sibling w/ CABG, MI or angioplasty before 65F 55M? No   Social History Narrative    Not on file     Social Drivers of Health     Financial Resource Strain: Low Risk  (12/7/2024)    [facility]    Financial Resource Strain     Difficulty of Paying Living Expenses: 3     Difficulty of Paying Living Expenses: Not on file   Food Insecurity: No Food Insecurity (12/7/2024)    Received from [facility]    Food Insecurity     Do you worry your food will run out before you are able to buy more?: 1   Transportation Needs: No Transportation Needs (12/7/2024)    Received from [facility]    Transportation Needs     Does lack of transportation keep you from medical appointments?: 1     Does lack of transportation keep you from work, meetings or getting things that you need?: 1   Physical Activity: Not on file   Stress: Not on file   Social Connections: Socially Integrated (12/7/2024)    Received from [facility]    Social Connections     Do you often feel lonely or isolated from those around you?: 0   Interpersonal Safety: Low Risk  (9/4/2024)    Interpersonal Safety     Do you feel physically and emotionally safe where you currently live?: Yes     Within the past 12 months, have you been hit, slapped, kicked or otherwise physically hurt by someone?: No     Within the past 12 months, have you been humiliated or emotionally abused in other ways by your partner or ex-partner?: No   Housing Stability: Low Risk  (12/7/2024)    Received from [facility]    Housing Stability     What is your housing situation today?: 1       Outpatient Encounter Medications as of 2/12/2025   Medication Sig Dispense Refill    alendronate (FOSAMAX) 10 MG  tablet TAKE 1 TABLET BY MOUTH IN THE MORNING BEFORE BREAKFAST 90 tablet 3    calcium citrate-vitamin D (CITRACAL) 315-5 MG-MCG TABS per tablet Take 1 tablet by mouth 2 times daily      citalopram (CELEXA) 20 MG tablet Take 1 tablet by mouth once daily 90 tablet 1    losartan (COZAAR) 50 MG tablet Take 1 tablet by mouth twice daily 180 tablet 0    omeprazole (PRILOSEC) 20 MG DR capsule Take 1 capsule by mouth once daily 90 capsule 1    senna-docusate (SENOKOT-S/PERICOLACE) 8.6-50 MG tablet Take 1-2 tablets by mouth 2 times daily as needed for constipation Take while on oral narcotics to prevent or treat constipation. 30 tablet 0    valACYclovir (VALTREX) 500 MG tablet Take 1 tablet by mouth twice daily 90 tablet 2     No facility-administered encounter medications on file as of 2/12/2025.             O:   NAD, WDWN, Alert & Oriented, Mood & Affect wnl, Vitals stable   General appearance normal   Vitals stable   Alert, oriented and in no acute distress     R arm 8mm pink pearly papule  R post shoulder 8mm pink pearly papule       Eyes: Conjunctivae/lids:Normal     ENT: Lips, mucosa: normal    MSK:Normal    Cardiovascular: peripheral edema none    Pulm: Breathing Normal    Neuro/Psych: Orientation:Alert and Orientedx3 ; Mood/Affect:normal       MICRO:   R arm (blue red):Unremarkable epidermis, dermis and superficial subcutis.  No concerning areas for malignancy.    R post shoulder (green red):Unremarkable epidermis, dermis and superficial subcutis.  No concerning areas for malignancy.       A/P:  Basal cell carcinoma   R arm   EXCISION OF BASAL CELL CARCINOMA, Margins confirmed with FROZEN SECTIONS AND Second intent: After thorough discussion of PGACAC, consent obtained, anesthesia and prep, the margins of the lesion were identified and an incision was made encompassing the lesion with 3mm margin. The incisions were made through the skin and down to and including the superficial subcutis.  The lesion was removed en  bloc and submitted for frozen section pathologic review. Clear margins obtained (1.4cm).    REPAIR SECOND INTENT: We discussed the options for wound management in full with the patient including risks/benefits/possible outcomes. Decision made to allow the wound to heal by second intention. EBL minimal; complications none; wound care routine.  The patient was discharged in good condition and will return in one month or prn for wound evaluation.   R post shoulder   EXCISION OF BASAL CELL CARCINOMA, Margins confirmed with FROZEN SECTIONS AND Second intent: After thorough discussion of PGACAC, consent obtained, anesthesia and prep, the margins of the lesion were identified and an incision was made encompassing the lesion with 3mm margin. The incisions were made through the skin and down to and including the superficial subcutis.  The lesion was removed en bloc and submitted for frozen section pathologic review. Clear margins obtained (1.4cm).    REPAIR SECOND INTENT: We discussed the options for wound management in full with the patient including risks/benefits/possible outcomes. Decision made to allow the wound to heal by second intention. EBL minimal; complications none; wound care routine.  The patient was discharged in good condition and will return in one month or prn for wound evaluation.     It was a pleasure speaking to Siomara Hansen today.  Previous clinic notes and pertinent laboratory tests were reviewed prior to Siomara Hansen's visit.  Signs and Symptoms of skin cancer discussed with patient.  Patient encouraged to perform monthly skin exams.  UV precautions reviewed with patient.  Risks of non-melanoma skin cancer discussed with patient   Return to clinic next appt

## 2025-02-17 ENCOUNTER — OFFICE VISIT (OUTPATIENT)
Dept: DERMATOLOGY | Facility: CLINIC | Age: 75
End: 2025-02-17
Payer: COMMERCIAL

## 2025-02-17 DIAGNOSIS — C44.519 BASAL CELL CARCINOMA OF ANTERIOR CHEST: Primary | ICD-10-CM

## 2025-02-17 PROCEDURE — 11602 EXC TR-EXT MAL+MARG 1.1-2 CM: CPT | Mod: 79 | Performed by: DERMATOLOGY

## 2025-02-17 PROCEDURE — 88331 PATH CONSLTJ SURG 1 BLK 1SPC: CPT | Performed by: DERMATOLOGY

## 2025-02-17 NOTE — LETTER
2/17/2025      Siomara Hansen  [address]  [facility] [place] [address]      Dear Colleague,    Thank you for referring your patient, Siomara Hansen, to the [facility]. Please see a copy of my visit note below.    Surgical Office Location :   [facility]  [address] [place] [address]      Siomara Hansen is an extremely pleasant 74 year old year old female patient here today for evaluation and managment of basal cell carcinoma on left chest.  Previous sites healing well.  Patient has no other skin complaints today.  Remainder of the HPI, Meds, PMH, Allergies, FH, and SH was reviewed in chart.      Past Medical History:   Diagnosis Date     Basal cell carcinoma      Breast cancer (H) 2000     Colon polyp     exam every 5 years     Hypertension     Dont remember date       Past Surgical History:   Procedure Laterality Date     ARTHROPLASTY HIP Right 3/5/2024    Procedure: Right Total Hip Arthroplasty;  Surgeon: Ruperto Silvestre MD;  Location: [facility]     BIOPSY  December 1999     BREAST SURGERY  February 2000     COLONOSCOPY  2023     GYN SURGERY  01/01/1986    D & C     KYPHOPLASTY N/A 1/11/2024    Procedure: Lumbar 1 kyphoplasty;  Surgeon: Ramon Hudson MD;  Location: [facility]     masectomy[  02/01/2000    Bilateral     ORTHOPEDIC SURGERY  2022        Family History   Problem Relation Age of Onset     Cancer Mother         Lung     Breast Cancer Mother      Cancer Father         Braine     Cancer Brother         Bladder     Heart Disease Brother      Breast Cancer Sister      Breast Cancer Sister      Cancer Brother         Lung     Melanoma No family hx of        Social History     Socioeconomic History     Marital status: Single     Spouse name: Not on file     Number of children: Not on file     Years of education: Not on file     Highest education level: Not on file   Occupational History     Not on file   Tobacco Use     Smoking status: Former      Current packs/day: 0.00     Average packs/day: 0.5 packs/day for 19.0 years (9.5 ttl pk-yrs)     Types: Cigarettes     Start date: 1965     Quit date: 1984     Years since quittin.1     Passive exposure: Past     Smokeless tobacco: Never   Vaping Use     Vaping status: Never Used   Substance and Sexual Activity     Alcohol use: Not Currently     Comment: Rare     Drug use: No     Sexual activity: Not Currently     Partners: Male     Birth control/protection: Pill   Other Topics Concern     Parent/sibling w/ CABG, MI or angioplasty before 65F 55M? No   Social History Narrative     Not on file     Social Drivers of Health     Financial Resource Strain: Low Risk  (2024)    Received from [facility]    Financial Resource Strain      Difficulty of Paying Living Expenses: 3      Difficulty of Paying Living Expenses: Not on file   Food Insecurity: No Food Insecurity (2024)    Received from [facility]    Food Insecurity      Do you worry your food will run out before you are able to buy more?: 1   Transportation Needs: No Transportation Needs (2024)    Received from [facility]    Transportation Needs      Does lack of transportation keep you from medical appointments?: 1      Does lack of transportation keep you from work, meetings or getting things that you need?: 1   Physical Activity: Not on file   Stress: Not on file   Social Connections: Socially Integrated (2024)    Received from [facility]    Social Connections      Do you often feel lonely or isolated from those around you?: 0   Interpersonal Safety: Low Risk  (2024)    Interpersonal Safety      Do you feel physically and emotionally safe where you currently live?: Yes      Within the past 12 months, have you been hit, slapped, kicked or otherwise physically hurt by someone?: No      Within  the past 12 months, have you been humiliated or emotionally abused in other ways by your partner or ex-partner?: No   Housing Stability: Low Risk  (12/7/2024)    Received [facility]    Housing Stability      What is your housing situation today?: 1       Outpatient Encounter Medications as of 2/17/2025   Medication Sig Dispense Refill     alendronate (FOSAMAX) 10 MG tablet TAKE 1 TABLET BY MOUTH IN THE MORNING BEFORE BREAKFAST 90 tablet 3     calcium citrate-vitamin D (CITRACAL) 315-5 MG-MCG TABS per tablet Take 1 tablet by mouth 2 times daily       citalopram (CELEXA) 20 MG tablet Take 1 tablet by mouth once daily 90 tablet 1     losartan (COZAAR) 50 MG tablet Take 1 tablet by mouth twice daily 180 tablet 0     omeprazole (PRILOSEC) 20 MG DR capsule Take 1 capsule by mouth once daily 90 capsule 1     senna-docusate (SENOKOT-S/PERICOLACE) 8.6-50 MG tablet Take 1-2 tablets by mouth 2 times daily as needed for constipation Take while on oral narcotics to prevent or treat constipation. 30 tablet 0     valACYclovir (VALTREX) 500 MG tablet Take 1 tablet by mouth twice daily 90 tablet 2     No facility-administered encounter medications on file as of 2/17/2025.             O:   NAD, WDWN, Alert & Oriented, Mood & Affect wnl, Vitals stable   General appearance normal   Vitals stable   Alert, oriented and in no acute distress     L chest 6mm scaly papule       Eyes: Conjunctivae/lids:Normal     ENT: Lips, mucosa: normal    MSK:Normal    Cardiovascular: peripheral edema none    Pulm: Breathing Normal    Neuro/Psych: Orientation:Alert and Orientedx3 ; Mood/Affect:normal       MICRO:   L chest:Unremarkable epidermis, dermis and superficial subcutis.  No concerning areas for malignancy.     A/P:  L chest basal cell carcinoma   EXCISION OF BASAL CELL CARCINOMA, Margins confirmed with FROZEN SECTIONS AND Second intent: After thorough discussion of PGACAC, consent obtained, anesthesia and  prep, the margins of the lesion were identified and an incision was made encompassing the lesion with 3mm margin. The incisions were made through the skin and down to and including the superficial subcutis.  The lesion was removed en bloc and submitted for frozen section pathologic review. Clear margins obtained (1.2cm).    REPAIR SECOND INTENT: We discussed the options for wound management in full with the patient including risks/benefits/possible outcomes. Decision made to allow the wound to heal by second intention. EBL minimal; complications none; wound care routine.  The patient was discharged in good condition and will return in one month or prn for wound evaluation.     It was a pleasure speaking to Siomara Hansen today.  Previous clinic notes and pertinent laboratory tests were reviewed prior to Siomara Hansen's visit.  Signs and Symptoms of skin cancer discussed with patient.  Patient encouraged to perform monthly skin exams.  UV precautions reviewed with patient.  Risks of non-melanoma skin cancer discussed with patient   Return to clinic 6 months        Again, thank you for allowing me to participate in the care of your patient.        Sincerely,        Markus Saab MD    Electronically signed

## 2025-02-17 NOTE — PATIENT INSTRUCTIONS
Open Wound Care     for chest        No strenuous activity for 48 hours    Take Tylenol as needed for discomfort.                                                .         Do not drink alcoholic beverages for 48 hours.    Keep the pressure bandage in place for 24 hours. If the bandage becomes blood tinged or loose, reinforce it with gauze and tape.        (Refer to the reverse side of this page for management of bleeding).    Remove bandage in 24 hours and begin wound care as follows:     Clean area with tap water using a Q tip or gauze pad, (shower / bathe normally)  Dry wound with Q tip or gauze pad  Apply Aquaphor, Vaseline, Polysporin or Bacitracin Ointment with a Q tip  Do NOT use Neosporin Ointment *  Cover the wound with a band-aid or nonstick gauze pad and paper tape.  Repeat wound care once a day until wound is completely healed.    It is an old wives tale that a wound heals better when it is exposed to air and allowed to dry out. The wound will heal faster with a better cosmetic result if it is kept moist with ointment and covered with a bandage.  Do not let the wound dry out.      Supplies Needed:                Qtips or gauze pads                Polysporin or Bacitracin Ointment                Bandaids or nonstick gauze pads and paper tape    Wound care kits and brown paper tape are available for purchase at   the pharmacy.    BLEEDING:    Use tightly rolled up gauze or cloth to apply direct pressure over the bandage for 20   minutes.  Reapply pressure for an additional 20 minutes if necessary  Call the office or go to the nearest emergency room if pressure fails to stop the bleeding.  Use additional gauze and tape to maintain pressure once the bleeding has stopped.  Begin wound care 24 hours after surgery as directed.                  WOUND HEALING    One week after surgery a pink / red halo will form around the outside of the wound.   This is new skin.  The center of the wound will appear yellowish  white and produce some drainage.  The pink halo will slowly migrate in toward the center of the wound until the wound is covered with new shiny pink skin.  There will be no more drainage when the wound is completely healed.  It will take six months to one year for the redness to fade.  The scar may be itchy, tight and sensitive to extreme temperatures for a year after the surgery.  Massaging the area several times a day for several minutes after the wound is completely healed will help the scar soften and normalize faster. Begin massage only after healing is complete.      In case of emergency call: Dr Saab: [phone]    [facility]: [phone]    [facility]:[phone]

## 2025-02-17 NOTE — PROGRESS NOTES
Siomara Hansen is an extremely pleasant 74 year old year old female patient here today for evaluation and managment of basal cell carcinoma on left chest.  Previous sites healing well.  Patient has no other skin complaints today.  Remainder of the HPI, Meds, PMH, Allergies, FH, and SH was reviewed in chart.      Past Medical History:   Diagnosis Date    Basal cell carcinoma     Breast cancer (H)     Colon polyp     exam every 5 years    Hypertension     Dont remember date       Past Surgical History:   Procedure Laterality Date    ARTHROPLASTY HIP Right 3/5/2024    Procedure: Right Total Hip Arthroplasty;  Surgeon: Ruperto Silvestre MD;  Location: [facility]    BIOPSY  1999    BREAST SURGERY  2000    COLONOSCOPY      GYN SURGERY  1986    D & C    KYPHOPLASTY N/A 2024    Procedure: Lumbar 1 kyphoplasty;  Surgeon: Ramon Hudson MD;  Location: [facility]    masectomy[  2000    Bilateral    ORTHOPEDIC SURGERY          Family History   Problem Relation Age of Onset    Cancer Mother         Lung    Breast Cancer Mother     Cancer Father         Braine    Cancer Brother         Bladder    Heart Disease Brother     Breast Cancer Sister     Breast Cancer Sister     Cancer Brother         Lung    Melanoma No family hx of        Social History     Socioeconomic History    Marital status: Single     Spouse name: Not on file    Number of children: Not on file    Years of education: Not on file    Highest education level: Not on file   Occupational History    Not on file   Tobacco Use    Smoking status: Former     Current packs/day: 0.00     Average packs/day: 0.5 packs/day for 19.0 years (9.5 ttl pk-yrs)     Types: Cigarettes     Start date: 1965     Quit date: 1984     Years since quittin.1     Passive exposure: Past    Smokeless tobacco: Never   Vaping Use    Vaping status: Never Used   Substance and Sexual Activity    Alcohol use: Not Currently     Comment:  Rare    Drug use: No    Sexual activity: Not Currently     Partners: Male     Birth control/protection: Pill   Other Topics Concern    Parent/sibling w/ CABG, MI or angioplasty before 65F 55M? No   Social History Narrative    Not on file     Social Drivers of Health     Financial Resource Strain: Low Risk  (12/7/2024)    [facility] from [facility]    Financial Resource Strain     Difficulty of Paying Living Expenses: 3     Difficulty of Paying Living Expenses: Not on file   Food Insecurity: No Food Insecurity (12/7/2024)    Received from [facility]    Food Insecurity     Do you worry your food will run out before you are able to buy more?: 1   Transportation Needs: No Transportation Needs (12/7/2024)    Received from [facility] beds at the [facility]    Transportation Needs     Does lack of transportation keep you from medical appointments?: 1     Does lack of transportation keep you from work, meetings or getting things that you need?: 1   Physical Activity: Not on file   Stress: Not on file   Social Connections: Socially Integrated (12/7/2024)    Received from [facility] include [facility] beds at the [facility]    Social Connections     Do you often feel lonely or isolated from those around you?: 0   Interpersonal Safety: Low Risk  (9/4/2024)    Interpersonal Safety     Do you feel physically and emotionally safe where you currently live?: Yes     Within the past 12 months, have you been hit, slapped, kicked or otherwise physically hurt by someone?: No     Within the past 12 months, have you been humiliated or emotionally abused in other ways by your partner or ex-partner?: No   Housing Stability: Low Risk  (12/7/2024)    Received from [facility]    Housing Stability     What is your housing situation today?: 1       Outpatient Encounter Medications as of 2/17/2025   Medication Sig Dispense Refill    alendronate  (FOSAMAX) 10 MG tablet TAKE 1 TABLET BY MOUTH IN THE MORNING BEFORE BREAKFAST 90 tablet 3    calcium citrate-vitamin D (CITRACAL) 315-5 MG-MCG TABS per tablet Take 1 tablet by mouth 2 times daily      citalopram (CELEXA) 20 MG tablet Take 1 tablet by mouth once daily 90 tablet 1    losartan (COZAAR) 50 MG tablet Take 1 tablet by mouth twice daily 180 tablet 0    omeprazole (PRILOSEC) 20 MG DR capsule Take 1 capsule by mouth once daily 90 capsule 1    senna-docusate (SENOKOT-S/PERICOLACE) 8.6-50 MG tablet Take 1-2 tablets by mouth 2 times daily as needed for constipation Take while on oral narcotics to prevent or treat constipation. 30 tablet 0    valACYclovir (VALTREX) 500 MG tablet Take 1 tablet by mouth twice daily 90 tablet 2     No facility-administered encounter medications on file as of 2/17/2025.             O:   NAD, WDWN, Alert & Oriented, Mood & Affect wnl, Vitals stable   General appearance normal   Vitals stable   Alert, oriented and in no acute distress     L chest 6mm scaly papule       Eyes: Conjunctivae/lids:Normal     ENT: Lips, mucosa: normal    MSK:Normal    Cardiovascular: peripheral edema none    Pulm: Breathing Normal    Neuro/Psych: Orientation:Alert and Orientedx3 ; Mood/Affect:normal       MICRO:   L chest:Unremarkable epidermis, dermis and superficial subcutis.  No concerning areas for malignancy.     A/P:  L chest basal cell carcinoma   EXCISION OF BASAL CELL CARCINOMA, Margins confirmed with FROZEN SECTIONS AND Second intent: After thorough discussion of PGACAC, consent obtained, anesthesia and prep, the margins of the lesion were identified and an incision was made encompassing the lesion with 3mm margin. The incisions were made through the skin and down to and including the superficial subcutis.  The lesion was removed en bloc and submitted for frozen section pathologic review. Clear margins obtained (1.2cm).    REPAIR SECOND INTENT: We discussed the options for wound management in full  with the patient including risks/benefits/possible outcomes. Decision made to allow the wound to heal by second intention. EBL minimal; complications none; wound care routine.  The patient was discharged in good condition and will return in one month or prn for wound evaluation.     It was a pleasure speaking to Siomara Hansen today.  Previous clinic notes and pertinent laboratory tests were reviewed prior to Siomara Hansen's visit.  Signs and Symptoms of skin cancer discussed with patient.  Patient encouraged to perform monthly skin exams.  UV precautions reviewed with patient.  Risks of non-melanoma skin cancer discussed with patient   Return to clinic 6 months

## 2025-03-08 ENCOUNTER — HEALTH MAINTENANCE LETTER (OUTPATIENT)
Age: 75
End: 2025-03-08

## 2025-04-15 ENCOUNTER — HOSPITAL ENCOUNTER (OUTPATIENT)
Dept: CT IMAGING | Facility: CLINIC | Age: 75
Discharge: HOME | End: 2025-04-15
Attending: PHYSICIAN ASSISTANT | Admitting: PHYSICIAN ASSISTANT
Payer: COMMERCIAL

## 2025-04-15 DIAGNOSIS — R91.8 LUNG NODULES: ICD-10-CM

## 2025-04-15 PROCEDURE — 71250 CT THORAX DX C-: CPT

## 2025-05-01 ENCOUNTER — HOSPITAL ENCOUNTER (OUTPATIENT)
Dept: PET IMAGING | Facility: CLINIC | Age: 75
Discharge: HOME | End: 2025-05-01
Attending: PHYSICIAN ASSISTANT
Payer: COMMERCIAL

## 2025-05-01 DIAGNOSIS — R91.8 LUNG NODULES: ICD-10-CM

## 2025-05-01 PROCEDURE — 343N000001 HC RX 343 MED OP 636: Performed by: PHYSICIAN ASSISTANT

## 2025-05-01 PROCEDURE — A9552 F18 FDG: HCPCS | Performed by: PHYSICIAN ASSISTANT

## 2025-05-01 PROCEDURE — 78816 PET IMAGE W/CT FULL BODY: CPT | Mod: PI

## 2025-05-01 RX ORDER — FLUDEOXYGLUCOSE F 18 200 MCI/ML
10-18 INJECTION, SOLUTION INTRAVENOUS ONCE
Status: COMPLETED | OUTPATIENT
Start: 2025-05-01 | End: 2025-05-01

## 2025-05-01 RX ADMIN — FLUDEOXYGLUCOSE F 18 12.81 MILLICURIE: 200 INJECTION, SOLUTION INTRAVENOUS at 07:49

## 2025-05-08 ENCOUNTER — VIRTUAL VISIT (OUTPATIENT)
Dept: PULMONOLOGY | Facility: CLINIC | Age: 75
End: 2025-05-08
Payer: COMMERCIAL

## 2025-05-08 ENCOUNTER — PATIENT OUTREACH (OUTPATIENT)
Dept: ONCOLOGY | Facility: CLINIC | Age: 75
End: 2025-05-08

## 2025-05-08 DIAGNOSIS — R91.8 LUNG NODULES: Primary | ICD-10-CM

## 2025-05-08 NOTE — PROGRESS NOTES
New Patient Oncology Nurse Navigator Note     Referring provider: Dr. 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 No evidence that any has metastasized.     Clinical Assessment / Barriers to Care (Per Nurse):  Tobacco History    Smoking Status  Former Smoking Start Date  1/5/1965 Quit Date  1/5/1984 Average Packs/Day  0.5 packs/day for 19.0 years (9.5 ttl pk-yrs) Smoking Tobacco Type  Cigarettes from 1/5/1965 to 1/5/1984     Records Location: [facility]   Records Needed: None  Additional testing needed prior to consult: PFTs    JEREMY IssaN, RN, OCN  [facility] Oncology Nurse Navigator  [phone] / [phone]

## 2025-05-08 NOTE — PROGRESS NOTES
"Virtual Visit Details    Type of service:  Video Visit   Video Start Time: 10:38 AM  Video End Time:11:06 AM    Originating Location (pt. Location): Home    Distant Location (provider location):  Off-site  Platform used for Video Visit: Renata        LUNG NODULE & INTERVENTIONAL PULMONARY CLINIC  [facility]     Siomara Hansen MRN# [id number]   Age: 73 year old YOB: 1950     Reason for Consultation: lung nodule(s)    Requesting Physician: Javier Stauffer,   [address] [facility], [address] [address]       Assessment and Plan:    34mm RML cystic nodule  16mm LLL groundglass nodule  8mm OZZY groundglass nodule  9mm RUL nodule  Initially seen on CT chest 6/2018; LLL groundglass nodule measured 9 x 9 mm, increased to 16 x 11mm and other scattered nodules were increased in size slightly on CT chest 6/2023. Dr. Stauffer saw her and offered biopsy which patient declined. Her case was discussed in our multidisciplinary nodule conference and recommended surveillance. CT chest 10/2023 and 4/2024 stable. Discussed possible etiologies, such as malignancy or inflammatory process. She is considered high risk for malignancy given history of smoking and family history of lung cancer. Since they were stable for 12 months of surveillance, so we elected to proceed with a 12 month follow up CT chest 4/2025 which unfortunately demonstrates significant interval growth of multiple groundglass nodules including a 3.4cm RML nodule, previously 1.5cm 6/2023. We discussed that this is highly concerning for lung cancer (possibly multifocal adenocarcinoma) but she remains reluctant to consider biopsy. She also doesn't believe in chemotherapy, fearful of side effects. But ultimately she \"[doesn't] want to die\" and wants to know potential treatment options. After a lengthy discussion we decided to get a PET to see the best approach for diagnosis as bronchoscopic biopsy of a groundglass or cystic " nodule is of low yield. PET 5/1/2025 without evidence of metastatic disease or mediastinal lymph node involvement, the RML cystic mass SUV is 1.8, the other nodules are not FDG avid since they are groundglass. Refer to thoracic surgery for surgical biopsy of the RML cystic mass. Will need updated PFTs which I have ordered.   Confirmed with Dr. Stauffer who agrees with this plan.         Raysa Garcia PA-C  Interventional and General Pulmonology  Department of Pulmonary, Allergy, Critical Care and Sleep Medicine   [facility]     I spent 42 minutes reviewing patient's records, imaging, and face-to-face and/or coordinating or discussing care plan on the day of the encounter.           History:     Siomara Hansen is a 74 year old female who is here for lung nodule(s). I last saw her in clinic 4/2025. Previously seen in clinic with Dr. Stauffer 10/2023.     CT A/P for h/o nephrolithasis identified multiple groundglass nodules, largest of which measured 11 x 14mm. Dedicated CT chest 6/23/2023 demonstrated numerous nodules (largest 11 x 14mm LLL nodule). She was concerned about undergoing a biopsy at that time due extreme fear of the biopsy causing cancer to spread. Her case was reviewed at lung nodule conference. Decision was made to continue surveillance. Repeat CT chest 10/2023 stable, seen by Dr. Stauffer who recommended 6 month follow up CT chest. Stable on CT 4/2024. The nodules increased 4/2025, here to review PET scan results.     - No new resp sx or complaints. Denies dyspnea. Has been coughing for many months, has a tickle in her throat and phlegm. No hemoptysis. No fevers, chills, night sweats or weight loss. Gaining weight.   - Personal hx of cancer: breast cancer in 1999, s/p bilateral mastectomy and lymph node dissection in 2000  - Family hx of cancer: mother and half brother passed from lung cancer, both were smokers  - Exposure hx: secondhand exposure while working at a [employer]  -  Tobacco hx: 1 ppd x 15 years, quit over 40 years ago  - My interpretation of the images relevant for this visit includes: 16mm LLL groundglass nodule   - My interpretation of the PFT's relevant for this visit includes: Normal 2023    She lives alone, her daughter lives in [place] but is likely willing to help her.     Other active medical problems include:   - h/o breast cancer as above  - osteoporosis  - basal cell caricnoma shoulder, removed 2025         Past Medical History:      Past Medical History:   Diagnosis Date    Basal cell carcinoma     Breast cancer (H)     Colon polyp     exam every 5 years    Hypertension     Dont remember date           Past Surgical History:      Past Surgical History:   Procedure Laterality Date    ARTHROPLASTY HIP Right 3/5/2024    Procedure: Right Total Hip Arthroplasty;  Surgeon: Ruperto Silvestre MD;  Location: [facility] OR    BIOPSY  1999    BREAST SURGERY  2000    COLONOSCOPY      GYN SURGERY  1986    D & C    KYPHOPLASTY N/A 2024    Procedure: Lumbar 1 kyphoplasty;  Surgeon: Ramon Hudson MD;  Location: [facility] OR    masectomy[  2000    Bilateral    ORTHOPEDIC SURGERY            Social History:     Social History     Tobacco Use    Smoking status: Former     Current packs/day: 0.00     Average packs/day: 0.5 packs/day for 19.0 years (9.5 ttl pk-yrs)     Types: Cigarettes     Start date: 1965     Quit date: 1984     Years since quittin.3     Passive exposure: Past    Smokeless tobacco: Never   Substance Use Topics    Alcohol use: Not Currently     Comment: Rare          Family History:     Family History   Problem Relation Age of Onset    Cancer Mother         Lung    Breast Cancer Mother     Cancer Father         Braine    Cancer Brother         Bladder    Heart Disease Brother     Breast Cancer Sister     Breast Cancer Sister     Cancer Brother         Lung    Melanoma No family hx of             Allergies:      Allergies   Allergen Reactions    Asa [Aspirin] Swelling    Hydrocortisone      Erythema      Zoster Vaccine Live      Did get red rash at injection site that lasted for about 3 days.          Medications:     Current Outpatient Medications   Medication Sig Dispense Refill    alendronate (FOSAMAX) 10 MG tablet TAKE 1 TABLET BY MOUTH IN THE MORNING BEFORE BREAKFAST 90 tablet 3    calcium citrate-vitamin D (CITRACAL) 315-5 MG-MCG TABS per tablet Take 1 tablet by mouth 2 times daily      citalopram (CELEXA) 20 MG tablet Take 1 tablet by mouth once daily 90 tablet 1    losartan (COZAAR) 50 MG tablet Take 1 tablet by mouth twice daily 180 tablet 0    omeprazole (PRILOSEC) 20 MG DR capsule Take 1 capsule by mouth once daily 90 capsule 1    senna-docusate (SENOKOT-S/PERICOLACE) 8.6-50 MG tablet Take 1-2 tablets by mouth 2 times daily as needed for constipation Take while on oral narcotics to prevent or treat constipation. 30 tablet 0    valACYclovir (VALTREX) 500 MG tablet Take 1 tablet by mouth twice daily 90 tablet 2     No current facility-administered medications for this visit.            Physical Exam:   LMP  (LMP Unknown)   Wt Readings from Last 4 Encounters:   09/04/24 74 kg (163 lb 3.2 oz)   03/05/24 75.8 kg (167 lb)   02/27/24 75.8 kg (167 lb)   01/11/24 75 kg (165 lb 6.4 oz)     General: Well appearing  Lungs: Nonlabored breathing  Neuro: Answering questions appropriately  Psych: Normal affect         Imaging/Lab Data   All laboratory and imaging data reviewed.    No results found for this or any previous visit (from the past 24 hours).

## 2025-05-12 NOTE — TELEPHONE ENCOUNTER
RECORDS STATUS - ALL OTHER DIAGNOSIS      RECORDS RECEIVED FROM: [facility]   NOTES STATUS DETAILS   OFFICE NOTE from referring provider [facility] 5/8/2025 - Raysa Garcia PA-C   MEDICATION LIST [facility]    LABS     PATHOLOGY REPORTS     ANYTHING RELATED TO DIAGNOSIS Epic 2/6/2025   IMAGING (NEED IMAGES & REPORT)     CT SCANS PACS CT Chest: 4/15/2025-6/23/2023   PET PACS PET Whole Body: 5/1/2025

## 2025-05-28 NOTE — PROGRESS NOTES
THORACIC SURGERY - NEW PATIENT OFFICE VISIT      Dear Dr. Mcdonald,    I saw Siomara Hansen at JESSEE Garcia's request in consultation for the evaluation and treatment of a indeterminate pulmonary nodule of the RIGHT middle lobe.    HPI  Siomara Hansen is a 74 year old female patient who presents with a slowly growing right middle lobe lung nodule. She lives in an apartment with her cat. She has had back issues (2 compression fractures in the upper neck). She had a right hip replacement in March 2025. The back pain limits her walking.     ECOG performance status  1- Mild physical restriction, sedentary                 Previsit Tests   PFT (5/23/2025): FEV1 107% predicted, 2.05 L, DLCO 65%    PET scan (5/1/2025): FDG avid 3.4 x 2.0 cm RIGHT middle lobe Mass (Max SUV 1.8). No suspicious hypermetabolic activity elsewhere      CT scan (4/15/2025): RIGHT middle lobe 3.4 x 2.0 cm pulmonary Mass with cystic components and solid septations, RIGHT upper lobe 1.0 x 0.6 cm groundglass nodule, RIGHT upper lobe 0.5 x 0.3 cm groundglass nodule, without mediastinal adenopathy, with no pleural effusion       CT scan (4/16/2024): RIGHT middle lobe 2.4 x 1.0 cm pulmonary Mass with cystic components and solid septations, without mediastinal adenopathy, with no pleural effusion      CT scan (10/16/2023): RIGHT middle lobe 1.5 x 1.1 cm pulmonary nodule, without mediastinal adenopathy, with no pleural effusion      CT scan (6/23/2023): RIGHT middle lobe 1.5 x 0.5 cm pulmonary nodule, without mediastinal adenopathy, with no pleural effusion      CT scan (6/13/2018): RIGHT middle lobe tiny pulmonary nodule, without mediastinal adenopathy, with no pleural effusion      PMH  Reviewed, as below    Past Medical History:   Diagnosis Date    Basal cell carcinoma     Breast cancer (H) 2000    Colon polyp     exam every 5 years    Hypertension     Dont remember date        PSH  Reviewed, as below    Past Surgical History:   Procedure Laterality Date     "ARTHROPLASTY HIP Right 3/5/2024    Procedure: Right Total Hip Arthroplasty;  Surgeon: Ruperto Silvestre MD;  Location: [facility] OR    BIOPSY  December 1999    BREAST SURGERY  February 2000    COLONOSCOPY  2023    GYN SURGERY  01/01/1986    D & C    KYPHOPLASTY N/A 1/11/2024    Procedure: Lumbar 1 kyphoplasty;  Surgeon: Ramon Hudson MD;  Location: [facility]    masectomy[  02/01/2000    Bilateral    ORTHOPEDIC SURGERY  2022        Allergies   Allergen Reactions    Asa [Aspirin] Swelling    Hydrocortisone      Erythema      Zoster Vaccine Live      Did get red rash at injection site that lasted for about 3 days.       Current Outpatient Medications   Medication Sig Dispense Refill    alendronate (FOSAMAX) 10 MG tablet TAKE 1 TABLET BY MOUTH IN THE MORNING BEFORE BREAKFAST 90 tablet 3    calcium citrate-vitamin D (CITRACAL) 315-5 MG-MCG TABS per tablet Take 1 tablet by mouth 2 times daily      citalopram (CELEXA) 20 MG tablet Take 1 tablet by mouth once daily 90 tablet 1    losartan (COZAAR) 50 MG tablet Take 1 tablet by mouth twice daily 180 tablet 0    omeprazole (PRILOSEC) 20 MG DR capsule Take 1 capsule by mouth once daily 90 capsule 1    senna-docusate (SENOKOT-S/PERICOLACE) 8.6-50 MG tablet Take 1-2 tablets by mouth 2 times daily as needed for constipation Take while on oral narcotics to prevent or treat constipation. 30 tablet 0    valACYclovir (VALTREX) 500 MG tablet Take 1 tablet by mouth twice daily 90 tablet 2     No current facility-administered medications for this visit.       ETOH: None (not for over a year)  TOBACCO: ages 15 - 33, 1 pack per day for 18 years. Quit 41 years ago.  OTHER DRUGS: None    Physical examination  BP (!) 169/88 (BP Location: Left arm, Patient Position: Sitting, Cuff Size: Adult Large)   Pulse 73   Resp 16   Ht 1.626 m (5' 4\")   Wt 78.9 kg (174 lb)   LMP  (LMP Unknown)   SpO2 98%   BMI 29.87 kg/m     Physical Exam  Constitutional:       Appearance: Normal appearance. " She is normal weight.   Cardiovascular:      Rate and Rhythm: Normal rate.   Pulmonary:      Effort: Pulmonary effort is normal.   Skin:     General: Skin is warm and dry.   Neurological:      Mental Status: She is alert and oriented to person, place, and time.   Psychiatric:         Mood and Affect: Mood normal.         Behavior: Behavior normal.         Thought Content: Thought content normal.         Judgment: Judgment normal.          From a personal perspective, she is here with her friend and her daughter is on the phone. She one other daughter and two sons. She has many grandchildren and a couple of greatgrandchildren. 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     Code Status: Full Code    IMPRESSION   74 year old female patient with RIGHT middle lobe indeterminate pulmonary nodule. She is a reasonable surgical candidate    Lung Cancer Surgery Risk Calculator:         [email or web address]    Stage: Indeterminate pulmonary lesion, IF this were a cancer, clinical stage IB      PLAN  I spent 45 min on the date of the encounter in chart review, patient visit, review of tests, documentation and/or discussion with other providers about the issues documented above. I reviewed the plan as follows:  Procedure planned: Robotic-assisted right thoracoscopic middle lobe wedge resection, possible lobectomy, mediastinal lymph node dissection.  I discussed the risks and benefits of the operation, including obtaining a diagnosis, resecting and staging the cancer. The risks include bleeding, infection, arrhythmia requiring medication or anticoagulation, prolonged air leak, chylothorax, deep venous thrombosis and pulmonary embolism, and death.  There is also a risk of prolonged pain, which could require further treatment.  Prolonged air leak could be treated with bronchoscopy and endobronchial valve insertion  or going home with a  chest tube.  Postoperative bleeding (rare) could require a return to the OR.   Necessary Preop Tests & Appointments: Preoperative assessment clinic  Regional Anesthesia Plan: Intraoperative intercostal nerve block  Anticoagulation Plan: Prophylactic Heparin     I appreciate the opportunity to participate in the care of your patient and will keep you updated.  Sincerely,    Julio Schulz MD

## 2025-06-02 ENCOUNTER — PRE VISIT (OUTPATIENT)
Dept: ONCOLOGY | Facility: CLINIC | Age: 75
End: 2025-06-02
Payer: COMMERCIAL

## 2025-06-02 ENCOUNTER — PREP FOR PROCEDURE (OUTPATIENT)
Dept: SURGERY | Facility: CLINIC | Age: 75
End: 2025-06-02
Payer: COMMERCIAL

## 2025-06-02 ENCOUNTER — ONCOLOGY VISIT (OUTPATIENT)
Dept: ONCOLOGY | Facility: CLINIC | Age: 75
End: 2025-06-02
Attending: PHYSICIAN ASSISTANT
Payer: COMMERCIAL

## 2025-06-02 VITALS
BODY MASS INDEX: 29.71 KG/M2 | SYSTOLIC BLOOD PRESSURE: 169 MMHG | HEIGHT: 64 IN | HEART RATE: 73 BPM | OXYGEN SATURATION: 98 % | DIASTOLIC BLOOD PRESSURE: 88 MMHG | RESPIRATION RATE: 16 BRPM | WEIGHT: 174 LBS

## 2025-06-02 DIAGNOSIS — R91.1 LUNG NODULE: Primary | ICD-10-CM

## 2025-06-02 DIAGNOSIS — R91.8 PULMONARY NODULES: ICD-10-CM

## 2025-06-02 PROCEDURE — G0463 HOSPITAL OUTPT CLINIC VISIT: HCPCS | Performed by: THORACIC SURGERY (CARDIOTHORACIC VASCULAR SURGERY)

## 2025-06-02 PROCEDURE — 99204 OFFICE O/P NEW MOD 45 MIN: CPT | Performed by: THORACIC SURGERY (CARDIOTHORACIC VASCULAR SURGERY)

## 2025-06-02 RX ORDER — HEPARIN SODIUM 10000 [USP'U]/ML
5000 INJECTION, SOLUTION INTRAVENOUS; SUBCUTANEOUS
OUTPATIENT
Start: 2025-06-02

## 2025-06-02 ASSESSMENT — PAIN SCALES - GENERAL: PAINLEVEL_OUTOF10: MODERATE PAIN (6)

## 2025-06-02 NOTE — LETTER
6/2/2025      Siomara Hansen  [address] [address]      Dear Colleague,    Thank you for referring your patient, Siomara Hansen, to the [facility]. Please see a copy of my visit note below.    THORACIC SURGERY - NEW PATIENT OFFICE VISIT      Dear Dr. Mcdonald,    [facility] consultation for the evaluation and treatment of a indeterminate pulmonary nodule of the RIGHT middle lobe.    HPI  Siomara Hansen is a 74 year old female patient who presents with a slowly growing right middle lobe lung nodule. She lives in an apartment with her cat. She has had back issues (2 compression fractures in the upper neck). She had a right hip replacement in March 2025. The back pain limits her walking.     ECOG performance status  1- Mild physical restriction, sedentary                 Previsit Tests   PFT (5/23/2025): FEV1 107% predicted, 2.05 L, DLCO 65%    PET scan (5/1/2025): FDG avid 3.4 x 2.0 cm RIGHT middle lobe Mass (Max SUV 1.8). No suspicious hypermetabolic activity elsewhere      CT scan (4/15/2025): RIGHT middle lobe 3.4 x 2.0 cm pulmonary Mass with cystic components and solid septations, RIGHT upper lobe 1.0 x 0.6 cm groundglass nodule, RIGHT upper lobe 0.5 x 0.3 cm groundglass nodule, without mediastinal adenopathy, with no pleural effusion       CT scan (4/16/2024): RIGHT middle lobe 2.4 x 1.0 cm pulmonary Mass with cystic components and solid septations, without mediastinal adenopathy, with no pleural effusion      CT scan (10/16/2023): RIGHT middle lobe 1.5 x 1.1 cm pulmonary nodule, without mediastinal adenopathy, with no pleural effusion      CT scan (6/23/2023): RIGHT middle lobe 1.5 x 0.5 cm pulmonary nodule, without mediastinal adenopathy, with no pleural effusion      CT scan (6/13/2018): RIGHT middle lobe tiny pulmonary nodule, without mediastinal adenopathy, with no pleural effusion      PMH  Reviewed, as below    Past  Medical History:   Diagnosis Date     Basal cell carcinoma      Breast cancer (H) 2000     Colon polyp     exam every 5 years     Hypertension     Dont remember date        PSH  Reviewed, as below    Past Surgical History:   Procedure Laterality Date     ARTHROPLASTY HIP Right 3/5/2024    Procedure: Right Total Hip Arthroplasty;  Surgeon: Ruperto Silvestre MD;  Location: [facility]     BIOPSY  December 1999     BREAST SURGERY  February 2000     COLONOSCOPY  2023     GYN SURGERY  01/01/1986    D & C     KYPHOPLASTY N/A 1/11/2024    Procedure: Lumbar 1 kyphoplasty;  Surgeon: Ramon Hudson MD;  Location: [facility]     masectomy[  02/01/2000    Bilateral     ORTHOPEDIC SURGERY  2022        Allergies   Allergen Reactions     Asa [Aspirin] Swelling     Hydrocortisone      Erythema       Zoster Vaccine Live      Did get red rash at injection site that lasted for about 3 days.       Current Outpatient Medications   Medication Sig Dispense Refill     alendronate (FOSAMAX) 10 MG tablet TAKE 1 TABLET BY MOUTH IN THE MORNING BEFORE BREAKFAST 90 tablet 3     calcium citrate-vitamin D (CITRACAL) 315-5 MG-MCG TABS per tablet Take 1 tablet by mouth 2 times daily       citalopram (CELEXA) 20 MG tablet Take 1 tablet by mouth once daily 90 tablet 1     losartan (COZAAR) 50 MG tablet Take 1 tablet by mouth twice daily 180 tablet 0     omeprazole (PRILOSEC) 20 MG DR capsule Take 1 capsule by mouth once daily 90 capsule 1     senna-docusate (SENOKOT-S/PERICOLACE) 8.6-50 MG tablet Take 1-2 tablets by mouth 2 times daily as needed for constipation Take while on oral narcotics to prevent or treat constipation. 30 tablet 0     valACYclovir (VALTREX) 500 MG tablet Take 1 tablet by mouth twice daily 90 tablet 2     No current facility-administered medications for this visit.       ETOH: None (not for over a year)  TOBACCO: ages 15 - 33, 1 pack per day for 18 years. Quit 41 years ago.  OTHER DRUGS: None    Physical examination  BP (!)  "169/88 (BP Location: Left arm, Patient Position: Sitting, Cuff Size: Adult Large)   Pulse 73   Resp 16   Ht 1.626 m (5' 4\")   Wt 78.9 kg (174 lb)   LMP  (LMP Unknown)   SpO2 98%   BMI 29.87 kg/m     Physical Exam  Constitutional:       Appearance: Normal appearance. She is normal weight.   Cardiovascular:      Rate and Rhythm: Normal rate.   Pulmonary:      Effort: Pulmonary effort is normal.   Skin:     General: Skin is warm and dry.   Neurological:      Mental Status: She is alert and oriented to person, place, and time.   Psychiatric:         Mood and Affect: Mood normal.         Behavior: Behavior normal.         Thought Content: Thought content normal.         Judgment: Judgment normal.          From a personal perspective, she is here with her friend and her daughter is on the phone. She one other daughter and two sons. She has many grandchildren and a couple of greatgrandchildren. 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     Code Status: Full Code    IMPRESSION   74 year old female patient with RIGHT middle lobe indeterminate pulmonary nodule. She is a reasonable surgical candidate    Lung Cancer Surgery Risk Calculator:         [email or web address]    Stage: Indeterminate pulmonary lesion, IF this were a cancer, clinical stage IB      PLAN  I spent 45 min on the date of the encounter in chart review, patient visit, review of tests, documentation and/or discussion with other providers about the issues documented above. I reviewed the plan as follows:  Procedure planned: Robotic-assisted right thoracoscopic middle lobe wedge resection, possible lobectomy, mediastinal lymph node dissection.  I discussed the risks and benefits of the operation, including obtaining a diagnosis, resecting and staging the cancer. The risks include bleeding, infection, arrhythmia requiring medication or anticoagulation, prolonged " "air leak, chylothorax, deep venous thrombosis and pulmonary embolism, and death.  There is also a risk of prolonged pain, which could require further treatment.  Prolonged air leak could be treated with bronchoscopy and endobronchial valve insertion  or going home with a chest tube.  Postoperative bleeding (rare) could require a return to the OR.   Necessary Preop Tests & Appointments: Preoperative assessment clinic  Regional Anesthesia Plan: Intraoperative intercostal nerve block  Anticoagulation Plan: Prophylactic Heparin     I appreciate the opportunity to participate in the care of your patient and will keep you updated.  Sincerely,    Julio Schulz MD          Oncology Rooming Note    June 2, 2025 2:01 PM   Siomara Hansen is a 74 year old female who presents for:    Chief Complaint   Patient presents with     Oncology Clinic Visit     New Patient     Initial Vitals: BP (!) 169/88 (BP Location: Left arm, Patient Position: Sitting, Cuff Size: Adult Large)   Pulse 73   Resp 16   Ht 1.626 m (5' 4\")   Wt 78.9 kg (174 lb)   LMP  (LMP Unknown)   SpO2 98%   BMI 29.87 kg/m   Estimated body mass index is 29.87 kg/m  as calculated from the following:    Height as of this encounter: 1.626 m (5' 4\").    Weight as of this encounter: 78.9 kg (174 lb). Body surface area is 1.89 meters squared.  Moderate Pain (6) Comment: Data Unavailable   No LMP recorded (lmp unknown). Patient is postmenopausal.  Allergies reviewed: Yes  Medications reviewed: Yes    Medications: Medication refills not needed today.  Pharmacy name entered into [facility]: [facility] Counts include 234 beds at the [facility] [place], [place] - [facility] [address]     Frailty Screening:   Is the patient here for a new oncology consult visit in cancer care? 1. Yes. Over the past month, have you experienced difficulty or required a caregiver to assist with:   1. Balance, walking or general mobility (including any falls)? Patient has vertigo  2. Completion of self-care tasks such as bathing, " dressing, toileting, grooming/hygiene?  NO  3. Concentration or memory that affects your daily life?  NO     PHQ9:  Did this patient require a PHQ9?: No      Clinical concerns: New PAtient       Suzy Leo MA                          Again, thank you for allowing me to participate in the care of your patient.        Sincerely,        Julio Schulz MD    Electronically signed

## 2025-06-02 NOTE — PROGRESS NOTES
"Oncology Rooming Note    June 2, 2025 2:01 PM   Siomara Hansen is a 74 year old female who presents for:    Chief Complaint   Patient presents with    Oncology Clinic Visit     New Patient     Initial Vitals: BP (!) 169/88 (BP Location: Left arm, Patient Position: Sitting, Cuff Size: Adult Large)   Pulse 73   Resp 16   Ht 1.626 m (5' 4\")   Wt 78.9 kg (174 lb)   LMP  (LMP Unknown)   SpO2 98%   BMI 29.87 kg/m   Estimated body mass index is 29.87 kg/m  as calculated from the following:    Height as of this encounter: 1.626 m (5' 4\").    Weight as of this encounter: 78.9 kg (174 lb). Body surface area is 1.89 meters squared.  Moderate Pain (6) Comment: Data Unavailable   No LMP recorded (lmp unknown). Patient is postmenopausal.  Allergies reviewed: Yes  Medications reviewed: Yes    Medications: Medication refills not needed today.  Pharmacy name entered into [facility]: [facility] [address] [place] [address]    Frailty Screening:   Is the patient here for a new oncology consult visit in cancer care? 1. Yes. Over the past month, have you experienced difficulty or required a caregiver to assist with:   1. Balance, walking or general mobility (including any falls)? Patient has vertigo  2. Completion of self-care tasks such as bathing, dressing, toileting, grooming/hygiene?  NO  3. Concentration or memory that affects your daily life?  NO     PHQ9:  Did this patient require a PHQ9?: No      Clinical concerns: New PAtient       Suzy Leo MA                        "

## 2025-06-05 ENCOUNTER — TELEPHONE (OUTPATIENT)
Dept: ONCOLOGY | Facility: CLINIC | Age: 75
End: 2025-06-05
Payer: COMMERCIAL

## 2025-06-05 NOTE — TELEPHONE ENCOUNTER
Called and spoke with pt to schedule surgery and pt has questions about how long she could safely wait to move fwd. Pt stated she would like to discuss this before scheduling surgery. Writer informed pt a msg would be sent over to the surgeon and nurse to give her a call back. Pt understood and agreed.     Shreya Zarate on 6/5/2025 at 9:53 AM

## 2025-06-09 ENCOUNTER — TRANSFERRED RECORDS (OUTPATIENT)
Dept: HEALTH INFORMATION MANAGEMENT | Facility: CLINIC | Age: 75
End: 2025-06-09
Payer: COMMERCIAL

## 2025-06-16 RX ORDER — DOCUSATE SODIUM 100 MG/1
100 CAPSULE, LIQUID FILLED ORAL EVERY OTHER DAY
COMMUNITY

## 2025-06-16 NOTE — PROGRESS NOTES
PTA medications updated by Medication Scribe prior to surgery via phone call with patient (last doses completed by Nurse)     Medication history sources: Patient, Surescripts, and H&P  In the past week, patient estimated taking medication this percent of the time: Greater than 90%      Significant changes made to the medication list:  None      Additional medication history information:   None    Medication reconciliation completed by provider prior to medication history? No    Time spent in this activity: 25 MINUTES    The information provided in this note is only as accurate as the sources available at the time of update(s)      Prior to Admission medications    Medication Sig Last Dose Taking? Auth Provider Long Term End Date   alendronate (FOSAMAX) 10 MG tablet TAKE 1 TABLET BY MOUTH IN THE MORNING BEFORE BREAKFAST  Yes Rafael Mcdonald MD Yes    calcium citrate-vitamin D (CITRACAL) 315-5 MG-MCG TABS per tablet Take 1 tablet by mouth 2 times daily  Yes Reported, Patient     citalopram (CELEXA) 20 MG tablet Take 1 tablet by mouth once daily Morning Yes Rafael Mcdonald MD Yes    docusate sodium (COLACE) 100 MG capsule Take 100 mg by mouth every other day.  Yes Reported, Patient     losartan (COZAAR) 50 MG tablet Take 1 tablet by mouth twice daily Evening Yes Rafael Mcdonald MD Yes    omeprazole (PRILOSEC) 20 MG DR capsule Take 1 capsule by mouth once daily  Yes Rafael Mcdonald MD No    valACYclovir (VALTREX) 500 MG tablet Take 1 tablet by mouth twice daily  Yes Rafael Mcdonald MD Yes        Medication history completed by: Sera Alvarado

## 2025-06-17 ENCOUNTER — TELEPHONE (OUTPATIENT)
Dept: FAMILY MEDICINE | Facility: CLINIC | Age: 75
End: 2025-06-17

## 2025-06-17 ENCOUNTER — OFFICE VISIT (OUTPATIENT)
Dept: FAMILY MEDICINE | Facility: CLINIC | Age: 75
End: 2025-06-17
Payer: COMMERCIAL

## 2025-06-17 VITALS
TEMPERATURE: 98.8 F | SYSTOLIC BLOOD PRESSURE: 138 MMHG | WEIGHT: 175 LBS | RESPIRATION RATE: 20 BRPM | HEART RATE: 69 BPM | DIASTOLIC BLOOD PRESSURE: 85 MMHG | BODY MASS INDEX: 29.88 KG/M2 | HEIGHT: 64 IN | OXYGEN SATURATION: 98 %

## 2025-06-17 DIAGNOSIS — M81.0 OSTEOPOROSIS, UNSPECIFIED OSTEOPOROSIS TYPE, UNSPECIFIED PATHOLOGICAL FRACTURE PRESENCE: ICD-10-CM

## 2025-06-17 DIAGNOSIS — R91.1 NODULE OF MIDDLE LOBE OF RIGHT LUNG: ICD-10-CM

## 2025-06-17 DIAGNOSIS — E78.5 HYPERLIPIDEMIA, UNSPECIFIED HYPERLIPIDEMIA TYPE: ICD-10-CM

## 2025-06-17 DIAGNOSIS — J43.9 MILD EMPHYSEMA (H): ICD-10-CM

## 2025-06-17 DIAGNOSIS — F11.90 CHRONIC, CONTINUOUS USE OF OPIOIDS: ICD-10-CM

## 2025-06-17 DIAGNOSIS — Z01.818 PREOP GENERAL PHYSICAL EXAM: Primary | ICD-10-CM

## 2025-06-17 DIAGNOSIS — I10 ESSENTIAL HYPERTENSION: ICD-10-CM

## 2025-06-17 DIAGNOSIS — F41.8 DEPRESSION WITH ANXIETY: ICD-10-CM

## 2025-06-17 DIAGNOSIS — R91.1 NODULE OF UPPER LOBE OF RIGHT LUNG: ICD-10-CM

## 2025-06-17 LAB
ANION GAP SERPL CALCULATED.3IONS-SCNC: 11 MMOL/L (ref 7–15)
BUN SERPL-MCNC: 10.8 MG/DL (ref 8–23)
CALCIUM SERPL-MCNC: 9.4 MG/DL (ref 8.8–10.4)
CHLORIDE SERPL-SCNC: 102 MMOL/L (ref 98–107)
CREAT SERPL-MCNC: 0.97 MG/DL (ref 0.51–0.95)
EGFRCR SERPLBLD CKD-EPI 2021: 61 ML/MIN/1.73M2
ERYTHROCYTE [DISTWIDTH] IN BLOOD BY AUTOMATED COUNT: 13 % (ref 10–15)
GLUCOSE SERPL-MCNC: 80 MG/DL (ref 70–99)
HCO3 SERPL-SCNC: 24 MMOL/L (ref 22–29)
HCT VFR BLD AUTO: 39.8 % (ref 35–47)
HGB BLD-MCNC: 13.4 G/DL (ref 11.7–15.7)
MCH RBC QN AUTO: 30.7 PG (ref 26.5–33)
MCHC RBC AUTO-ENTMCNC: 33.7 G/DL (ref 31.5–36.5)
MCV RBC AUTO: 91 FL (ref 78–100)
PLATELET # BLD AUTO: 230 10E3/UL (ref 150–450)
POTASSIUM SERPL-SCNC: 4.1 MMOL/L (ref 3.4–5.3)
RBC # BLD AUTO: 4.36 10E6/UL (ref 3.8–5.2)
SODIUM SERPL-SCNC: 137 MMOL/L (ref 135–145)
WBC # BLD AUTO: 7.6 10E3/UL (ref 4–11)

## 2025-06-17 PROCEDURE — 99214 OFFICE O/P EST MOD 30 MIN: CPT | Performed by: FAMILY MEDICINE

## 2025-06-17 PROCEDURE — 36415 COLL VENOUS BLD VENIPUNCTURE: CPT | Performed by: FAMILY MEDICINE

## 2025-06-17 PROCEDURE — 3079F DIAST BP 80-89 MM HG: CPT | Performed by: FAMILY MEDICINE

## 2025-06-17 PROCEDURE — 93000 ELECTROCARDIOGRAM COMPLETE: CPT | Performed by: FAMILY MEDICINE

## 2025-06-17 PROCEDURE — 3074F SYST BP LT 130 MM HG: CPT | Performed by: FAMILY MEDICINE

## 2025-06-17 PROCEDURE — 85027 COMPLETE CBC AUTOMATED: CPT | Performed by: FAMILY MEDICINE

## 2025-06-17 PROCEDURE — 1126F AMNT PAIN NOTED NONE PRSNT: CPT | Performed by: FAMILY MEDICINE

## 2025-06-17 PROCEDURE — 80048 BASIC METABOLIC PNL TOTAL CA: CPT | Performed by: FAMILY MEDICINE

## 2025-06-17 ASSESSMENT — PAIN SCALES - GENERAL: PAINLEVEL_OUTOF10: NO PAIN (0)

## 2025-06-17 NOTE — PROGRESS NOTES
Preoperative Evaluation  [facility]  [address] [place] [address]  Phone: [phone]  Fax: [phone]  Primary Provider: Rafael Mcdonald MD  Pre-op Performing Provider: Rafael Mcdonald MD  Jun 17, 2025 6/12/2025   Surgical Information   What procedure is being done? Lung Biopsy   Facility or Hospital where procedure/surgery will be performed: [facility] in [place], [place]   Who is doing the procedure / surgery? Dr. Brady Jasso   Date of surgery / procedure: June 20,2025   Time of surgery / procedure: Noon/Lung Biopsy   Where do you plan to recover after surgery? at home with family     Fax number for surgical facility: Note does not need to be faxed, will be available electronically in Epic.    Assessment & Plan     The proposed surgical procedure is considered INTERMEDIATE risk.      ICD-10-CM    1. Preop general physical exam  Z01.818       2. Nodule of middle lobe of right lung  R91.1       3. Nodule of upper lobe of right lung  R91.1       4. Essential hypertension  I10 BASIC METABOLIC PANEL     CBC with platelets     EKG 12-lead complete w/read - Clinics      5. Chronic, continuous use of opioids  F11.90       6. Depression with anxiety  F41.8       7. Mild emphysema (H)  J43.9       8. Hyperlipidemia, unspecified hyperlipidemia type  E78.5       9. Osteoporosis, unspecified osteoporosis type, unspecified pathological fracture presence  M81.0            - No identified additional risk factors other than previously addressed    Antiplatelet or Anticoagulation Medication Instructions   - We reviewed the medication list and the patient is not on an antiplatelet or anticoagulation medications.    Additional Medication Instructions  Take all scheduled medications on the day of surgery    Recommendation  Approval given to proceed with proposed procedure, without further diagnostic evaluation.      Karen Stringer is a 74 year old, presenting for  the following:  Pre-Op Exam          6/17/2025     1:51 PM   Additional Questions   Roomed by Madie JOHNSON CMA     HPI:   74-year-old female presents for a preop physical exam.  Patient is scheduled to have limited right thoracotomy on June 20, 2025 . She requires evaluation and anesthesia risk assessment prior to undergoing surgery/procedure.  Patient denies any fever, chills, sore throat, cough, shortness of breath, chest pain, palpitation, diarrhea, constipation, abdominal pain, headache or other relevant systemic symptoms.           6/12/2025   Pre-Op Questionnaire   Have you ever had a heart attack or stroke? No   Have you ever had surgery on your heart or blood vessels, such as a stent placement, a coronary artery bypass, or surgery on an artery in your head, neck, heart, or legs? No   Do you have chest pain with activity? No   Do you have a history of heart failure? No   Do you currently have a cold, bronchitis or symptoms of other infection? No   Do you have a cough, shortness of breath, or wheezing? No   Do you or anyone in your family have previous history of blood clots? No   Do you or does anyone in your family have a serious bleeding problem such as prolonged bleeding following surgeries or cuts? No   Have you ever had problems with anemia or been told to take iron pills? No   Have you had any abnormal blood loss such as black, tarry or bloody stools, or abnormal vaginal bleeding? No   Have you ever had a blood transfusion? No   Are you willing to have a blood transfusion if it is medically needed before, during, or after your surgery? Yes   Have you or any of your relatives ever had problems with anesthesia? No   Do you have sleep apnea, excessive snoring or daytime drowsiness? No   Do you have any artifical heart valves or other implanted medical devices like a pacemaker, defibrillator, or continuous glucose monitor? No   Do you have artificial joints? No   Are you allergic to latex? No     Advance Care  Planning        Preoperative Review of    reviewed - controlled substances reflected in medication list.        Patient Active Problem List    Diagnosis Date Noted    S/P total hip arthroplasty 03/05/2024     Priority: Medium    Chronic, continuous use of opioids 11/30/2023     Priority: Medium    Cyst of right ovary 06/27/2023     Priority: Medium     6/2023, needs follow-up ultrasound in 6 months      Dermatochalasis, bilateral 06/07/2023     Priority: Medium    History of vertebral compression fracture 06/07/2023     Priority: Medium    History of shingles 05/09/2022     Priority: Medium    Mood problem 10/02/2019     Priority: Medium    Basal cell carcinoma (BCC) 11/09/2018     Priority: Medium    Essential hypertension 03/29/2018     Priority: Medium    Osteopenia 11/06/2016     Priority: Medium    Mild emphysema (H) 08/12/2016     Priority: Medium    Constipation 04/29/2014     Priority: Medium    Nephrolithiasis 04/28/2014     Priority: Medium    Hyperlipidemia 04/28/2014     Priority: Medium    Angioma 10/21/2013     Priority: Medium    SK (seborrheic keratosis) 10/21/2013     Priority: Medium    History of skin cancer 10/21/2013     Priority: Medium    Senile sebaceous gland hyperplasia 10/21/2013     Priority: Medium    Lentigo 10/21/2013     Priority: Medium    Mixed hyperlipidemia 06/12/2013     Priority: Medium     Diagnosis updated by automated process. Provider to review and confirm.      CARDIOVASCULAR SCREENING; LDL GOAL LESS THAN 160 07/02/2012     Priority: Medium    Chronic low back pain 07/02/2012     Priority: Medium    Gastroesophageal reflux disease 07/02/2012     Priority: Medium      Past Medical History:   Diagnosis Date    Basal cell carcinoma     Breast cancer (H) 2000    Colon polyp     exam every 5 years    Hypertension     Dont remember date     Past Surgical History:   Procedure Laterality Date    ARTHROPLASTY HIP Right 3/5/2024    Procedure: Right Total Hip Arthroplasty;   Surgeon: Ruperto Silvestre MD;  Location: [facility]    BIOPSY  1999    BREAST SURGERY  2000    COLONOSCOPY      GYN SURGERY  1986    D & C    KYPHOPLASTY N/A 2024    Procedure: Lumbar 1 kyphoplasty;  Surgeon: Ramon Hudson MD;  Location: [facility] OR    masectomy[  2000    Bilateral    ORTHOPEDIC SURGERY       Current Outpatient Medications   Medication Sig Dispense Refill    alendronate (FOSAMAX) 10 MG tablet TAKE 1 TABLET BY MOUTH IN THE MORNING BEFORE BREAKFAST 90 tablet 3    calcium citrate-vitamin D (CITRACAL) 315-5 MG-MCG TABS per tablet Take 1 tablet by mouth 2 times daily      citalopram (CELEXA) 20 MG tablet Take 1 tablet by mouth once daily 90 tablet 1    docusate sodium (COLACE) 100 MG capsule Take 100 mg by mouth every other day.      losartan (COZAAR) 50 MG tablet Take 1 tablet by mouth twice daily 180 tablet 0    omeprazole (PRILOSEC) 20 MG DR capsule Take 1 capsule by mouth once daily 90 capsule 1    valACYclovir (VALTREX) 500 MG tablet Take 1 tablet by mouth twice daily 90 tablet 2       Allergies   Allergen Reactions    Asa [Aspirin] Swelling    Hydrocortisone      Erythema      Zoster Vaccine Live      Did get red rash at injection site that lasted for about 3 days.        Social History     Tobacco Use    Smoking status: Former     Current packs/day: 0.00     Average packs/day: 0.5 packs/day for 19.0 years (9.5 ttl pk-yrs)     Types: Cigarettes     Start date: 1965     Quit date: 1984     Years since quittin.4     Passive exposure: Past    Smokeless tobacco: Never   Substance Use Topics    Alcohol use: Not Currently     Comment: Rare     Family History   Problem Relation Age of Onset    Cancer Mother         Lung    Breast Cancer Mother     Cancer Father         Braine    Cancer Brother         Bladder    Heart Disease Brother     Breast Cancer Sister     Breast Cancer Sister     Cancer Brother         Lung    Melanoma No family hx of   "    History   Drug Use No           Review of Systems  Constitutional, neuro, ENT, endocrine, pulmonary, cardiac, gastrointestinal, genitourinary, musculoskeletal, integument and psychiatric systems are negative, except as otherwise noted.    Objective    /80   Pulse 69   Temp 98.8  F (37.1  C) (Tympanic)   Resp 20   Ht 1.626 m (5' 4\")   Wt 79.4 kg (175 lb)   LMP  (LMP Unknown)   SpO2 98%   BMI 30.04 kg/m     Estimated body mass index is 30.04 kg/m  as calculated from the following:    Height as of this encounter: 1.626 m (5' 4\").    Weight as of this encounter: 79.4 kg (175 lb).  Physical Exam  GENERAL: alert and no distress  EYES: Eyes grossly normal to inspection, PERRL and conjunctivae and sclerae normal  HENT: normal cephalic/atraumatic, nose and mouth without ulcers or lesions, oropharynx clear, and oral mucous membranes moist  NECK: no adenopathy, no asymmetry, masses, or scars  RESP: lungs clear to auscultation - no rales, rhonchi or wheezes  CV: regular rate and rhythm, normal S1 S2, no S3 or S4, no murmur, click or rub, no peripheral edema  ABDOMEN: soft, nontender, no hepatosplenomegaly, no masses and bowel sounds normal  MS: no gross musculoskeletal defects noted, no edema  SKIN: no suspicious lesions or rashes  NEURO: Normal strength and tone, mentation intact and speech normal  PSYCH: mentation appears normal, affect normal/bright    No results for input(s): \"HGB\", \"PLT\", \"INR\", \"NA\", \"POTASSIUM\", \"CR\", \"A1C\" in the last 8760 hours.     Diagnostics  Labs pending at this time.  Results will be reviewed when available.   EKG: appears normal, NSR, normal axis, normal intervals, no acute ST/T changes c/w ischemia, no LVH by voltage criteria, unchanged from previous tracings    Revised Cardiac Risk Index (RCRI)  The patient has the following serious cardiovascular risks for perioperative complications:   - No serious cardiac risks = 0 points     RCRI Interpretation: 0 points: Class I (very low " risk - 0.4% complication rate)         Signed Electronically by: Rafael Mcdonald MD  A copy of this evaluation report is provided to the requesting physician.

## 2025-06-17 NOTE — H&P (VIEW-ONLY)
Preoperative Evaluation  [facility]  [address] [place] [id number]  Phone: [phone]  Fax: [phone]  Primary Provider: Rafael Mcdonald MD  Pre-op Performing Provider: Rafael Mcdonald MD  Jun 17, 2025 6/12/2025   Surgical Information   What procedure is being done? Lung Biopsy   Facility or Hospital where procedure/surgery will be performed: [facility] in [place], [place]   Who is doing the procedure / surgery? Dr. Brady Jasso   Date of surgery / procedure: June 20,2025   Time of surgery / procedure: Noon/Lung Biopsy   Where do you plan to recover after surgery? at home with family     Fax number for surgical facility: Note does not need to be faxed, will be available electronically in Epic.    Assessment & Plan     The proposed surgical procedure is considered INTERMEDIATE risk.      ICD-10-CM    1. Preop general physical exam  Z01.818       2. Nodule of middle lobe of right lung  R91.1       3. Nodule of upper lobe of right lung  R91.1       4. Essential hypertension  I10 BASIC METABOLIC PANEL     CBC with platelets     EKG 12-lead complete w/read - Clinics      5. Chronic, continuous use of opioids  F11.90       6. Depression with anxiety  F41.8       7. Mild emphysema (H)  J43.9       8. Hyperlipidemia, unspecified hyperlipidemia type  E78.5       9. Osteoporosis, unspecified osteoporosis type, unspecified pathological fracture presence  M81.0            - No identified additional risk factors other than previously addressed    Antiplatelet or Anticoagulation Medication Instructions   - We reviewed the medication list and the patient is not on an antiplatelet or anticoagulation medications.    Additional Medication Instructions  Take all scheduled medications on the day of surgery    Recommendation  Approval given to proceed with proposed procedure, without further diagnostic evaluation.      Karen Stringer is a 74 year old, presenting for  the following:  Pre-Op Exam          6/17/2025     1:51 PM   Additional Questions   Roomed by Madie JOHNSON CMA     HPI:   74-year-old female presents for a preop physical exam.  Patient is scheduled to have limited right thoracotomy on June 20, 2025 . She requires evaluation and anesthesia risk assessment prior to undergoing surgery/procedure.  Patient denies any fever, chills, sore throat, cough, shortness of breath, chest pain, palpitation, diarrhea, constipation, abdominal pain, headache or other relevant systemic symptoms.           6/12/2025   Pre-Op Questionnaire   Have you ever had a heart attack or stroke? No   Have you ever had surgery on your heart or blood vessels, such as a stent placement, a coronary artery bypass, or surgery on an artery in your head, neck, heart, or legs? No   Do you have chest pain with activity? No   Do you have a history of heart failure? No   Do you currently have a cold, bronchitis or symptoms of other infection? No   Do you have a cough, shortness of breath, or wheezing? No   Do you or anyone in your family have previous history of blood clots? No   Do you or does anyone in your family have a serious bleeding problem such as prolonged bleeding following surgeries or cuts? No   Have you ever had problems with anemia or been told to take iron pills? No   Have you had any abnormal blood loss such as black, tarry or bloody stools, or abnormal vaginal bleeding? No   Have you ever had a blood transfusion? No   Are you willing to have a blood transfusion if it is medically needed before, during, or after your surgery? Yes   Have you or any of your relatives ever had problems with anesthesia? No   Do you have sleep apnea, excessive snoring or daytime drowsiness? No   Do you have any artifical heart valves or other implanted medical devices like a pacemaker, defibrillator, or continuous glucose monitor? No   Do you have artificial joints? No   Are you allergic to latex? No     Advance Care  Planning        Preoperative Review of    reviewed - controlled substances reflected in medication list.        Patient Active Problem List    Diagnosis Date Noted    S/P total hip arthroplasty 03/05/2024     Priority: Medium    Chronic, continuous use of opioids 11/30/2023     Priority: Medium    Cyst of right ovary 06/27/2023     Priority: Medium     6/2023, needs follow-up ultrasound in 6 months      Dermatochalasis, bilateral 06/07/2023     Priority: Medium    History of vertebral compression fracture 06/07/2023     Priority: Medium    History of shingles 05/09/2022     Priority: Medium    Mood problem 10/02/2019     Priority: Medium    Basal cell carcinoma (BCC) 11/09/2018     Priority: Medium    Essential hypertension 03/29/2018     Priority: Medium    Osteopenia 11/06/2016     Priority: Medium    Mild emphysema (H) 08/12/2016     Priority: Medium    Constipation 04/29/2014     Priority: Medium    Nephrolithiasis 04/28/2014     Priority: Medium    Hyperlipidemia 04/28/2014     Priority: Medium    Angioma 10/21/2013     Priority: Medium    SK (seborrheic keratosis) 10/21/2013     Priority: Medium    History of skin cancer 10/21/2013     Priority: Medium    Senile sebaceous gland hyperplasia 10/21/2013     Priority: Medium    Lentigo 10/21/2013     Priority: Medium    Mixed hyperlipidemia 06/12/2013     Priority: Medium     Diagnosis updated by automated process. Provider to review and confirm.      CARDIOVASCULAR SCREENING; LDL GOAL LESS THAN 160 07/02/2012     Priority: Medium    Chronic low back pain 07/02/2012     Priority: Medium    Gastroesophageal reflux disease 07/02/2012     Priority: Medium      Past Medical History:   Diagnosis Date    Basal cell carcinoma     Breast cancer (H) 2000    Colon polyp     exam every 5 years    Hypertension     Dont remember date     Past Surgical History:   Procedure Laterality Date    ARTHROPLASTY HIP Right 3/5/2024    Procedure: Right Total Hip Arthroplasty;   Surgeon: Ruperto Silvestre MD;  Location: [facility]    BIOPSY  1999    BREAST SURGERY  2000    COLONOSCOPY      GYN SURGERY  1986    D & C    KYPHOPLASTY N/A 2024    Procedure: Lumbar 1 kyphoplasty;  Surgeon: Ramon Hudson MD;  Location: [facility]    masectomy[  2000    Bilateral    ORTHOPEDIC SURGERY       Current Outpatient Medications   Medication Sig Dispense Refill    alendronate (FOSAMAX) 10 MG tablet TAKE 1 TABLET BY MOUTH IN THE MORNING BEFORE BREAKFAST 90 tablet 3    calcium citrate-vitamin D (CITRACAL) 315-5 MG-MCG TABS per tablet Take 1 tablet by mouth 2 times daily      citalopram (CELEXA) 20 MG tablet Take 1 tablet by mouth once daily 90 tablet 1    docusate sodium (COLACE) 100 MG capsule Take 100 mg by mouth every other day.      losartan (COZAAR) 50 MG tablet Take 1 tablet by mouth twice daily 180 tablet 0    omeprazole (PRILOSEC) 20 MG DR capsule Take 1 capsule by mouth once daily 90 capsule 1    valACYclovir (VALTREX) 500 MG tablet Take 1 tablet by mouth twice daily 90 tablet 2       Allergies   Allergen Reactions    Asa [Aspirin] Swelling    Hydrocortisone      Erythema      Zoster Vaccine Live      Did get red rash at injection site that lasted for about 3 days.        Social History     Tobacco Use    Smoking status: Former     Current packs/day: 0.00     Average packs/day: 0.5 packs/day for 19.0 years (9.5 ttl pk-yrs)     Types: Cigarettes     Start date: 1965     Quit date: 1984     Years since quittin.4     Passive exposure: Past    Smokeless tobacco: Never   Substance Use Topics    Alcohol use: Not Currently     Comment: Rare     Family History   Problem Relation Age of Onset    Cancer Mother         Lung    Breast Cancer Mother     Cancer Father         Braine    Cancer Brother         Bladder    Heart Disease Brother     Breast Cancer Sister     Breast Cancer Sister     Cancer Brother         Lung    Melanoma No family hx of   "    History   Drug Use No           Review of Systems  Constitutional, neuro, ENT, endocrine, pulmonary, cardiac, gastrointestinal, genitourinary, musculoskeletal, integument and psychiatric systems are negative, except as otherwise noted.    Objective    /80   Pulse 69   Temp 98.8  F (37.1  C) (Tympanic)   Resp 20   Ht 1.626 m (5' 4\")   Wt 79.4 kg (175 lb)   LMP  (LMP Unknown)   SpO2 98%   BMI 30.04 kg/m     Estimated body mass index is 30.04 kg/m  as calculated from the following:    Height as of this encounter: 1.626 m (5' 4\").    Weight as of this encounter: 79.4 kg (175 lb).  Physical Exam  GENERAL: alert and no distress  EYES: Eyes grossly normal to inspection, PERRL and conjunctivae and sclerae normal  HENT: normal cephalic/atraumatic, nose and mouth without ulcers or lesions, oropharynx clear, and oral mucous membranes moist  NECK: no adenopathy, no asymmetry, masses, or scars  RESP: lungs clear to auscultation - no rales, rhonchi or wheezes  CV: regular rate and rhythm, normal S1 S2, no S3 or S4, no murmur, click or rub, no peripheral edema  ABDOMEN: soft, nontender, no hepatosplenomegaly, no masses and bowel sounds normal  MS: no gross musculoskeletal defects noted, no edema  SKIN: no suspicious lesions or rashes  NEURO: Normal strength and tone, mentation intact and speech normal  PSYCH: mentation appears normal, affect normal/bright    No results for input(s): \"HGB\", \"PLT\", \"INR\", \"NA\", \"POTASSIUM\", \"CR\", \"A1C\" in the last 8760 hours.     Diagnostics  Labs pending at this time.  Results will be reviewed when available.   EKG: appears normal, NSR, normal axis, normal intervals, no acute ST/T changes c/w ischemia, no LVH by voltage criteria, unchanged from previous tracings    Revised Cardiac Risk Index (RCRI)  The patient has the following serious cardiovascular risks for perioperative complications:   - No serious cardiac risks = 0 points     RCRI Interpretation: 0 points: Class I (very low " risk - 0.4% complication rate)         Signed Electronically by: Rafael Mcdonald MD  A copy of this evaluation report is provided to the requesting physician.

## 2025-06-17 NOTE — TELEPHONE ENCOUNTER
"Patient called the clinic with questions about her medications. She stated she forgot to get an answer about her medications.     RN reviewed providers OV notes and it states to take all scheduled medications on the day of surgery. However, on the medication list all of her medications say \"will hold AM of surgery\"    Please clarify with patient if she is supposed to take any of her medications on the day of surgery, if she needs to hold them and for how long. Also she needs to know about her citracal and her Vitamin D3 that she takes, can she still take those?    Routing to provider as high priority.   Caro Putnam RN on 6/17/2025 at 4:56 PM    "

## 2025-06-18 ENCOUNTER — RESULTS FOLLOW-UP (OUTPATIENT)
Dept: FAMILY MEDICINE | Facility: CLINIC | Age: 75
End: 2025-06-18

## 2025-06-18 NOTE — TELEPHONE ENCOUNTER
Will recommend to continue citalopram and losartan as prescribed, hold other morning meds.    Dr Mcdonald

## 2025-06-19 NOTE — PROGRESS NOTES
** Updating last doses per call from PT**    PTA medications updated by Medication Scribe prior to surgery via phone call with patient (last doses completed by Nurse)     Medication history sources: Patient, Surescripts, and H&P  In the past week, patient estimated taking medication this percent of the time: Greater than 90%      Significant changes made to the medication list:  None      Additional medication history information:   None    Medication reconciliation completed by provider prior to medication history? No    Time spent in this activity: 10 minutes    The information provided in this note is only as accurate as the sources available at the time of update(s)      Prior to Admission medications    Medication Sig Last Dose Taking? Auth Provider Long Term End Date   alendronate (FOSAMAX) 10 MG tablet TAKE 1 TABLET BY MOUTH IN THE MORNING BEFORE BREAKFAST Morning Yes Rafael Mcdonald MD Yes    calcium citrate-vitamin D (CITRACAL) 315-5 MG-MCG TABS per tablet Take 1 tablet by mouth 2 times daily Evening Yes Reported, Patient     citalopram (CELEXA) 20 MG tablet Take 1 tablet by mouth once daily Morning Yes Rafael Mcdonald MD Yes    docusate sodium (COLACE) 100 MG capsule Take 100 mg by mouth every other day. Evening Yes Reported, Patient     losartan (COZAAR) 50 MG tablet Take 1 tablet by mouth twice daily Evening Yes Rafael Mcdonald MD Yes    omeprazole (PRILOSEC) 20 MG DR capsule Take 1 capsule by mouth once daily Morning Yes Rafael Mcdonald MD No    valACYclovir (VALTREX) 500 MG tablet Take 1 tablet by mouth twice daily Evening Yes Rafael Mcdonald MD Yes        Medication history completed by: Roosevelt Conde

## 2025-06-20 ENCOUNTER — HOSPITAL ENCOUNTER (INPATIENT)
Facility: CLINIC | Age: 75
End: 2025-06-20
Attending: THORACIC SURGERY (CARDIOTHORACIC VASCULAR SURGERY) | Admitting: THORACIC SURGERY (CARDIOTHORACIC VASCULAR SURGERY)
Payer: COMMERCIAL

## 2025-06-20 ENCOUNTER — APPOINTMENT (OUTPATIENT)
Dept: GENERAL RADIOLOGY | Facility: CLINIC | Age: 75
End: 2025-06-20
Attending: THORACIC SURGERY (CARDIOTHORACIC VASCULAR SURGERY)
Payer: COMMERCIAL

## 2025-06-20 DIAGNOSIS — K59.00 CONSTIPATION, UNSPECIFIED CONSTIPATION TYPE: ICD-10-CM

## 2025-06-20 DIAGNOSIS — I48.0 PAROXYSMAL ATRIAL FIBRILLATION (H): ICD-10-CM

## 2025-06-20 DIAGNOSIS — G89.18 ACUTE POST-OPERATIVE PAIN: Primary | ICD-10-CM

## 2025-06-20 DIAGNOSIS — I10 ESSENTIAL HYPERTENSION: ICD-10-CM

## 2025-06-20 PROBLEM — R91.1 NODULE OF MIDDLE LOBE OF RIGHT LUNG: Status: ACTIVE | Noted: 2025-06-20

## 2025-06-20 LAB
ABO + RH BLD: NORMAL
ANION GAP SERPL CALCULATED.3IONS-SCNC: 14 MMOL/L (ref 7–15)
BLD GP AB SCN SERPL QL: NEGATIVE
BUN SERPL-MCNC: 14.7 MG/DL (ref 8–23)
CALCIUM SERPL-MCNC: 9.1 MG/DL (ref 8.8–10.4)
CHLORIDE SERPL-SCNC: 103 MMOL/L (ref 98–107)
CREAT SERPL-MCNC: 1 MG/DL (ref 0.51–0.95)
EGFRCR SERPLBLD CKD-EPI 2021: 58 ML/MIN/1.73M2
ERYTHROCYTE [DISTWIDTH] IN BLOOD BY AUTOMATED COUNT: 13.2 % (ref 10–15)
GLUCOSE SERPL-MCNC: 115 MG/DL (ref 70–99)
HCO3 SERPL-SCNC: 20 MMOL/L (ref 22–29)
HCT VFR BLD AUTO: 40.6 % (ref 35–47)
HGB BLD-MCNC: 13.6 G/DL (ref 11.7–15.7)
INR PPP: 1.03 (ref 0.85–1.15)
MCH RBC QN AUTO: 30.6 PG (ref 26.5–33)
MCHC RBC AUTO-ENTMCNC: 33.5 G/DL (ref 31.5–36.5)
MCV RBC AUTO: 91 FL (ref 78–100)
PLATELET # BLD AUTO: 218 10E3/UL (ref 150–450)
POTASSIUM SERPL-SCNC: 4.1 MMOL/L (ref 3.4–5.3)
PROTHROMBIN TIME: 13.3 SECONDS (ref 11.8–14.8)
RBC # BLD AUTO: 4.45 10E6/UL (ref 3.8–5.2)
SODIUM SERPL-SCNC: 137 MMOL/L (ref 135–145)
SPECIMEN EXP DATE BLD: NORMAL
WBC # BLD AUTO: 7.2 10E3/UL (ref 4–11)

## 2025-06-20 PROCEDURE — 999N000141 HC STATISTIC PRE-PROCEDURE NURSING ASSESSMENT: Performed by: THORACIC SURGERY (CARDIOTHORACIC VASCULAR SURGERY)

## 2025-06-20 PROCEDURE — 07B70ZZ EXCISION OF THORAX LYMPHATIC, OPEN APPROACH: ICD-10-PCS | Performed by: THORACIC SURGERY (CARDIOTHORACIC VASCULAR SURGERY)

## 2025-06-20 PROCEDURE — 250N000011 HC RX IP 250 OP 636: Performed by: THORACIC SURGERY (CARDIOTHORACIC VASCULAR SURGERY)

## 2025-06-20 PROCEDURE — 80048 BASIC METABOLIC PNL TOTAL CA: CPT | Performed by: THORACIC SURGERY (CARDIOTHORACIC VASCULAR SURGERY)

## 2025-06-20 PROCEDURE — 710N000009 HC RECOVERY PHASE 1, LEVEL 1, PER MIN: Performed by: THORACIC SURGERY (CARDIOTHORACIC VASCULAR SURGERY)

## 2025-06-20 PROCEDURE — 86900 BLOOD TYPING SEROLOGIC ABO: CPT | Performed by: THORACIC SURGERY (CARDIOTHORACIC VASCULAR SURGERY)

## 2025-06-20 PROCEDURE — 0BBC0ZZ EXCISION OF RIGHT UPPER LUNG LOBE, OPEN APPROACH: ICD-10-PCS | Performed by: THORACIC SURGERY (CARDIOTHORACIC VASCULAR SURGERY)

## 2025-06-20 PROCEDURE — 250N000011 HC RX IP 250 OP 636: Performed by: PHYSICIAN ASSISTANT

## 2025-06-20 PROCEDURE — 258N000003 HC RX IP 258 OP 636: Performed by: PHYSICIAN ASSISTANT

## 2025-06-20 PROCEDURE — 250N000009 HC RX 250: Performed by: THORACIC SURGERY (CARDIOTHORACIC VASCULAR SURGERY)

## 2025-06-20 PROCEDURE — 250N000025 HC SEVOFLURANE, PER MIN: Performed by: THORACIC SURGERY (CARDIOTHORACIC VASCULAR SURGERY)

## 2025-06-20 PROCEDURE — 999N000065 XR CHEST PORT 1 VIEW

## 2025-06-20 PROCEDURE — 250N000013 HC RX MED GY IP 250 OP 250 PS 637: Performed by: PHYSICIAN ASSISTANT

## 2025-06-20 PROCEDURE — 85610 PROTHROMBIN TIME: CPT | Performed by: THORACIC SURGERY (CARDIOTHORACIC VASCULAR SURGERY)

## 2025-06-20 PROCEDURE — 360N000077 HC SURGERY LEVEL 4, PER MIN: Performed by: THORACIC SURGERY (CARDIOTHORACIC VASCULAR SURGERY)

## 2025-06-20 PROCEDURE — 36415 COLL VENOUS BLD VENIPUNCTURE: CPT | Performed by: THORACIC SURGERY (CARDIOTHORACIC VASCULAR SURGERY)

## 2025-06-20 PROCEDURE — 370N000017 HC ANESTHESIA TECHNICAL FEE, PER MIN: Performed by: THORACIC SURGERY (CARDIOTHORACIC VASCULAR SURGERY)

## 2025-06-20 PROCEDURE — 88331 PATH CONSLTJ SURG 1 BLK 1SPC: CPT | Mod: TC | Performed by: THORACIC SURGERY (CARDIOTHORACIC VASCULAR SURGERY)

## 2025-06-20 PROCEDURE — 0BTD0ZZ RESECTION OF RIGHT MIDDLE LUNG LOBE, OPEN APPROACH: ICD-10-PCS | Performed by: THORACIC SURGERY (CARDIOTHORACIC VASCULAR SURGERY)

## 2025-06-20 PROCEDURE — 120N000013 HC R&B IMCU

## 2025-06-20 PROCEDURE — 250N000011 HC RX IP 250 OP 636: Performed by: ANESTHESIOLOGY

## 2025-06-20 PROCEDURE — 258N000003 HC RX IP 258 OP 636: Performed by: ANESTHESIOLOGY

## 2025-06-20 PROCEDURE — 85027 COMPLETE CBC AUTOMATED: CPT | Performed by: THORACIC SURGERY (CARDIOTHORACIC VASCULAR SURGERY)

## 2025-06-20 PROCEDURE — 272N000001 HC OR GENERAL SUPPLY STERILE: Performed by: THORACIC SURGERY (CARDIOTHORACIC VASCULAR SURGERY)

## 2025-06-20 RX ORDER — NALOXONE HYDROCHLORIDE 0.4 MG/ML
0.2 INJECTION, SOLUTION INTRAMUSCULAR; INTRAVENOUS; SUBCUTANEOUS
Status: DISCONTINUED | OUTPATIENT
Start: 2025-06-20 | End: 2025-06-30 | Stop reason: HOSPADM

## 2025-06-20 RX ORDER — NALOXONE HYDROCHLORIDE 0.4 MG/ML
0.1 INJECTION, SOLUTION INTRAMUSCULAR; INTRAVENOUS; SUBCUTANEOUS
Status: DISCONTINUED | OUTPATIENT
Start: 2025-06-20 | End: 2025-06-20 | Stop reason: HOSPADM

## 2025-06-20 RX ORDER — DOCUSATE SODIUM 100 MG/1
100 CAPSULE, LIQUID FILLED ORAL EVERY OTHER DAY
Status: DISCONTINUED | OUTPATIENT
Start: 2025-06-21 | End: 2025-06-30 | Stop reason: HOSPADM

## 2025-06-20 RX ORDER — SODIUM CHLORIDE, SODIUM LACTATE, POTASSIUM CHLORIDE, CALCIUM CHLORIDE 600; 310; 30; 20 MG/100ML; MG/100ML; MG/100ML; MG/100ML
INJECTION, SOLUTION INTRAVENOUS CONTINUOUS
Status: DISCONTINUED | OUTPATIENT
Start: 2025-06-20 | End: 2025-06-20 | Stop reason: HOSPADM

## 2025-06-20 RX ORDER — NALOXONE HYDROCHLORIDE 0.4 MG/ML
0.4 INJECTION, SOLUTION INTRAMUSCULAR; INTRAVENOUS; SUBCUTANEOUS
Status: DISCONTINUED | OUTPATIENT
Start: 2025-06-20 | End: 2025-06-30 | Stop reason: HOSPADM

## 2025-06-20 RX ORDER — ONDANSETRON 2 MG/ML
4 INJECTION INTRAMUSCULAR; INTRAVENOUS EVERY 30 MIN PRN
Status: DISCONTINUED | OUTPATIENT
Start: 2025-06-20 | End: 2025-06-20 | Stop reason: HOSPADM

## 2025-06-20 RX ORDER — HYDROMORPHONE HCL IN WATER/PF 6 MG/30 ML
0.2 PATIENT CONTROLLED ANALGESIA SYRINGE INTRAVENOUS
Status: DISCONTINUED | OUTPATIENT
Start: 2025-06-20 | End: 2025-06-22

## 2025-06-20 RX ORDER — CITALOPRAM HYDROBROMIDE 20 MG/1
20 TABLET ORAL DAILY
Status: DISCONTINUED | OUTPATIENT
Start: 2025-06-20 | End: 2025-06-30 | Stop reason: HOSPADM

## 2025-06-20 RX ORDER — HYDROMORPHONE HCL IN WATER/PF 6 MG/30 ML
0.4 PATIENT CONTROLLED ANALGESIA SYRINGE INTRAVENOUS EVERY 5 MIN PRN
Status: DISCONTINUED | OUTPATIENT
Start: 2025-06-20 | End: 2025-06-20 | Stop reason: HOSPADM

## 2025-06-20 RX ORDER — FENTANYL CITRATE 0.05 MG/ML
25 INJECTION, SOLUTION INTRAMUSCULAR; INTRAVENOUS EVERY 5 MIN PRN
Status: DISCONTINUED | OUTPATIENT
Start: 2025-06-20 | End: 2025-06-20 | Stop reason: HOSPADM

## 2025-06-20 RX ORDER — LABETALOL HYDROCHLORIDE 5 MG/ML
5 INJECTION, SOLUTION INTRAVENOUS ONCE
Status: COMPLETED | OUTPATIENT
Start: 2025-06-20 | End: 2025-06-20

## 2025-06-20 RX ORDER — AMOXICILLIN 250 MG
1 CAPSULE ORAL 2 TIMES DAILY
Status: DISCONTINUED | OUTPATIENT
Start: 2025-06-20 | End: 2025-06-23

## 2025-06-20 RX ORDER — LIDOCAINE 40 MG/G
CREAM TOPICAL
Status: DISCONTINUED | OUTPATIENT
Start: 2025-06-20 | End: 2025-06-21

## 2025-06-20 RX ORDER — HYDROMORPHONE HYDROCHLORIDE 2 MG/1
2 TABLET ORAL
Status: DISCONTINUED | OUTPATIENT
Start: 2025-06-20 | End: 2025-06-24

## 2025-06-20 RX ORDER — ENOXAPARIN SODIUM 100 MG/ML
40 INJECTION SUBCUTANEOUS EVERY 24 HOURS
Status: DISCONTINUED | OUTPATIENT
Start: 2025-06-21 | End: 2025-06-27 | Stop reason: ALTCHOICE

## 2025-06-20 RX ORDER — PROCHLORPERAZINE MALEATE 5 MG/1
5 TABLET ORAL EVERY 6 HOURS PRN
Status: DISCONTINUED | OUTPATIENT
Start: 2025-06-20 | End: 2025-06-30 | Stop reason: HOSPADM

## 2025-06-20 RX ORDER — VALACYCLOVIR HYDROCHLORIDE 500 MG/1
500 TABLET, FILM COATED ORAL 2 TIMES DAILY
Status: DISCONTINUED | OUTPATIENT
Start: 2025-06-20 | End: 2025-06-30 | Stop reason: HOSPADM

## 2025-06-20 RX ORDER — ONDANSETRON 4 MG/1
4 TABLET, ORALLY DISINTEGRATING ORAL EVERY 30 MIN PRN
Status: DISCONTINUED | OUTPATIENT
Start: 2025-06-20 | End: 2025-06-20 | Stop reason: HOSPADM

## 2025-06-20 RX ORDER — GINSENG 100 MG
CAPSULE ORAL
Status: DISCONTINUED | OUTPATIENT
Start: 2025-06-20 | End: 2025-06-30 | Stop reason: HOSPADM

## 2025-06-20 RX ORDER — HYDROMORPHONE HYDROCHLORIDE 2 MG/1
4 TABLET ORAL
Status: DISCONTINUED | OUTPATIENT
Start: 2025-06-20 | End: 2025-06-22

## 2025-06-20 RX ORDER — HEPARIN SODIUM 5000 [USP'U]/.5ML
5000 INJECTION, SOLUTION INTRAVENOUS; SUBCUTANEOUS
Status: COMPLETED | OUTPATIENT
Start: 2025-06-20 | End: 2025-06-20

## 2025-06-20 RX ORDER — LIDOCAINE 40 MG/G
CREAM TOPICAL
Status: DISCONTINUED | OUTPATIENT
Start: 2025-06-20 | End: 2025-06-30 | Stop reason: HOSPADM

## 2025-06-20 RX ORDER — CEFAZOLIN SODIUM/WATER 2 G/20 ML
2 SYRINGE (ML) INTRAVENOUS
Status: DISCONTINUED | OUTPATIENT
Start: 2025-06-20 | End: 2025-06-20 | Stop reason: HOSPADM

## 2025-06-20 RX ORDER — CEFAZOLIN SODIUM/WATER 2 G/20 ML
2 SYRINGE (ML) INTRAVENOUS
Status: COMPLETED | OUTPATIENT
Start: 2025-06-20 | End: 2025-06-20

## 2025-06-20 RX ORDER — HYDROMORPHONE HCL IN WATER/PF 6 MG/30 ML
0.2 PATIENT CONTROLLED ANALGESIA SYRINGE INTRAVENOUS EVERY 5 MIN PRN
Status: DISCONTINUED | OUTPATIENT
Start: 2025-06-20 | End: 2025-06-20 | Stop reason: HOSPADM

## 2025-06-20 RX ORDER — BISACODYL 10 MG
10 SUPPOSITORY, RECTAL RECTAL DAILY PRN
Status: DISCONTINUED | OUTPATIENT
Start: 2025-06-23 | End: 2025-06-30 | Stop reason: HOSPADM

## 2025-06-20 RX ORDER — FENTANYL CITRATE 0.05 MG/ML
50 INJECTION, SOLUTION INTRAMUSCULAR; INTRAVENOUS EVERY 5 MIN PRN
Status: DISCONTINUED | OUTPATIENT
Start: 2025-06-20 | End: 2025-06-20 | Stop reason: HOSPADM

## 2025-06-20 RX ORDER — HYDROMORPHONE HYDROCHLORIDE 1 MG/ML
0.3 INJECTION, SOLUTION INTRAMUSCULAR; INTRAVENOUS; SUBCUTANEOUS
Status: DISCONTINUED | OUTPATIENT
Start: 2025-06-20 | End: 2025-06-22

## 2025-06-20 RX ORDER — MAGNESIUM HYDROXIDE 1200 MG/15ML
LIQUID ORAL PRN
Status: DISCONTINUED | OUTPATIENT
Start: 2025-06-20 | End: 2025-06-20 | Stop reason: HOSPADM

## 2025-06-20 RX ORDER — ACETAMINOPHEN 500 MG
1000 TABLET ORAL 3 TIMES DAILY
Status: DISCONTINUED | OUTPATIENT
Start: 2025-06-20 | End: 2025-06-30 | Stop reason: HOSPADM

## 2025-06-20 RX ORDER — PANTOPRAZOLE SODIUM 40 MG/1
40 TABLET, DELAYED RELEASE ORAL DAILY
Status: DISCONTINUED | OUTPATIENT
Start: 2025-06-20 | End: 2025-06-30 | Stop reason: HOSPADM

## 2025-06-20 RX ORDER — CEFAZOLIN SODIUM/WATER 2 G/20 ML
2 SYRINGE (ML) INTRAVENOUS SEE ADMIN INSTRUCTIONS
Status: DISCONTINUED | OUTPATIENT
Start: 2025-06-20 | End: 2025-06-20 | Stop reason: HOSPADM

## 2025-06-20 RX ORDER — SODIUM CHLORIDE 9 MG/ML
INJECTION, SOLUTION INTRAVENOUS CONTINUOUS
Status: DISCONTINUED | OUTPATIENT
Start: 2025-06-20 | End: 2025-06-22

## 2025-06-20 RX ORDER — ONDANSETRON 4 MG/1
4 TABLET, ORALLY DISINTEGRATING ORAL EVERY 6 HOURS PRN
Status: DISCONTINUED | OUTPATIENT
Start: 2025-06-20 | End: 2025-06-30 | Stop reason: HOSPADM

## 2025-06-20 RX ORDER — LOSARTAN POTASSIUM 50 MG/1
50 TABLET ORAL 2 TIMES DAILY
Status: DISCONTINUED | OUTPATIENT
Start: 2025-06-20 | End: 2025-06-24

## 2025-06-20 RX ORDER — CALCIUM CARBONATE 500 MG/1
500 TABLET, CHEWABLE ORAL 4 TIMES DAILY PRN
Status: DISCONTINUED | OUTPATIENT
Start: 2025-06-20 | End: 2025-06-30 | Stop reason: HOSPADM

## 2025-06-20 RX ORDER — POLYETHYLENE GLYCOL 3350 17 G/17G
17 POWDER, FOR SOLUTION ORAL DAILY
Status: DISCONTINUED | OUTPATIENT
Start: 2025-06-21 | End: 2025-06-30 | Stop reason: HOSPADM

## 2025-06-20 RX ORDER — ONDANSETRON 2 MG/ML
4 INJECTION INTRAMUSCULAR; INTRAVENOUS EVERY 6 HOURS PRN
Status: DISCONTINUED | OUTPATIENT
Start: 2025-06-20 | End: 2025-06-30 | Stop reason: HOSPADM

## 2025-06-20 RX ORDER — BUPIVACAINE HYDROCHLORIDE 5 MG/ML
INJECTION, SOLUTION PERINEURAL PRN
Status: DISCONTINUED | OUTPATIENT
Start: 2025-06-20 | End: 2025-06-20 | Stop reason: HOSPADM

## 2025-06-20 RX ADMIN — LABETALOL HYDROCHLORIDE 5 MG: 5 INJECTION INTRAVENOUS at 14:05

## 2025-06-20 RX ADMIN — SODIUM CHLORIDE, SODIUM LACTATE, POTASSIUM CHLORIDE, AND CALCIUM CHLORIDE: .6; .31; .03; .02 INJECTION, SOLUTION INTRAVENOUS at 09:58

## 2025-06-20 RX ADMIN — VALACYCLOVIR HYDROCHLORIDE 500 MG: 500 TABLET, FILM COATED ORAL at 21:16

## 2025-06-20 RX ADMIN — PANTOPRAZOLE SODIUM 40 MG: 40 TABLET, DELAYED RELEASE ORAL at 17:00

## 2025-06-20 RX ADMIN — HYDROMORPHONE HYDROCHLORIDE 0.4 MG: 0.2 INJECTION, SOLUTION INTRAMUSCULAR; INTRAVENOUS; SUBCUTANEOUS at 14:44

## 2025-06-20 RX ADMIN — ACETAMINOPHEN 1000 MG: 500 TABLET, FILM COATED ORAL at 17:00

## 2025-06-20 RX ADMIN — HYDROMORPHONE HYDROCHLORIDE 2 MG: 2 TABLET ORAL at 21:36

## 2025-06-20 RX ADMIN — LOSARTAN POTASSIUM 50 MG: 50 TABLET, FILM COATED ORAL at 21:15

## 2025-06-20 RX ADMIN — ACETAMINOPHEN 1000 MG: 500 TABLET, FILM COATED ORAL at 21:15

## 2025-06-20 RX ADMIN — FENTANYL CITRATE 50 MCG: 50 INJECTION INTRAMUSCULAR; INTRAVENOUS at 13:58

## 2025-06-20 RX ADMIN — SENNOSIDES AND DOCUSATE SODIUM 1 TABLET: 50; 8.6 TABLET ORAL at 21:16

## 2025-06-20 RX ADMIN — CITALOPRAM HYDROBROMIDE 20 MG: 20 TABLET ORAL at 17:00

## 2025-06-20 RX ADMIN — HYDROMORPHONE HYDROCHLORIDE 0.3 MG: 1 INJECTION, SOLUTION INTRAMUSCULAR; INTRAVENOUS; SUBCUTANEOUS at 19:05

## 2025-06-20 RX ADMIN — HYDROMORPHONE HYDROCHLORIDE 0.3 MG: 1 INJECTION, SOLUTION INTRAMUSCULAR; INTRAVENOUS; SUBCUTANEOUS at 16:57

## 2025-06-20 RX ADMIN — SODIUM CHLORIDE: 0.9 INJECTION, SOLUTION INTRAVENOUS at 17:05

## 2025-06-20 RX ADMIN — FENTANYL CITRATE 50 MCG: 50 INJECTION INTRAMUSCULAR; INTRAVENOUS at 13:42

## 2025-06-20 RX ADMIN — FENTANYL CITRATE 50 MCG: 50 INJECTION INTRAMUSCULAR; INTRAVENOUS at 13:18

## 2025-06-20 RX ADMIN — HYDROMORPHONE HYDROCHLORIDE 0.4 MG: 0.2 INJECTION, SOLUTION INTRAMUSCULAR; INTRAVENOUS; SUBCUTANEOUS at 14:10

## 2025-06-20 RX ADMIN — HEPARIN SODIUM 5000 UNITS: 5000 INJECTION, SOLUTION INTRAVENOUS; SUBCUTANEOUS at 09:59

## 2025-06-20 ASSESSMENT — ACTIVITIES OF DAILY LIVING (ADL)
ADLS_ACUITY_SCORE: 34
ADLS_ACUITY_SCORE: 29
ADLS_ACUITY_SCORE: 29
ADLS_ACUITY_SCORE: 46
ADLS_ACUITY_SCORE: 29
ADLS_ACUITY_SCORE: 34
ADLS_ACUITY_SCORE: 29
ADLS_ACUITY_SCORE: 36
ADLS_ACUITY_SCORE: 28
ADLS_ACUITY_SCORE: 35
ADLS_ACUITY_SCORE: 29
ADLS_ACUITY_SCORE: 36
ADLS_ACUITY_SCORE: 29

## 2025-06-20 NOTE — OR NURSING
Notified Dr Manning HTN in PACU-similar as pre-op. OK for labetalol then transfer to floor to continue home BP meds.

## 2025-06-20 NOTE — OP NOTE
PROCEDURE DATE: 06/20/2025    SURGEON: Brady Jasso MD    FIRST ASSISTANT: Sharmila Nguyễn PA-C     PREOPERATIVE DIAGNOSIS: Right lung nodules    POSTOPERATIVE DIAGNOSIS: Same    PROCEDURES:  Limited right thoracotomy  Right middle lobectomy  Wedge resection groundglass nodule posterior right upper lobe lung  Mediastinal lymph node dissection    ANESTHESIA: General with double-lumen endotracheal tube      INDICATIONS FOR PROCEDURE: 74-year-old woman with abnormal CT scan of the chest showing enlarging part groundglass part solid and cystic lesion of the right middle lobe lung.  She has a remote history of smoking.  I reviewed the CT scan.  In the upper lobe there is a very small minute 3 Kramer groundglass opacity at the apex.  Deep in the right lower lobe lung is a very faint groundglass opacity with no solid component.  The subpleural location in the posterior segment of the right upper lobe lung there is a 8 mm groundglass opacity and a smaller one in the lateral aspect of the right upper lobe lung.  In the left side there is 1 groundglass opacity in each lobe with no solid component.  On the PET/CT scan the only lesion that is hypermetabolic is in the middle lobe lung lesion.  Her pulmonary function tests are adequate.  Options were discussed in great details and is elected to proceed with a limited right thoracotomy      DESCRIPTION OF PROCEDURE: The patient was brought to the operating room and placed in a supine position.  Under general anesthesia with a double-lumen tracheal tube, the patient was placed in a left lateral decubitus position.  The right chest was prepared and draped in the usual fashion using ChloraPrep.  The ventilation of the right lung was discontinued.  A limited thoracotomy was made.  The pleural space was entered in the fifth intercostal space preserving the integrity of the ribs.  On exploration, there is no pleural fluid or pleural nodule.  Diaphragm, hilum and mediastinum were  normal.  Careful palpation of the lung was done.  OF the 4 groundglass opacity in the right upper lobe lung the only 1 that was clearly defined was the one in the posterior segment of the right upper lobe lung.  Using multiple application of Cucumber 60 gold stapling device which section was done.  Then the middle lobe was carefully examined.  The part solid part groundglass and cystic lesion in the middle lobe was clearly identified.  This was too close to the minor fissure and not amenable to a wedge resection.  Palpation of the right lower lobe lung was normal.  The inferior pulmonary ligament was mobilized.  The hilum was dissected circumferentially.  The mediastinal pleura above the azygos vein was incised.  Mediastinal dissection was performed excising the inferior pulm ligament lymph node subcarinal lymph node posterior hilar lymph node and right paratracheal lymph node.  These were all submitted for permanent section.  During the dissection for the middle lobectomy a #11 #12 lymph nodes were excised and submitted for permanent section.    Then, the vein draining the middle lobe to the superior pulmonary vein was dissected circumferentially and stapled application of Cucumber 35 vascular stapling device.  The fissure was entered exposing the pulmonary artery.  The fissure was complete anteriorly with application of Cucumber 60 gold stapling device.  Then the right middle bronchus was dissected circumferentially and stapled application of Cucumber 35 vascular stapling device close to its origin.  There was 1 branch of the pulmonary artery mid lobe.  This branch was dissected circumferentially and stapled application of Cucumber 35 vascular stapling device.  Then the middle lobectomy was completed dividing the minor fissure with some of the upper lobe lung attached to the specimen.  This was done with multiple application of Cucumber 60 gold stapling device.  The resection was done with excellent margin.  Staple line  was hemostatic.  The bronchial stump was airtight at a pressure of 20 cm water.  There was an excellent reexpansion of the upper and lower lobe lung.  Pro gel was placed on the fissure.  Hemostasis was verified and was excellent.  Through separate stab wound a 28 straight chest was placed and sutured skin with 2 silk suture.  On-Q cath were placed.  30 cc of Marcaine 0.5% without epinephrine was injected as well as intercostal blocks.  The thoracotomy was closure in the usual fashion.    ESTIMATED BLOOD LOSS: 50 mL    COUNTS: Needle and sponge count is correct    Sharmila Nguyễn PA-C was the first assistant during the procedure.  Her role as first assistant during the procedure was essential and necessary to accomplishing the steps described in the procedure above, providing exposure, retraction and handling of the scope.     Brady Jasso MD

## 2025-06-20 NOTE — INTERVAL H&P NOTE
"I have reviewed the surgical (or preoperative) H&P that is linked to this encounter, and examined the patient. There are no significant changes    Clinical Conditions Present on Arrival:  Clinically Significant Risk Factors Present on Admission                    # Overweight: Estimated body mass index is 27.96 kg/m  as calculated from the following:    Height as of this encounter: 1.676 m (5' 6\").    Weight as of this encounter: 78.6 kg (173 lb 3.2 oz).       "

## 2025-06-20 NOTE — BRIEF OP NOTE
[facility]    Brief Operative Note    Pre-operative diagnosis: Nodule of middle lobe of right lung [R91.1]  Nodule of upper lobe of right lung [R91.1]  Post-operative diagnosis right middle lobe lung nodule and right upper lobe ground glass opacity    Procedure: RIGHT THORACOTOMY, RIGHT MIDDLE LOBECTOMY, WEDGE RESECTION OF RIGHT UPPER LOBE LUNG NODULE, MEDIASTINAL LYMPH NODE DISSECTION, Right - Chest    Surgeon: Surgeons and Role:     * Brady Jasso MD - Primary     * Sharmila Nguyễn PA-C - Assisting  Anesthesia: General   Estimated Blood Loss: 50 cc    Drains: One 28 straight chest tube to right apx  Specimens:   ID Type Source Tests Collected by Time Destination   1 : RIGHT UPPER LOBE GROUND GLASS NODULE Tissue Lung, Upper Lobe, Right SURGICAL PATHOLOGY EXAM Brady Jasso MD 6/20/2025 11:08 AM    2 : #9 INFERIOR PULMONARY LIGAMENT NODE Tissue Lymph Node(s), Mediastinal, Regional SURGICAL PATHOLOGY EXAM Brady Jasso MD 6/20/2025 11:10 AM    3 : #7 SUBCARINAL NODE Tissue Lymph Node(s), Mediastinal, Regional SURGICAL PATHOLOGY EXAM Brady Jasso MD 6/20/2025 11:11 AM    4 : #10R POSTERIOR HILAR NODE Tissue Lymph Node(s), Mediastinal, Regional SURGICAL PATHOLOGY EXAM Brady Jasso MD 6/20/2025 11:11 AM    5 : #12 RIGHT MIDDLE LOBE BRONCHUS LYMPH NODE Tissue Lymph Node(s), Mediastinal, Regional SURGICAL PATHOLOGY EXAM Brady Jasso MD 6/20/2025 11:30 AM    6 : #11 INTERLOBAR NODE Tissue Lymph Node(s), Mediastinal, Regional SURGICAL PATHOLOGY EXAM Brady Jasso MD 6/20/2025 11:33 AM    7 : RIGHT MIDDLE LOBE LUNG Tissue Lung, Middle Lobe, Right SURGICAL PATHOLOGY EXAM Brady Jasso MD 6/20/2025 11:47 AM    8 : #4R LOWER PARATRACHEAL NODE Tissue Lymph Node(s), Mediastinal, Regional SURGICAL PATHOLOGY EXAM Brady Jasso MD 6/20/2025 11:51 AM      Findings:   Await final path on right middle  lobe and right upper lobe lung nodules  Complications: None.  Implants: * No implants in log *

## 2025-06-21 ENCOUNTER — APPOINTMENT (OUTPATIENT)
Dept: GENERAL RADIOLOGY | Facility: CLINIC | Age: 75
DRG: 163 | End: 2025-06-21
Attending: PHYSICIAN ASSISTANT
Payer: COMMERCIAL

## 2025-06-21 LAB
ANION GAP SERPL CALCULATED.3IONS-SCNC: 12 MMOL/L (ref 7–15)
BUN SERPL-MCNC: 19.6 MG/DL (ref 8–23)
CALCIUM SERPL-MCNC: 8.3 MG/DL (ref 8.8–10.4)
CHLORIDE SERPL-SCNC: 100 MMOL/L (ref 98–107)
CREAT SERPL-MCNC: 1.09 MG/DL (ref 0.51–0.95)
EGFRCR SERPLBLD CKD-EPI 2021: 53 ML/MIN/1.73M2
GLUCOSE BLDC GLUCOMTR-MCNC: 110 MG/DL (ref 70–99)
GLUCOSE SERPL-MCNC: 133 MG/DL (ref 70–99)
HCO3 SERPL-SCNC: 21 MMOL/L (ref 22–29)
HGB BLD-MCNC: 12.2 G/DL (ref 11.7–15.7)
MCV RBC AUTO: 93 FL (ref 78–100)
POTASSIUM SERPL-SCNC: 4.6 MMOL/L (ref 3.4–5.3)
SODIUM SERPL-SCNC: 133 MMOL/L (ref 135–145)

## 2025-06-21 PROCEDURE — 258N000003 HC RX IP 258 OP 636: Performed by: PHYSICIAN ASSISTANT

## 2025-06-21 PROCEDURE — 94640 AIRWAY INHALATION TREATMENT: CPT | Mod: 76

## 2025-06-21 PROCEDURE — 250N000013 HC RX MED GY IP 250 OP 250 PS 637: Performed by: THORACIC SURGERY (CARDIOTHORACIC VASCULAR SURGERY)

## 2025-06-21 PROCEDURE — 85018 HEMOGLOBIN: CPT | Performed by: PHYSICIAN ASSISTANT

## 2025-06-21 PROCEDURE — 120N000001 HC R&B MED SURG/OB

## 2025-06-21 PROCEDURE — 250N000011 HC RX IP 250 OP 636: Performed by: PHYSICIAN ASSISTANT

## 2025-06-21 PROCEDURE — 36415 COLL VENOUS BLD VENIPUNCTURE: CPT | Performed by: PHYSICIAN ASSISTANT

## 2025-06-21 PROCEDURE — 71045 X-RAY EXAM CHEST 1 VIEW: CPT

## 2025-06-21 PROCEDURE — 250N000013 HC RX MED GY IP 250 OP 250 PS 637: Performed by: PHYSICIAN ASSISTANT

## 2025-06-21 PROCEDURE — 999N000157 HC STATISTIC RCP TIME EA 10 MIN

## 2025-06-21 PROCEDURE — 250N000009 HC RX 250: Performed by: THORACIC SURGERY (CARDIOTHORACIC VASCULAR SURGERY)

## 2025-06-21 PROCEDURE — 80048 BASIC METABOLIC PNL TOTAL CA: CPT | Performed by: PHYSICIAN ASSISTANT

## 2025-06-21 PROCEDURE — 250N000009 HC RX 250: Performed by: PHYSICIAN ASSISTANT

## 2025-06-21 RX ORDER — ALBUTEROL SULFATE 0.83 MG/ML
2.5 SOLUTION RESPIRATORY (INHALATION) EVERY 4 HOURS PRN
Status: DISCONTINUED | OUTPATIENT
Start: 2025-06-21 | End: 2025-06-30 | Stop reason: HOSPADM

## 2025-06-21 RX ADMIN — HYDROMORPHONE HYDROCHLORIDE 4 MG: 2 TABLET ORAL at 17:09

## 2025-06-21 RX ADMIN — ACETAMINOPHEN 1000 MG: 500 TABLET, FILM COATED ORAL at 10:44

## 2025-06-21 RX ADMIN — ACETAMINOPHEN 1000 MG: 500 TABLET, FILM COATED ORAL at 17:09

## 2025-06-21 RX ADMIN — HYDROMORPHONE HYDROCHLORIDE 4 MG: 2 TABLET ORAL at 10:44

## 2025-06-21 RX ADMIN — LOSARTAN POTASSIUM 50 MG: 50 TABLET, FILM COATED ORAL at 20:18

## 2025-06-21 RX ADMIN — PANTOPRAZOLE SODIUM 40 MG: 40 TABLET, DELAYED RELEASE ORAL at 10:44

## 2025-06-21 RX ADMIN — DOCUSATE SODIUM 100 MG: 100 CAPSULE, LIQUID FILLED ORAL at 10:44

## 2025-06-21 RX ADMIN — SENNOSIDES AND DOCUSATE SODIUM 1 TABLET: 50; 8.6 TABLET ORAL at 10:44

## 2025-06-21 RX ADMIN — HYDROMORPHONE HYDROCHLORIDE 4 MG: 2 TABLET ORAL at 13:53

## 2025-06-21 RX ADMIN — ALBUTEROL SULFATE 2.5 MG: 2.5 SOLUTION RESPIRATORY (INHALATION) at 12:16

## 2025-06-21 RX ADMIN — HYDROMORPHONE HYDROCHLORIDE 2 MG: 2 TABLET ORAL at 03:56

## 2025-06-21 RX ADMIN — VALACYCLOVIR HYDROCHLORIDE 500 MG: 500 TABLET, FILM COATED ORAL at 20:18

## 2025-06-21 RX ADMIN — BACITRACIN: 500 OINTMENT TOPICAL at 17:15

## 2025-06-21 RX ADMIN — ENOXAPARIN SODIUM 40 MG: 40 INJECTION SUBCUTANEOUS at 10:49

## 2025-06-21 RX ADMIN — VALACYCLOVIR HYDROCHLORIDE 500 MG: 500 TABLET, FILM COATED ORAL at 10:44

## 2025-06-21 RX ADMIN — CITALOPRAM HYDROBROMIDE 20 MG: 20 TABLET ORAL at 10:44

## 2025-06-21 RX ADMIN — POLYETHYLENE GLYCOL 3350 17 G: 17 POWDER, FOR SOLUTION ORAL at 10:45

## 2025-06-21 RX ADMIN — Medication 1 LOZENGE: at 13:54

## 2025-06-21 RX ADMIN — HYDROMORPHONE HYDROCHLORIDE 2 MG: 2 TABLET ORAL at 07:01

## 2025-06-21 RX ADMIN — LOSARTAN POTASSIUM 50 MG: 50 TABLET, FILM COATED ORAL at 10:44

## 2025-06-21 RX ADMIN — SENNOSIDES AND DOCUSATE SODIUM 1 TABLET: 50; 8.6 TABLET ORAL at 20:19

## 2025-06-21 RX ADMIN — SODIUM CHLORIDE: 0.9 INJECTION, SOLUTION INTRAVENOUS at 03:51

## 2025-06-21 RX ADMIN — ALBUTEROL SULFATE 2.5 MG: 2.5 SOLUTION RESPIRATORY (INHALATION) at 17:26

## 2025-06-21 RX ADMIN — HYDROMORPHONE HYDROCHLORIDE 4 MG: 2 TABLET ORAL at 20:18

## 2025-06-21 RX ADMIN — BACITRACIN: 500 OINTMENT TOPICAL at 10:49

## 2025-06-21 ASSESSMENT — ACTIVITIES OF DAILY LIVING (ADL)
ADLS_ACUITY_SCORE: 35
ADLS_ACUITY_SCORE: 38
ADLS_ACUITY_SCORE: 35
ADLS_ACUITY_SCORE: 38
ADLS_ACUITY_SCORE: 35
ADLS_ACUITY_SCORE: 38
ADLS_ACUITY_SCORE: 35
ADLS_ACUITY_SCORE: 38
ADLS_ACUITY_SCORE: 35
ADLS_ACUITY_SCORE: 38

## 2025-06-21 NOTE — PROVIDER NOTIFICATION
Pt with new expiratory wheezes bilaterally, increased shortness of breath  IS and flutter valve performed.    Dr. Jasso notified, PRN albuterol neb ordered for wheezing and given with some improvement.    Continued ambulation and pulmonary toilet encouraged.

## 2025-06-21 NOTE — PROGRESS NOTES
THORACIC SURGERY POD # 1    Discussed the findings and procedure    AVSS on room air    No airleak serosanguineous chest tube output    Chest x-ray looks good    Chest tube to waterseal  Ambulate in hallway x 8  Respiratory care ++    Discussed    ABHIJEET AMADOR MD [facility] ONCOLOGY THORACIC SURGERY  CELL:  [phone]  OFFICE: [phone]

## 2025-06-21 NOTE — PLAN OF CARE
[id number]    A&O x4 but a little forgetful. VSS on RA most of morning, returned to 2L O2 NC after new wheezing.   Up assist x1 WGB. Ambulating in halls.  LS diminished R side, clear L => intermittent wheezing bilaterally.   BS hypoactive. Voiding some, PO fluids encouraged.   Thoracotomy incision CHRISTIANE, had scant serosanguinous drainage, steri strips intact.   CT to water seal, no air leak, no crepitus, output getting lighter in color.   On Q pump infusing, sensors taped, clamps open, tegaderm dressing with oozing under it as was yesterday.  Still having high levels of pain, especially radiating to R breast. Ice applied + PRN PO dilaudid 4mg

## 2025-06-21 NOTE — PLAN OF CARE
Goal Outcome Evaluation:  1900-0730    Plan of Care Reviewed With: patient        Pt is alert, oriented X 4. AVSS on room air (she was on 3 L NC but weaned down to room air). Up with A1 GB. Tele is SR, LS diminished on R upper lobe and R lower lobe. Tolerating regular diet, good appetite. AUOP, pt prefers to use the bathroom rather than the purewick; no BM this shift, passing gas. Off IMC status; PIV on L arm X SL. CT with sanguinous output with minimal drainage, on-Q pump infusing. Up to the chair, breakfast was ordered.

## 2025-06-21 NOTE — PLAN OF CARE
"Patient Name: Maryanne  MRN: [id number]  Date of Admission: 6/20/2025  Reason for Admission: Thoracotomy  Level of Care: MS    Vitals:   BP Readings from Last 1 Encounters:   06/21/25 108/54     Pulse Readings from Last 1 Encounters:   06/21/25 74     Wt Readings from Last 1 Encounters:   06/21/25 80.9 kg (178 lb 4.8 oz)     Ht Readings from Last 1 Encounters:   06/20/25 1.676 m (5' 6\")     Estimated body mass index is 28.78 kg/m  as calculated from the following:    Height as of this encounter: 1.676 m (5' 6\").    Weight as of this encounter: 80.9 kg (178 lb 4.8 oz).  Temp Readings from Last 1 Encounters:   06/21/25 99.1  F (37.3  C) (Oral)       Pain: Pain goal 0 Pain Rating 3-7 Effective pain medication/regimen PRN meds    CV Surgery Patient: No    Assessment    Resp: LS wheezing  Telemetry: SR  Neuro: A.Ox4  GI/: Adequate UO via purewick/BR, BS audible  Skin/Wounds: R CT site  Lines/Drains: Chest tube site, PIV  Activity: Ax1 GB/W  Sleep: -  Abnormal Labs: -    Aggression Stop Light: Green          Patient Care Plan: Continue to monitor     Goal Outcome Evaluation:                            "

## 2025-06-21 NOTE — PLAN OF CARE
Pt arrived from PACU and settled ~1515    A&O x4, sleepy. VSS on 2L O2 NC, weaned to RA for a short time but desat when OOB, returned to 2L NC for now.   Pt ambulated in room to bathroom, and up in chair for dinner with assist x2 for help with lines.   LS diminished R side. For a short time had some wheezing and pt felt she needed to cough. Was able to cough independently but still felt she needed to cough more. IS and flutter valve teaching done and pt demonstrated. Felt improvement and wheezes improved after flutter valve use. Encouraged goal to use Q1hr  BS hypoactive. Flatus -. Voided small amt.   Incision with dressing CDI.   CT -20 suction, scant drainage marked with no change since up from PACU  No air leak, no crepitus.  On Q infusing, sensors taped, clamps open. Some sangious drainage under tegaderm.   Ice applied.  Pain 6-8/10, mostly in R breast area. mild improvement with IV dilaudid. Pt feels tylenol does not help her but was willing to try.

## 2025-06-22 ENCOUNTER — APPOINTMENT (OUTPATIENT)
Dept: GENERAL RADIOLOGY | Facility: CLINIC | Age: 75
End: 2025-06-22
Attending: PHYSICIAN ASSISTANT
Payer: COMMERCIAL

## 2025-06-22 LAB — GLUCOSE BLDC GLUCOMTR-MCNC: 123 MG/DL (ref 70–99)

## 2025-06-22 PROCEDURE — 71045 X-RAY EXAM CHEST 1 VIEW: CPT

## 2025-06-22 PROCEDURE — 120N000001 HC R&B MED SURG/OB

## 2025-06-22 PROCEDURE — 250N000013 HC RX MED GY IP 250 OP 250 PS 637: Performed by: PHYSICIAN ASSISTANT

## 2025-06-22 PROCEDURE — 250N000011 HC RX IP 250 OP 636: Performed by: PHYSICIAN ASSISTANT

## 2025-06-22 RX ADMIN — ACETAMINOPHEN 1000 MG: 500 TABLET, FILM COATED ORAL at 00:10

## 2025-06-22 RX ADMIN — VALACYCLOVIR HYDROCHLORIDE 500 MG: 500 TABLET, FILM COATED ORAL at 09:27

## 2025-06-22 RX ADMIN — SENNOSIDES AND DOCUSATE SODIUM 1 TABLET: 50; 8.6 TABLET ORAL at 09:26

## 2025-06-22 RX ADMIN — ENOXAPARIN SODIUM 40 MG: 40 INJECTION SUBCUTANEOUS at 09:27

## 2025-06-22 RX ADMIN — VALACYCLOVIR HYDROCHLORIDE 500 MG: 500 TABLET, FILM COATED ORAL at 21:05

## 2025-06-22 RX ADMIN — CITALOPRAM HYDROBROMIDE 20 MG: 20 TABLET ORAL at 09:27

## 2025-06-22 RX ADMIN — ACETAMINOPHEN 1000 MG: 500 TABLET, FILM COATED ORAL at 21:04

## 2025-06-22 RX ADMIN — LOSARTAN POTASSIUM 50 MG: 50 TABLET, FILM COATED ORAL at 09:27

## 2025-06-22 RX ADMIN — HYDROMORPHONE HYDROCHLORIDE 2 MG: 2 TABLET ORAL at 00:12

## 2025-06-22 RX ADMIN — ACETAMINOPHEN 1000 MG: 500 TABLET, FILM COATED ORAL at 16:49

## 2025-06-22 RX ADMIN — HYDROMORPHONE HYDROCHLORIDE 4 MG: 2 TABLET ORAL at 05:05

## 2025-06-22 RX ADMIN — POLYETHYLENE GLYCOL 3350 17 G: 17 POWDER, FOR SOLUTION ORAL at 09:26

## 2025-06-22 RX ADMIN — LOSARTAN POTASSIUM 50 MG: 50 TABLET, FILM COATED ORAL at 21:05

## 2025-06-22 RX ADMIN — SENNOSIDES AND DOCUSATE SODIUM 1 TABLET: 50; 8.6 TABLET ORAL at 21:05

## 2025-06-22 RX ADMIN — ACETAMINOPHEN 1000 MG: 500 TABLET, FILM COATED ORAL at 09:26

## 2025-06-22 RX ADMIN — PANTOPRAZOLE SODIUM 40 MG: 40 TABLET, DELAYED RELEASE ORAL at 09:27

## 2025-06-22 ASSESSMENT — ACTIVITIES OF DAILY LIVING (ADL)
ADLS_ACUITY_SCORE: 39
ADLS_ACUITY_SCORE: 35
ADLS_ACUITY_SCORE: 39
ADLS_ACUITY_SCORE: 39
ADLS_ACUITY_SCORE: 35
ADLS_ACUITY_SCORE: 39
ADLS_ACUITY_SCORE: 39
ADLS_ACUITY_SCORE: 35
ADLS_ACUITY_SCORE: 35
ADLS_ACUITY_SCORE: 39
ADLS_ACUITY_SCORE: 35
ADLS_ACUITY_SCORE: 39
ADLS_ACUITY_SCORE: 35
ADLS_ACUITY_SCORE: 39
ADLS_ACUITY_SCORE: 35

## 2025-06-22 NOTE — PROVIDER NOTIFICATION
MD Notification    Notified Person: MD    Notified Person Name: Dr. Jasso    Notification Date/Time:1745     Notification Interaction: Text message    Purpose of Notification: Pt has not had urine output since 0530 this AM, most recent bladder scan at 1700 was 150    Orders Received: no new orders     Comments:

## 2025-06-22 NOTE — PLAN OF CARE
[id number]    Orientation/neuro: A&Ox4  Vitals/Pain: VSS on 1L NC, unable to wean. Pt reporting 8/10 incisional pain, managing with PRN dilaudid throughout shift  Resp: Expiratory wheezes, coarse LS on R. Pt declined nebs. Unable to wean off 1L NC. VYAS. Congested, productive cough.   Telemetry: n/a  GI/: Tolerating regular diet. Denies nausea. +BS, +flatus, -BM this shift. Pt c/o voiding urgency/hesitancy/retention and bladder discomfort. PVRs over 500, straight cath for 0530 @ 0545.  Skin/Wounds: R thora site, CT site, on-q pump. Scattered bruising.   Lines/Drains: CT to water seal, serosang output, no airleak. CT dressing CDI. On-q pump sensors taped to skin, clamps open. L PIV SL  Activity: A1 GBW  Sleep: 9 hours  Labs: Abnormal/Trends, Electrolyte Replacement-   POD# 2     Aggression Stop Light: Green          Patient Care Plan: Encourage pulm hygiene, pain management, encourage activity/OOB, walks. Wean O2 as able

## 2025-06-22 NOTE — PLAN OF CARE
"Patient Name: Maryanne  MRN: [id number]  Date of Admission: 6/20/2025  Reason for Admission: Thoracotomy  Level of Care: MS         Vitals:   BP Readings from Last 1 Encounters:   06/22/25 101/76     Pulse Readings from Last 1 Encounters:   06/22/25 72     Wt Readings from Last 1 Encounters:   06/21/25 80.9 kg (178 lb 4.8 oz)     Ht Readings from Last 1 Encounters:   06/20/25 1.676 m (5' 6\")     Estimated body mass index is 28.78 kg/m  as calculated from the following:    Height as of this encounter: 1.676 m (5' 6\").    Weight as of this encounter: 80.9 kg (178 lb 4.8 oz).  Temp Readings from Last 1 Encounters:   06/22/25 98.4  F (36.9  C) (Oral)             Pain: Pain goal 0 Pain Rating 1-3 Effective pain medication/regimen PRN meds     CV Surgery Patient: No     Assessment     Resp: LS wheezing  Telemetry: SR  Neuro: A.Ox4  GI/: No UO during shift, BS audible  Skin/Wounds: R CT site  Lines/Drains: Chest tube site, PIV  Activity: Ax1 GB/W  Sleep: -  Abnormal Labs: -     Aggression Stop Light: Green          Patient Care Plan: Continue to monitor      Goal Outcome Evaluation:                  Goal Outcome Evaluation:                            "

## 2025-06-22 NOTE — PROGRESS NOTES
THORACIC SURGERY    Sleepy this morning  AVSS  On 1 L nasal cannula  No airleak  Serosanguineous output    Chest x-ray looks good.  No pneumothorax or infiltrates    Decrease narcotics  Needs to ambulate 8 times in hallway  Respiratory care ++ including albuterol nebs  Clamp chest tube at midnight    Discussed    ABHIJEET AMADOR MD [facility] ONCOLOGY THORACIC SURGERY  CELL:  [phone]  OFFICE: [phone]

## 2025-06-23 ENCOUNTER — APPOINTMENT (OUTPATIENT)
Dept: GENERAL RADIOLOGY | Facility: CLINIC | Age: 75
End: 2025-06-23
Attending: PHYSICIAN ASSISTANT
Payer: COMMERCIAL

## 2025-06-23 LAB
ALBUMIN UR-MCNC: NEGATIVE MG/DL
ANION GAP SERPL CALCULATED.3IONS-SCNC: 10 MMOL/L (ref 7–15)
APPEARANCE UR: CLEAR
BILIRUB UR QL STRIP: NEGATIVE
BUN SERPL-MCNC: 27.8 MG/DL (ref 8–23)
CALCIUM SERPL-MCNC: 8.5 MG/DL (ref 8.8–10.4)
CHLORIDE SERPL-SCNC: 99 MMOL/L (ref 98–107)
COLOR UR AUTO: NORMAL
CREAT SERPL-MCNC: 1.26 MG/DL (ref 0.51–0.95)
EGFRCR SERPLBLD CKD-EPI 2021: 44 ML/MIN/1.73M2
GLUCOSE SERPL-MCNC: 105 MG/DL (ref 70–99)
GLUCOSE UR STRIP-MCNC: NEGATIVE MG/DL
HCO3 SERPL-SCNC: 20 MMOL/L (ref 22–29)
HGB UR QL STRIP: NEGATIVE
KETONES UR STRIP-MCNC: NEGATIVE MG/DL
LEUKOCYTE ESTERASE UR QL STRIP: NEGATIVE
MCV RBC AUTO: 94 FL (ref 78–100)
NITRATE UR QL: NEGATIVE
OSMOLALITY SERPL: 286 MMOL/KG (ref 280–301)
OSMOLALITY UR: 362 MMOL/KG (ref 100–1200)
PH UR STRIP: 6 [PH] (ref 5–7)
PLATELET # BLD AUTO: 202 10E3/UL (ref 150–450)
POTASSIUM SERPL-SCNC: 4.4 MMOL/L (ref 3.4–5.3)
RBC URINE: <1 /HPF
SODIUM SERPL-SCNC: 126 MMOL/L (ref 135–145)
SODIUM SERPL-SCNC: 128 MMOL/L (ref 135–145)
SODIUM SERPL-SCNC: 129 MMOL/L (ref 135–145)
SODIUM UR-SCNC: 20 MMOL/L
SP GR UR STRIP: 1.01 (ref 1–1.03)
SQUAMOUS EPITHELIAL: 1 /HPF
TRANSITIONAL EPI: <1 /HPF
UROBILINOGEN UR STRIP-MCNC: NORMAL MG/DL
WBC URINE: <1 /HPF

## 2025-06-23 PROCEDURE — 84300 ASSAY OF URINE SODIUM: CPT | Performed by: PHYSICIAN ASSISTANT

## 2025-06-23 PROCEDURE — 84295 ASSAY OF SERUM SODIUM: CPT | Performed by: PHYSICIAN ASSISTANT

## 2025-06-23 PROCEDURE — 258N000003 HC RX IP 258 OP 636: Performed by: PHYSICIAN ASSISTANT

## 2025-06-23 PROCEDURE — 36415 COLL VENOUS BLD VENIPUNCTURE: CPT | Performed by: PHYSICIAN ASSISTANT

## 2025-06-23 PROCEDURE — 250N000011 HC RX IP 250 OP 636: Performed by: PHYSICIAN ASSISTANT

## 2025-06-23 PROCEDURE — 99222 1ST HOSP IP/OBS MODERATE 55: CPT | Performed by: PHYSICIAN ASSISTANT

## 2025-06-23 PROCEDURE — 120N000001 HC R&B MED SURG/OB

## 2025-06-23 PROCEDURE — 83935 ASSAY OF URINE OSMOLALITY: CPT | Performed by: PHYSICIAN ASSISTANT

## 2025-06-23 PROCEDURE — 36415 COLL VENOUS BLD VENIPUNCTURE: CPT | Performed by: THORACIC SURGERY (CARDIOTHORACIC VASCULAR SURGERY)

## 2025-06-23 PROCEDURE — 250N000013 HC RX MED GY IP 250 OP 250 PS 637: Performed by: THORACIC SURGERY (CARDIOTHORACIC VASCULAR SURGERY)

## 2025-06-23 PROCEDURE — 71045 X-RAY EXAM CHEST 1 VIEW: CPT

## 2025-06-23 PROCEDURE — 81001 URINALYSIS AUTO W/SCOPE: CPT | Performed by: PHYSICIAN ASSISTANT

## 2025-06-23 PROCEDURE — 83930 ASSAY OF BLOOD OSMOLALITY: CPT | Performed by: PHYSICIAN ASSISTANT

## 2025-06-23 PROCEDURE — 250N000013 HC RX MED GY IP 250 OP 250 PS 637: Performed by: PHYSICIAN ASSISTANT

## 2025-06-23 PROCEDURE — 80048 BASIC METABOLIC PNL TOTAL CA: CPT | Performed by: THORACIC SURGERY (CARDIOTHORACIC VASCULAR SURGERY)

## 2025-06-23 PROCEDURE — 85049 AUTOMATED PLATELET COUNT: CPT | Performed by: PHYSICIAN ASSISTANT

## 2025-06-23 RX ORDER — SODIUM CHLORIDE 9 MG/ML
INJECTION, SOLUTION INTRAVENOUS CONTINUOUS
Status: DISCONTINUED | OUTPATIENT
Start: 2025-06-23 | End: 2025-06-24

## 2025-06-23 RX ORDER — AMOXICILLIN 250 MG
2 CAPSULE ORAL 2 TIMES DAILY
Status: DISCONTINUED | OUTPATIENT
Start: 2025-06-23 | End: 2025-06-30 | Stop reason: HOSPADM

## 2025-06-23 RX ADMIN — DOCUSATE SODIUM 100 MG: 100 CAPSULE, LIQUID FILLED ORAL at 08:58

## 2025-06-23 RX ADMIN — LOSARTAN POTASSIUM 50 MG: 50 TABLET, FILM COATED ORAL at 08:59

## 2025-06-23 RX ADMIN — POLYETHYLENE GLYCOL 3350 17 G: 17 POWDER, FOR SOLUTION ORAL at 08:58

## 2025-06-23 RX ADMIN — ACETAMINOPHEN 1000 MG: 500 TABLET, FILM COATED ORAL at 21:06

## 2025-06-23 RX ADMIN — SODIUM CHLORIDE: 0.9 INJECTION, SOLUTION INTRAVENOUS at 14:19

## 2025-06-23 RX ADMIN — VALACYCLOVIR HYDROCHLORIDE 500 MG: 500 TABLET, FILM COATED ORAL at 08:59

## 2025-06-23 RX ADMIN — ACETAMINOPHEN 1000 MG: 500 TABLET, FILM COATED ORAL at 08:59

## 2025-06-23 RX ADMIN — SODIUM CHLORIDE: 0.9 INJECTION, SOLUTION INTRAVENOUS at 23:47

## 2025-06-23 RX ADMIN — HYDROMORPHONE HYDROCHLORIDE 2 MG: 2 TABLET ORAL at 08:59

## 2025-06-23 RX ADMIN — VALACYCLOVIR HYDROCHLORIDE 500 MG: 500 TABLET, FILM COATED ORAL at 21:06

## 2025-06-23 RX ADMIN — SENNOSIDES AND DOCUSATE SODIUM 1 TABLET: 50; 8.6 TABLET ORAL at 08:59

## 2025-06-23 RX ADMIN — CITALOPRAM HYDROBROMIDE 20 MG: 20 TABLET ORAL at 08:59

## 2025-06-23 RX ADMIN — ACETAMINOPHEN 1000 MG: 500 TABLET, FILM COATED ORAL at 15:34

## 2025-06-23 RX ADMIN — ENOXAPARIN SODIUM 40 MG: 40 INJECTION SUBCUTANEOUS at 08:58

## 2025-06-23 RX ADMIN — PANTOPRAZOLE SODIUM 40 MG: 40 TABLET, DELAYED RELEASE ORAL at 08:58

## 2025-06-23 ASSESSMENT — ACTIVITIES OF DAILY LIVING (ADL)
ADLS_ACUITY_SCORE: 48
ADLS_ACUITY_SCORE: 48
ADLS_ACUITY_SCORE: 35
ADLS_ACUITY_SCORE: 48
ADLS_ACUITY_SCORE: 35
ADLS_ACUITY_SCORE: 48
DEPENDENT_IADLS:: INDEPENDENT
ADLS_ACUITY_SCORE: 48
ADLS_ACUITY_SCORE: 35
ADLS_ACUITY_SCORE: 35
ADLS_ACUITY_SCORE: 48
ADLS_ACUITY_SCORE: 48
ADLS_ACUITY_SCORE: 35
ADLS_ACUITY_SCORE: 48
ADLS_ACUITY_SCORE: 35
ADLS_ACUITY_SCORE: 48
ADLS_ACUITY_SCORE: 35
ADLS_ACUITY_SCORE: 48
ADLS_ACUITY_SCORE: 35
ADLS_ACUITY_SCORE: 35
ADLS_ACUITY_SCORE: 48
ADLS_ACUITY_SCORE: 35

## 2025-06-23 NOTE — PLAN OF CARE
Goal Outcome Evaluation:      Plan of Care Reviewed With: patient          Outcome Evaluation: Home pending medical readiness

## 2025-06-23 NOTE — PLAN OF CARE
"Patient Name: Maryanne  MRN: [id number]  Date of Admission: 6/20/2025  Reason for Admission: Thoracotomy  Level of Care: MS      Vitals:   BP Readings from Last 1 Encounters:   06/23/25 102/65     Pulse Readings from Last 1 Encounters:   06/23/25 97     Wt Readings from Last 1 Encounters:   06/21/25 80.9 kg (178 lb 4.8 oz)     Ht Readings from Last 1 Encounters:   06/20/25 1.676 m (5' 6\")     Estimated body mass index is 28.78 kg/m  as calculated from the following:    Height as of this encounter: 1.676 m (5' 6\").    Weight as of this encounter: 80.9 kg (178 lb 4.8 oz).  Temp Readings from Last 1 Encounters:   06/23/25 98.2  F (36.8  C) (Oral)     Pain: Pain goal 0 Pain Rating 1-3 Effective pain medication/regimen PRN meds     CV Surgery Patient: No     Assessment     Resp: LS diminished  Telemetry: SR  Neuro: A.Ox4  GI/: adequate UO during shift via bathroom, BS audible  Skin/Wounds: R CT site  Lines/Drains: Chest tube site, PIV  Activity: Ax1 GB/W  Sleep: -  Abnormal Labs: NA, BUN/Creat     Aggression Stop Light: Green          Patient Care Plan: Continue to monitor      Goal Outcome Evaluation:                                 "

## 2025-06-23 NOTE — PROGRESS NOTES
FOLLOW UP: Urine sodium 20, Urine osm 362, Serum osm 286. Will trial IV fluids with 0.9% NS at 100 ccs/hr. Sodium checks q6 hrs, report results to on-call Hospitalist for IV fluid adjustment as needed. Goal to increase sodium no more than 134 mmol/L in the next 24 hours.

## 2025-06-23 NOTE — CONSULTS
Care Management Initial Consult    General Information  Assessment completed with: Patient,    Type of CM/SW Visit: Initial Assessment    Primary Care Provider verified and updated as needed: Yes   Readmission within the last 30 days:        Reason for Consult: discharge planning  Advance Care Planning: Advance Care Planning Reviewed: no concerns identified          Communication Assessment  Patient's communication style: spoken language (English or Bilingual)    Hearing Difficulty or Deaf: no   Wear Glasses or Blind: yes    Cognitive  Cognitive/Neuro/Behavioral: WDL  Level of Consciousness: alert  Arousal Level: opens eyes spontaneously  Orientation: oriented x 4     Best Language: 0 - No aphasia  Speech: spontaneous, logical, clear    Living Environment:   People in home: alone (daughter to stay with her)     Current living Arrangements: apartment      Able to return to prior arrangements: yes       Family/Social Support:  Care provided by: self  Provides care for: no one  Marital Status: Single  Support system:            Description of Support System:           Current Resources:   Patient receiving home care services: No        Community Resources: None  Equipment currently used at home: none  Supplies currently used at home:      Employment/Financial:  Employment Status: retired        Financial Concerns:             Does the patient's insurance plan have a 3 day qualifying hospital stay waiver?  No    Lifestyle & Psychosocial Needs:  Social Drivers of Health     Food Insecurity: Low Risk  (6/23/2025)    Food Insecurity     Within the past 12 months, did you worry that your food would run out before you got money to buy more?: No     Within the past 12 months, did the food you bought just not last and you didn t have money to get more?: No   Depression: Not at risk (5/8/2025)    PHQ-2     PHQ-2 Score: 0   Housing Stability: Low Risk  (6/23/2025)    Housing Stability     Do you have housing? : Yes     Are you  worried about losing your housing?: No   Tobacco Use: Medium Risk (6/20/2025)    Patient History     Smoking Tobacco Use: Former     Smokeless Tobacco Use: Never     Passive Exposure: Past   Financial Resource Strain: Low Risk  (6/23/2025)    Financial Resource Strain     Within the past 12 months, have you or your family members you live with been unable to get utilities (heat, electricity) when it was really needed?: No   Alcohol Use: Not on file   Transportation Needs: Low Risk  (6/23/2025)    Transportation Needs     Within the past 12 months, has lack of transportation kept you from medical appointments, getting your medicines, non-medical meetings or appointments, work, or from getting things that you need?: No   Physical Activity: Not on file   Interpersonal Safety: Low Risk  (6/20/2025)    Interpersonal Safety     Do you feel physically and emotionally safe where you currently live?: Yes     Within the past 12 months, have you been hit, slapped, kicked or otherwise physically hurt by someone?: No     Within the past 12 months, have you been humiliated or emotionally abused in other ways by your partner or ex-partner?: No   Stress: Not on file   Social Connections: Socially Integrated (12/7/2024)    Received from [employer] [facility]    Social Connections     Do you often feel lonely or isolated from those around you?: 0   Health Literacy: Not on file       Functional Status:  Prior to admission patient needed assistance:   Dependent ADLs:: Independent  Dependent IADLs:: Independent       Mental Health Status:          Chemical Dependency Status:                Values/Beliefs:  Spiritual, Cultural Beliefs, Adventist Practices, Values that affect care:                 Discussed  Partnership in Safe Discharge Planning  document with patient/family: No    Additional Information:  Met with patient to discuss role in discharge planning. Pt lives alone in apartment. Notes her daughter will  be staying with her for awhile after surgery.  At baseline Pt is indep in ADL's, drives, and is retired. She does not use any equipment, no services in the home.  She notes she has walker, shower chair, knee chair, wheelchair, reacher if needed.  Nephew, Daughter, friend could provide transport.   Pt can make own Follow-up.   At this point Pt does not anticipate any needs    Next Steps: CC to follow if needs arise.     Deepthi Jason RN

## 2025-06-23 NOTE — PLAN OF CARE
[id number]    Orientation/neuro: A&Ox4. Neuros intact  Vitals/Pain: VSS on RA. Pt reporting 5/10 incisional pain, managing with scheduled tylenol and ice packs throughout shift  Resp: LS clear/dim, weaned to RA. Productive cough  Telemetry: n/a  GI/: Tolerating regular diet. Denies nausea. +BS, +flatus, -BM this shift. Pt only able to void 100ml when scanned for 282ml during previous check, c/o hesitancy and discomfort, PVR for 426, straight cath for 500 @ 0015.   Skin/Wounds: R thora site, CT site, on-q pump. Scattered bruising.   Lines/Drains: CT to water seal, serosang output, no airleak. CT dressing CDI. On-q pump sensors taped to skin, clamps open. L PIV SL   Activity: A1 GBW   Sleep: 8-9 hours between cares  Labs: Abnormal/Trends, Electrolyte Replacement- Cr 1.26, Na 129, Ca 8.5    Aggression Stop Light: Green          Patient Care Plan: Encourage activity, 8 walks in [place], and pulm hygiene/IS use. Pain management.

## 2025-06-23 NOTE — PROGRESS NOTES
"Thoracic Surgery:    BP 92/56 (BP Location: Right arm)   Pulse 73   Temp 98.1  F (36.7  C) (Oral)   Resp 16   Ht 1.676 m (5' 6\")   Wt 80.9 kg (178 lb 4.8 oz)   LMP  (LMP Unknown)   SpO2 94%   BMI 28.78 kg/m      CXR: OK, no PTX  CT: 420 ml over 24 hours    Final path: pending    S: Urinating well on own just recently this morning-- no SOB- walking minimally-- discussed chest tube and clamp trial    O: CT: no air leak, no bleeding  On-Q: infusing    P: Clamp CT tonight  Probable discharge home tomorrow    Sharmila Nguyễn PA-C with Dr. [facility]  Cell [phone]    "

## 2025-06-23 NOTE — CONSULTS
[facility]  Consult Note - Hospitalist Service  Date of Admission:  6/20/2025  Consult Requested by: Sharmila Nguyễn PA-C   Reason for Consult: S/P thoracotomy, needs medical management (hyponatremic, decreasing renal fx)     Assessment & Plan   Siomara Hansen is a 74 year old female with below PMHx admitted on 6/20/2025. She underwent thoracic surgery as below on 6/20/25 with Dr. Jasso. Hospitalist service was consulted 6/23/25 for post-op hyponatremia and elevated creatinine.     S/P limited R thoracotomy, right middle lobectomy, wedge resection groundglass nodule posterior RUL, mediastinal LN dissection (6/20/25): Surgery per Dr. Jasso. EBL 50 ccs. General anesthesia used.   *Preliminary pathology positive for carcinoma  -- Defer routine post-operative cares, IVF, DVT prophylaxis and pain control to primary service   -- Treated with periop Ancef   -- Analgesic regimen with tylenol 1000 mg q8 hrs, dilaudid 2 mg q3 hrs PRN, bupivacaine pump   -- GI prophylaxis while on NSAIDs with Protonix    -- VTE prophylaxis with Lovenox subcutaneous     -- Encourage pulmonary toilet; incentive spirometer at bedside   -- Bowel regimen in place while on narcotics   -- PT and OT in the AM   -- Hgb and BMP in AM      Hyponatremia: Suspect SIADH in the setting of lung surgery, pain, ? Celexa (no recent dose adjustments). No hx of hyponatremia, no excessive fluid consumption. Not on diuretics. Received ~300 ccs LR intra-op followed by ~1400 ccs of 0.9% NS from 6/20-6/21, discontinued at 0704. Appears euvolemic.   - Urine osm, serum osm, urine sodium   - Hold Celexa, last dose 6/23 AM.   - Repeat sodium level now   - Further plan of care pending results above     Elevated creatinine on CKD II  Acute urinary retention, improving: Baseline creatinine ~0.9-1.0. Creatinine on 6/23 1.26.   *Soft BPs, continued on Losartan. Maintained on Lovenox for DVT prophy. Urinary retention noted overnight 6/22, straight  "cath X2  - Hold Losartan   - Bladder scan, PRN straight cath   - Check UA, denies current urinary sx   - Avoid nephrotoxic agents   - Continue Lovenox for now, but if creatinine continues to rise, will have to change to subcutaneous heparin      Soft BPs   Hx of hypertension: Hold Losartan as above. SBPs in the 90s-low 100s    Depression   Anxiety: Hold Celexa in the setting of hyponatremia above starting 6/24 dose.     HSV prophylaxis: Continue PTA Valtrex      The patient's care was discussed with the Attending Physician, Dr. Sargent, Bedside Nurse, and Patient.    Clinically Significant Risk Factors         # Hyponatremia: Lowest Na = 129 mmol/L in last 2 days, will monitor as appropriate           # Hypertension: Noted on problem list            # Overweight: Estimated body mass index is 28.78 kg/m  as calculated from the following:    Height as of this encounter: 1.676 m (5' 6\").    Weight as of this encounter: 80.9 kg (178 lb 4.8 oz)., PRESENT ON ADMISSION            Domonique Shelley PA-C  Hospitalist Service  Securely message with [email or web address] (more info)  Text page via [facility] Paging/Directory   ______________________________________________________________________    Chief Complaint   Lung nodules     History is obtained from the patient and chart review.     History of Present Illness   Siomara Hansen is a 74 year old female with below PMHx admitted on 6/20/2025. She underwent thoracic surgery as below on 6/20/25 with Dr. Jasso. Hospitalist service was consulted 6/23/25 for post-op hyponatremia and elevated creatinine.     Surgery per Dr. Jasso. EBL 50 ccs. General anesthesia used. In regards to pt's hyponatremia, suspect SIADH in the setting of lung surgery, pain, ? Celexa (no recent dose adjustments). No hx of hyponatremia, no excessive fluid consumption. Not on diuretics. Received ~300 ccs LR intra-op followed by ~1400 ccs of 0.9% NS from 6/20-6/21, discontinued at 0704.     Pt reports that " her pain is moderate, but controlled on current regimen. Eating well. Urinating without difficulty today. No cardiac sx.     Past Medical History    Past Medical History:   Diagnosis Date    Basal cell carcinoma     Breast cancer ([facility]) 2000    Colon polyp     exam every 5 years    Hypertension     Dont remember date       Past Surgical History   Past Surgical History:   Procedure Laterality Date    ARTHROPLASTY HIP Right 3/5/2024    Procedure: Right Total Hip Arthroplasty;  Surgeon: Ruperto Silvestre MD;  Location: [facility] OR    BIOPSY  December 1999    BREAST SURGERY  February 2000    COLONOSCOPY  2023    GYN SURGERY  01/01/1986    D & C    KYPHOPLASTY N/A 1/11/2024    Procedure: Lumbar 1 kyphoplasty;  Surgeon: Ramon Hudson MD;  Location: [facility] OR    masectomy[  02/01/2000    Bilateral    ORTHOPEDIC SURGERY  2022    THORACOTOMY Right 6/20/2025    Procedure: RIGHT THORACOTOMY, RIGHT MIDDLE LOBECTOMY, WEDGE RESECTION, RIGHT UPPER LOBE LUNG NODULE, MEDIASTINAL LYMPH NODE DISSECTION;  Surgeon: Brady Jasso MD;  Location:  OR       Medications   Medications Prior to Admission   Medication Sig Dispense Refill Last Dose/Taking    alendronate (FOSAMAX) 10 MG tablet TAKE 1 TABLET BY MOUTH IN THE MORNING BEFORE BREAKFAST 90 tablet 3 6/19/2025    calcium citrate-vitamin D (CITRACAL) 315-5 MG-MCG TABS per tablet Take 1 tablet by mouth 2 times daily   6/19/2025    citalopram (CELEXA) 20 MG tablet Take 1 tablet by mouth once daily 90 tablet 1 6/19/2025    docusate sodium (COLACE) 100 MG capsule Take 100 mg by mouth every other day.   6/19/2025    losartan (COZAAR) 50 MG tablet Take 1 tablet by mouth twice daily 180 tablet 0 6/19/2025 at  8:00 PM    omeprazole (PRILOSEC) 20 MG DR capsule Take 1 capsule by mouth once daily 90 capsule 1 6/19/2025    valACYclovir (VALTREX) 500 MG tablet Take 1 tablet by mouth twice daily 90 tablet 2 6/19/2025          Review of Systems    The 10 point Review of Systems is  negative other than noted in the HPI    Social History   I have reviewed this patient's social history and updated it with pertinent information if needed.  Social History     Tobacco Use    Smoking status: Former     Current packs/day: 0.00     Average packs/day: 0.5 packs/day for 19.0 years (9.5 ttl pk-yrs)     Types: Cigarettes     Start date: 1965     Quit date: 1984     Years since quittin.4     Passive exposure: Past    Smokeless tobacco: Never   Vaping Use    Vaping status: Never Used   Substance Use Topics    Alcohol use: Not Currently     Comment: Rare    Drug use: No         Family History   I have reviewed this patient's family history and updated it with pertinent information if needed.  Family History   Problem Relation Age of Onset    Cancer Mother         Lung    Breast Cancer Mother     Cancer Father         Braine    Cancer Brother         Bladder    Heart Disease Brother     Breast Cancer Sister     Breast Cancer Sister     Cancer Brother         Lung    Melanoma No family hx of          Allergies   Allergies   Allergen Reactions    Asa [Aspirin] Swelling    Hydrocortisone      Erythema      Zoster Vaccine Live      Did get red rash at injection site that lasted for about 3 days.        Physical Exam   Vital Signs: Temp: 98.8  F (37.1  C) Temp src: Oral BP: 101/66 Pulse: 87   Resp: 18 SpO2: 94 % O2 Device: None (Room air) Oxygen Delivery: 1 LPM  Weight: 178 lbs 4.8 oz    CONSTITUTIONAL: Pt sitting upright in chair, dressed in hospital garb. Appears comfortable. Cooperative with interview.   HEENT: Normocephalic, atraumatic. Oral mucosa pink and moist; negative for ulcerations, erythema, or exudates.    CARDIOVASCULAR: RRR, no murmurs, rubs, or extra heart sounds appreciated. Pulses +2/4 and regular in upper and lower extremities, bilaterally.   RESPIRATORY: No increased work of breathing.  CT in place. CTA, bilat; no wheezes, rales, or rhonchi appreciated.  GASTROINTESTINAL:  Abdomen  soft, non-distended. BS auscultated in all four quadrants. Negative for tenderness to palpation.    MUSCULOSKELETAL: No gross deformities noted. Normal muscle tone.   HEMATOLOGIC/LYMPHATIC/IMMUNOLOGIC: Negative for lower extremity edema, bilaterally.  NEUROLOGIC: Alert and oriented to person, place, and time. No focal neuro deficits.   SKIN: Warm, dry, intact. CT in place.     Medical Decision Making       50 MINUTES SPENT BY ME on the date of service doing chart review, history, exam, documentation & further activities per the note.      Data     I have personally reviewed the following data over the past 24 hrs:    N/A  \   N/A   / 202     129 (L) 99 27.8 (H) /  105 (H)   4.4 20 (L) 1.26 (H) \       Imaging results reviewed over the past 24 hrs:   Recent Results (from the past 24 hours)   XR Chest Port 1 View    Narrative    EXAM: XR CHEST PORT 1 VIEW  LOCATION: [facility]  DATE: 6/23/2025    INDICATION: s p right middle lobectomy  COMPARISON: 6/22/2025      Impression    IMPRESSION:   1. Postoperative changes in the right midlung with resultant volume loss. Unchanged nodular consolidation or postoperative changes in the right parahilar region. Right chest tube is present. Unchanged lateral right chest wall and right base of the neck   subcutaneous emphysema. No definite right pleural effusion or pneumothorax is identified.  2. Clear left hemithorax.  3. Stable normal cardiomediastinal silhouette.  4. Right axillary surgical clips.

## 2025-06-24 ENCOUNTER — APPOINTMENT (OUTPATIENT)
Dept: OCCUPATIONAL THERAPY | Facility: CLINIC | Age: 75
End: 2025-06-24
Attending: PHYSICIAN ASSISTANT
Payer: COMMERCIAL

## 2025-06-24 ENCOUNTER — APPOINTMENT (OUTPATIENT)
Dept: GENERAL RADIOLOGY | Facility: CLINIC | Age: 75
End: 2025-06-24
Attending: PHYSICIAN ASSISTANT
Payer: COMMERCIAL

## 2025-06-24 LAB
ANION GAP SERPL CALCULATED.3IONS-SCNC: 12 MMOL/L (ref 7–15)
BUN SERPL-MCNC: 18.1 MG/DL (ref 8–23)
CALCIUM SERPL-MCNC: 8.4 MG/DL (ref 8.8–10.4)
CHLORIDE SERPL-SCNC: 105 MMOL/L (ref 98–107)
CREAT SERPL-MCNC: 0.93 MG/DL (ref 0.51–0.95)
EGFRCR SERPLBLD CKD-EPI 2021: 64 ML/MIN/1.73M2
ERYTHROCYTE [DISTWIDTH] IN BLOOD BY AUTOMATED COUNT: 13.7 % (ref 10–15)
GLUCOSE SERPL-MCNC: 102 MG/DL (ref 70–99)
HCO3 SERPL-SCNC: 19 MMOL/L (ref 22–29)
HCT VFR BLD AUTO: 33.9 % (ref 35–47)
HGB BLD-MCNC: 11.5 G/DL (ref 11.7–15.7)
MCH RBC QN AUTO: 31.2 PG (ref 26.5–33)
MCHC RBC AUTO-ENTMCNC: 33.9 G/DL (ref 31.5–36.5)
MCV RBC AUTO: 92 FL (ref 78–100)
PATH REPORT.COMMENTS IMP SPEC: ABNORMAL
PATH REPORT.COMMENTS IMP SPEC: ABNORMAL
PATH REPORT.COMMENTS IMP SPEC: YES
PATH REPORT.FINAL DX SPEC: ABNORMAL
PATH REPORT.GROSS SPEC: ABNORMAL
PATH REPORT.INTRAOP OBS SPEC DOC: ABNORMAL
PATH REPORT.MICROSCOPIC SPEC OTHER STN: ABNORMAL
PATH REPORT.RELEVANT HX SPEC: ABNORMAL
PATHOLOGY SYNOPTIC REPORT: ABNORMAL
PHOTO IMAGE: ABNORMAL
PLATELET # BLD AUTO: 225 10E3/UL (ref 150–450)
POTASSIUM SERPL-SCNC: 4.3 MMOL/L (ref 3.4–5.3)
RBC # BLD AUTO: 3.69 10E6/UL (ref 3.8–5.2)
SODIUM SERPL-SCNC: 132 MMOL/L (ref 135–145)
SODIUM SERPL-SCNC: 135 MMOL/L (ref 135–145)
SODIUM SERPL-SCNC: 136 MMOL/L (ref 135–145)
WBC # BLD AUTO: 6.8 10E3/UL (ref 4–11)

## 2025-06-24 PROCEDURE — 36415 COLL VENOUS BLD VENIPUNCTURE: CPT | Performed by: PHYSICIAN ASSISTANT

## 2025-06-24 PROCEDURE — 88305 TISSUE EXAM BY PATHOLOGIST: CPT | Mod: 26 | Performed by: PATHOLOGY

## 2025-06-24 PROCEDURE — 250N000011 HC RX IP 250 OP 636: Performed by: NURSE PRACTITIONER

## 2025-06-24 PROCEDURE — 250N000009 HC RX 250: Performed by: THORACIC SURGERY (CARDIOTHORACIC VASCULAR SURGERY)

## 2025-06-24 PROCEDURE — 97535 SELF CARE MNGMENT TRAINING: CPT | Mod: GO | Performed by: OCCUPATIONAL THERAPIST

## 2025-06-24 PROCEDURE — 99232 SBSQ HOSP IP/OBS MODERATE 35: CPT | Performed by: NURSE PRACTITIONER

## 2025-06-24 PROCEDURE — 84295 ASSAY OF SERUM SODIUM: CPT | Performed by: PHYSICIAN ASSISTANT

## 2025-06-24 PROCEDURE — 94640 AIRWAY INHALATION TREATMENT: CPT

## 2025-06-24 PROCEDURE — 250N000013 HC RX MED GY IP 250 OP 250 PS 637: Performed by: NURSE PRACTITIONER

## 2025-06-24 PROCEDURE — 250N000013 HC RX MED GY IP 250 OP 250 PS 637: Performed by: PHYSICIAN ASSISTANT

## 2025-06-24 PROCEDURE — 99207 PR NO BILLABLE SERVICE THIS VISIT: CPT | Performed by: STUDENT IN AN ORGANIZED HEALTH CARE EDUCATION/TRAINING PROGRAM

## 2025-06-24 PROCEDURE — 250N000011 HC RX IP 250 OP 636: Performed by: PHYSICIAN ASSISTANT

## 2025-06-24 PROCEDURE — 88331 PATH CONSLTJ SURG 1 BLK 1SPC: CPT | Mod: 26 | Performed by: PATHOLOGY

## 2025-06-24 PROCEDURE — 120N000001 HC R&B MED SURG/OB

## 2025-06-24 PROCEDURE — 999N000157 HC STATISTIC RCP TIME EA 10 MIN

## 2025-06-24 PROCEDURE — 85018 HEMOGLOBIN: CPT | Performed by: PHYSICIAN ASSISTANT

## 2025-06-24 PROCEDURE — 71045 X-RAY EXAM CHEST 1 VIEW: CPT

## 2025-06-24 PROCEDURE — 82310 ASSAY OF CALCIUM: CPT | Performed by: PHYSICIAN ASSISTANT

## 2025-06-24 PROCEDURE — 80048 BASIC METABOLIC PNL TOTAL CA: CPT | Performed by: PHYSICIAN ASSISTANT

## 2025-06-24 PROCEDURE — 97165 OT EVAL LOW COMPLEX 30 MIN: CPT | Mod: GO | Performed by: OCCUPATIONAL THERAPIST

## 2025-06-24 PROCEDURE — 88307 TISSUE EXAM BY PATHOLOGIST: CPT | Mod: 26 | Performed by: PATHOLOGY

## 2025-06-24 PROCEDURE — 250N000013 HC RX MED GY IP 250 OP 250 PS 637: Performed by: STUDENT IN AN ORGANIZED HEALTH CARE EDUCATION/TRAINING PROGRAM

## 2025-06-24 PROCEDURE — 88309 TISSUE EXAM BY PATHOLOGIST: CPT | Mod: 26 | Performed by: PATHOLOGY

## 2025-06-24 PROCEDURE — 99233 SBSQ HOSP IP/OBS HIGH 50: CPT | Performed by: NURSE PRACTITIONER

## 2025-06-24 RX ORDER — METHOCARBAMOL 500 MG/1
500 TABLET, FILM COATED ORAL 2 TIMES DAILY PRN
Status: DISCONTINUED | OUTPATIENT
Start: 2025-06-24 | End: 2025-06-30 | Stop reason: HOSPADM

## 2025-06-24 RX ORDER — QUETIAPINE FUMARATE 25 MG/1
25 TABLET, FILM COATED ORAL 2 TIMES DAILY PRN
Status: DISCONTINUED | OUTPATIENT
Start: 2025-06-24 | End: 2025-06-30 | Stop reason: HOSPADM

## 2025-06-24 RX ORDER — OLANZAPINE 10 MG/2ML
5 INJECTION, POWDER, FOR SOLUTION INTRAMUSCULAR 2 TIMES DAILY PRN
Status: DISCONTINUED | OUTPATIENT
Start: 2025-06-24 | End: 2025-06-30 | Stop reason: HOSPADM

## 2025-06-24 RX ORDER — AMOXICILLIN 250 MG
1-3 CAPSULE ORAL 2 TIMES DAILY PRN
Qty: 30 TABLET | Refills: 0 | Status: SHIPPED | OUTPATIENT
Start: 2025-06-24 | End: 2025-06-27

## 2025-06-24 RX ORDER — WATER 10 ML/10ML
INJECTION INTRAMUSCULAR; INTRAVENOUS; SUBCUTANEOUS
Status: COMPLETED
Start: 2025-06-24 | End: 2025-06-24

## 2025-06-24 RX ORDER — ACETAMINOPHEN 500 MG
1000 TABLET ORAL 3 TIMES DAILY
Qty: 100 TABLET | Refills: 0 | Status: SHIPPED | OUTPATIENT
Start: 2025-06-24 | End: 2025-06-26

## 2025-06-24 RX ORDER — HYDROMORPHONE HYDROCHLORIDE 2 MG/1
2 TABLET ORAL EVERY 6 HOURS PRN
Qty: 10 TABLET | Refills: 0 | Status: SHIPPED | OUTPATIENT
Start: 2025-06-24 | End: 2025-06-24

## 2025-06-24 RX ORDER — ACETAMINOPHEN 500 MG
500 TABLET ORAL 2 TIMES DAILY PRN
Status: DISCONTINUED | OUTPATIENT
Start: 2025-06-24 | End: 2025-06-30 | Stop reason: HOSPADM

## 2025-06-24 RX ORDER — QUETIAPINE FUMARATE 25 MG/1
25 TABLET, FILM COATED ORAL AT BEDTIME
Status: DISCONTINUED | OUTPATIENT
Start: 2025-06-24 | End: 2025-06-30 | Stop reason: HOSPADM

## 2025-06-24 RX ADMIN — ACETAMINOPHEN 1000 MG: 500 TABLET, FILM COATED ORAL at 15:29

## 2025-06-24 RX ADMIN — PANTOPRAZOLE SODIUM 40 MG: 40 TABLET, DELAYED RELEASE ORAL at 09:31

## 2025-06-24 RX ADMIN — SENNOSIDES AND DOCUSATE SODIUM 2 TABLET: 50; 8.6 TABLET ORAL at 09:30

## 2025-06-24 RX ADMIN — WATER 10 ML: 1 INJECTION INTRAMUSCULAR; INTRAVENOUS; SUBCUTANEOUS at 20:02

## 2025-06-24 RX ADMIN — OLANZAPINE 5 MG: 10 INJECTION, POWDER, FOR SOLUTION INTRAMUSCULAR at 19:59

## 2025-06-24 RX ADMIN — ACETAMINOPHEN 1000 MG: 500 TABLET, FILM COATED ORAL at 09:30

## 2025-06-24 RX ADMIN — ACETAMINOPHEN 1000 MG: 500 TABLET, FILM COATED ORAL at 22:15

## 2025-06-24 RX ADMIN — VALACYCLOVIR HYDROCHLORIDE 500 MG: 500 TABLET, FILM COATED ORAL at 20:12

## 2025-06-24 RX ADMIN — ENOXAPARIN SODIUM 40 MG: 40 INJECTION SUBCUTANEOUS at 09:31

## 2025-06-24 RX ADMIN — ONDANSETRON 4 MG: 4 TABLET, ORALLY DISINTEGRATING ORAL at 02:23

## 2025-06-24 RX ADMIN — CITALOPRAM HYDROBROMIDE 20 MG: 20 TABLET ORAL at 09:31

## 2025-06-24 RX ADMIN — VALACYCLOVIR HYDROCHLORIDE 500 MG: 500 TABLET, FILM COATED ORAL at 09:31

## 2025-06-24 RX ADMIN — ALBUTEROL SULFATE 2.5 MG: 2.5 SOLUTION RESPIRATORY (INHALATION) at 00:11

## 2025-06-24 ASSESSMENT — ACTIVITIES OF DAILY LIVING (ADL)
ADLS_ACUITY_SCORE: 48
PREVIOUS_RESPONSIBILITIES: MEAL PREP;HOUSEKEEPING;LAUNDRY;SHOPPING;YARDWORK;MEDICATION MANAGEMENT;FINANCES;DRIVING
ADLS_ACUITY_SCORE: 48

## 2025-06-24 NOTE — PROGRESS NOTES
06/24/25 1600   Appointment Info   Signing Clinician's Name / Credentials (OT) Elva Rochasyd, OTR/L   Living Environment   People in Home alone   Current Living Arrangements apartment   Home Accessibility no concerns   Living Environment Comments Pt reports she lives in an apartment with elevator access. Pt has a tub shower with grab bar, owns a shower chair but does not use at baseline (reports its in storage). Pt has standard height toilets and owns a toilet riser   Self-Care   Usual Activity Tolerance good   Current Activity Tolerance fair   Regular Exercise No   Equipment Currently Used at Home none   Fall history within last six months no   Activity/Exercise/Self-Care Comment Pt reports independence with ADLs and IADLS at baseline without AE. Pt reports daughter is able to stay a few days but works. Once daughter leaves pt has no reliable assistance available and will need to be Mod I with all ADLS and IADLS   Instrumental Activities of Daily Living (IADL)   Previous Responsibilities meal prep;housekeeping;laundry;shopping;yardwork;medication management;finances;driving   IADL Comments Pt reports daughter can assist for a few days then pt would have to be independent   General Information   Onset of Illness/Injury or Date of Surgery 06/20/25   Referring Physician Sharmila Nguyễn, PA-C   Patient/Family Therapy Goal Statement (OT) Patient agreeable to therapy   Additional Occupational Profile Info/Pertinent History of Current Problem 74 year old female admitted 6/20/25 for limited R thoracotomy, right middle lobectomy, wedge resection groundglass nodule posterior RUL, mediastinal LN dissection (6/20/25), pathology showing invasive acinar adenocarcinoma. Noted to have urinary retention requiring multiple straight catheterizations for which urology is consulted upon. Also noted to have an SRINI w Creatinine on 6/23 of 1.26, now resolved with IV fluids.      No UTI Hx. No gross hematuria or dysuria. Notes nocturia  and urinary urgency occasionally that is brought on my ambulation. She reports she stays well hydrated and drinks a lot of water.   Existing Precautions/Restrictions fall   Limitations/Impairments safety/cognitive   Cognitive Status Examination   Orientation Status orientation to person, place and time   Affect/Mental Status (Cognitive) confused   Follows Commands WFL;follows one-step commands   Safety Deficit moderate deficit   Memory Deficit moderate deficit   Attention Deficit moderate deficit   Executive Function Deficit moderate deficit   Cognitive Status Comments See Short Blessed below for details. Pt appears confused during session stating conflicting information. Pt appears tangential requiring redirection to task. Appears below baseline   Cognitive Screens/Assessments   Cognitive Assessments Completed Other Cognitive Screen/Assessment   Cognitive Assessment Test Short Blessed   Cognitive Assessment Test Score -11   Cognitive Assessment Test Interpretation Indicates severe cognitive impairment with continued assessment required   Visual Perception   Visual Impairment/Limitations WFL   Sensory   Sensory Comments Pt reports intact   Pain Assessment   Patient Currently in Pain Yes, see Vital Sign flowsheet   Posture   Posture forward head position;protracted shoulders   Range of Motion Comprehensive   Comment, General Range of Motion RUE limited due to pain from thoracotomy   Strength Comprehensive (MMT)   Comment, General Manual Muscle Testing (MMT) Assessment Impaired global strength, poor tolerance for activity   Coordination   Coordination Comments Impaired due to pain   Bed Mobility   Comment (Bed Mobility) Not assessed, pt seated in chair. Will assess at a later date   Transfers   Transfer Comments CGA-min assist   Balance   Balance Comments Impaired in standing requiring walker   Activities of Daily Living   BADL Assessment/Intervention bathing;lower body dressing;grooming;toileting   Bathing  Assessment/Intervention   Comment, (Bathing) Mod assist with simulated tub transfer   Lower Body Dressing Assessment/Training   Comment, (Lower Body Dressing) Min assist   Grooming Assessment/Training   Comment, (Grooming) Poor tolerance for activity in standing   Toileting   Comment, (Toileting) Min assist for transfer   Clinical Impression   Criteria for Skilled Therapeutic Interventions Met (OT) Yes, treatment indicated   OT Diagnosis Decline in ADL independence and safety   Influenced by the following impairments Lobectomy and R thoracotomy   OT Problem List-Impairments impacting ADL problems related to;activity tolerance impaired;balance;cognition;eating/swallowing;mobility;range of motion (ROM);strength;pain   Assessment of Occupational Performance 3-5 Performance Deficits   Identified Performance Deficits Impaired dressing bathing toileting and grooming tolerance with decline in cognition   Planned Therapy Interventions (OT) ADL retraining;cognition;progressive activity/exercise   Clinical Decision Making Complexity (OT) problem focused assessment/low complexity   Risk & Benefits of therapy have been explained evaluation/treatment results reviewed;care plan/treatment goals reviewed;risks/benefits reviewed;participants voiced agreement with care plan;participants included;patient;current/potential barriers reviewed   OT Total Evaluation Time   OT Eval, Low Complexity Minutes (52698) 10   OT Goals   Therapy Frequency (OT) 5 times/week   OT Predicted Duration/Target Date for Goal Attainment 07/03/25   OT Goals Hygiene/Grooming;Lower Body Dressing;Lower Body Bathing;Toilet Transfer/Toileting;Cognition   OT: Hygiene/Grooming modified independent;using adaptive equipment;while standing   OT: Lower Body Dressing Modified independent;using adaptive equipment;including set-up/clothing retrieval   OT: Lower Body Bathing Modified independent;using adaptive equipment   OT: Toilet Transfer/Toileting Modified  "independent;toilet transfer;cleaning and garment management;using adaptive equipment   OT: Cognitive Patient/caregiver will verbalize understanding of cognitive assessment results/recommendations as needed for safe discharge planning   Self-Care/Home Management   Self-Care/Home Mgmt/ADL, Compensatory, Meal Prep Minutes (56185) 41   Symptoms Noted During/After Treatment (Meal Preparation/Planning Training) increased pain;fatigue   Treatment Detail/Skilled Intervention Ot; Pt seated upright in chair, agreeable to therapy. Pt appears confused requiring redirection to task and repeated instructions during session. Pt educated on figure four to don socks in chair, after education pt required min assist to complete figure four positioning, SBA to don socks. Pt completed sit to stand with CGA and walker with cues for hand placement. Pt ambulated to bathroom. Pt compelted toilet transfer with min assist to stand with pt attempting to pull through the walker. Pt compelted pericares and clothing management with SBA, pt required max assist for posterior cares as she had pain when reaching. Pt educated on bathroom buddy to improve posterior cares. Pt completed g/h at sink with cues for walker placement and SBA for 1 minute. Pt returned to room. Pt educated on shower transfer technique. Attempted with simulated set up. Pt demonstrated poor follow through with instructions for novel task. Pt required mod assist to maintain balance when stepping over tub ledge. PT educated on AE for showering, Pt reports a tub transfer bench would likely not fit in her tub as the toilet and sink are close to the tub. Pt returned to chair. Pt educated on the results of the short Blessed cognitive screen. Pt appears to lack insight into deficits reporting \"when I get home I'll just stop taking the meds and it will get better\" refering to her cognition. Pt educated on the importance of maintaining her med schedule as prescribed to avoid complications " and rehospitalizations. Pt educated on the need for TCU  as pt daughter is unabel to provide assist long term. Pt apepars resistant, educated on concerns including falls, med mismanagement, inability to stand from toilet, inability to shower and dress, etc. Pt reports understanding. PT in chair with alarm raffy ndcall light upon OT departure   OT Discharge Planning   OT Plan SLUMS, LE dressing, g/h in standing   OT Discharge Recommendation (DC Rec) Transitional Care Facility   OT Rationale for DC Rec Patient presents below baseline with ADLs and mobility requiring Ax1 with all tasks. Pt cognition appears impaired with poor judgement and memory. Pt lives alone, daughter only able to assist for a few days after discharge. Pt would benefit from continued OT in TCU to return to prior level of function. If pt returns home, she would require assist x1 with all mobility and ADLS as well as assist with all IADLS   OT Brief overview of current status Goals of therapy will be to address safe mobility and make recs for d/c to next level of care. Pt and RN will continue to follow all falls risk precautions as documented by RN staff while hospitalized.   OT Total Distance Amb During Session (feet) 40

## 2025-06-24 NOTE — PROGRESS NOTES
Brief Hospitalist Note     Hyponatremia and SRINI now resolved with IV fluids.   Per thoracic surgery pt may be able to discharge home today.   Ok to discharge from hospitalist stand-point. Encourage PO intake. Ok to continue celexa but would stop Losartan at discharge. Pt's blood pressures have been on the low side here. Recommend follow-up with PCP in 1-2 weeks for a BP check. She may ultimately need some BP meds, but likely at a lower dose. Discharge Navigator updated.     Hospitalist Service will sign off.   Andreia Sargent MD

## 2025-06-24 NOTE — PROGRESS NOTES
"Thoracic Surgery POD #4:  /77 (BP Location: Right arm)   Pulse 75   Temp 98  F (36.7  C) (Oral)   Resp 18   Ht 1.676 m (5' 6\")   Wt 80.9 kg (178 lb 4.8 oz)   LMP  (LMP Unknown)   SpO2 96%   BMI 28.78 kg/m      On room air    CXR: no PTX with CT clamped overnight  UOP: straight cathed x two (for 875 and 750)  Final path: pending  Na: 136  Creat: 0.93    S: Somewhat confused about time of day and season. Oriented to President, current location and situation. Lives alone with two sons in the area but not very available. Daughters out of town. No SOB but tires easily. Admits to sensation of not completely emptying bladder here in hospital but also PTA. Post-op pain as expected. Doesn't think nebs are helpful.    O: CT:no air leak with cough, DCed without complication.  Occlusive dressing applied.  Inc: dry, no erythema, steris intact  On-Q: infusing, clamps open, sensors affixed to skin    P:  -Urology consult for urinary retention  -Appreciate Hospitalist help re: Na, Creat, etc  -PT/OT consult  -SW consult for discontinue planning-- patient hopeful she qualifies for a Home RN  -Remove OnQ when empty  -minimize narcotics    Sharmila Nguyễn PA-C with Dr. Brady [facility]  Cell [phone]          "

## 2025-06-24 NOTE — PROGRESS NOTES
June 24, 2025  Shift:[id number]  Siomara Hansen  74 year old  YOB: 1950    Reason for admission:Nodule of middle lobe of right lung [R91.1]  Nodule of upper lobe of right lung [R91.1]    Cognition/Mentation:AxOX4. Forgetful  Neuros/CMS:Intact   VS:VSS on RA  Cardiac:No tele ordered  GI/: Continent. Up to BR. Adequate output.  Pulmonary:LS exp wheezing. R side coarse.   Pain:Mild to moderate right sided chest pain. Worse with deep inhalations   Drains/Lines:PIV SL. On-Q pump.  Skin:Misael redness to coccyx. Old R CT site. Scab to R ankle   Activity:A1 GB/W.   Diet:Regular. Thin liquids. Pills whole.   Discharge:TCU pending    Shift summary: CT removed without complication. Dressing CDI. On-Q pump to remain in place until empty. Dressing with dried drainage. Hospitalist signed of as hyponatremia and SRINI resolved. Urology consulted d/t retention and need for straight caths. Urology will follow output and signed off for this stay. Daughter at bedside this afternoon.    1630: Daughter called RN to bedside d/t patient having hallucinations and delusions. Patient experiencing increased confusion and paranoia. Believe this is d/t hospital acquired delirium. JESSEE smith. Vitals switched to Q8 to alllow patient to get more sleep overnight. Will re-evaluate in the AM.

## 2025-06-24 NOTE — PLAN OF CARE
"Goal Outcome Evaluation:      Plan of Care Reviewed With: patient    Overall Patient Progress: improvingOverall Patient Progress: improving  Patient Name: Maryanne  MRN: [id number]  Date of Admission: 6/20/2025  Reason for Admission: R thoracotomy   Level of Care: Med-surge     Vitals:   BP Readings from Last 1 Encounters:   06/23/25 115/71     Pulse Readings from Last 1 Encounters:   06/23/25 89     Wt Readings from Last 1 Encounters:   06/21/25 80.9 kg (178 lb 4.8 oz)     Ht Readings from Last 1 Encounters:   06/20/25 1.676 m (5' 6\")     Estimated body mass index is 28.78 kg/m  as calculated from the following:    Height as of this encounter: 1.676 m (5' 6\").    Weight as of this encounter: 80.9 kg (178 lb 4.8 oz).  Temp Readings from Last 1 Encounters:   06/23/25 98  F (36.7  C) (Oral)       Pain: Pt denies pain     CV Surgery Patient: No    Assessment    Resp: LS expiatory wheeze and R sided coarse, RA, 1 PRN neb given, R CT clamped at midnight   Telemetry: No tele but NS   Neuro: Aox4, forgetful  GI/: Regular diet, BM+, Good UO, Retention w straight cath for 750, PRN zofran x1   Skin/Wounds: R incision site, redness to bottom   Lines/Drains: L PIV w NS at 100 ml/hr   Activity: A1` WGB   Sleep: Good   Abnormal Labs: Monitor Na levels     Aggression Stop Light: Green          Patient Care Plan: Monitor, chest x-ray, possible removal of CT, increase physical activity, possible urology consult       "

## 2025-06-24 NOTE — PROVIDER NOTIFICATION
MD Notification    Notified Person: MD    Notified Person Name: Dr. Marte     Notification Date/Time: 6/24 at 0116    Notification Interaction: Vocera     Purpose of Notification: Midnight Na was 133. Last one at 1745 was 126. Lab is rerunning it again to make sure it is accurate      Orders Received: Aware    Comments:

## 2025-06-24 NOTE — PROGRESS NOTES
"  Urology Progress Note    Name: Siomara Hansen    MRN: [id number]   YOB: 1950  Date of Admission: 6/20/2025               Impression and Plan:   Impression / Plan:   Siomara Hansen is a 74 year old female admitted 6/20/25 for limited R thoracotomy, right middle lobectomy, wedge resection groundglass nodule posterior RUL, mediastinal LN dissection (6/20/25), pathology showing invasive acinar adenocarcinoma. Noted to have urinary retention requiring multiple straight catheterizations for which urology is consulted upon. Also noted to have an SRINI w Creatinine on 6/23 of 1.26, now resolved with IV fluids.     No UTI Hx. No gross hematuria or dysuria. Notes nocturia and urinary urgency occasionally that is brought on my ambulation. She reports she stays well hydrated and drinks a lot of water.    Vital signs:  Temp: 99  F (37.2  C) Temp src: Oral BP: 130/76 Pulse: 77   Resp: 16 SpO2: 97 % O2 Device: None (Room air) Oxygen Delivery: 1 LPM Height: 167.6 cm (5' 6\") Weight: 80.9 kg (178 lb 4.8 oz)  Estimated body mass index is 28.78 kg/m  as calculated from the following:    Height as of this encounter: 1.676 m (5' 6\").    Weight as of this encounter: 80.9 kg (178 lb 4.8 oz).    Exam:  No acute distress. Alert.  Breathing comfortably.     Labs:  WBC 6.8  Hgb 11.5  Cr 0.93        Post-op Urinary Retention is the most likely etiology in this patient, given the recent thoracic surgery, advanced age, and intermittent high post-void residuals. Constipation possibly contributing as she reports chronic problems with constipation.     -Avoid anticholinergic, antihistamine, narcotics, and alpha-agonist medications as these will exacerbate urinary retention.   -Bowel regimen to avoid constipation.   -Discussed performing crede maneuver regularly to aid in bladder emptying.   -Monitor post-void residuals and assess for overdistension while inpatient.  -Straight cath PRN for volumes greater than 500 mL.  -Will arrange " for PVR in the coming wks on an outpatient basis to ensure she continues to empty adequately. If retention persists, will consider non-invasive uroflowmetry or urodynamics.  -Urology will sign off.    Thank you for the opportunity to participate in the care of Siomara Hansen.     MELISSA Khan, CNP  [facility] - Department of Urology  Office: [phone]  After business hours: [phone]  Securely message with [employer]

## 2025-06-25 ENCOUNTER — APPOINTMENT (OUTPATIENT)
Dept: OCCUPATIONAL THERAPY | Facility: CLINIC | Age: 75
End: 2025-06-25
Attending: THORACIC SURGERY (CARDIOTHORACIC VASCULAR SURGERY)
Payer: COMMERCIAL

## 2025-06-25 ENCOUNTER — APPOINTMENT (OUTPATIENT)
Dept: PHYSICAL THERAPY | Facility: CLINIC | Age: 75
End: 2025-06-25
Attending: PHYSICIAN ASSISTANT
Payer: COMMERCIAL

## 2025-06-25 ENCOUNTER — APPOINTMENT (OUTPATIENT)
Dept: GENERAL RADIOLOGY | Facility: CLINIC | Age: 75
End: 2025-06-25
Attending: PHYSICIAN ASSISTANT
Payer: COMMERCIAL

## 2025-06-25 LAB
ALBUMIN UR-MCNC: 70 MG/DL
APPEARANCE UR: ABNORMAL
BACTERIA #/AREA URNS HPF: ABNORMAL /HPF
BILIRUB UR QL STRIP: NEGATIVE
COLOR UR AUTO: ABNORMAL
GLUCOSE UR STRIP-MCNC: NEGATIVE MG/DL
HGB UR QL STRIP: ABNORMAL
KETONES UR STRIP-MCNC: NEGATIVE MG/DL
LEUKOCYTE ESTERASE UR QL STRIP: ABNORMAL
MUCOUS THREADS #/AREA URNS LPF: PRESENT /LPF
NITRATE UR QL: POSITIVE
PH UR STRIP: 6 [PH] (ref 5–7)
RBC URINE: 63 /HPF
SODIUM SERPL-SCNC: 135 MMOL/L (ref 135–145)
SODIUM SERPL-SCNC: 136 MMOL/L (ref 135–145)
SODIUM SERPL-SCNC: 137 MMOL/L (ref 135–145)
SODIUM SERPL-SCNC: 138 MMOL/L (ref 135–145)
SP GR UR STRIP: 1.01 (ref 1–1.03)
SQUAMOUS EPITHELIAL: 1 /HPF
UROBILINOGEN UR STRIP-MCNC: NORMAL MG/DL
WBC CLUMPS #/AREA URNS HPF: PRESENT /HPF
WBC URINE: >182 /HPF

## 2025-06-25 PROCEDURE — 36415 COLL VENOUS BLD VENIPUNCTURE: CPT | Performed by: PHYSICIAN ASSISTANT

## 2025-06-25 PROCEDURE — 250N000013 HC RX MED GY IP 250 OP 250 PS 637: Performed by: NURSE PRACTITIONER

## 2025-06-25 PROCEDURE — 97161 PT EVAL LOW COMPLEX 20 MIN: CPT | Mod: GP

## 2025-06-25 PROCEDURE — 120N000001 HC R&B MED SURG/OB

## 2025-06-25 PROCEDURE — 81003 URINALYSIS AUTO W/O SCOPE: CPT | Performed by: PHYSICIAN ASSISTANT

## 2025-06-25 PROCEDURE — 97530 THERAPEUTIC ACTIVITIES: CPT | Mod: GO

## 2025-06-25 PROCEDURE — 250N000013 HC RX MED GY IP 250 OP 250 PS 637: Performed by: PHYSICIAN ASSISTANT

## 2025-06-25 PROCEDURE — 84295 ASSAY OF SERUM SODIUM: CPT | Performed by: PHYSICIAN ASSISTANT

## 2025-06-25 PROCEDURE — 97535 SELF CARE MNGMENT TRAINING: CPT | Mod: GO

## 2025-06-25 PROCEDURE — 97116 GAIT TRAINING THERAPY: CPT | Mod: GP

## 2025-06-25 PROCEDURE — 250N000011 HC RX IP 250 OP 636: Performed by: PHYSICIAN ASSISTANT

## 2025-06-25 PROCEDURE — 71045 X-RAY EXAM CHEST 1 VIEW: CPT

## 2025-06-25 PROCEDURE — 250N000013 HC RX MED GY IP 250 OP 250 PS 637: Performed by: STUDENT IN AN ORGANIZED HEALTH CARE EDUCATION/TRAINING PROGRAM

## 2025-06-25 PROCEDURE — 87086 URINE CULTURE/COLONY COUNT: CPT | Performed by: PHYSICIAN ASSISTANT

## 2025-06-25 RX ORDER — LIDOCAINE 4 G/G
1 PATCH TOPICAL
Status: CANCELLED | OUTPATIENT
Start: 2025-06-26

## 2025-06-25 RX ORDER — SULFAMETHOXAZOLE AND TRIMETHOPRIM 800; 160 MG/1; MG/1
1 TABLET ORAL 2 TIMES DAILY
Status: DISCONTINUED | OUTPATIENT
Start: 2025-06-25 | End: 2025-06-26

## 2025-06-25 RX ORDER — LIDOCAINE 4 G/G
2 PATCH TOPICAL
Status: DISCONTINUED | OUTPATIENT
Start: 2025-06-25 | End: 2025-06-30 | Stop reason: HOSPADM

## 2025-06-25 RX ADMIN — ACETAMINOPHEN 1000 MG: 500 TABLET, FILM COATED ORAL at 16:16

## 2025-06-25 RX ADMIN — POLYETHYLENE GLYCOL 3350 17 G: 17 POWDER, FOR SOLUTION ORAL at 09:27

## 2025-06-25 RX ADMIN — ENOXAPARIN SODIUM 40 MG: 40 INJECTION SUBCUTANEOUS at 09:43

## 2025-06-25 RX ADMIN — VALACYCLOVIR HYDROCHLORIDE 500 MG: 500 TABLET, FILM COATED ORAL at 20:26

## 2025-06-25 RX ADMIN — QUETIAPINE FUMARATE 25 MG: 25 TABLET ORAL at 00:52

## 2025-06-25 RX ADMIN — QUETIAPINE FUMARATE 25 MG: 25 TABLET ORAL at 21:46

## 2025-06-25 RX ADMIN — DOCUSATE SODIUM 100 MG: 100 CAPSULE, LIQUID FILLED ORAL at 09:24

## 2025-06-25 RX ADMIN — ACETAMINOPHEN 1000 MG: 500 TABLET, FILM COATED ORAL at 09:24

## 2025-06-25 RX ADMIN — PANTOPRAZOLE SODIUM 40 MG: 40 TABLET, DELAYED RELEASE ORAL at 09:24

## 2025-06-25 RX ADMIN — LIDOCAINE 2 PATCH: 4 PATCH TOPICAL at 15:05

## 2025-06-25 RX ADMIN — CITALOPRAM HYDROBROMIDE 20 MG: 20 TABLET ORAL at 09:24

## 2025-06-25 RX ADMIN — VALACYCLOVIR HYDROCHLORIDE 500 MG: 500 TABLET, FILM COATED ORAL at 09:24

## 2025-06-25 RX ADMIN — ACETAMINOPHEN 1000 MG: 500 TABLET, FILM COATED ORAL at 21:45

## 2025-06-25 RX ADMIN — SULFAMETHOXAZOLE AND TRIMETHOPRIM 1 TABLET: 800; 160 TABLET ORAL at 20:26

## 2025-06-25 RX ADMIN — SENNOSIDES AND DOCUSATE SODIUM 2 TABLET: 50; 8.6 TABLET ORAL at 09:24

## 2025-06-25 ASSESSMENT — ACTIVITIES OF DAILY LIVING (ADL)
ADLS_ACUITY_SCORE: 54
ADLS_ACUITY_SCORE: 51
ADLS_ACUITY_SCORE: 54
ADLS_ACUITY_SCORE: 51
ADLS_ACUITY_SCORE: 51
ADLS_ACUITY_SCORE: 54
ADLS_ACUITY_SCORE: 51
ADLS_ACUITY_SCORE: 54
ADLS_ACUITY_SCORE: 51

## 2025-06-25 NOTE — PROGRESS NOTES
Care Management Follow Up    Length of Stay (days): 5    Expected Discharge Date: 06/26/2025     Concerns to be Addressed: discharge planning     Patient plan of care discussed at interdisciplinary rounds: Yes    Anticipated Discharge Disposition: Transitional Care        Anticipated Discharge Services:    Anticipated Discharge DME:      Patient/family educated on Medicare website which has current facility and service quality ratings: yes  Education Provided on the Discharge Plan: Yes  Patient/Family in Agreement with the Plan: yes    Referrals Placed by CM/SW: Post Acute Facilities  Private pay costs discussed: private room/amenity fees    Discussed  Partnership in Safe Discharge Planning  document with patient/family: No     Handoff Completed: No, handoff not indicated or clinically appropriate    Additional Information:  SW reviewed chart. OT is now recommending TCU. PT consult pending. YANN called and spoke with pt's daughter, Dariela. She is in agreement with TCU. A family member works at [employer] and this would be first choice. Dariela is also in agreement with the facilities near [place]. Reviewed TCU and Medicare coverage. YANN informed Dariela a private room would be an additional daily fee, if requested.     Next Steps: Will send referrals when discharge date known. YANN following for discharge planning.     JEANCARLOS Laws, LICSW  [phone] Desk phone  [phone] Cell/text (Preferred)  [facility]    Available on [employer]

## 2025-06-25 NOTE — PROGRESS NOTES
"Pt son called ANS upset that patient is \"getting too much of the drip\" and is confused and that it needs to stop or he's \"calling the \".   Investigated situation, mother does not give me permission to speak to her son, cannot add his contact info to the chart. Upon meeting- pt seems confused with slightly slurred speech, but was persistent I not disclose medical information to her son.   Reassured son that pt is well cared for and referred to his sister as point of contact.   "

## 2025-06-25 NOTE — PROGRESS NOTES
"THORACIC SURGERY POD#5    Reviewed events overnight. ON-Q bupivacaine pump removed. Started seroquel for delirium.     S: Sitting up in chair, states she is doing well. Pain is controlled well. States she is in hospital, not sure of date, understands she is recovering from surgery.    RN reporting foul smelling urine this morning.    O: BP (!) 165/77 (BP Location: Right arm)   Pulse 86   Temp 100.2  F (37.9  C) (Oral)   Resp 18   Ht 1.676 m (5' 6\")   Wt 80.9 kg (178 lb 4.8 oz)   LMP  (LMP Unknown)   SpO2 99%   BMI 28.78 kg/m    Gen: Sitting up in chair, alert and oriented to person and place.   Resp: On room air  Incision: Steri-strips in place over incision, dressing over ON-Q removal site clean. Chest tube site is closed, no drainage.    A/P:   - UA ordered again, was normal 2 days ago  - Appreciate Urology consult  - PT/OT following, recommending TCU now  - SW consult for TCU placement  - Tylenol for pain, avoiding narcotics    Domonique Smith PA-C with Dr. Brady [facility] Thoracic Surgery  Office: [phone]  Cell: [phone]    "

## 2025-06-25 NOTE — PROVIDER NOTIFICATION
MD Notification    Notified Person: MD    Notified Person Name: Rox Smith     Notification Date/Time: 1720 6/25/25    Notification Interaction: in person    Purpose of Notification: pts UA results     Orders Received:    Comments:

## 2025-06-25 NOTE — CODE/RAPID RESPONSE
"[facility] CODE 21 Note  6/24/2025   Time Called: 1936     Assessment & Plan     Acute encephalopathy, agitation suspect 2/2 delirium in setting of recent R thoractomy.   Upon arrival, pt sitting upright in a wheelchair, awake, alert, in no overt distress; however, noted conversational confusion and paranoia.  Nursing notes pt has been having intermittent hallucinations throughout the day.  This evening, pt believed someone to be in the closet in her room with subsequent confusion.  Pt ambulated out to the nursing station and subsequently began to run down the hallway toward the elevator yelling \"help me\", prompting CODE 21.  Pt attempted calling her son, Angus, several times but was not able to utilize phone correctly.  After assistance with pt contacting her son, Angus, pt updating Angus that she was being held at the hospital against her will, that security was fooling her and that he needed to come pick her up from the hospital.  Pt further argues with her son about her overall clinical status.  Angus expressed concern that pt does not appear to be herself.  Despite frequent redirection, pt remained confused, paranoid, frustrated regarding overall clinical status.  Pt's VS noting SBP 150s, HR 70s, RR 10s-20s, O2 sats > 92% on 3L O2 via NC, afebrile.    INTERVENTIONS:  - Despite frequent redirection, pt remained frustrated, paranoid; will administer 5 mg IM zyprexa now  - Pt's son, Angus, was updated on the phone call initially; Angus expressed concern that pt does not appear to be herself.  Discussed with Angus that pt appears to have developed hospital acquired delirium.  Will attempt to help pt achieve a restful night to help reorient pt.  Angus expressed understanding.    - Noted Na WNL, UA WNL, no obvious focal neuro deficit noted, last received opioids yesterday  - Contacted Dariela, pt's daughter, listed emergency contact, to review above.  Dariela notes since surgery, pt has " "been saying \"off\" statements and has noted concern for possible hallucinations since surgery.  Dariela notes pt had been endorsing that she was hearing and seeing her cat since surgery.  Dariela notes she was present today when pt had increased paranoia and confusion.  Dariela expressed understanding that pt required zyprexa, may require restraints if continues to get out of bed, fall risk, paranoid and confused.    - Pt's son, Angus, called unit back to talk with writer again.  Angus expressed that pt needs to be stopped immediately from pain pump in place as he states his concern that pt is becoming confused from pain pump.  Pt began to threaten a law suit toward writer if pain pump was not stopped.  Expressed to Angus that would need to discuss with thoracic surgery and Dariela as Angus is not pt's primary contact.  Angus continued to threaten a law suit if pain pump was not stopped.  Provided Angus with patient relations number as Angus continued to verbally escalate on the phone.  I discussed with Angus that it is not appropriate to threaten writer and that would not continue with Angus in this conversation at this time.   - Contacted Dariela once again to discuss recent events with Angus.  Dariela and Angus both in agreement for onQ pump to be disconitnued.  Both Dariela and Angus state \"I would rather have my mom in pain than to have her more and more confused\".   Requested for nursing to contact Dr. Jasso, thoracic surgery, to ensure OK to remove (was told that onQ pump nearing completion regardless).    - Dr. Jasso updated by nursing; OK to discontinue onQ pump.  Dr. Jasso notes concern that pt will likely have ongoing, uncontrolled pain; states no opioids or ibuprofen.  Dr. Jasso also Ok with initiating pt on seroquel  - Will order zyprexa 5 mg BID PRN, seroquel 25 mg every HS, seroquel 25 mg BID PRN     Discussed with and defer further cares to nursing and Dr. Jasso, hospitalist    Interval History     Siomara BURK " Jade is a 74 year old female who was admitted on 6/20/2025 for elective R thoracotomy, R middle lobectomy, wedge resection of R upper lobe lung nodule, mediastinal lymph node dissection.    Medical history significant for:   Past Medical History:   Diagnosis Date    Basal cell carcinoma     Breast cancer (H) 2000    Colon polyp     exam every 5 years    Hypertension     Dont remember date     Code Status: Full Code    Allergies   Allergies   Allergen Reactions    Asa [Aspirin] Swelling    Hydrocortisone      Erythema      Zoster Vaccine Live      Did get red rash at injection site that lasted for about 3 days.     Physical Exam   Vital Signs with Ranges:  Temp:  [98  F (36.7  C)-99  F (37.2  C)] 98.3  F (36.8  C)  Pulse:  [75-89] 79  Resp:  [16-20] 20  BP: (115-171)/() 158/104  SpO2:  [95 %-98 %] 98 %  I/O last 3 completed shifts:  In: 1201 [P.O.:835; I.V.:366]  Out: 2008 [Urine:1818; Chest Tube:190]    Constitutional: Pt sitting upright in a wheelchair, awake, alert, in no overt distress; however, noted conversational confusion and paranoia  Neck: No upper airway wheezes or stridor noted  Pulmonary: In no overt respiratory distress  Cardiovascular: Regular rate and rhythm  GI: Nondistended  Skin/Integumen: Warm, dry, pink  Neuro: Awake, alert, clear speech, aware of location, time; however, conversation confused, paranoid  Psych:  Verbally aggressive  Extremities: Moving all extremities     Medical Decision Making       40 MINUTES SPENT BY ME on the date of service doing chart review, history, exam, documentation & further activities per the note.       MELISSA Dyson [facility]  Hospitalist-House [facility]  Hospitalist Service  Securely message with [employer] (more info)  Text page via Highlight Paging/Directory

## 2025-06-25 NOTE — PROGRESS NOTES
THORACIC SURGERY    Events of yesterday reviewed with nursing    In the chair this am.  Sleeping.  Did not wake her up    On RA  O2 sats 93%    Final path pending    OT, PT evaluation  D/C planning    ABHIJEET AMADOR MD [facility] ONCOLOGY THORACIC SURGERY  CELL:  [phone]  OFFICE: [phone]

## 2025-06-25 NOTE — PLAN OF CARE
"Goal Outcome Evaluation:      Plan of Care Reviewed With: patient    Overall Patient Progress: no changeOverall Patient Progress: no change    Patient Name: Maryanne  MRN: [id number]  Date of Admission: 2025  Reason for Admission: Thoracotomy  Level of Care: CV    Vitals:   BP Readings from Last 1 Encounters:   25 (!) 155/79     Pulse Readings from Last 1 Encounters:   25 72     Wt Readings from Last 1 Encounters:   25 80.9 kg (178 lb 4.8 oz)     Ht Readings from Last 1 Encounters:   25 1.676 m (5' 6\")     Estimated body mass index is 28.78 kg/m  as calculated from the following:    Height as of this encounter: 1.676 m (5' 6\").    Weight as of this encounter: 80.9 kg (178 lb 4.8 oz).  Temp Readings from Last 1 Encounters:   25 98.7  F (37.1  C) (Oral)     Pain: Pain goal:0 Pain Ratin/10  Effective pain medication/regimen Schedule Tylenol    CV Surgery Patient: No    Assessment: POD # 5 S/P Thoracotomy Right side chest    Resp: Diminished lung sounds,Room air  VS: Vital signs stable,afebrile  Neuro: Alert and oriented x 2-3,confused and forgetful,with on & off hallucinations  Diet: Regular  GI/: No BM on this shift,passing gas.Frequent urination to bathroom.  Skin/Wounds: Redness blanchable to coccyx,R CT site,scab to Right ankle  Lines/Drains: L PIV SL,On Q pump pulled out as per patient family request  Activity: Assist x 1.GB/W  Sleep: Poor,Seroquel given as scheduled  Abnormal Labs: Na monitoring    Aggression Stop Light: Yellow,Code 21 called at 19:30 Pm for agitation          Patient Care Plan: Continue plan of care    "

## 2025-06-25 NOTE — PROGRESS NOTES
"   06/25/25 1610   Appointment Info   Signing Clinician's Name / Credentials (PT) Junie Martin PT, DPT   Living Environment   People in Home alone   Current Living Arrangements apartment   Home Accessibility no concerns   Self-Care   Usual Activity Tolerance good   Current Activity Tolerance moderate   Equipment Currently Used at Home none   Fall history within last six months no   Activity/Exercise/Self-Care Comment Pt IND for mobility and ADLs at baseline. Does own walker from prior hip sx, but does not regularly use   General Information   Onset of Illness/Injury or Date of Surgery 06/20/25   Referring Physician Sharmila Nguyễn, [facility]   Pertinent History of Current Problem (include personal factors and/or comorbidities that impact the POC) \"74 year old female admitted 6/20/25 for limited R thoracotomy, right middle lobectomy, wedge resection groundglass nodule posterior RUL, mediastinal LN dissection (6/20/25), pathology showing invasive acinar adenocarcinoma. Noted to have urinary retention requiring multiple straight catheterizations for which urology is consulted upon. Also noted to have an SRINI w Creatinine on 6/23 of 1.26, now resolved with IV fluids.      No UTI Hx. No gross hematuria or dysuria. Notes nocturia and urinary urgency occasionally that is brought on my ambulation. She reports she stays well hydrated and drinks a lot of water.\"   Existing Precautions/Restrictions fall   Cognition   Orientation Status (Cognition) oriented x 3   Follows Commands (Cognition) WFL   Cognitive Status Comments slow to recall   Pain Assessment   Patient Currently in Pain Yes, see Vital Sign flowsheet  (pain near R axilla)   Integumentary/Edema   Integumentary/Edema no deficits were identifed   Posture    Posture Forward head position   Range of Motion (ROM)   Range of Motion ROM is WFL   Strength (Manual Muscle Testing)   Strength Comments mildly decr functional LE strength and endurance   Bed Mobility   Comment, (Bed " Mobility) NT, pt up in chair upon PT arrival   Transfers   Comment, (Transfers) CGA sit<>stand to FWW   Gait/Stairs (Locomotion)   Comment, (Gait/Stairs) CGA for gait with FWW, slowed gait speed   Balance   Balance Comments falls risk, currently benefits from use of FWW for safe upright mobility   Sensory Examination   Sensory Perception patient reports no sensory changes   Clinical Impression   Criteria for Skilled Therapeutic Intervention Yes, treatment indicated   PT Diagnosis (PT) impaired IND with functional mobility   Influenced by the following impairments impaired activity tolerance, functional strength, balance, pain   Functional limitations due to impairments impaired bed mobility, transfers, ambulation   Clinical Presentation (PT Evaluation Complexity) stable   Clinical Presentation Rationale Based on current presentation, PMH, social support   Clinical Decision Making (Complexity) low complexity   Planned Therapy Interventions (PT) balance training;bed mobility training;gait training;home exercise program;patient/family education;strengthening;transfer training;progressive activity/exercise;home program guidelines   Risk & Benefits of therapy have been explained evaluation/treatment results reviewed;care plan/treatment goals reviewed;risks/benefits reviewed;current/potential barriers reviewed;participants voiced agreement with care plan;participants included;patient   PT Total Evaluation Time   PT Eval, Low Complexity Minutes (38608) 10   Physical Therapy Goals   PT Frequency 5x/week   PT Predicted Duration/Target Date for Goal Attainment 07/02/25   PT Goals Bed Mobility;Transfers;Gait   PT: Bed Mobility Modified independent;Supine to/from sit   PT: Transfers Modified independent;Sit to/from stand;Bed to/from chair;Assistive device   PT: Gait Modified independent;Assistive device;Rolling walker;150 feet   Interventions   Interventions Quick Adds Gait Training;Therapeutic Activity   Gait Training   Gait  Training Minutes (57481) 8   Treatment Detail/Skilled Intervention Following eval, pt amb further distance with FWW and CGA. Very slow GS. When asked to increase GS, pt demos just a slight increase. Reports too fatigued for further mobility this PM. Hand-off to RN and NA in room at end of session   Distance in Feet 175   PT Discharge Planning   PT Plan gait with FWW, balance tasks, assess bed mob   PT Discharge Recommendation (DC Rec) Transitional Care Facility;home with assist;home with home care physical therapy   PT Rationale for DC Rec Pt currently below reported IND. Currently ambulating with CGA at FWW, limited by fatigue this PM. Pt would benefit from TCU stay to maximize safety and IND with mobility at time of hospital discharge. If returning home, would require 24 h supervision, use of FWW for all mobility, and HHPT to address contiued mobility needs.   PT Brief overview of current status Goals of therapy will be to address safe mobility and make recs for d/c to next level of care. Pt and RN will continue to follow all falls risk precautions as documented by RN staff while hospitalized.   PT Total Distance Amb During Session (feet) 175   Physical Therapy Time and Intention   Timed Code Treatment Minutes 8   Total Session Time (sum of timed and untimed services) 18

## 2025-06-25 NOTE — PROVIDER NOTIFICATION
Patient's family are requesting for On-Q pump to be removed as they think it's causing confusion for patient. See House [facility] note.  Spoke with Dr. Jasso, got an ok to remove  On-Q pump.

## 2025-06-26 ENCOUNTER — APPOINTMENT (OUTPATIENT)
Dept: GENERAL RADIOLOGY | Facility: CLINIC | Age: 75
End: 2025-06-26
Attending: PHYSICIAN ASSISTANT
Payer: COMMERCIAL

## 2025-06-26 ENCOUNTER — APPOINTMENT (OUTPATIENT)
Dept: PHYSICAL THERAPY | Facility: CLINIC | Age: 75
End: 2025-06-26
Attending: THORACIC SURGERY (CARDIOTHORACIC VASCULAR SURGERY)
Payer: COMMERCIAL

## 2025-06-26 ENCOUNTER — APPOINTMENT (OUTPATIENT)
Dept: OCCUPATIONAL THERAPY | Facility: CLINIC | Age: 75
End: 2025-06-26
Attending: THORACIC SURGERY (CARDIOTHORACIC VASCULAR SURGERY)
Payer: COMMERCIAL

## 2025-06-26 LAB
ANION GAP SERPL CALCULATED.3IONS-SCNC: 13 MMOL/L (ref 7–15)
ATRIAL RATE - MUSE: 150 BPM
BACTERIA UR CULT: NORMAL
BASOPHILS # BLD AUTO: 0.1 10E3/UL (ref 0–0.2)
BASOPHILS NFR BLD AUTO: 1 %
BUN SERPL-MCNC: 14.1 MG/DL (ref 8–23)
CALCIUM SERPL-MCNC: 9 MG/DL (ref 8.8–10.4)
CHLORIDE SERPL-SCNC: 101 MMOL/L (ref 98–107)
CREAT SERPL-MCNC: 0.88 MG/DL (ref 0.51–0.95)
CREAT SERPL-MCNC: 0.97 MG/DL (ref 0.51–0.95)
DIASTOLIC BLOOD PRESSURE - MUSE: NORMAL MMHG
EGFRCR SERPLBLD CKD-EPI 2021: 61 ML/MIN/1.73M2
EGFRCR SERPLBLD CKD-EPI 2021: 68 ML/MIN/1.73M2
EOSINOPHIL # BLD AUTO: 0.2 10E3/UL (ref 0–0.7)
EOSINOPHIL NFR BLD AUTO: 2 %
ERYTHROCYTE [DISTWIDTH] IN BLOOD BY AUTOMATED COUNT: 13.7 % (ref 10–15)
GLUCOSE SERPL-MCNC: 122 MG/DL (ref 70–99)
HCO3 SERPL-SCNC: 22 MMOL/L (ref 22–29)
HCT VFR BLD AUTO: 35.7 % (ref 35–47)
HGB BLD-MCNC: 12.1 G/DL (ref 11.7–15.7)
IMM GRANULOCYTES # BLD: 0.1 10E3/UL
IMM GRANULOCYTES NFR BLD: 1 %
INTERPRETATION ECG - MUSE: NORMAL
LYMPHOCYTES # BLD AUTO: 3.2 10E3/UL (ref 0.8–5.3)
LYMPHOCYTES NFR BLD AUTO: 34 %
MAGNESIUM SERPL-MCNC: 1.7 MG/DL (ref 1.7–2.3)
MCH RBC QN AUTO: 31.3 PG (ref 26.5–33)
MCHC RBC AUTO-ENTMCNC: 33.9 G/DL (ref 31.5–36.5)
MCV RBC AUTO: 92 FL (ref 78–100)
MCV RBC AUTO: 94 FL (ref 78–100)
MONOCYTES # BLD AUTO: 0.5 10E3/UL (ref 0–1.3)
MONOCYTES NFR BLD AUTO: 6 %
NEUTROPHILS # BLD AUTO: 5.3 10E3/UL (ref 1.6–8.3)
NEUTROPHILS NFR BLD AUTO: 57 %
NRBC # BLD AUTO: 0 10E3/UL
NRBC BLD AUTO-RTO: 0 /100
P AXIS - MUSE: NORMAL DEGREES
PHOSPHATE SERPL-MCNC: 4.8 MG/DL (ref 2.5–4.5)
PLATELET # BLD AUTO: 245 10E3/UL (ref 150–450)
PLATELET # BLD AUTO: 293 10E3/UL (ref 150–450)
POTASSIUM SERPL-SCNC: 3.7 MMOL/L (ref 3.4–5.3)
PR INTERVAL - MUSE: NORMAL MS
QRS DURATION - MUSE: 72 MS
QT - MUSE: 340 MS
QTC - MUSE: 494 MS
R AXIS - MUSE: 19 DEGREES
RBC # BLD AUTO: 3.87 10E6/UL (ref 3.8–5.2)
SODIUM SERPL-SCNC: 136 MMOL/L (ref 135–145)
SODIUM SERPL-SCNC: 137 MMOL/L (ref 135–145)
SODIUM SERPL-SCNC: 138 MMOL/L (ref 135–145)
SODIUM SERPL-SCNC: 140 MMOL/L (ref 135–145)
SYSTOLIC BLOOD PRESSURE - MUSE: NORMAL MMHG
T AXIS - MUSE: 251 DEGREES
TROPONIN T SERPL HS-MCNC: 23 NG/L
VENTRICULAR RATE- MUSE: 127 BPM
WBC # BLD AUTO: 9.4 10E3/UL (ref 4–11)

## 2025-06-26 PROCEDURE — 93005 ELECTROCARDIOGRAM TRACING: CPT

## 2025-06-26 PROCEDURE — 71045 X-RAY EXAM CHEST 1 VIEW: CPT

## 2025-06-26 PROCEDURE — 36415 COLL VENOUS BLD VENIPUNCTURE: CPT | Performed by: PHYSICIAN ASSISTANT

## 2025-06-26 PROCEDURE — 250N000009 HC RX 250

## 2025-06-26 PROCEDURE — 97535 SELF CARE MNGMENT TRAINING: CPT | Mod: GO

## 2025-06-26 PROCEDURE — 120N000001 HC R&B MED SURG/OB

## 2025-06-26 PROCEDURE — 84484 ASSAY OF TROPONIN QUANT: CPT

## 2025-06-26 PROCEDURE — 84295 ASSAY OF SERUM SODIUM: CPT | Performed by: PHYSICIAN ASSISTANT

## 2025-06-26 PROCEDURE — 999N000157 HC STATISTIC RCP TIME EA 10 MIN

## 2025-06-26 PROCEDURE — 36415 COLL VENOUS BLD VENIPUNCTURE: CPT

## 2025-06-26 PROCEDURE — 80048 BASIC METABOLIC PNL TOTAL CA: CPT | Performed by: PHYSICIAN ASSISTANT

## 2025-06-26 PROCEDURE — 85049 AUTOMATED PLATELET COUNT: CPT

## 2025-06-26 PROCEDURE — 84100 ASSAY OF PHOSPHORUS: CPT

## 2025-06-26 PROCEDURE — 84295 ASSAY OF SERUM SODIUM: CPT

## 2025-06-26 PROCEDURE — 97110 THERAPEUTIC EXERCISES: CPT | Mod: GP

## 2025-06-26 PROCEDURE — 85025 COMPLETE CBC W/AUTO DIFF WBC: CPT | Performed by: PHYSICIAN ASSISTANT

## 2025-06-26 PROCEDURE — 258N000003 HC RX IP 258 OP 636

## 2025-06-26 PROCEDURE — 93010 ELECTROCARDIOGRAM REPORT: CPT | Performed by: INTERNAL MEDICINE

## 2025-06-26 PROCEDURE — 83735 ASSAY OF MAGNESIUM: CPT

## 2025-06-26 PROCEDURE — 99233 SBSQ HOSP IP/OBS HIGH 50: CPT

## 2025-06-26 PROCEDURE — 250N000011 HC RX IP 250 OP 636: Performed by: PHYSICIAN ASSISTANT

## 2025-06-26 PROCEDURE — 250N000013 HC RX MED GY IP 250 OP 250 PS 637: Performed by: STUDENT IN AN ORGANIZED HEALTH CARE EDUCATION/TRAINING PROGRAM

## 2025-06-26 PROCEDURE — 97129 THER IVNTJ 1ST 15 MIN: CPT | Mod: GO

## 2025-06-26 PROCEDURE — 82565 ASSAY OF CREATININE: CPT | Performed by: PHYSICIAN ASSISTANT

## 2025-06-26 PROCEDURE — 97116 GAIT TRAINING THERAPY: CPT | Mod: GP

## 2025-06-26 PROCEDURE — 250N000013 HC RX MED GY IP 250 OP 250 PS 637: Performed by: PHYSICIAN ASSISTANT

## 2025-06-26 PROCEDURE — 250N000013 HC RX MED GY IP 250 OP 250 PS 637: Performed by: NURSE PRACTITIONER

## 2025-06-26 RX ORDER — METOPROLOL TARTRATE 1 MG/ML
5 INJECTION, SOLUTION INTRAVENOUS ONCE
Status: COMPLETED | OUTPATIENT
Start: 2025-06-26 | End: 2025-06-26

## 2025-06-26 RX ORDER — LIDOCAINE 4 G/G
2 PATCH TOPICAL EVERY 24 HOURS
DISCHARGE
Start: 2025-06-26

## 2025-06-26 RX ORDER — ACETAMINOPHEN 500 MG
1000 TABLET ORAL 3 TIMES DAILY
DISCHARGE
Start: 2025-06-26

## 2025-06-26 RX ORDER — METOPROLOL TARTRATE 1 MG/ML
5 INJECTION, SOLUTION INTRAVENOUS EVERY 6 HOURS PRN
Status: DISCONTINUED | OUTPATIENT
Start: 2025-06-26 | End: 2025-06-30 | Stop reason: HOSPADM

## 2025-06-26 RX ADMIN — ACETAMINOPHEN 1000 MG: 500 TABLET, FILM COATED ORAL at 21:35

## 2025-06-26 RX ADMIN — ACETAMINOPHEN 1000 MG: 500 TABLET, FILM COATED ORAL at 09:34

## 2025-06-26 RX ADMIN — SULFAMETHOXAZOLE AND TRIMETHOPRIM 1 TABLET: 800; 160 TABLET ORAL at 09:34

## 2025-06-26 RX ADMIN — VALACYCLOVIR HYDROCHLORIDE 500 MG: 500 TABLET, FILM COATED ORAL at 21:35

## 2025-06-26 RX ADMIN — SODIUM CHLORIDE, SODIUM LACTATE, POTASSIUM CHLORIDE, AND CALCIUM CHLORIDE 500 ML: .6; .31; .03; .02 INJECTION, SOLUTION INTRAVENOUS at 23:00

## 2025-06-26 RX ADMIN — METHOCARBAMOL 500 MG: 500 TABLET ORAL at 23:59

## 2025-06-26 RX ADMIN — LIDOCAINE 2 PATCH: 4 PATCH TOPICAL at 09:33

## 2025-06-26 RX ADMIN — ACETAMINOPHEN 1000 MG: 500 TABLET, FILM COATED ORAL at 16:23

## 2025-06-26 RX ADMIN — CITALOPRAM HYDROBROMIDE 20 MG: 20 TABLET ORAL at 09:34

## 2025-06-26 RX ADMIN — METOPROLOL TARTRATE 5 MG: 5 INJECTION INTRAVENOUS at 22:34

## 2025-06-26 RX ADMIN — VALACYCLOVIR HYDROCHLORIDE 500 MG: 500 TABLET, FILM COATED ORAL at 09:34

## 2025-06-26 RX ADMIN — ENOXAPARIN SODIUM 40 MG: 40 INJECTION SUBCUTANEOUS at 09:33

## 2025-06-26 RX ADMIN — SENNOSIDES AND DOCUSATE SODIUM 2 TABLET: 50; 8.6 TABLET ORAL at 09:34

## 2025-06-26 RX ADMIN — PANTOPRAZOLE SODIUM 40 MG: 40 TABLET, DELAYED RELEASE ORAL at 09:34

## 2025-06-26 RX ADMIN — QUETIAPINE FUMARATE 25 MG: 25 TABLET ORAL at 21:35

## 2025-06-26 RX ADMIN — SENNOSIDES AND DOCUSATE SODIUM 2 TABLET: 50; 8.6 TABLET ORAL at 21:35

## 2025-06-26 RX ADMIN — ACETAMINOPHEN 500 MG: 500 TABLET, FILM COATED ORAL at 03:49

## 2025-06-26 RX ADMIN — POLYETHYLENE GLYCOL 3350 17 G: 17 POWDER, FOR SOLUTION ORAL at 09:33

## 2025-06-26 ASSESSMENT — ACTIVITIES OF DAILY LIVING (ADL)
ADLS_ACUITY_SCORE: 51

## 2025-06-26 NOTE — PROGRESS NOTES
Care Management Follow Up    Length of Stay (days): 6    Expected Discharge Date: 06/27/2025     Concerns to be Addressed: discharge planning     Patient plan of care discussed at interdisciplinary rounds: Yes    Anticipated Discharge Disposition: Transitional Care              Anticipated Discharge Services:    Anticipated Discharge DME:      Patient/family educated on Medicare website which has current facility and service quality ratings: yes  Education Provided on the Discharge Plan: Yes  Patient/Family in Agreement with the Plan: yes    Referrals Placed by CM/SW: Post Acute Facilities  Private pay costs discussed: Not applicable    Discussed  Partnership in Safe Discharge Planning  document with patient/family: No     Handoff Completed: No, handoff not indicated or clinically appropriate    Additional Information:  Pt accepted to [facility] in [place]. It is a private room at no additional fee, per Allyn in admissions. Voicemail left at [facility] to inquire about bed availability. This facility is pt/family preference.     Next Steps: SW following for discharge planning.     JEANCARLOS Laws, LICSW  [phone] Desk phone  [phone] Cell/text (Preferred)  [facility]    Available on [employer]

## 2025-06-26 NOTE — PLAN OF CARE
Goal Outcome Evaluation:    Plan of Care Reviewed With: patient    Overall Patient Progress: no change    Orientation: A&O x3, disoriented to time, intermittent confusion.   Aggression Stop Light: Green  Activity: Ax1 w/ GB+walker, up ambulating in halls  Diet/BS Checks: Regular  IV Access/Drains: PIV SL  Pain Management: scheduled Tylenol and lidocaine patches, avoid narcotics  Abnormal VS/Results: VSS on RA except HTN at times.  Bowel/Bladder: Continent of B/B.  Skin/Wounds: L thora site, CT sites  Consults: Thoracic Surgery  D/C Disposition: pending TCU placement

## 2025-06-26 NOTE — PLAN OF CARE
"Goal Outcome Evaluation:      Patient Name: Maryanne  MRN: [id number]  Date of Admission: 2025  Reason for Admission: S/P Thoracotomy  Level of Care: MS    Vitals:   BP Readings from Last 1 Encounters:   25 (!) 133/99     Pulse Readings from Last 1 Encounters:   25 98     Wt Readings from Last 1 Encounters:   25 80.9 kg (178 lb 4.8 oz)     Ht Readings from Last 1 Encounters:   25 1.676 m (5' 6\")     Estimated body mass index is 28.78 kg/m  as calculated from the following:    Height as of this encounter: 1.676 m (5' 6\").    Weight as of this encounter: 80.9 kg (178 lb 4.8 oz).  Temp Readings from Last 1 Encounters:   25 97.9  F (36.6  C) (Oral)     Pain: Pain goal:0 Pain Ratin/10  Effective pain medication/regimen Schedule Tylenol    CV Surgery Patient: No    Assessment: POD # 6 S/P Thoracotomy Right side chest    Resp: Diminished lung sounds,Room air  VS: Vital signs stable,afebrile  Neuro: Alert and oriented x 3,Disoriented to date and time,forgetful  Diet: Regular  GI/: No BM on this shift,passing gas.Adequate urine output.  Skin/Wounds: Redness blanchable to coccyx,R CT site,scab to Right ankle  Lines/Drains: L PIV SL  Activity: Assist x 1.GB/W  Sleep: Poor,Seroquel given as scheduled  Abnormal Labs: Na monitoring    Aggression Stop Light: Green          Patient Care Plan: Continue plan of care,Urology consult,PT/OT following,TCU when discharge.  "

## 2025-06-26 NOTE — PLAN OF CARE
Goal Outcome Evaluation:    Orientation:A&Ox4     Vitals/Tele: vss on ra     IV Access/drains: piv sl    Diet: reg     Mobility: A1/ SBA     GI/: continent frequent urination     Wound/Skin: redness to coccyx. Old R CT site. Scab to R ankle    Discharge Plan: pending TCU placement       See Flow sheets for assessment

## 2025-06-26 NOTE — PROGRESS NOTES
"THORACIC SURGERY POD#6    S: Doing well today, no events overnight. Pain worse since ON-Q removed, but doing okay with scheduled Tylenol and lidocaine patches. Urinating okay, no further straight catheterizations yesterday    O: BP (!) 160/114 (BP Location: Left arm)   Pulse 79   Temp 98.1  F (36.7  C) (Oral)   Resp 18   Ht 1.676 m (5' 6\")   Wt 80.9 kg (178 lb 4.8 oz)   LMP  (LMP Unknown)   SpO2 97%   BMI 28.78 kg/m    Gen: Sitting up in chair, alert  Resp: On room air  Incision: Steri-strips in place, chest tube site closed.     A/P:   - UTI, started Bactrim last night, follow urine cultures  - Dispo to TCU, waiting for placement  - Tylenol and lidocaine patches for pain, avoiding narcotics    Domonique Smith PA-C with Dr. Brady [facility] Oncology Thoracic Surgery  Office: [phone]  Cell: [phone]    As above  Discussed pathology report    BRADY AMADOR MD [facility] ONCOLOGY THORACIC SURGERY  CELL:  [phone]  OFFICE: [phone]  "

## 2025-06-27 ENCOUNTER — APPOINTMENT (OUTPATIENT)
Dept: OCCUPATIONAL THERAPY | Facility: CLINIC | Age: 75
End: 2025-06-27
Attending: THORACIC SURGERY (CARDIOTHORACIC VASCULAR SURGERY)
Payer: COMMERCIAL

## 2025-06-27 VITALS
BODY MASS INDEX: 28.66 KG/M2 | HEIGHT: 66 IN | TEMPERATURE: 98.6 F | DIASTOLIC BLOOD PRESSURE: 98 MMHG | HEART RATE: 73 BPM | OXYGEN SATURATION: 96 % | RESPIRATION RATE: 21 BRPM | SYSTOLIC BLOOD PRESSURE: 153 MMHG | WEIGHT: 178.3 LBS

## 2025-06-27 LAB
ATRIAL RATE - MUSE: 147 BPM
ATRIAL RATE - MUSE: 150 BPM
DIASTOLIC BLOOD PRESSURE - MUSE: NORMAL MMHG
DIASTOLIC BLOOD PRESSURE - MUSE: NORMAL MMHG
INTERPRETATION ECG - MUSE: NORMAL
INTERPRETATION ECG - MUSE: NORMAL
P AXIS - MUSE: NORMAL DEGREES
P AXIS - MUSE: NORMAL DEGREES
PR INTERVAL - MUSE: NORMAL MS
PR INTERVAL - MUSE: NORMAL MS
QRS DURATION - MUSE: 70 MS
QRS DURATION - MUSE: 72 MS
QT - MUSE: 340 MS
QT - MUSE: 366 MS
QTC - MUSE: 483 MS
QTC - MUSE: 494 MS
R AXIS - MUSE: 19 DEGREES
R AXIS - MUSE: 60 DEGREES
SYSTOLIC BLOOD PRESSURE - MUSE: NORMAL MMHG
SYSTOLIC BLOOD PRESSURE - MUSE: NORMAL MMHG
T AXIS - MUSE: -11 DEGREES
T AXIS - MUSE: 251 DEGREES
TROPONIN T SERPL HS-MCNC: 24 NG/L
VENTRICULAR RATE- MUSE: 105 BPM
VENTRICULAR RATE- MUSE: 127 BPM

## 2025-06-27 PROCEDURE — 93010 ELECTROCARDIOGRAM REPORT: CPT | Performed by: INTERNAL MEDICINE

## 2025-06-27 PROCEDURE — 250N000013 HC RX MED GY IP 250 OP 250 PS 637: Performed by: NURSE PRACTITIONER

## 2025-06-27 PROCEDURE — 120N000001 HC R&B MED SURG/OB

## 2025-06-27 PROCEDURE — 250N000011 HC RX IP 250 OP 636: Performed by: PHYSICIAN ASSISTANT

## 2025-06-27 PROCEDURE — 250N000013 HC RX MED GY IP 250 OP 250 PS 637: Performed by: PHYSICIAN ASSISTANT

## 2025-06-27 PROCEDURE — 97535 SELF CARE MNGMENT TRAINING: CPT | Mod: GO

## 2025-06-27 PROCEDURE — 36415 COLL VENOUS BLD VENIPUNCTURE: CPT

## 2025-06-27 PROCEDURE — 97530 THERAPEUTIC ACTIVITIES: CPT | Mod: GO

## 2025-06-27 PROCEDURE — 93005 ELECTROCARDIOGRAM TRACING: CPT

## 2025-06-27 PROCEDURE — 250N000013 HC RX MED GY IP 250 OP 250 PS 637: Performed by: INTERNAL MEDICINE

## 2025-06-27 PROCEDURE — 250N000009 HC RX 250

## 2025-06-27 PROCEDURE — 99232 SBSQ HOSP IP/OBS MODERATE 35: CPT | Performed by: INTERNAL MEDICINE

## 2025-06-27 PROCEDURE — 84484 ASSAY OF TROPONIN QUANT: CPT

## 2025-06-27 PROCEDURE — 250N000013 HC RX MED GY IP 250 OP 250 PS 637: Performed by: STUDENT IN AN ORGANIZED HEALTH CARE EDUCATION/TRAINING PROGRAM

## 2025-06-27 RX ORDER — AMOXICILLIN 250 MG
1-3 CAPSULE ORAL 2 TIMES DAILY PRN
DISCHARGE
Start: 2025-06-27

## 2025-06-27 RX ORDER — CARVEDILOL 3.12 MG/1
3.12 TABLET ORAL 2 TIMES DAILY WITH MEALS
Status: DISCONTINUED | OUTPATIENT
Start: 2025-06-27 | End: 2025-06-28

## 2025-06-27 RX ORDER — CARVEDILOL 3.12 MG/1
3.12 TABLET ORAL 2 TIMES DAILY WITH MEALS
DISCHARGE
Start: 2025-06-27 | End: 2025-06-29

## 2025-06-27 RX ADMIN — POLYETHYLENE GLYCOL 3350 17 G: 17 POWDER, FOR SOLUTION ORAL at 09:08

## 2025-06-27 RX ADMIN — VALACYCLOVIR HYDROCHLORIDE 500 MG: 500 TABLET, FILM COATED ORAL at 21:07

## 2025-06-27 RX ADMIN — ENOXAPARIN SODIUM 40 MG: 40 INJECTION SUBCUTANEOUS at 09:08

## 2025-06-27 RX ADMIN — APIXABAN 5 MG: 5 TABLET, FILM COATED ORAL at 13:18

## 2025-06-27 RX ADMIN — APIXABAN 5 MG: 5 TABLET, FILM COATED ORAL at 21:06

## 2025-06-27 RX ADMIN — LIDOCAINE 2 PATCH: 4 PATCH TOPICAL at 09:08

## 2025-06-27 RX ADMIN — DOCUSATE SODIUM 100 MG: 100 CAPSULE, LIQUID FILLED ORAL at 09:07

## 2025-06-27 RX ADMIN — ACETAMINOPHEN 1000 MG: 500 TABLET, FILM COATED ORAL at 21:07

## 2025-06-27 RX ADMIN — SENNOSIDES AND DOCUSATE SODIUM 2 TABLET: 50; 8.6 TABLET ORAL at 21:06

## 2025-06-27 RX ADMIN — VALACYCLOVIR HYDROCHLORIDE 500 MG: 500 TABLET, FILM COATED ORAL at 09:07

## 2025-06-27 RX ADMIN — METOPROLOL TARTRATE 5 MG: 5 INJECTION INTRAVENOUS at 22:43

## 2025-06-27 RX ADMIN — METOPROLOL TARTRATE 5 MG: 5 INJECTION INTRAVENOUS at 13:52

## 2025-06-27 RX ADMIN — ACETAMINOPHEN 500 MG: 500 TABLET, FILM COATED ORAL at 04:29

## 2025-06-27 RX ADMIN — QUETIAPINE FUMARATE 25 MG: 25 TABLET ORAL at 21:07

## 2025-06-27 RX ADMIN — ACETAMINOPHEN 1000 MG: 500 TABLET, FILM COATED ORAL at 15:33

## 2025-06-27 RX ADMIN — PANTOPRAZOLE SODIUM 40 MG: 40 TABLET, DELAYED RELEASE ORAL at 09:07

## 2025-06-27 RX ADMIN — SENNOSIDES AND DOCUSATE SODIUM 2 TABLET: 50; 8.6 TABLET ORAL at 09:07

## 2025-06-27 RX ADMIN — ACETAMINOPHEN 1000 MG: 500 TABLET, FILM COATED ORAL at 09:07

## 2025-06-27 RX ADMIN — CARVEDILOL 3.12 MG: 3.12 TABLET, FILM COATED ORAL at 13:18

## 2025-06-27 RX ADMIN — CITALOPRAM HYDROBROMIDE 20 MG: 20 TABLET ORAL at 09:07

## 2025-06-27 RX ADMIN — CARVEDILOL 3.12 MG: 3.12 TABLET, FILM COATED ORAL at 17:48

## 2025-06-27 ASSESSMENT — ACTIVITIES OF DAILY LIVING (ADL)
ADLS_ACUITY_SCORE: 45
ADLS_ACUITY_SCORE: 47
ADLS_ACUITY_SCORE: 45
ADLS_ACUITY_SCORE: 47
ADLS_ACUITY_SCORE: 45
ADLS_ACUITY_SCORE: 47
ADLS_ACUITY_SCORE: 45
ADLS_ACUITY_SCORE: 47

## 2025-06-27 NOTE — PROGRESS NOTES
"Thoracic Surgery POD #7:    BP (!) 189/101 (BP Location: Left arm)   Pulse 75   Temp 98.8  F (37.1  C) (Oral)   Resp 20   Ht 1.676 m (5' 6\")   Wt 80.9 kg (178 lb 4.8 oz)   LMP  (LMP Unknown)   SpO2 95%   BMI 28.78 kg/m      RRT last night for Aifb with RVR-- given Metoprolol 5 mg IV once. Converted to NSR soon thereafter    S: Denies new chest pain/pressure, lightheadedness, dizziness, sensation of racing heart. Patient oriented to time and place and situation. Using bathroom independently. Complains of surgical pain that she feels \"in her boob\" which is typical distribution of post-thoracotomy pain. Discussed pain control with tylenol and lidocaine patches. No nausea, eating OK but mentions she hasn't had a BM recently.    O: Inc: benign, dry, no erythema, steri strips intact  CT site: dry, minimal erythema  Abd: soft, nondistended    P:   Hospitalists were re-consulted for Afib that occurred last night-- now in NSR.  Awaiting transfer to TCU when medically stable and accepting facility confirmed. Ready to transfer from Thoracic standpoint.  Follow up with Thoracic Surgery July 9-- details added to AVS.    Sharmila Nguyễn PA-C with Dr. Brady [facility]  Cell [phone]    "

## 2025-06-27 NOTE — PLAN OF CARE
Goal Outcome Evaluation:    Plan of Care Reviewed With: patient    Overall Patient Progress: no change    POD #7  Orientation: A&O x3/4, intermittent confusion, forgetful  Aggression Stop Light: Green  Activity: SBA w/ walker+GB, ambulated in halls  Diet/BS Checks: Regular  Tele:  NSR then converted to Afib RVR asymptomatic 1344 converted back to SR 1510.   IV Access/Drains: 1 PIV SL  Pain Management: schedule Tylenol and Lido patches in place  Abnormal VS/Results: VSS on RA except HTN   Bowel/Bladder: Continent of B/B, no BM +passing gas.  Skin/Wounds: R thora site and CT site  Consults: Thoracic Surg, SW  D/C Disposition: pending  Other Info: small drainage from CT site dressing placed

## 2025-06-27 NOTE — PROGRESS NOTES
Routine consult for hospitalist service received: new a fib.  See [facility] documentation for further details.  BP okay, asymptomatic andHR improved, continue with plan. Full consult 6/27. Please vocera hospitalist service with concerns.

## 2025-06-27 NOTE — PROGRESS NOTES
Patient complained on difficulty breathing. Pulse oximeter alarming -150. Radial pulse feels fast and irregular. EKG released. RRT called. See [facility] note.    Dr. Jasso made aware. Ordered Hospitalist consult.

## 2025-06-27 NOTE — PLAN OF CARE
Orientations: A&O x3, intermittent confusion/forgetful  Vitals/Pain: afebrile, BP slightly elevated, sating mid 90s on RA; reported 6-8/10 incisional pain, PRN Robaxin and Tylenol given  Tele: a-fib RVR converted to SR around 2300  Lines/Drains: PIV-SL  Skin/Wounds: R thora site intact with steri-strips, R CT closed with dressing CDI  GI/: denies n/v; voiding freely without issue  Labs: Abnormal/Trends, Electrolyte Replacement- BMP/Mg/Ph  Ambulation/Assist: A1-RW/GB  Sleep Quality: intermittent, long intervals  Plan: pending TCU placement

## 2025-06-27 NOTE — PLAN OF CARE
Pt POD #6 Thoracotomy Right side chest. A&O x3, disoriented to time, intermittent confusion. VSS on RA, hypertension at times. Tele Afib [id number], RRT called. Metoprolol givenx1.LR bolus running at 250ml/hr.  Ax1 w/ GB+walker. Regular. PIV SL Continent of B/B.Skin R thora site. Discharge pending TCU placement

## 2025-06-27 NOTE — CODE/RAPID RESPONSE
"[facility]  [facility] RRT Note  6/26/2025   Time called: 2211  RRT called for: Atrial fibrillation with rapid ventricular response  Code status: Full Code    Assessment & Plan   Siomara Hansen is a 74 year old female who was admitted on 6/20/2025 for elective R thoracotomy, R middle lobectomy, wedge resection of R upper lobe lung nodule, mediastinal lymph node dissection. PMH significant for basal cell carcinoma, breast cancer, and HTN.     I was paged to the bedside to evaluate Ms. Siomara Hansen for an acute episode of atrial fibrillation with rate in the 120-140 range, asymptomatic with otherwise stable vital signs. Patient denied dizziness, light-headedness, or new chest pain that differed from her surgical pain. Patient was warm and dry with 2+ pulses in all extremities. Lung sounds clear, normal S1, S2 with no gallop, click, or murmur, irregularly irregular rhythm.    BP (!) 150/104   Pulse 111   Temp 98.1  F (36.7  C) (Oral)   Resp 26   Ht 1.676 m (5' 6\")   Wt 80.9 kg (178 lb 4.8 oz)   LMP  (LMP Unknown)   SpO2 97%   BMI 28.78 kg/m      Diagnosis:  Atrial fibrillation with rapid ventricular response  - Metoprolol 5mg IV once and PRN for Afib with HR > 120 bpm  - Check electrolytes  - Administer small 500 ml IVF bolus over 2 hours    Plan:  -- EKG: atrial fibrillation with rapid ventricular response, minor ST depressions in lateral leads.   -- Labs   *CBC   *BMP   *Magnesium   *Phosphorous  -- If atrial fibrillation persists then consult cardiology    At the conclusion of this RRT patient was hemodynamically stable and will remain on current unit.    Her history is significant for:  Past Medical History:   Diagnosis Date    Basal cell carcinoma     Breast cancer ([facility]) 2000    Colon polyp     exam every 5 years    Hypertension     Dont remember date     Past Surgical History:   Procedure Laterality Date    ARTHROPLASTY HIP Right 3/5/2024    Procedure: Right Total Hip " Arthroplasty;  Surgeon: Ruperto Silvestre MD;  Location: [facility] OR    BIOPSY  December 1999    BREAST SURGERY  February 2000    COLONOSCOPY  2023    GYN SURGERY  01/01/1986    D & C    KYPHOPLASTY N/A 1/11/2024    Procedure: Lumbar 1 kyphoplasty;  Surgeon: Ramon Hudson MD;  Location: [facility] OR    masectomy[  02/01/2000    Bilateral    ORTHOPEDIC SURGERY  2022    THORACOTOMY Right 6/20/2025    Procedure: RIGHT THORACOTOMY, RIGHT MIDDLE LOBECTOMY, WEDGE RESECTION, RIGHT UPPER LOBE LUNG NODULE, MEDIASTINAL LYMPH NODE DISSECTION;  Surgeon: Brady Jasso MD;  Location:  OR       Review of Systems   The 10 point Review of Systems is negative other than noted in the HPI or here.     Allergies   Allergies   Allergen Reactions    Asa [Aspirin] Swelling    Hydrocortisone      Erythema      Zoster Vaccine Live      Did get red rash at injection site that lasted for about 3 days.       Physical Exam   Physical Exam  Constitutional:       General: She is not in acute distress.     Appearance: She is not ill-appearing.   HENT:      Nose: Nose normal.   Eyes:      Pupils: Pupils are equal, round, and reactive to light.   Cardiovascular:      Rate and Rhythm: Normal rate and regular rhythm.      Pulses: Normal pulses.      Heart sounds: Normal heart sounds.   Pulmonary:      Effort: Pulmonary effort is normal.      Breath sounds: Normal breath sounds.   Abdominal:      General: Abdomen is flat.      Palpations: Abdomen is soft.   Musculoskeletal:      Right lower leg: No edema.      Left lower leg: No edema.   Skin:     General: Skin is warm and dry.      Capillary Refill: Capillary refill takes less than 2 seconds.   Neurological:      General: No focal deficit present.      Mental Status: She is alert and oriented to person, place, and time.   Psychiatric:         Mood and Affect: Mood normal.         Vital Signs with Ranges:  Temp:  [97.9  F (36.6  C)-98.5  F (36.9  C)] 98.1  F (36.7  C)  Pulse:  []  128  Resp:  [14-18] 18  BP: (114-172)/() 164/88  SpO2:  [96 %-100 %] 97 %  I/O last 3 completed shifts:  In: 440 [P.O.:440]  Out: 2400 [Urine:2400]    Data   Recent Results (from the past 24 hours)   Platelet count   Result Value Ref Range    Platelet Count 245 150 - 450 10e3/uL    MCV 94 78 - 100 fL   Sodium (Every 6 Hrs)   Result Value Ref Range    Sodium 140 135 - 145 mmol/L   Creatinine   Result Value Ref Range    Creatinine 0.88 0.51 - 0.95 mg/dL    GFR Estimate 68 >60 mL/min/1.73m2   XR Chest Port 1 View    Narrative    EXAM: XR CHEST PORT 1 VIEW  LOCATION: [facility]  DATE: 6/26/2025    INDICATION: s p right middle lobectomy  COMPARISON: 6/25/2025.      Impression    IMPRESSION: Post surgical changes from right middle lobe lobectomy with surgical staples seen in the right midlung zone and hilum again noted. Subcutaneous emphysema is again seen extending along the right chest wall and in the right neck. There is right   perihilar and mid lung zone atelectasis as well as a small right-sided pleural effusion, not significant change from prior exam. No pneumothorax is identified on the current exam. The left lung is clear.   Sodium (Every 6 Hrs)   Result Value Ref Range    Sodium 138 135 - 145 mmol/L   Sodium (Every 6 Hrs)   Result Value Ref Range    Sodium 137 135 - 145 mmol/L   EKG 12-lead, tracing only   Result Value Ref Range    Systolic Blood Pressure  mmHg    Diastolic Blood Pressure  mmHg    Ventricular Rate 127 BPM    Atrial Rate 150 BPM    DC Interval  ms    QRS Duration 72 ms     ms    QTc 494 ms    P Axis  degrees    R AXIS 19 degrees    T Axis 251 degrees    Interpretation ECG       Atrial fibrillation with rapid ventricular response  ST depression, consider subendocardial injury  Nonspecific T wave abnormality  Abnormal ECG  No previous ECGs available     Basic metabolic panel   Result Value Ref Range    Sodium 136 135 - 145 mmol/L    Potassium 3.7 3.4 - 5.3  mmol/L    Chloride 101 98 - 107 mmol/L    Carbon Dioxide (CO2) 22 22 - 29 mmol/L    Anion Gap 13 7 - 15 mmol/L    Urea Nitrogen 14.1 8.0 - 23.0 mg/dL    Creatinine 0.97 (H) 0.51 - 0.95 mg/dL    GFR Estimate 61 >60 mL/min/1.73m2    Calcium 9.0 8.8 - 10.4 mg/dL    Glucose 122 (H) 70 - 99 mg/dL   Magnesium   Result Value Ref Range    Magnesium 1.7 1.7 - 2.3 mg/dL   Phosphorus   Result Value Ref Range    Phosphorus 4.8 (H) 2.5 - 4.5 mg/dL   CBC with Platelets & Differential    Narrative    The following orders were created for panel order CBC with Platelets & Differential.  Procedure                               Abnormality         Status                     ---------                               -----------         ------                     CBC with platelets and ...[[id number]]                      Final result                 Please view results for these tests on the individual orders.   Troponin T, High Sensitivity   Result Value Ref Range    Troponin T, High Sensitivity 23 (H) <=14 ng/L   CBC with platelets and differential   Result Value Ref Range    WBC Count 9.4 4.0 - 11.0 10e3/uL    RBC Count 3.87 3.80 - 5.20 10e6/uL    Hemoglobin 12.1 11.7 - 15.7 g/dL    Hematocrit 35.7 35.0 - 47.0 %    MCV 92 78 - 100 fL    MCH 31.3 26.5 - 33.0 pg    MCHC 33.9 31.5 - 36.5 g/dL    RDW 13.7 10.0 - 15.0 %    Platelet Count 293 150 - 450 10e3/uL    % Neutrophils 57 %    % Lymphocytes 34 %    % Monocytes 6 %    % Eosinophils 2 %    % Basophils 1 %    % Immature Granulocytes 1 %    NRBCs per 100 WBC 0 <1 /100    Absolute Neutrophils 5.3 1.6 - 8.3 10e3/uL    Absolute Lymphocytes 3.2 0.8 - 5.3 10e3/uL    Absolute Monocytes 0.5 0.0 - 1.3 10e3/uL    Absolute Eosinophils 0.2 0.0 - 0.7 10e3/uL    Absolute Basophils 0.1 0.0 - 0.2 10e3/uL    Absolute Immature Granulocytes 0.1 <=0.4 10e3/uL    Absolute NRBCs 0.0 10e3/uL     COVID-19 PCR Results           No data to display              COVID-19 Antibody Results, Testing for Immunity            No data to display              EKG:  Interpreted by MELISSA Lew CNP  Time reviewed: 2210  Symptoms at time of EKG: None   Rhythm: atrial fibrillation - rapid  Rate: Tachycardia  Axis: Normal  Ectopy: none  Conduction: normal  ST Segments/ T Waves: ST Segment Depression V4, V5, and V6  Q Waves: none  Comparison to prior: atrial fibrillation with rapid ventricular response has replaced SR  Clinical Impression: atrial fibrillation (new onset)      Time Spent on this Encounter   I spent 30 minutes on the unit/floor managing the care of Siomara Hansen. Over 50% of my time was spent counseling the patient and/or coordinating care regarding services listed in this note.    MELISSA Lew CNP  Hospitalist - [facility]  Text me on the [employer] roslyn for a textback

## 2025-06-27 NOTE — PROGRESS NOTES
Contacted by Sharmila Nguyễn PA-C of Thoracic Surgery to discuss pt case. I had completed the initial consult for elevated creatinine and hyponatremia and one of my colleagues subsequently signed off after resolve. Chart further reviewed and note RRT called 6/24 for increased confusion and paranoia, concern for hospital associated delirium, ? Contribution of pain pump. Pt was initiated on Seroquel. Urology was also consulted for post-op urinary retention which has since improved. UA 6/23 unremarkable. Repeat UA 6/25 grossly abnormal. Pt initiated on Bactrim. Thoracic Surgery notes tentative plan for discharge to TCU 6/27 and question if pt should discharge with Seroquel. They report pt is back to baseline mental status. Recommend discontinuing scheduled Seroquel at discharge.     ADDENDUM 1800:   Urine culture with urogenital rosalind. Bactrim discontinued 6/26 evening.     ADDENDUM 6/27 0700:   Updated Thoracic Surgery on discontinuing antibiotics given above. Note RRT overnight for new a fib, Hospitalist reconsulted. Defer further cares and management who my colleague, Dr. Molina, who will round on patient today.

## 2025-06-27 NOTE — PROGRESS NOTES
Care Management Follow Up    Length of Stay (days): 7    Expected Discharge Date: 06/27/2025     Concerns to be Addressed: discharge planning     Patient plan of care discussed at interdisciplinary rounds: Yes    Anticipated Discharge Disposition: Transitional Care              Anticipated Discharge Services:    Anticipated Discharge DME:      Patient/family educated on Medicare website which has current facility and service quality ratings: yes  Education Provided on the Discharge Plan: Yes  Patient/Family in Agreement with the Plan: yes    Referrals Placed by CM/SW: Senior Linkage Line, Post Acute Facilities  Private pay costs discussed: Not applicable    Discussed  Partnership in Safe Discharge Planning  document with patient/family: No     Handoff Completed: No, handoff not indicated or clinically appropriate    Additional Information:  Pt not ready for discharge today. [facility] can also accept. Neither [facility] or [facility] can accept over the weekend. YANN updated pt's daughter, Dariela. If discharge Monday, daughter prefers [facility]. [facility] would need pt to arrive by 1400. Family is planning to transport.     Next Steps: Coordinate discharge to TCU. SW following.     JEANCARLOS Laws, LICSW  [phone] Desk phone  [phone] Cell/text (Preferred)  [facility]    Available on [employer]      PAS-RR    D: Per DHS regulation, YANN completed and submitted PAS-RR to [place] [employer] Direct Connect via the [employer] Link[employer] Line.  PAS-RR confirmation # is : [id number]      P: Further questions may be directed to [facility] at #[phone], option #4 for PAS-RR staff.

## 2025-06-27 NOTE — PROGRESS NOTES
"[facility]    Internal Medicine Hospitalist Progress Note  06/27/2025  I evaluated patient on the above date.    Nitesh Molina Jr., MD  [phone] (p)  Text Page  Vocera      [New actions/orders today (06/27/2025) are underlined in the assessment and plan. All lab results in the assessment and plan were reviewed.]  Assessment & Plan   Siomara Hansen is a 74 year old female with history including HTN; depression/anxiety; and right lung nodules; who underwent right thoracotomy with resections 6/20/2025. IM consulted for issues including hyponatremia, SRINI and afib.        Right lung adenocarcinoma - s/p limited R thoracotomy, right middle lobectomy, wedge resection groundglass nodule posterior RUL, mediastinal LN dissection 6/20/2025.  * Pt with right lung nodules, followed by Dr. Jasso.  * Underwent surgery as above. Path preliminary pathology positive for adenocarcinoma.  - Post-op management per Thoracic Surgery.  - Continue scheduled acetaminophen, PRN acetaminophen, PRN methocarbamol.    Afib with RVR, resolved.  Troponin elevation, suspect demand ischemia related to above.  * 6/26: In the evening, RRT called for acute tachycardia. ECG showed afib with RVR, nonspecific t-wave abnormality, no acute ischemic changes. Given IV metoprolol with conversion to NSR. Troponin elevated but \"flat\".  * 6/27: Remains in sinus.  Recent Labs   Lab 06/27/25  0105 06/26/25  2234   CTROPT 24* 23*   - Continue to monitor on telemetry.,  - Given afib with [id number] score of at least 4 (age, female, HTN), will start on apixaban, I d/w Sharmila Stevens PA-C, OK to start from surgical standpoint.  - Start carvedilol 3.125 mg BID.  - Order echocardiogram.  - Follow-up with Cardiology outpatient.    ADDENDUM 14:11:  - Noted, developed afib again, given IV metoprolol.  - Hold on discharge for now in light of above.    Hyponatremia, suspect nutritional/hypovolemia, resolved.  * Initial presentation as above.  * " 6/21: Sodium 133 (with cr 1.09).  * 6/23: Sodium down to 129 on 6/23 despite NS. IM consulted. NS increased.   * 6/24: Sodium improved and subsequently normalized.    SRINI, suspect prerenal.  * Cr increased to 1.26 on 6/23, IVF's increased, losartan held.  * Cr normalized 6/24.  Recent Labs   Lab 06/26/25  2234 06/26/25  0536 06/24/25  0613 06/23/25  0543 06/21/25  0536   CR 0.97* 0.88 0.93 1.26* 1.09*   Estimated Creatinine Clearance: 53.7 mL/min (A) (based on SCr of 0.97 mg/dL (H)).  - Continue to monitor BMP.  - Avoid nephrotoxic medications.    AMS, suspect metabolic encephalopathy (hyponatremia, delirium), resolved.  Abnormal UA.  * 6/24: RRT called for increased confusion and paranoia. Started on scheduled quetiapine and PRN olanzapine and PRN quetiapine. Also noted with possible UTI which was treated.  * 6/25: UR grossly abnormal and started on sulfamethoxazole-trimethoprim. UC subsequently positive for only >100K urogenital rosalind.  * 6/27: Sulfamethoxazole-trimethoprim stopped given UC results. Mental status appears to be at baseline.  - Continue to treat other issues as noted.  - Continue scheduled quetiapine 25 mg at bedtime; PRN quetiapine and PRN olanzapine.  - Re-orient as needed.  - Maintain normal day/night, sleep/wake cycles.  - Minimize sedating medications as able.    Acute urinary retention.  * Pt did have issues with urinary retention requiring SIC. Seen by Urology. Subsequently improved   - Continue to monitor for urinary retention.  - Continue PRN SIC.    HTN (benign essential).  [PTA: losartan 50 mg BID.]  * Losartan held due to SRINI and soft BP's.  * BP's increased 6/26 and 6/27.  - Start carvedilol 3.125 mg BID as cr still elevated and also PAF.    Depression/anxiety.  - Continue citalopram.    H/o HSV.  - Continue PTA valacyclovir prophylaxis.       Clinically Significant Risk Factors                   # Hypertension: Noted on problem list            # Overweight: Estimated body mass index is  "28.78 kg/m  as calculated from the following:    Height as of this encounter: 1.676 m (5' 6\").    Weight as of this encounter: 80.9 kg (178 lb 4.8 oz).             Diet: Advance Diet as Tolerated: Regular Diet Adult  Diet    Prophylaxis: PCD's and ambulation  Hoffman Catheter: Not present  Lines: None     Code Status: Full Code    Disposition Plan   Medically Ready for Discharge: Anticipated Today  Expected discharge to transitional care unit (TCU) pending echocardiogram results. Will place discharge order if echocardiogram OK.    Entered: Nitesh Molina MD 06/27/2025, 11:14 AM             Interval History   Doing OK overall.  Has pain from surgery, otherwise no chest pain.  Pain with deep breaths and coughing.    * Data reviewed today: I reviewed all new labs and imaging over the last 24 hours. I personally reviewed the ECG tracing showing findings as described above in the A/P.    Physical Exam   Most recent vitals:   , Blood pressure (!) 147/78, pulse 79, temperature 98.8  F (37.1  C), temperature source Oral, resp. rate 20, height 1.676 m (5' 6\"), weight 80.9 kg (178 lb 4.8 oz), SpO2 95%. O2 Device: None (Room air)    Vitals:    06/20/25 0921 06/21/25 0545   Weight: 78.6 kg (173 lb 3.2 oz) 80.9 kg (178 lb 4.8 oz)     Vital signs with ranges:  Temp:  [98.1  F (36.7  C)-98.8  F (37.1  C)] 98.8  F (37.1  C)  Pulse:  [] 79  Resp:  [17-50] 20  BP: (114-189)/() 147/78  SpO2:  [94 %-97 %] 95 %  Patient Vitals for the past 24 hrs:   BP Temp Temp src Pulse Resp SpO2   06/27/25 1100 (!) 147/78 -- -- 79 -- --   06/27/25 0743 (!) 189/101 98.8  F (37.1  C) Oral 75 20 --   06/27/25 0600 (!) 178/89 -- -- 71 22 95 %   06/27/25 0429 -- 98.8  F (37.1  C) Oral 75 17 94 %   06/27/25 0200 (!) 155/86 -- -- 72 23 97 %   06/27/25 0000 (!) 153/98 -- -- 73 21 96 %   06/26/25 2351 (!) 168/90 98.6  F (37  C) Oral 73 25 97 %   06/26/25 2300 (!) 146/81 -- -- (!) 125 25 95 %   06/26/25 2250 -- -- -- 115 18 96 %   06/26/25 2240 " (!) 165/105 -- -- (!) 128 27 96 %   06/26/25 2230 (!) 150/105 -- -- (!) 138 22 97 %   06/26/25 2220 (!) 150/104 -- -- 111 26 97 %   06/26/25 2210 (!) 164/88 -- -- (!) 123 (!) 50 96 %   06/26/25 2135 (!) 164/88 -- -- (!) 128 -- --   06/26/25 2100 (!) 172/86 98.1  F (36.7  C) Oral 96 -- 97 %   06/26/25 1518 (!) 130/109 98.5  F (36.9  C) Oral 88 -- 97 %   06/26/25 1126 114/83 98.5  F (36.9  C) Oral 81 18 96 %     I/O's last 24 hours:  I/O last 3 completed shifts:  In: 180 [P.O.:180]  Out: 2060 [Urine:2060]    Constitutional: awake, alert, oriented, pleasant, conversant   Head:   Eyes:   ENT:   Neck:   Cardiovascular: regular rate and rhythm; no murmurs, rubs or gallops  Lungs: diminished in the bases, no crackles or wheezes, shallow breaths  Gastrointestinal/Abdomen: soft, non-tender, non-distended, positive bowel sounds  :   Musculoskeletal:   Skin/Extremities: no bilateral lower extremity edema  Neurologic:   Psychiatric:   Hematologic/Lymphatic/Immunologic:        Labs reviewed.  Recent Labs   Lab 06/26/25  2234 06/26/25  1751 06/26/25  1211 06/26/25  0536 06/24/25  1137 06/24/25  0613 06/23/25  1146 06/23/25  0543 06/21/25  0542 06/21/25  0536   WBC 9.4  --   --   --   --  6.8  --   --   --   --    HGB 12.1  --   --   --   --  11.5*  --   --   --  12.2   MCV 92  --   --  94  --  92  --  94  --  93     --   --  245  --  225  --  202   < >  --     137 138 140   < > 136  136   < > 129*  --  133*   POTASSIUM 3.7  --   --   --   --  4.3  --  4.4  --  4.6   CHLORIDE 101  --   --   --   --  105  --  99  --  100   CO2 22  --   --   --   --  19*  --  20*  --  21*   BUN 14.1  --   --   --   --  18.1  --  27.8*  --  19.6   CR 0.97*  --   --  0.88  --  0.93  --  1.26*  --  1.09*   ANIONGAP 13  --   --   --   --  12  --  10  --  12   VINEET 9.0  --   --   --   --  8.4*  --  8.5*  --  8.3*   *  --   --   --   --  102*  --  105*   < > 133*    < > = values in this interval not displayed.     Recent Labs   Lab  "Test 06/27/25  0105 06/26/25 2234   TROPONIN T HIGH SENSITIVITY 24* 23*     Recent Labs   Lab 06/26/25  2234 06/24/25  0613 06/23/25  0543 06/22/25  0600 06/21/25  0542 06/21/25  0536   * 102* 105* 123* 110* 133*     No lab results found.    No results for input(s): \"INR\", \"[id number]\" in the last 168 hours.  Recent Labs   Lab 06/26/25 2234 06/24/25  0613   WBC 9.4 6.8       MICRO:  Cultures (including blood and urine):  Recent Labs   Lab 06/25/25  1612   CULTURE >100,000 CFU/mL Mixture of urogenital rosalind       No results found for this or any previous visit (from the past 24 hours).    Medications   All medications were reviewed. MAR.    Infusions:  Current Facility-Administered Medications   Medication Dose Route Frequency Provider Last Rate Last Admin     Scheduled Medications:  Current Facility-Administered Medications   Medication Dose Route Frequency Provider Last Rate Last Admin    acetaminophen (TYLENOL) tablet 1,000 mg  1,000 mg Oral TID Sharmila Nguyễn PA-C   1,000 mg at 06/27/25 0907    citalopram (celeXA) tablet 20 mg  20 mg Oral Daily Andreia Sargent MD   20 mg at 06/27/25 0907    docusate sodium (COLACE) capsule 100 mg  100 mg Oral Every Other Day Sharmila Nguyễn PA-C   100 mg at 06/27/25 0907    enoxaparin ANTICOAGULANT (LOVENOX) injection 40 mg  40 mg Subcutaneous Q24H Sharmila Nguyễn PA-C   40 mg at 06/27/25 0908    Lidocaine (LIDOCARE) 4 % Patch 2 patch  2 patch Transdermal Q24H Rox Smith PA-C   2 patch at 06/27/25 0908    pantoprazole (PROTONIX) EC tablet 40 mg  40 mg Oral Daily Sharmila Nguyễn PA-C   40 mg at 06/27/25 0907    polyethylene glycol (MIRALAX) Packet 17 g  17 g Oral Daily Sharmila Nguyễn PA-C   17 g at 06/27/25 0908    QUEtiapine (SEROquel) tablet 25 mg  25 mg Oral At Bedtime Kiko Mcclure, MELISSA CNP   25 mg at 06/26/25 2133    senna-docusate (SENOKOT-S/PERICOLACE) 8.6-50 MG per tablet 2 tablet  2 tablet Oral BID Domonique Shelley PA-C   2 " tablet at 06/27/25 0907    sodium chloride (PF) 0.9% PF flush 3 mL  3 mL Intracatheter Q8H VIKTORIYA Sharmila Nguyễn PA-C   3 mL at 06/27/25 0908    valACYclovir (VALTREX) tablet 500 mg  500 mg Oral BID Sharmila Nguyễn PA-C   500 mg at 06/27/25 0907     PRN Medications:  Current Facility-Administered Medications   Medication Dose Route Frequency Provider Last Rate Last Admin    acetaminophen (TYLENOL) tablet 500 mg  500 mg Oral BID PRN Kiko Mcclure APRN CNP   500 mg at 06/27/25 0429    albuterol (PROVENTIL) neb solution 2.5 mg  2.5 mg Nebulization Q4H PRN Brady Jasso MD   2.5 mg at 06/24/25 0011    bacitracin ointment   Topical ONCE PRN REPEAT PER INSTRUCTIONS Sharmila Nguyễn PA-C   Given at 06/21/25 1715    benzocaine-menthol (CHLORASEPTIC) 6-10 MG lozenge 1 lozenge  1 lozenge Buccal Q1H PRN Brady Jasso MD   1 lozenge at 06/21/25 1354    bisacodyl (DULCOLAX) suppository 10 mg  10 mg Rectal Daily PRN Sharmila Nguyễn PA-C        calcium carbonate (TUMS) chewable tablet 500 mg  500 mg Oral 4x Daily PRN Sharmila Nguyễn PA-C        lidocaine (LMX4) cream   Topical Q1H PRN Sharmila Nguyễn PA-C        lidocaine 1 % 0.1-1 mL  0.1-1 mL Other Q1H PRN Sharmila Nguyễn PA-C        magnesium hydroxide (MILK OF MAGNESIA) suspension 30 mL  30 mL Oral Daily PRN Sharmila Nguyễn PA-C        methocarbamol (ROBAXIN) tablet 500 mg  500 mg Oral BID PRN Sharmila Nguyễn PA-C   500 mg at 06/26/25 8865    metoprolol (LOPRESSOR) injection 5 mg  5 mg Intravenous Q6H PRN Huber Esquivel APRN CNP        naloxone (NARCAN) injection 0.2 mg  0.2 mg Intravenous Q2 Min PRN Brady Jasso MD        Or    naloxone (NARCAN) injection 0.4 mg  0.4 mg Intravenous Q2 Min PRN Brady Jasso MD        Or    naloxone (NARCAN) injection 0.2 mg  0.2 mg Intramuscular Q2 Min PRN Brady Jasso MD        Or    naloxone (NARCAN) injection 0.4 mg  0.4 mg Intramuscular Q2 Min PRN Brady Jasso,  MD        OLANZapine (zyPREXA) injection 5 mg  5 mg Intramuscular BID PRN Kiko Mcclure APRN CNP   5 mg at 06/24/25 1959    ondansetron (ZOFRAN ODT) ODT tab 4 mg  4 mg Oral Q6H PRN Sharmila Nguyễn PA-C   4 mg at 06/24/25 0223    Or    ondansetron (ZOFRAN) injection 4 mg  4 mg Intravenous Q6H PRN Sharmila Nguyễn PA-C        prochlorperazine (COMPAZINE) injection 5 mg  5 mg Intravenous Q6H PRN Sharmila Nguyễn PA-C        Or    prochlorperazine (COMPAZINE) tablet 5 mg  5 mg Oral Q6H PRN Sharmila Nguyễn PA-C        QUEtiapine (SEROquel) tablet 25 mg  25 mg Oral BID PRN Kiko Mcclure APRN CNP   25 mg at 06/25/25 0052    sodium chloride (PF) 0.9% PF flush 3 mL  3 mL Intracatheter q1 min prn Sharmila Nguyễn PA-C

## 2025-06-27 NOTE — PROGRESS NOTES
Addendum to Consult note dated 6/23    SRINI considered but ruled out   CKD II  Acute urinary retention, improving: Baseline creatinine ~0.9-1.0. Creatinine on 6/23 1.26.   *Soft BPs, continued on Losartan. Maintained on Lovenox for DVT prophy. Urinary retention noted overnight 6/22, straight cath X2  - Hold Losartan   - Bladder scan, PRN straight cath   - Check UA, denies current urinary sx   - Avoid nephrotoxic agents   - Continue Lovenox for now, but if creatinine continues to rise, will have to change to subcutaneous heparin      Domonique Shelley PA-C

## 2025-06-28 ENCOUNTER — APPOINTMENT (OUTPATIENT)
Dept: CARDIOLOGY | Facility: CLINIC | Age: 75
End: 2025-06-28
Attending: INTERNAL MEDICINE
Payer: COMMERCIAL

## 2025-06-28 LAB
ALBUMIN SERPL BCG-MCNC: 3.3 G/DL (ref 3.5–5.2)
ALP SERPL-CCNC: 99 U/L (ref 40–150)
ALT SERPL W P-5'-P-CCNC: 16 U/L (ref 0–50)
ANION GAP SERPL CALCULATED.3IONS-SCNC: 13 MMOL/L (ref 7–15)
AST SERPL W P-5'-P-CCNC: 29 U/L (ref 0–45)
BASOPHILS # BLD AUTO: 0.1 10E3/UL (ref 0–0.2)
BASOPHILS NFR BLD AUTO: 1 %
BILIRUB DIRECT SERPL-MCNC: 0.09 MG/DL (ref 0–0.3)
BILIRUB SERPL-MCNC: 0.3 MG/DL
BUN SERPL-MCNC: 11.6 MG/DL (ref 8–23)
CALCIUM SERPL-MCNC: 9 MG/DL (ref 8.8–10.4)
CHLORIDE SERPL-SCNC: 103 MMOL/L (ref 98–107)
CREAT SERPL-MCNC: 0.92 MG/DL (ref 0.51–0.95)
EGFRCR SERPLBLD CKD-EPI 2021: 65 ML/MIN/1.73M2
EOSINOPHIL # BLD AUTO: 0.3 10E3/UL (ref 0–0.7)
EOSINOPHIL NFR BLD AUTO: 3 %
ERYTHROCYTE [DISTWIDTH] IN BLOOD BY AUTOMATED COUNT: 13.5 % (ref 10–15)
GLUCOSE SERPL-MCNC: 184 MG/DL (ref 70–99)
HCO3 SERPL-SCNC: 21 MMOL/L (ref 22–29)
HCT VFR BLD AUTO: 37.1 % (ref 35–47)
HGB BLD-MCNC: 12.8 G/DL (ref 11.7–15.7)
IMM GRANULOCYTES # BLD: 0.1 10E3/UL
IMM GRANULOCYTES NFR BLD: 1 %
LVEF ECHO: NORMAL
LYMPHOCYTES # BLD AUTO: 2.4 10E3/UL (ref 0.8–5.3)
LYMPHOCYTES NFR BLD AUTO: 29 %
MAGNESIUM SERPL-MCNC: 1.8 MG/DL (ref 1.7–2.3)
MCH RBC QN AUTO: 31.4 PG (ref 26.5–33)
MCHC RBC AUTO-ENTMCNC: 34.5 G/DL (ref 31.5–36.5)
MCV RBC AUTO: 91 FL (ref 78–100)
MONOCYTES # BLD AUTO: 0.4 10E3/UL (ref 0–1.3)
MONOCYTES NFR BLD AUTO: 5 %
NEUTROPHILS # BLD AUTO: 5.1 10E3/UL (ref 1.6–8.3)
NEUTROPHILS NFR BLD AUTO: 61 %
NRBC # BLD AUTO: 0 10E3/UL
NRBC BLD AUTO-RTO: 0 /100
PLATELET # BLD AUTO: 337 10E3/UL (ref 150–450)
POTASSIUM SERPL-SCNC: 3.8 MMOL/L (ref 3.4–5.3)
PROT SERPL-MCNC: 6.2 G/DL (ref 6.4–8.3)
RBC # BLD AUTO: 4.08 10E6/UL (ref 3.8–5.2)
SODIUM SERPL-SCNC: 137 MMOL/L (ref 135–145)
WBC # BLD AUTO: 8.4 10E3/UL (ref 4–11)

## 2025-06-28 PROCEDURE — 250N000013 HC RX MED GY IP 250 OP 250 PS 637: Performed by: NURSE PRACTITIONER

## 2025-06-28 PROCEDURE — 250N000013 HC RX MED GY IP 250 OP 250 PS 637: Performed by: INTERNAL MEDICINE

## 2025-06-28 PROCEDURE — 80048 BASIC METABOLIC PNL TOTAL CA: CPT | Performed by: INTERNAL MEDICINE

## 2025-06-28 PROCEDURE — 120N000001 HC R&B MED SURG/OB

## 2025-06-28 PROCEDURE — 36415 COLL VENOUS BLD VENIPUNCTURE: CPT | Performed by: INTERNAL MEDICINE

## 2025-06-28 PROCEDURE — 82248 BILIRUBIN DIRECT: CPT | Performed by: INTERNAL MEDICINE

## 2025-06-28 PROCEDURE — 93306 TTE W/DOPPLER COMPLETE: CPT | Mod: 26 | Performed by: INTERNAL MEDICINE

## 2025-06-28 PROCEDURE — 999N000208 ECHOCARDIOGRAM COMPLETE

## 2025-06-28 PROCEDURE — 250N000013 HC RX MED GY IP 250 OP 250 PS 637: Performed by: PHYSICIAN ASSISTANT

## 2025-06-28 PROCEDURE — 85004 AUTOMATED DIFF WBC COUNT: CPT | Performed by: INTERNAL MEDICINE

## 2025-06-28 PROCEDURE — 83735 ASSAY OF MAGNESIUM: CPT | Performed by: INTERNAL MEDICINE

## 2025-06-28 PROCEDURE — 255N000002 HC RX 255 OP 636: Performed by: INTERNAL MEDICINE

## 2025-06-28 PROCEDURE — 250N000013 HC RX MED GY IP 250 OP 250 PS 637: Performed by: STUDENT IN AN ORGANIZED HEALTH CARE EDUCATION/TRAINING PROGRAM

## 2025-06-28 PROCEDURE — 250N000009 HC RX 250

## 2025-06-28 PROCEDURE — 99232 SBSQ HOSP IP/OBS MODERATE 35: CPT | Performed by: INTERNAL MEDICINE

## 2025-06-28 PROCEDURE — 93005 ELECTROCARDIOGRAM TRACING: CPT

## 2025-06-28 PROCEDURE — 93010 ELECTROCARDIOGRAM REPORT: CPT | Performed by: INTERNAL MEDICINE

## 2025-06-28 RX ORDER — CARVEDILOL 6.25 MG/1
6.25 TABLET ORAL 2 TIMES DAILY WITH MEALS
DISCHARGE
Start: 2025-06-28 | End: 2025-06-29

## 2025-06-28 RX ORDER — METOPROLOL TARTRATE 1 MG/ML
5 INJECTION, SOLUTION INTRAVENOUS ONCE
Status: DISCONTINUED | OUTPATIENT
Start: 2025-06-28 | End: 2025-06-28

## 2025-06-28 RX ORDER — CARVEDILOL 6.25 MG/1
6.25 TABLET ORAL 2 TIMES DAILY WITH MEALS
Status: DISCONTINUED | OUTPATIENT
Start: 2025-06-28 | End: 2025-06-29

## 2025-06-28 RX ORDER — CARVEDILOL 3.12 MG/1
3.12 TABLET ORAL ONCE
Status: COMPLETED | OUTPATIENT
Start: 2025-06-28 | End: 2025-06-28

## 2025-06-28 RX ADMIN — VALACYCLOVIR HYDROCHLORIDE 500 MG: 500 TABLET, FILM COATED ORAL at 08:34

## 2025-06-28 RX ADMIN — SENNOSIDES AND DOCUSATE SODIUM 2 TABLET: 50; 8.6 TABLET ORAL at 08:34

## 2025-06-28 RX ADMIN — CARVEDILOL 3.12 MG: 3.12 TABLET, FILM COATED ORAL at 08:34

## 2025-06-28 RX ADMIN — VALACYCLOVIR HYDROCHLORIDE 500 MG: 500 TABLET, FILM COATED ORAL at 20:33

## 2025-06-28 RX ADMIN — CALCIUM CARBONATE (ANTACID) CHEW TAB 500 MG 500 MG: 500 CHEW TAB at 23:18

## 2025-06-28 RX ADMIN — METOPROLOL TARTRATE 5 MG: 5 INJECTION INTRAVENOUS at 05:55

## 2025-06-28 RX ADMIN — ACETAMINOPHEN 1000 MG: 500 TABLET, FILM COATED ORAL at 17:03

## 2025-06-28 RX ADMIN — METOPROLOL TARTRATE 5 MG: 5 INJECTION INTRAVENOUS at 13:04

## 2025-06-28 RX ADMIN — PANTOPRAZOLE SODIUM 40 MG: 40 TABLET, DELAYED RELEASE ORAL at 08:34

## 2025-06-28 RX ADMIN — CARVEDILOL 3.12 MG: 3.12 TABLET, FILM COATED ORAL at 09:27

## 2025-06-28 RX ADMIN — PERFLUTREN 10 ML (DILUTED): 6.52 INJECTION, SUSPENSION INTRAVENOUS at 07:43

## 2025-06-28 RX ADMIN — LIDOCAINE 2 PATCH: 4 PATCH TOPICAL at 08:33

## 2025-06-28 RX ADMIN — APIXABAN 5 MG: 5 TABLET, FILM COATED ORAL at 08:34

## 2025-06-28 RX ADMIN — CITALOPRAM HYDROBROMIDE 20 MG: 20 TABLET ORAL at 08:34

## 2025-06-28 RX ADMIN — CARVEDILOL 6.25 MG: 6.25 TABLET, FILM COATED ORAL at 17:03

## 2025-06-28 RX ADMIN — APIXABAN 5 MG: 5 TABLET, FILM COATED ORAL at 20:33

## 2025-06-28 RX ADMIN — SENNOSIDES AND DOCUSATE SODIUM 2 TABLET: 50; 8.6 TABLET ORAL at 20:33

## 2025-06-28 RX ADMIN — ACETAMINOPHEN 1000 MG: 500 TABLET, FILM COATED ORAL at 23:11

## 2025-06-28 RX ADMIN — ACETAMINOPHEN 1000 MG: 500 TABLET, FILM COATED ORAL at 08:34

## 2025-06-28 RX ADMIN — QUETIAPINE FUMARATE 25 MG: 25 TABLET ORAL at 23:12

## 2025-06-28 ASSESSMENT — ACTIVITIES OF DAILY LIVING (ADL)
ADLS_ACUITY_SCORE: 49
ADLS_ACUITY_SCORE: 45
ADLS_ACUITY_SCORE: 45
ADLS_ACUITY_SCORE: 49
ADLS_ACUITY_SCORE: 45
ADLS_ACUITY_SCORE: 45
ADLS_ACUITY_SCORE: 49
ADLS_ACUITY_SCORE: 45

## 2025-06-28 NOTE — PROGRESS NOTES
"[facility]    Internal Medicine Hospitalist Progress Note  06/28/2025  I evaluated patient on the above date.    Nitesh Molina Jr., MD  [phone] (p)  Text Page  Vocera      [New actions/orders today (06/28/2025) are underlined in the assessment and plan. All lab results in the assessment and plan were reviewed.]  Assessment & Plan   Siomara Hansen is a 74 year old female with history including HTN; depression/anxiety; and right lung nodules; who underwent right thoracotomy with resections 6/20/2025. IM consulted for issues including hyponatremia, SRINI and afib.        Right lung adenocarcinoma - s/p limited R thoracotomy, right middle lobectomy, wedge resection groundglass nodule posterior RUL, mediastinal LN dissection 6/20/2025.  * Pt with right lung nodules, followed by Dr. Jasso.  * Underwent surgery as above. Path preliminary pathology positive for adenocarcinoma.  - Post-op management per Thoracic Surgery.  - Continue scheduled acetaminophen, PRN acetaminophen, PRN methocarbamol.    Afib with RVR.  Troponin elevation, suspect demand ischemia related to above.  * 6/26: In the evening, RRT called for acute tachycardia. ECG showed afib with RVR, nonspecific t-wave abnormality, no acute ischemic changes. Given IV metoprolol with conversion to NSR. Troponin elevated but \"flat\".  * 6/27: Started on apixaban given [id number] score of at least 4 (age, female, HTN). Started on carvedilol. Further episodes of afib with RVR requiring IV metoprolol with conversion back to NSR in the afternoon and evening.  * 6/28: Afib again in am.  Recent Labs   Lab 06/27/25  0105 06/26/25  2234   CTROPT 24* 23*   - Continue to monitor on telemetry.  - Increase carvedilol 3.125 mg --> 6.5 mg BID.  - Continue apixaban 5 mg BID.  - Continue PRN IV metoprolol.  - Echocardiogram pending.  - Plan referall to Cardiology outpatient.    ADDENDUM 15:42:  - Pt continues to have recurrent episodes of rapid afib with " conversion after IV metoprolol.  - Will ask Cardiology to see - ?short course of amiodarone.    Hyponatremia, suspect nutritional/hypovolemia, resolved.  * Initial presentation as above.  * 6/21: Sodium 133 (with cr 1.09).  * 6/23: Sodium down to 129 on 6/23 despite NS. IM consulted. NS increased.   * 6/24: Sodium improved and subsequently normalized.    SRINI, suspect prerenal.  * Cr increased to 1.26 on 6/23, IVF's increased, losartan held.  * Cr normalized 6/24.  Recent Labs   Lab 06/26/25  2234 06/26/25  0536 06/24/25  0613 06/23/25  0543   CR 0.97* 0.88 0.93 1.26*   Estimated Creatinine Clearance: 53.7 mL/min (A) (based on SCr of 0.97 mg/dL (H)).  - Continue to monitor BMP.  - Avoid nephrotoxic medications.    AMS, suspect metabolic encephalopathy (hyponatremia, delirium), resolved.  Abnormal UA.  * 6/24: RRT called for increased confusion and paranoia. Started on scheduled quetiapine and PRN olanzapine and PRN quetiapine. Also noted with possible UTI which was treated.  * 6/25: UR grossly abnormal and started on sulfamethoxazole-trimethoprim. UC subsequently positive for only >100K urogenital rosalind.  * 6/27: Sulfamethoxazole-trimethoprim stopped given UC results. Mental status appears to be at baseline.  - Continue to treat other issues as noted.  - Continue scheduled quetiapine 25 mg at bedtime; PRN quetiapine and PRN olanzapine.  - Re-orient as needed.  - Maintain normal day/night, sleep/wake cycles.  - Minimize sedating medications as able.    Acute urinary retention.  * Pt did have issues with urinary retention requiring SIC. Seen by Urology. Subsequently improved   - Continue to monitor for urinary retention.  - Continue PRN SIC.    HTN (benign essential).  [PTA: losartan 50 mg BID.]  * Losartan held due to SRINI and soft BP's.  * BP's increased 6/26 and 6/27.  * Started carvedilol 6/27.  - Continue carvedilol - increase to 6.5 mg BID as above.  - Continue to hold losartan for now given  "SRINI.    Depression/anxiety.  - Continue citalopram.    H/o HSV.  - Continue PTA valacyclovir prophylaxis.       Clinically Significant Risk Factors                   # Hypertension: Noted on problem list            # Overweight: Estimated body mass index is 28.78 kg/m  as calculated from the following:    Height as of this encounter: 1.676 m (5' 6\").    Weight as of this encounter: 80.9 kg (178 lb 4.8 oz).             Diet: Advance Diet as Tolerated: Regular Diet Adult  Diet    Prophylaxis: PCD's and ambulation  Hoffman Catheter: Not present  Lines: None     Code Status: Full Code    Disposition Plan   Medically Ready for Discharge: Anticipated in 1-2 Days  Expected discharge to transitional care unit (TCU) pending stability in HR's and TCU bed availability (TCU will not accept new patients on weekend so earliest would be 6/30).     Entered: Nitesh Molina MD 06/28/2025, 8:41 AM             Interval History   Developed afib again yesterday afternoon, converted back to NSR a few hours after IV metoprolol.  Does note surgical site pain this am.    * Data reviewed today: I reviewed all new labs and imaging over the last 24 hours. I personally reviewed no images or ECG's today.    Physical Exam   Most recent vitals:   , Blood pressure (!) 141/96, pulse 111, temperature 98.4  F (36.9  C), temperature source Oral, resp. rate 20, height 1.676 m (5' 6\"), weight 80.9 kg (178 lb 4.8 oz), SpO2 95%. O2 Device: None (Room air)    Vitals:    06/20/25 0921 06/21/25 0545   Weight: 78.6 kg (173 lb 3.2 oz) 80.9 kg (178 lb 4.8 oz)     Vital signs with ranges:  Temp:  [98.3  F (36.8  C)-98.9  F (37.2  C)] 98.4  F (36.9  C)  Pulse:  [] 111  Resp:  [18-22] 20  BP: (137-161)/() 141/96  SpO2:  [93 %-98 %] 95 %  Patient Vitals for the past 24 hrs:   BP Temp Temp src Pulse Resp SpO2   06/28/25 0811 (!) 141/96 98.4  F (36.9  C) Oral 111 20 95 %   06/28/25 0600 (!) 161/94 -- -- -- 22 98 %   06/28/25 0000 (!) 159/84 98.3  F " (36.8  C) Oral 71 20 94 %   06/27/25 2330 (!) 142/86 -- -- 71 -- 93 %   06/27/25 2315 -- -- -- -- -- 95 %   06/27/25 2310 (!) 138/107 -- -- 73 -- 95 %   06/27/25 2300 (!) 140/89 -- -- 96 -- 95 %   06/27/25 2250 -- -- -- 99 -- --   06/27/25 2243 (!) 137/99 -- -- (!) 137 -- 96 %   06/27/25 1942 (!) 159/82 98.9  F (37.2  C) Oral 72 21 --   06/27/25 1547 (!) 161/84 98.9  F (37.2  C) Oral 74 20 96 %   06/27/25 1347 (!) 144/97 -- -- 107 20 94 %   06/27/25 1100 (!) 147/78 98.3  F (36.8  C) Oral 79 18 --     I/O's last 24 hours:  I/O last 3 completed shifts:  In: 960 [P.O.:960]  Out: 2700 [Urine:2700]    Constitutional: awake, alert, oriented, pleasant, conversant   Head:   Eyes:   ENT:   Neck:   Cardiovascular: tachycardic, irregular rhythm, no murmurs/rubs/gallops  Lungs: diminished in the bases, no crackles or wheezes, deeper breaths  Gastrointestinal/Abdomen: soft, non-tender, non-distended, positive bowel sounds  :   Musculoskeletal:   Skin/Extremities:   Neurologic:   Psychiatric:   Hematologic/Lymphatic/Immunologic:        Labs reviewed.  Recent Labs   Lab 06/26/25  2234 06/26/25  1751 06/26/25  1211 06/26/25  0536 06/24/25  1137 06/24/25  0613 06/23/25  1146 06/23/25  0543   WBC 9.4  --   --   --   --  6.8  --   --    HGB 12.1  --   --   --   --  11.5*  --   --    MCV 92  --   --  94  --  92  --  94     --   --  245  --  225  --  202    137 138 140   < > 136  136   < > 129*   POTASSIUM 3.7  --   --   --   --  4.3  --  4.4   CHLORIDE 101  --   --   --   --  105  --  99   CO2 22  --   --   --   --  19*  --  20*   BUN 14.1  --   --   --   --  18.1  --  27.8*   CR 0.97*  --   --  0.88  --  0.93  --  1.26*   ANIONGAP 13  --   --   --   --  12  --  10   VINEET 9.0  --   --   --   --  8.4*  --  8.5*   *  --   --   --   --  102*  --  105*    < > = values in this interval not displayed.     Recent Labs   Lab Test 06/27/25  0105 06/26/25  2234   TROPONIN T HIGH SENSITIVITY 24* 23*     Recent Labs   Lab  "254 25  0613 25  0543 25  0600   * 102* 105* 123*     No lab results found.    No results for input(s): \"INR\", \"[id number]\" in the last 168 hours.  Recent Labs   Lab 25  0613   WBC 9.4 6.8       MICRO:  Cultures (including blood and urine):  Recent Labs   Lab 25  1612   CULTURE >100,000 CFU/mL Mixture of urogenital rosalind       Recent Results (from the past 24 hours)   Echocardiogram Complete   Result Value    LVEF  60-65%    Narrative    [id number]  [id number]  [id number]^PAOLA^ESTEBAN^JANEEN     [facility]  Echocardiography Laboratory  [address] [place] [id number]     Name: BRIDGER RIVERA  MRN: [id number]  : 1950  Study Date: 2025 07:10 AM  Age: 74 yrs  Gender: Female  Patient Location: [facility]  Reason For Study: Atrial Fibrillation  Ordering Physician: ESTEBAN GUEVARA  Referring Physician: Rafael Mcdonald  Performed By: Fabricio Romo     BSA: 1.9 m2  Height: 66 in  Weight: 178 lb  HR: 69  BP: 147/78 mmHg  ______________________________________________________________________________  Procedure  Echocardiogram with two-dimensional, color and spectral Doppler. Definity (NDC  #13883-792) given intravenously.  ______________________________________________________________________________  Interpretation Summary     The visual ejection fraction is 60-65%.  Grade I or early diastolic dysfunction.  The right ventricle is normal in structure, function and size.  Ascending aorta dilatation is present.  There is no pericardial effusion.  ______________________________________________________________________________  Left Ventricle  The left ventricle is normal in structure, function and size. The visual  ejection fraction is 60-65%. Grade I or early diastolic dysfunction. No  regional wall motion abnormalities noted.     Right Ventricle  The right ventricle is normal in structure, function and size.   "   Atria  Normal left atrial size. Right atrial size is normal. There is no color  Doppler evidence of an atrial shunt.     Mitral Valve  There is mild mitral annular calcification. There is trace mitral  regurgitation.     Tricuspid Valve  The tricuspid valve is normal in structure and function. There is trace  tricuspid regurgitation.     Aortic Valve  The aortic valve is normal in structure and function.     Pulmonic Valve  The pulmonic valve is not well seen, but is grossly normal. There is trace  pulmonic valvular regurgitation.     Vessels  Normal size aorta. Ascending aorta dilatation is present.     Pericardium  There is no pericardial effusion.     Rhythm  Sinus rhythm was noted.  ______________________________________________________________________________  MMode/2D Measurements & Calculations  IVSd: 1.0 cm     LVIDd: 3.8 cm  LVIDs: 2.6 cm  LVPWd: 1.2 cm  FS: 31.6 %  LV mass(C)d: 134.7 grams  LV mass(C)dI: 70.7 grams/m2  Ao root diam: 3.0 cm  asc Aorta Diam: 3.8 cm  LVOT diam: 2.1 cm  LVOT area: 3.5 cm2  Ao root diam index Ht(cm/m): 1.8  Ao root diam index BSA (cm/m2): 1.6  Asc Ao diam index BSA (cm/m2): 2.0  Asc Ao diam index Ht(cm/m): 2.3  LA Volume (BP): 44.2 ml     LA Volume Index (BP): 23.3 ml/m2  RV Base: 3.5 cm  RWT: 0.63  TAPSE: 1.4 cm     Doppler Measurements & Calculations  MV E max thomas: 55.0 cm/sec  MV A max thomas: 65.5 cm/sec  MV E/A: 0.84  MV dec time: 0.25 sec  Ao V2 max: 86.6 cm/sec  Ao max PG: 3.0 mmHg  Ao V2 mean: 59.4 cm/sec  Ao mean P.0 mmHg  Ao V2 VTI: 16.9 cm  HUGO(I,D): 2.8 cm2  HUGO(V,D): 2.4 cm2  LV V1 max P.4 mmHg  LV V1 max: 59.7 cm/sec  LV V1 VTI: 13.9 cm  SV(LVOT): 48.1 ml  SI(LVOT): 25.3 ml/m2  PA acc time: 0.10 sec  TR max thomas: 216.0 cm/sec  TR max P.9 mmHg  AV Thomas Ratio (DI): 0.69  HUGO Index (cm2/m2): 1.5     E/E' avg: 10.6  Lateral E/e': 9.7  Medial E/e': 11.5  RV S Thomas: 13.9 cm/sec      ______________________________________________________________________________  Report approved by: Ze Seth MD on 06/28/2025 08:19 AM             Medications   All medications were reviewed. MAR.    Infusions:  Current Facility-Administered Medications   Medication Dose Route Frequency Provider Last Rate Last Admin     Scheduled Medications:  Current Facility-Administered Medications   Medication Dose Route Frequency Provider Last Rate Last Admin    acetaminophen (TYLENOL) tablet 1,000 mg  1,000 mg Oral TID Sharmila Nguyễn PA-C   1,000 mg at 06/28/25 0834    apixaban ANTICOAGULANT (ELIQUIS) tablet 5 mg  5 mg Oral BID Nitesh Molina MD   5 mg at 06/28/25 0834    carvedilol (COREG) tablet 3.125 mg  3.125 mg Oral Once Nitesh Molina MD        carvedilol (COREG) tablet 6.25 mg  6.25 mg Oral BID w/meals Nitesh Molina MD        citalopram (celeXA) tablet 20 mg  20 mg Oral Daily Andreia Sargent MD   20 mg at 06/28/25 0834    docusate sodium (COLACE) capsule 100 mg  100 mg Oral Every Other Day Sharmila Nguyễn PA-C   100 mg at 06/27/25 0907    Lidocaine (LIDOCARE) 4 % Patch 2 patch  2 patch Transdermal Q24H Rox Smith PA-C   2 patch at 06/28/25 0833    pantoprazole (PROTONIX) EC tablet 40 mg  40 mg Oral Daily Sharmila Nguyễn PA-C   40 mg at 06/28/25 0834    polyethylene glycol (MIRALAX) Packet 17 g  17 g Oral Daily Sharmila Nguyễn PA-C   17 g at 06/27/25 0908    QUEtiapine (SEROquel) tablet 25 mg  25 mg Oral At Bedtime Kiko Mcclure APRN CNP   25 mg at 06/27/25 2107    senna-docusate (SENOKOT-S/PERICOLACE) 8.6-50 MG per tablet 2 tablet  2 tablet Oral BID Domonique Shelley PA-C   2 tablet at 06/28/25 0834    sodium chloride (PF) 0.9% PF flush 3 mL  3 mL Intracatheter Q8H [facility] Sharmila Nguyễn PA-C   3 mL at 06/28/25 0835    valACYclovir (VALTREX) tablet 500 mg  500 mg Oral BID Sharmila Nguyễn PA-C   500 mg at 06/28/25 0834     PRN Medications:  Current  Facility-Administered Medications   Medication Dose Route Frequency Provider Last Rate Last Admin    acetaminophen (TYLENOL) tablet 500 mg  500 mg Oral BID PRN Kiko Mcclure APRN CNP   500 mg at 06/27/25 0429    albuterol (PROVENTIL) neb solution 2.5 mg  2.5 mg Nebulization Q4H PRN Brady Jasso MD   2.5 mg at 06/24/25 0011    bacitracin ointment   Topical ONCE PRN REPEAT PER INSTRUCTIONS Sharmila Nguyễn PA-C   Given at 06/21/25 1715    benzocaine-menthol (CHLORASEPTIC) 6-10 MG lozenge 1 lozenge  1 lozenge Buccal Q1H PRN Brady Jasso MD   1 lozenge at 06/21/25 1354    bisacodyl (DULCOLAX) suppository 10 mg  10 mg Rectal Daily PRN Sharmila Nguyễn PA-C        calcium carbonate (TUMS) chewable tablet 500 mg  500 mg Oral 4x Daily PRN Sharmila Nguyễn PA-C        lidocaine (LMX4) cream   Topical Q1H PRN Sharmila Nguyễn PA-C        lidocaine 1 % 0.1-1 mL  0.1-1 mL Other Q1H PRN Sharmila Nguyễn PA-C        magnesium hydroxide (MILK OF MAGNESIA) suspension 30 mL  30 mL Oral Daily PRN Sharmila Nguyễn PA-C        methocarbamol (ROBAXIN) tablet 500 mg  500 mg Oral BID PRN Sharmila Nguyễn PA-C   500 mg at 06/26/25 1239    metoprolol (LOPRESSOR) injection 5 mg  5 mg Intravenous Q6H PRN Huber Esquivel APRN CNP   5 mg at 06/28/25 0555    naloxone (NARCAN) injection 0.2 mg  0.2 mg Intravenous Q2 Min PRN Brady Jasso MD        Or    naloxone (NARCAN) injection 0.4 mg  0.4 mg Intravenous Q2 Min PRN Brady Jasso MD        Or    naloxone (NARCAN) injection 0.2 mg  0.2 mg Intramuscular Q2 Min PRN Brady Jasso MD        Or    naloxone (NARCAN) injection 0.4 mg  0.4 mg Intramuscular Q2 Min PRN Brady Jasso MD        OLANZapine (zyPREXA) injection 5 mg  5 mg Intramuscular BID PRN Kiko Mcclure APRN CNP   5 mg at 06/24/25 1959    ondansetron (ZOFRAN ODT) ODT tab 4 mg  4 mg Oral Q6H PRN Sharmila Nguyễn PA-C   4 mg at 06/24/25 0223    Or    ondansetron  (ZOFRAN) injection 4 mg  4 mg Intravenous Q6H PRN Sharmila Nguyễn PA-C        prochlorperazine (COMPAZINE) injection 5 mg  5 mg Intravenous Q6H PRN Sharmila Nguyễn PA-C        Or    prochlorperazine (COMPAZINE) tablet 5 mg  5 mg Oral Q6H PRN Sharmila Nguyễn PA-C        QUEtiapine (SEROquel) tablet 25 mg  25 mg Oral BID PRN Kiko Mcclure APRN CNP   25 mg at 06/25/25 0052    sodium chloride (PF) 0.9% PF flush 3 mL  3 mL Intracatheter q1 min prn Sharmila Nguyễn PA-C

## 2025-06-28 NOTE — PLAN OF CARE
Goal Outcome Evaluation:    Plan of Care Reviewed With: patient    Overall Patient Progress: improving    POD #8  Orientation: A&O x4, forgetful at times  Aggression Stop Light: Green  Activity: SBA w/ walker+GB, ambulated in halls  Diet/BS Checks: Regular  Tele: NSR then converted to Afib RVR x3 this shift asymptomatic, MD aware. IV metoprolol given x1  IV Access/Drains: 1 PIV SL  Pain Management: schedule Tylenol and Lido patches in place.  Abnormal VS/Results: VSS on RA   Bowel/Bladder: Continent of B/B, no BM +passing gas.  Skin/Wounds: R thora site and CT site dressing changed x1 today.  Consults: Thoracic Surg, SW  D/C Disposition: pending  Other Info: Echo completed today, Cardio consult placed for irregular rhythm.

## 2025-06-28 NOTE — PROGRESS NOTES
"BRIEF NUTRITION ASSESSMENT      REASON FOR ASSESSMENT:  Siomara Hansen is a 74 year old female seen by Registered Dietitian for LOS    NUTRITION HISTORY:  - Pt does not follow any particular diet at home. She mentions having poor dentition so she can't tolerate a lot of fresh veggies.  - She has a sweet tooth and can't walk as much as she used to which she feels have contributed to recent weight gain.   - She would like to lose weight.     CURRENT DIET AND INTAKE:  Diet: regular  Appetite is good. Pt reports ordering and eating adequately. 100% intakes recorded consistently for meals.     ANTHROPOMETRICS:  Height: 5' 6\"  Weight:  178 lbs 4.8 oz (80.9 kg)   Body mass index is 28.78 kg/m .   Weight Status: Overweight BMI 25-29.9  IBW:  59.1 kg   %IBW: 137%  Weight History:   Wt Readings from Last 10 Encounters:   06/21/25 80.9 kg (178 lb 4.8 oz)   06/17/25 79.4 kg (175 lb)   06/02/25 78.9 kg (174 lb)   09/04/24 74 kg (163 lb 3.2 oz)   03/05/24 75.8 kg (167 lb)   02/27/24 75.8 kg (167 lb)   01/11/24 75 kg (165 lb 6.4 oz)   01/02/24 75.8 kg (167 lb)   11/21/23 76.2 kg (168 lb)   11/13/23 76.7 kg (169 lb)     LABS:  Labs noted    MALNUTRITION:  Patient does not meet two of the following criteria necessary for diagnosing malnutrition.     % Weight Loss:  None noted  % Intake:  No decreased intake noted  Subcutaneous Fat Loss:  None observed  Muscle Loss:  None observed  Fluid Retention:  None noted    NUTRITION INTERVENTION:  Nutrition Diagnosis:  No nutrition diagnosis at this time.    Implementation:  Nutrition Education:  Discussed ways to incorporate more fiber into diet in setting of poor dentition; Reviewed \"balanced\" meal planning to include a protein, veg, grain/starch.     FOLLOW UP/MONITORING:   Will re-evaluate in 7 - 10 days, or sooner, if re-consulted.    Archana Trammell RD, LD  Pager: [phone]  Available on [employer]    "

## 2025-06-28 NOTE — PLAN OF CARE
"Patient Name: Maryanne  MRN: [id number]  Date of Admission: 6/20/2025  Reason for Admission: R Thoracotomy with resections  Level of Care: Medical Surgical    Vitals:   BP Readings from Last 1 Encounters:   06/28/25 (!) 159/84     Pulse Readings from Last 1 Encounters:   06/28/25 71     Wt Readings from Last 1 Encounters:   06/21/25 80.9 kg (178 lb 4.8 oz)     Ht Readings from Last 1 Encounters:   06/20/25 1.676 m (5' 6\")     Estimated body mass index is 28.78 kg/m  as calculated from the following:    Height as of this encounter: 1.676 m (5' 6\").    Weight as of this encounter: 80.9 kg (178 lb 4.8 oz).  Temp Readings from Last 1 Encounters:   06/28/25 98.3  F (36.8  C) (Oral)     Pain: Pain goal 0/10 Highest  Pain Rating 5/10 Effective pain medication/regimen rest, repositioning, ice.     Assessment    Resp: On RA, LS diminished.   Telemetry: NSR. Pt went into Afib RVR from 1338-2636. Given PRN Metoprolol x1 and converted back to NSR. Went into Afib again at 7819-4061. IV Metoprolol x1 given, converted back to NSR.  Neuro: A&O x4, anxious  GI/: BS normoactive, X1 small BM this shift.  Pt reports passing gas and is receiving bowel medications. Pt having good urine output. Continent of bowel and bladder. Regular diet.   Skin/Wounds: R thora site, R chest tube site having moderate drainage this shift. Dressing changed PRN and bacitracin applied each time.   Lines/Drains: PIV L hand saline locked.   Activity: A1 GBW/ SBA  Sleep: Pt sleeping between cares.     Aggression Stop Light: Green          Patient Care Plan: Echo needs to be completed. TCU wont accept her as a new patient during the weekend. Manage pain, monitor drainage from old CT and frequently assess resp status.      "

## 2025-06-28 NOTE — PROGRESS NOTES
THORACIC SURGERY POD # 8    Episode of atrial fibrillation  On RA  Good pain control    D/C as per hospitalsit    ABHIJEET AMADOR MD [facility] THORACIC SURGERY  CELL:  [phone]  OFFICE: [phone]

## 2025-06-29 LAB
ANION GAP SERPL CALCULATED.3IONS-SCNC: 14 MMOL/L (ref 7–15)
ATRIAL RATE - MUSE: 147 BPM
B-OH-BUTYR SERPL-SCNC: <0.18 MMOL/L
BASOPHILS # BLD AUTO: 0.1 10E3/UL (ref 0–0.2)
BASOPHILS NFR BLD AUTO: 1 %
BUN SERPL-MCNC: 12.7 MG/DL (ref 8–23)
CALCIUM SERPL-MCNC: 8.7 MG/DL (ref 8.8–10.4)
CHLORIDE SERPL-SCNC: 103 MMOL/L (ref 98–107)
CREAT SERPL-MCNC: 0.84 MG/DL (ref 0.51–0.95)
DIASTOLIC BLOOD PRESSURE - MUSE: NORMAL MMHG
EGFRCR SERPLBLD CKD-EPI 2021: 72 ML/MIN/1.73M2
EOSINOPHIL # BLD AUTO: 0.2 10E3/UL (ref 0–0.7)
EOSINOPHIL NFR BLD AUTO: 3 %
ERYTHROCYTE [DISTWIDTH] IN BLOOD BY AUTOMATED COUNT: 13.5 % (ref 10–15)
GLUCOSE SERPL-MCNC: 108 MG/DL (ref 70–99)
HCO3 SERPL-SCNC: 21 MMOL/L (ref 22–29)
HCT VFR BLD AUTO: 35.9 % (ref 35–47)
HGB BLD-MCNC: 12.1 G/DL (ref 11.7–15.7)
IMM GRANULOCYTES # BLD: 0.1 10E3/UL
IMM GRANULOCYTES NFR BLD: 2 %
INTERPRETATION ECG - MUSE: NORMAL
LYMPHOCYTES # BLD AUTO: 2.5 10E3/UL (ref 0.8–5.3)
LYMPHOCYTES NFR BLD AUTO: 40 %
MCH RBC QN AUTO: 31.7 PG (ref 26.5–33)
MCHC RBC AUTO-ENTMCNC: 33.7 G/DL (ref 31.5–36.5)
MCV RBC AUTO: 94 FL (ref 78–100)
MONOCYTES # BLD AUTO: 0.5 10E3/UL (ref 0–1.3)
MONOCYTES NFR BLD AUTO: 8 %
NEUTROPHILS # BLD AUTO: 2.9 10E3/UL (ref 1.6–8.3)
NEUTROPHILS NFR BLD AUTO: 46 %
NRBC # BLD AUTO: 0 10E3/UL
NRBC BLD AUTO-RTO: 0 /100
P AXIS - MUSE: NORMAL DEGREES
PLATELET # BLD AUTO: 300 10E3/UL (ref 150–450)
POTASSIUM SERPL-SCNC: 4 MMOL/L (ref 3.4–5.3)
PR INTERVAL - MUSE: NORMAL MS
QRS DURATION - MUSE: 72 MS
QT - MUSE: 354 MS
QTC - MUSE: 504 MS
R AXIS - MUSE: 47 DEGREES
RBC # BLD AUTO: 3.82 10E6/UL (ref 3.8–5.2)
SODIUM SERPL-SCNC: 138 MMOL/L (ref 135–145)
SYSTOLIC BLOOD PRESSURE - MUSE: NORMAL MMHG
T AXIS - MUSE: 262 DEGREES
VENTRICULAR RATE- MUSE: 122 BPM
WBC # BLD AUTO: 6.3 10E3/UL (ref 4–11)

## 2025-06-29 PROCEDURE — 250N000013 HC RX MED GY IP 250 OP 250 PS 637: Performed by: NURSE PRACTITIONER

## 2025-06-29 PROCEDURE — 85025 COMPLETE CBC W/AUTO DIFF WBC: CPT | Performed by: INTERNAL MEDICINE

## 2025-06-29 PROCEDURE — 36415 COLL VENOUS BLD VENIPUNCTURE: CPT | Performed by: INTERNAL MEDICINE

## 2025-06-29 PROCEDURE — 250N000013 HC RX MED GY IP 250 OP 250 PS 637: Performed by: STUDENT IN AN ORGANIZED HEALTH CARE EDUCATION/TRAINING PROGRAM

## 2025-06-29 PROCEDURE — 80048 BASIC METABOLIC PNL TOTAL CA: CPT | Performed by: INTERNAL MEDICINE

## 2025-06-29 PROCEDURE — 250N000013 HC RX MED GY IP 250 OP 250 PS 637: Performed by: PHYSICIAN ASSISTANT

## 2025-06-29 PROCEDURE — 99222 1ST HOSP IP/OBS MODERATE 55: CPT | Mod: 25 | Performed by: INTERNAL MEDICINE

## 2025-06-29 PROCEDURE — 82010 KETONE BODYS QUAN: CPT | Performed by: INTERNAL MEDICINE

## 2025-06-29 PROCEDURE — 250N000013 HC RX MED GY IP 250 OP 250 PS 637: Performed by: INTERNAL MEDICINE

## 2025-06-29 PROCEDURE — 99238 HOSP IP/OBS DSCHRG MGMT 30/<: CPT | Performed by: INTERNAL MEDICINE

## 2025-06-29 PROCEDURE — 120N000001 HC R&B MED SURG/OB

## 2025-06-29 RX ORDER — METOPROLOL SUCCINATE 50 MG/1
50 TABLET, EXTENDED RELEASE ORAL DAILY
DISCHARGE
Start: 2025-06-29 | End: 2025-07-12

## 2025-06-29 RX ORDER — METOPROLOL SUCCINATE 25 MG/1
25 TABLET, EXTENDED RELEASE ORAL DAILY
Status: DISCONTINUED | OUTPATIENT
Start: 2025-06-29 | End: 2025-06-29

## 2025-06-29 RX ORDER — AMIODARONE HYDROCHLORIDE 200 MG/1
TABLET ORAL
DISCHARGE
Start: 2025-06-29 | End: 2025-07-12

## 2025-06-29 RX ORDER — METOPROLOL SUCCINATE 50 MG/1
50 TABLET, EXTENDED RELEASE ORAL DAILY
Status: DISCONTINUED | OUTPATIENT
Start: 2025-06-29 | End: 2025-06-30 | Stop reason: HOSPADM

## 2025-06-29 RX ORDER — LOSARTAN POTASSIUM 50 MG/1
50 TABLET ORAL 2 TIMES DAILY
DISCHARGE
Start: 2025-06-29 | End: 2025-06-29

## 2025-06-29 RX ORDER — LOSARTAN POTASSIUM 25 MG/1
25 TABLET ORAL 2 TIMES DAILY
DISCHARGE
Start: 2025-06-29 | End: 2025-06-29

## 2025-06-29 RX ORDER — AMIODARONE HYDROCHLORIDE 200 MG/1
200 TABLET ORAL 2 TIMES DAILY
Status: DISCONTINUED | OUTPATIENT
Start: 2025-06-29 | End: 2025-06-30 | Stop reason: HOSPADM

## 2025-06-29 RX ORDER — LOSARTAN POTASSIUM 50 MG/1
50 TABLET ORAL 2 TIMES DAILY
Status: ON HOLD | COMMUNITY
End: 2025-06-29

## 2025-06-29 RX ORDER — LOSARTAN POTASSIUM 25 MG/1
25 TABLET ORAL 2 TIMES DAILY
Status: DISCONTINUED | OUTPATIENT
Start: 2025-06-29 | End: 2025-06-29

## 2025-06-29 RX ORDER — LOSARTAN POTASSIUM 50 MG/1
50 TABLET ORAL 2 TIMES DAILY
Status: DISCONTINUED | OUTPATIENT
Start: 2025-06-29 | End: 2025-06-29

## 2025-06-29 RX ADMIN — PANTOPRAZOLE SODIUM 40 MG: 40 TABLET, DELAYED RELEASE ORAL at 09:12

## 2025-06-29 RX ADMIN — VALACYCLOVIR HYDROCHLORIDE 500 MG: 500 TABLET, FILM COATED ORAL at 09:13

## 2025-06-29 RX ADMIN — POLYETHYLENE GLYCOL 3350 17 G: 17 POWDER, FOR SOLUTION ORAL at 09:14

## 2025-06-29 RX ADMIN — LIDOCAINE 2 PATCH: 4 PATCH TOPICAL at 09:10

## 2025-06-29 RX ADMIN — APIXABAN 5 MG: 5 TABLET, FILM COATED ORAL at 21:36

## 2025-06-29 RX ADMIN — VALACYCLOVIR HYDROCHLORIDE 500 MG: 500 TABLET, FILM COATED ORAL at 21:36

## 2025-06-29 RX ADMIN — METOPROLOL SUCCINATE 50 MG: 50 TABLET, EXTENDED RELEASE ORAL at 16:12

## 2025-06-29 RX ADMIN — LOSARTAN POTASSIUM 25 MG: 25 TABLET, FILM COATED ORAL at 13:37

## 2025-06-29 RX ADMIN — ACETAMINOPHEN 1000 MG: 500 TABLET, FILM COATED ORAL at 09:11

## 2025-06-29 RX ADMIN — CARVEDILOL 6.25 MG: 6.25 TABLET, FILM COATED ORAL at 09:12

## 2025-06-29 RX ADMIN — ACETAMINOPHEN 1000 MG: 500 TABLET, FILM COATED ORAL at 21:36

## 2025-06-29 RX ADMIN — CITALOPRAM HYDROBROMIDE 20 MG: 20 TABLET ORAL at 09:13

## 2025-06-29 RX ADMIN — SENNOSIDES AND DOCUSATE SODIUM 2 TABLET: 50; 8.6 TABLET ORAL at 09:13

## 2025-06-29 RX ADMIN — DOCUSATE SODIUM 100 MG: 100 CAPSULE, LIQUID FILLED ORAL at 09:13

## 2025-06-29 RX ADMIN — APIXABAN 5 MG: 5 TABLET, FILM COATED ORAL at 09:12

## 2025-06-29 RX ADMIN — AMIODARONE HYDROCHLORIDE 200 MG: 200 TABLET ORAL at 21:36

## 2025-06-29 RX ADMIN — ACETAMINOPHEN 1000 MG: 500 TABLET, FILM COATED ORAL at 16:12

## 2025-06-29 RX ADMIN — METHOCARBAMOL 500 MG: 500 TABLET ORAL at 10:47

## 2025-06-29 RX ADMIN — QUETIAPINE FUMARATE 25 MG: 25 TABLET ORAL at 22:57

## 2025-06-29 ASSESSMENT — ACTIVITIES OF DAILY LIVING (ADL)
ADLS_ACUITY_SCORE: 49
ADLS_ACUITY_SCORE: 45
ADLS_ACUITY_SCORE: 45
ADLS_ACUITY_SCORE: 49
ADLS_ACUITY_SCORE: 45
ADLS_ACUITY_SCORE: 49
ADLS_ACUITY_SCORE: 45
ADLS_ACUITY_SCORE: 49
ADLS_ACUITY_SCORE: 45
ADLS_ACUITY_SCORE: 49
ADLS_ACUITY_SCORE: 45
ADLS_ACUITY_SCORE: 45
ADLS_ACUITY_SCORE: 49
ADLS_ACUITY_SCORE: 45
ADLS_ACUITY_SCORE: 49
ADLS_ACUITY_SCORE: 49

## 2025-06-29 NOTE — PROGRESS NOTES
"[facility]    Internal Medicine Hospitalist Progress Note  06/29/2025  I evaluated patient on the above date.    Nitesh Molina Jr., MD  [phone] (p)  Text Page  Vocera      [New actions/orders today (06/29/2025) are underlined in the assessment and plan. All lab results in the assessment and plan were reviewed.]  Assessment & Plan   Siomara Hansen is a 74 year old female with history including HTN; depression/anxiety; and right lung nodules; who underwent right thoracotomy with resections 6/20/2025. IM consulted for issues including hyponatremia, SRINI and afib.        Right lung adenocarcinoma - s/p limited R thoracotomy, right middle lobectomy, wedge resection groundglass nodule posterior RUL, mediastinal LN dissection 6/20/2025.  * Pt with right lung nodules, followed by Dr. Jasso.  * Underwent surgery as above. Path preliminary pathology positive for adenocarcinoma.  - Post-op management per Thoracic Surgery.  - Continue scheduled acetaminophen, PRN acetaminophen, PRN methocarbamol.    Afib with RVR.  Troponin elevation, suspect demand ischemia related to above.  * 6/26: In the evening, RRT called for acute tachycardia. ECG showed afib with RVR, nonspecific t-wave abnormality, no acute ischemic changes. Given IV metoprolol with conversion to NSR. Troponin elevated but \"flat\".  * 6/27: Started on apixaban given [id number] score of at least 4 (age, female, HTN). Started on carvedilol. Further episodes of afib with RVR requiring IV metoprolol with conversion back to NSR in the afternoon and evening.  * 6/28: Echo showed LVEF 60-65%, grade 1 diastolic dysfunction, no rWMAs; RV OK; trace MR; trace TR. In and out of rapid afib, requiring IV metoprolol at times. Carvedilol increased. Cardiology consulted for possible anti-arryhthmic.  * 6/29: In NSR.  Recent Labs   Lab 06/27/25  0105 06/26/25  2234   CTROPT 24* 23*   - Continue to monitor on telemetry.  - Continue carvedilol 6.25 mg " BID.  - Continue apixaban 5 mg BID.  - Continue PRN IV metoprolol.  - Ask Cardiology to see - question if could benefit from a short course of amiodarone - appreciate help.    Hyponatremia, suspect nutritional/hypovolemia, resolved.  * Initial presentation as above.  * 6/21: Sodium 133 (with cr 1.09).  * 6/23: Sodium down to 129 on 6/23 despite NS. IM consulted. NS increased.   * 6/24: Sodium improved and subsequently normalized.    SRINI, suspect prerenal, resolved.  Metabolic acidosis, unclear etiology.  * Bicarb 20 and cr 1.00 on admit.   * Cr increased to 1.26 on 6/23, IVF's increased, losartan held.  * Bicarb 19 with normal AG 6/24.   * Cr subsequently normal and bicarb still low 6/29.  Recent Labs   Lab 06/29/25  0606 06/28/25  0902 06/26/25  2234 06/26/25  0536 06/24/25  0613 06/23/25  0543   CO2 21* 21* 22  --  19* 20*   ANIONGAP 14 13 13  --  12 10   BUN 12.7 11.6 14.1  --  18.1 27.8*   CR 0.84 0.92 0.97* 0.88 0.93 1.26*   POTASSIUM 4.0 3.8 3.7  --  4.3 4.4   Estimated Creatinine Clearance: 62 mL/min (based on SCr of 0.84 mg/dL).  - Continue to monitor BMP - repeat in am.  - Avoid nephrotoxic medications.    AMS, suspect metabolic encephalopathy (hyponatremia, delirium), resolved.  Abnormal UA.  * 6/24: RRT called for increased confusion and paranoia. Started on scheduled quetiapine and PRN olanzapine and PRN quetiapine. Also noted with possible UTI which was treated.  * 6/25: UR grossly abnormal and started on sulfamethoxazole-trimethoprim. UC subsequently positive for only >100K urogenital rosalind.  * 6/27: Sulfamethoxazole-trimethoprim stopped given UC results. Mental status appears to be at baseline.  - Continue to treat other issues as noted.  - Continue scheduled quetiapine 25 mg at bedtime; PRN quetiapine and PRN olanzapine.  - Re-orient as needed.  - Maintain normal day/night, sleep/wake cycles.  - Minimize sedating medications as able.    Acute urinary retention.  * Pt did have issues with urinary  "retention requiring SIC. Seen by Urology. Subsequently improved   - Continue to monitor for urinary retention.  - Continue PRN SIC.    HTN (benign essential).  [PTA: losartan 50 mg BID.]  * Losartan held due to SRINI and soft BP's.  * BP's increased 6/26 and 6/27.  * Started carvedilol 6/27, increased 6/28.  - Restart losartan at 25 mg BID (lower dose given new carvedilol).  - Continue carvedilol 6.25 mg BID.    Depression/anxiety.  - Continue citalopram.    H/o HSV.  - Continue PTA valacyclovir prophylaxis.       Clinically Significant Risk Factors               # Hypoalbuminemia: Lowest albumin = 3.3 g/dL at 6/28/2025  9:02 AM, will monitor as appropriate     # Hypertension: Noted on problem list            # Overweight: Estimated body mass index is 28.78 kg/m  as calculated from the following:    Height as of this encounter: 1.676 m (5' 6\").    Weight as of this encounter: 80.9 kg (178 lb 4.8 oz).             Diet: Advance Diet as Tolerated: Regular Diet Adult  Diet    Prophylaxis: PCD's and ambulation  Hoffman Catheter: Not present  Lines: None     Code Status: Full Code    Disposition Plan   Medically Ready for Discharge: Anticipated Tomorrow  Expected discharge to transitional care unit (TCU) pending stability in HR's and TCU bed availability (TCU will not accept new patients on weekend so earliest would be 6/30).     Entered: Nitesh Molina MD 06/29/2025, 11:07 AM             Interval History   In and out afib last evening.  Remains in sinus this am.  Pain seems to be better and taking deeper breaths.    * Data reviewed today: I reviewed all new labs and imaging over the last 24 hours. I personally reviewed no images or ECG's today.    Physical Exam   Most recent vitals:   , Blood pressure (!) 154/85, pulse 71, temperature 98.8  F (37.1  C), temperature source Oral, resp. rate 18, height 1.676 m (5' 6\"), weight 80.9 kg (178 lb 4.8 oz), SpO2 96%. O2 Device: None (Room air)    Vitals:    06/20/25 0921 " "06/21/25 0545   Weight: 78.6 kg (173 lb 3.2 oz) 80.9 kg (178 lb 4.8 oz)     Vital signs with ranges:  Temp:  [97.7  F (36.5  C)-98.8  F (37.1  C)] 98.8  F (37.1  C)  Pulse:  [] 71  Resp:  [18-20] 18  BP: (112-154)/(77-99) 154/85  SpO2:  [96 %-97 %] 96 %  Patient Vitals for the past 24 hrs:   BP Temp Temp src Pulse Resp SpO2   06/29/25 0842 (!) 154/85 98.8  F (37.1  C) Oral 71 18 96 %   06/29/25 0015 (!) 135/99 97.7  F (36.5  C) Axillary 68 18 96 %   06/28/25 2004 139/81 98  F (36.7  C) Oral 71 20 97 %   06/28/25 1550 130/77 98.1  F (36.7  C) Oral 74 20 97 %   06/28/25 1300 112/86 -- -- (!) 140 -- --     I/O's last 24 hours:  I/O last 3 completed shifts:  In: 720 [P.O.:720]  Out: 3100 [Urine:3100]    Constitutional: awake, alert, oriented, pleasant, conversant   Head:   Eyes:   ENT:   Neck:   Cardiovascular: regular rate and rhythm; no murmurs, rubs or gallops  Lungs: diminished in the bases, no crackles or wheezes, deeper breaths  Gastrointestinal/Abdomen: soft, non-tender, non-distended, positive bowel sounds  :   Musculoskeletal:   Skin/Extremities: No bilateral lower extremity edema.  Neurologic:   Psychiatric:   Hematologic/Lymphatic/Immunologic:        Labs reviewed.  Recent Labs   Lab 06/29/25  0606 06/28/25  0902 06/26/25  2234   WBC 6.3 8.4 9.4   HGB 12.1 12.8 12.1   MCV 94 91 92    337 293    137 136   POTASSIUM 4.0 3.8 3.7   CHLORIDE 103 103 101   CO2 21* 21* 22   BUN 12.7 11.6 14.1   CR 0.84 0.92 0.97*   ANIONGAP 14 13 13   VINEET 8.7* 9.0 9.0   * 184* 122*   ALBUMIN  --  3.3*  --    PROTTOTAL  --  6.2*  --    BILITOTAL  --  0.3  --    ALKPHOS  --  99  --    ALT  --  16  --    AST  --  29  --      Recent Labs   Lab Test 06/27/25  0105 06/26/25  2234   TROPONIN T HIGH SENSITIVITY 24* 23*     Recent Labs   Lab 06/29/25  0606 06/28/25  0902 06/26/25  2234 06/24/25  0613 06/23/25  0543   * 184* 122* 102* 105*     No lab results found.    No results for input(s): \"INR\", " "\"[id number]\" in the last 168 hours.  Recent Labs   Lab 06/29/25  0606 06/28/25  0902 06/26/25  2234 06/24/25  0613   WBC 6.3 8.4 9.4 6.8       MICRO:  Cultures (including blood and urine):  Recent Labs   Lab 06/25/25  1612   CULTURE >100,000 CFU/mL Mixture of urogenital rosalind       No results found for this or any previous visit (from the past 24 hours).      Medications   All medications were reviewed. MAR.    Infusions:  Current Facility-Administered Medications   Medication Dose Route Frequency Provider Last Rate Last Admin     Scheduled Medications:  Current Facility-Administered Medications   Medication Dose Route Frequency Provider Last Rate Last Admin    acetaminophen (TYLENOL) tablet 1,000 mg  1,000 mg Oral TID Sharmila Nguyễn PA-C   1,000 mg at 06/29/25 0911    apixaban ANTICOAGULANT (ELIQUIS) tablet 5 mg  5 mg Oral BID Nitesh Molina MD   5 mg at 06/29/25 0912    carvedilol (COREG) tablet 6.25 mg  6.25 mg Oral BID w/meals Nitesh Molina MD   6.25 mg at 06/29/25 0912    citalopram (celeXA) tablet 20 mg  20 mg Oral Daily Andreia Sargent MD   20 mg at 06/29/25 0913    docusate sodium (COLACE) capsule 100 mg  100 mg Oral Every Other Day Sharmila Nguyễn PA-C   100 mg at 06/29/25 0913    Lidocaine (LIDOCARE) 4 % Patch 2 patch  2 patch Transdermal Q24H Rox Smith PA-C   2 patch at 06/29/25 0910    pantoprazole (PROTONIX) EC tablet 40 mg  40 mg Oral Daily Sharmila Nguyễn PA-C   40 mg at 06/29/25 0912    polyethylene glycol (MIRALAX) Packet 17 g  17 g Oral Daily Sharmila Nguyễn PA-C   17 g at 06/29/25 0914    QUEtiapine (SEROquel) tablet 25 mg  25 mg Oral At Bedtime Kiko Mcclure APRN CNP   25 mg at 06/28/25 2312    senna-docusate (SENOKOT-S/PERICOLACE) 8.6-50 MG per tablet 2 tablet  2 tablet Oral BID Domonique Shelley PA-C   2 tablet at 06/29/25 0913    sodium chloride (PF) 0.9% PF flush 3 mL  3 mL Intracatheter Q8H Sharmila Lundberg PA-C   3 mL at 06/29/25 0924    " valACYclovir (VALTREX) tablet 500 mg  500 mg Oral BID Sharmila Nguyễn PA-C   500 mg at 06/29/25 0913     PRN Medications:  Current Facility-Administered Medications   Medication Dose Route Frequency Provider Last Rate Last Admin    acetaminophen (TYLENOL) tablet 500 mg  500 mg Oral BID PRN Kiko Mcclure APRN CNP   500 mg at 06/27/25 0429    albuterol (PROVENTIL) neb solution 2.5 mg  2.5 mg Nebulization Q4H PRN Brady Jasso MD   2.5 mg at 06/24/25 0011    bacitracin ointment   Topical ONCE PRN REPEAT PER INSTRUCTIONS Sharmila Nguyễn PA-C   Given at 06/21/25 1715    benzocaine-menthol (CHLORASEPTIC) 6-10 MG lozenge 1 lozenge  1 lozenge Buccal Q1H PRN Brady Jasso MD   1 lozenge at 06/21/25 1354    bisacodyl (DULCOLAX) suppository 10 mg  10 mg Rectal Daily PRN Sharmila Nguyễn PA-C        calcium carbonate (TUMS) chewable tablet 500 mg  500 mg Oral 4x Daily PRN Sharmila Nguyễn PA-C   500 mg at 06/28/25 2318    lidocaine (LMX4) cream   Topical Q1H PRN Sharmila Nguyễn PA-C        lidocaine 1 % 0.1-1 mL  0.1-1 mL Other Q1H PRN Sharmila Nguyễn PA-C        magnesium hydroxide (MILK OF MAGNESIA) suspension 30 mL  30 mL Oral Daily PRN Sharmila Nguyễn PA-C        methocarbamol (ROBAXIN) tablet 500 mg  500 mg Oral BID PRN Sharmila Nguyễn PA-C   500 mg at 06/29/25 1047    metoprolol (LOPRESSOR) injection 5 mg  5 mg Intravenous Q6H PRN Huber Esquivel APRN CNP   5 mg at 06/28/25 1304    naloxone (NARCAN) injection 0.2 mg  0.2 mg Intravenous Q2 Min PRN Brady Jasso MD        Or    naloxone (NARCAN) injection 0.4 mg  0.4 mg Intravenous Q2 Min PRN Brady Jasso MD        Or    naloxone (NARCAN) injection 0.2 mg  0.2 mg Intramuscular Q2 Min PRN Brady Jasso MD        Or    naloxone (NARCAN) injection 0.4 mg  0.4 mg Intramuscular Q2 Min PRN Brady Jasso MD        OLANZapine (zyPREXA) injection 5 mg  5 mg Intramuscular BID PRN Kiko Mcclure, APRN CNP    5 mg at 06/24/25 1959    ondansetron (ZOFRAN ODT) ODT tab 4 mg  4 mg Oral Q6H PRN Sharmila Nguyễn PA-C   4 mg at 06/24/25 0223    Or    ondansetron (ZOFRAN) injection 4 mg  4 mg Intravenous Q6H PRN Sharmila Nguyễn PA-C        prochlorperazine (COMPAZINE) injection 5 mg  5 mg Intravenous Q6H PRN Sharmila Nguyễn PA-C        Or    prochlorperazine (COMPAZINE) tablet 5 mg  5 mg Oral Q6H PRN Sharmila Nguyễn PA-C        QUEtiapine (SEROquel) tablet 25 mg  25 mg Oral BID PRN Kiko Mcclure APRN CNP   25 mg at 06/25/25 0052    sodium chloride (PF) 0.9% PF flush 3 mL  3 mL Intracatheter q1 min prn Sharmila Nguyễn PA-C

## 2025-06-29 NOTE — PLAN OF CARE
Pt alert and oriented x4. Pt c/o right breast pain rated 4-5 this shift with improvement to 0-2 with tylenol. Pt anxious regarding chest tube insertion site drainage. Dressing changed x2 today. First dressing small amt sero-sang/serous output and second change was moderate amt drainage. Pt denies shortness of breath, denies chest pain. Pt ambulates with stand by assist and walker. Pt ambulated frequently in room and twice outside of room.  Pt tolerating regular diet. Pt with one large loose stool. Pt voiding adequate amts. Pt went into a-fib rate 120s-140s for less than 5 minutes at 1600 and converted on her own. Pt was in a-fib rate 90s this morning for 30 minutes and converted on her own, otherwise pt in sinus rhythm rate 60s-70s this shift. Pt updated on plan of care.

## 2025-06-29 NOTE — PROGRESS NOTES
THORACIC SURGERY POD # 9    Stable  Room air    Discharge as per hospitalist    ABHIJEET AMADOR MD [facility] ONCOLOGY THORACIC SURGERY  CELL:  [phone]  OFFICE: [phone]

## 2025-06-29 NOTE — DISCHARGE SUMMARY
[facility]  Discharge Summary        Siomara Hansen MRN# [id number]   YOB: 1950 Age: 74 year old     Date of Admission: 6/20/2025  Date of Discharge: 06/29/2025  Admitting Physician: Brady Jasso MD  Discharge Physician: ***     Primary Provider: Rafael Mcdonald  Primary Care Physician Phone Number: [phone]         Discharge Diagnoses:   Right lung adenocarcinoma - s/p limited R thoracotomy, right middle lobectomy, wedge resection groundglass nodule posterior RUL, mediastinal LN dissection 6/20/2025.  Afib with RVR.  Troponin elevation, suspect demand ischemia related to above.  Hyponatremia, suspect nutritional/hypovolemia.  AMS, suspect metabolic encephalopathy (hyponatremia, delirium).  SRINI, suspect prerenal.  Metabolic acidosis, unclear etiology.  Abnormal UA.  Acute urinary retention.  HTN (benign essential).        Other Chronic Medical Problems:      Depression/anxiety.  H/o HSV.       Allergies:        Allergies   Allergen Reactions    Asa [Aspirin] Swelling    Hydrocortisone      Erythema      Zoster Vaccine Live      Did get red rash at injection site that lasted for about 3 days.           Discharge Medications:        Current Discharge Medication List        START taking these medications    Details   acetaminophen (TYLENOL) 500 MG tablet Take 2 tablets (1,000 mg) by mouth 3 times daily.    Associated Diagnoses: Acute post-operative pain      amiodarone (PACERONE) 200 MG tablet 200 mg BID through 7/12/2025, then 200 mg daily starting 7/13/2025.    Associated Diagnoses: Paroxysmal atrial fibrillation (H)      apixaban ANTICOAGULANT (ELIQUIS) 5 MG tablet Take 1 tablet (5 mg) by mouth 2 times daily.    Associated Diagnoses: Paroxysmal atrial fibrillation (H)      Lidocaine (LIDOCARE) 4 % Patch Place 2 patches over 12 hours onto the skin every 24 hours. To prevent lidocaine toxicity, patient should be patch free for 12 hrs daily.    Associated  Diagnoses: Acute post-operative pain      metoprolol succinate ER (TOPROL XL) 50 MG 24 hr tablet Take 1 tablet (50 mg) by mouth daily. Hold for SBP<100 or HR<55.    Associated Diagnoses: Paroxysmal atrial fibrillation (H)      senna-docusate (SENOKOT-S/PERICOLACE) 8.6-50 MG tablet Take 1-3 tablets by mouth 2 times daily as needed for constipation.    Associated Diagnoses: Constipation, unspecified constipation type           CONTINUE these medications which have NOT CHANGED    Details   alendronate (FOSAMAX) 10 MG tablet TAKE 1 TABLET BY MOUTH IN THE MORNING BEFORE BREAKFAST  Qty: 90 tablet, Refills: 3    Associated Diagnoses: Osteopenia, unspecified location; Compression fracture of L1 vertebra with routine healing, subsequent encounter      calcium citrate-vitamin D (CITRACAL) 315-5 MG-MCG TABS per tablet Take 1 tablet by mouth 2 times daily      citalopram (CELEXA) 20 MG tablet Take 1 tablet by mouth once daily  Qty: 90 tablet, Refills: 1    Associated Diagnoses: Anxiety      docusate sodium (COLACE) 100 MG capsule Take 100 mg by mouth every other day.      omeprazole (PRILOSEC) 20 MG DR capsule Take 1 capsule by mouth once daily  Qty: 90 capsule, Refills: 1    Associated Diagnoses: Gastroesophageal reflux disease, unspecified whether esophagitis present      valACYclovir (VALTREX) 500 MG tablet Take 1 tablet by mouth twice daily  Qty: 90 tablet, Refills: 2    Associated Diagnoses: HSV-2 (herpes simplex virus 2) infection           STOP taking these medications       losartan (COZAAR) 50 MG tablet Comments:   Reason for Stopping:         losartan (COZAAR) 50 MG tablet Comments:   Reason for Stopping:                   Discharge Instructions and Follow-Up:        Discharge Orders      Follow-Up with Cardiology      General info for SNF    Length of Stay Estimate: Short Term Care: Estimated # of Days <30  Condition at Discharge: Improving  Level of care:skilled   Rehabilitation Potential: Fair  Admission H&P  remains valid and up-to-date: Yes  Recent Chemotherapy: N/A  Use Nursing Home Standing Orders: Yes     Mantoux instructions    Give two-step Mantoux (PPD) Per Facility Policy Yes     Reason for your hospital stay    Lung surgery with Dr. Jasso     Intake and output    Every shift     Wound care (specify)    Site:   right lateral chest  Instructions:  OK to remove right chest tube site dressing and take a regular shower (no baths or hot tubs). Use regular soap and water and pat the site(s) dry after your shower. Re-apply a dry bandage to the site(s) after your shower and once each day (or more often if lots of drainage). Stop bandaging the site(s) once there is no drainage for one day.    Leave white steri strip tapes in place  Please leave the white steri strip tapes in place that are applied to your surgical incision(s). They will peel off on their own or we will remove them in clinic.     Activity - Up with assistive device     Follow Up and recommended labs and tests    Follow up with Dr. Jasso/Domonique Smith PA-C (Thoracic Surgery) on Wednesday, July 9.  The following appointments are already scheduled for you:  -check-in at 3:05 pm at [facility] at [address] [place] [address], [address], [place] [address]) on Wednesday, July 9  -then go upstairs to [facility] in [address] (same building) for your post-op appointment at 3:40 pm that same day    Please call Yina at [phone] if you have any appointment questions     Follow Up and recommended labs and tests    1. Follow up with detention physician.  The following labs/tests are recommended: CBC, BMP.  2. Follow-up with PCP after TCU discharge.     Full Code     Physical Therapy Adult Consult    Evaluate and treat as clinically indicated.    Reason:  weakness/fatigue/below baseline with mobility     Occupational Therapy Adult Consult    Evaluate and treat as clinically indicated.    Reason:  weakness/fatigue/below baseline with ADLs  "    Fall precautions     Diet    Follow this diet upon discharge: Current Diet:Orders Placed This Encounter      Advance Diet as Tolerated: Regular Diet Adult           Consultations This Hospital Stay:      CARE MANAGEMENT / SOCIAL WORK IP CONSULT  HOSPITALIST IP CONSULT  CARE MANAGEMENT / SOCIAL WORK IP CONSULT  UROLOGY IP CONSULT  PHYSICAL THERAPY ADULT IP CONSULT  OCCUPATIONAL THERAPY ADULT IP CONSULT  PHYSICAL THERAPY ADULT IP CONSULT  OCCUPATIONAL THERAPY ADULT IP CONSULT  HOSPITALIST IP CONSULT  CARDIOLOGY IP CONSULT        Admission History:      Please see the H&P by Brady Jasso MD on 6/20/2025 for complete details. Briefly, Siomara Hansen is a 74 year old female with history including HTN; depression/anxiety; and right lung nodules; who underwent right thoracotomy with resections 6/20/2025. IM consulted for issues including hyponatremia, SRINI and afib.          Problem Oriented Hospital Course:        Right lung adenocarcinoma - s/p limited R thoracotomy, right middle lobectomy, wedge resection groundglass nodule posterior RUL, mediastinal LN dissection 6/20/2025.  * Pt with right lung nodules, followed by Dr. Jasso.  * Underwent surgery as above. Path preliminary pathology positive for adenocarcinoma.  - Continue PRN acetaminophen.  - Follow-up with Thoracic Surgery/Oncology outpatient.    Afib with RVR.  Troponin elevation, suspect demand ischemia related to above.  * 6/26: In the evening, RRT called for acute tachycardia. ECG showed afib with RVR, nonspecific t-wave abnormality, no acute ischemic changes. Given IV metoprolol with conversion to NSR. Troponin elevated but \"flat\".  * 6/27: Started on apixaban given [id number] score of at least 4 (age, female, HTN). Started on carvedilol. Further episodes of afib with RVR requiring IV metoprolol with conversion back to NSR in the afternoon and evening.  * 6/28: Echo showed LVEF 60-65%, grade 1 diastolic dysfunction, no rWMAs; RV OK; trace " MR; trace TR. In and out of rapid afib, requiring IV metoprolol at times. Carvedilol increased. Cardiology consulted for possible anti-arryhthmic.  * 6/29: In NSR. Seen by Cardiology and started on short course of amiodarone. Carvedilol stopped and started on metoprolol ER..  - Continue amiodarone (new) 200 mg BID for 2 weeks, then 200 mg daily for 3 months.  - Continue metoprolol ER 50 daily (new).  - Continue apixaban 5 mg BID (new).  - Follow-up with Cardiology outpatient.    Hyponatremia, suspect nutritional/hypovolemia.  * Initial presentation as above.  * 6/21: Sodium 133 (with cr 1.09).  * 6/23: Sodium down to 129 on 6/23 despite NS. IM consulted. NS increased.   * 6/24: Sodium improved and subsequently normalized.    SRINI, suspect prerenal.  Metabolic acidosis, unclear etiology.  * Bicarb 20 and cr 1.00 on admit.   * Cr increased to 1.26 on 6/23, IVF's increased, losartan held.  * Bicarb 19 with normal AG 6/24.   * Cr subsequently normal and bicarb still low 6/29.  Recent Labs   Lab 06/29/25  0606 06/28/25  0902 06/26/25  2234 06/26/25  0536 06/24/25  0613 06/23/25  0543   CO2 21* 21* 22  --  19* 20*   ANIONGAP 14 13 13  --  12 10   KETONE <0.18  --   --   --   --   --    BUN 12.7 11.6 14.1  --  18.1 27.8*   CR 0.84 0.92 0.97* 0.88 0.93 1.26*   POTASSIUM 4.0 3.8 3.7  --  4.3 4.4   Estimated Creatinine Clearance: 62 mL/min (based on SCr of 0.84 mg/dL).  - Continue to monitor BMP.  - Avoid nephrotoxic medications.    AMS, suspect metabolic encephalopathy (hyponatremia, delirium), resolved.  Abnormal UA.  * 6/24: RRT called for increased confusion and paranoia. Started on scheduled quetiapine and PRN olanzapine and PRN quetiapine. Also noted with possible UTI which was treated.  * 6/25: UR grossly abnormal and started on sulfamethoxazole-trimethoprim. UC subsequently positive for only >100K urogenital rosalind.  * 6/27: Sulfamethoxazole-trimethoprim stopped given UC results. Mental status appears to be at  "baseline.  - Continue to treat other issues as noted.  - Re-orient as needed.  - Maintain normal day/night, sleep/wake cycles.  - Minimize sedating medications as able.    Acute urinary retention.  * Pt did have issues with urinary retention requiring SIC. Seen by Urology. Subsequently improved   - Continue to monitor for urinary retention.    HTN (benign essential).  [PTA: losartan 50 mg BID.]  * Losartan held due to SRINI and soft BP's.  * BP's increased 6/26 and 6/27.  * Started carvedilol 6/27, increased 6/28.  * On 6/29, restarted losartan at lower dose but subsequently seen by Cardiology and losartan and carvedilol stopped and started on metoprolol ER.  - Continue metoprolol ER 50 mg daily (new).    Depression/anxiety.  - Continue citalopram.    H/o HSV.  - Continue PTA valacyclovir prophylaxis.     Clinically Significant Risk Factors   { TIP  Diagnoses will continue to appear throughout the admission.  :41136}            # Hypoalbuminemia: Lowest albumin = 3.3 g/dL at 6/28/2025  9:02 AM, will monitor as appropriate     # Hypertension: Noted on problem list            # Overweight: Estimated body mass index is 28.78 kg/m  as calculated from the following:    Height as of this encounter: 1.676 m (5' 6\").    Weight as of this encounter: 80.9 kg (178 lb 4.8 oz).                   Pending Results:        Unresulted Labs Ordered in the Past 30 Days of this Admission       No orders found from 5/21/2025 to 6/21/2025.                  Discharge Disposition:        Discharge to assisted living facility ([facility]).        Discharge Time:        {DISCHARGE TIME:841648}            Condition and Physical on Discharge:      Discharge condition: Stable   Vitals: Blood pressure 115/67, pulse 72, temperature 97.6  F (36.4  C), temperature source Oral, resp. rate 18, height 1.676 m (5' 6\"), weight 80.9 kg (178 lb 4.8 oz), SpO2 96%.             Constitutional: ***     Lungs: ***   Cardiovascular: ***   Abdomen: ***   Skin: *** "   Other:              Key Imaging Studies, Lab Findings and Procedures/Surgeries:        Results for orders placed or performed during the hospital encounter of 06/20/25   XR Chest Port 1 View    Narrative    EXAM: XR CHEST PORT 1 VIEW  LOCATION: [facility]  DATE: 6/20/2025    INDICATION: Postoperative after right thoracotomy with right middle lobectomy and right upper lobe wedge resection.  COMPARISON: 7/6/2012.      Impression    IMPRESSION:   Shallow inspiration. Postoperative changes in the right mid and upper lung. Single right apical chest tube. Airspace opacity in the right midlung is likely postoperative, and may be atelectasis. There is mild atelectasis at the left lung base. No   pneumothorax.   XR Chest Port 1 View    Narrative    EXAM: XR CHEST PORT 1 VIEW  LOCATION: [facility]  DATE: 6/21/2025    INDICATION: s p right middle lobectomy  COMPARISON: 6/20/2025      Impression    IMPRESSION: Right chest tube is in similar position. No convincing pneumothorax. The remaining cardiopulmonary findings are otherwise not significantly changed.   XR Chest Port 1 View    Narrative    EXAM: XR CHEST PORT 1 VIEW  LOCATION: [facility]  DATE: 6/22/2025    INDICATION: s p right middle lobectomy  COMPARISON: Chest x-ray June 21, 2025.      Impression    IMPRESSION: Postoperative change in the right hemithorax with an indwelling thoracostomy drain again noted. There is more extensive subcutaneous emphysema in the right chest wall and neck base now than on the x-ray yesterday morning. However, no   pneumothorax is evident. The left lung and pleural spaces appear normal.   XR Chest Port 1 View    Narrative    EXAM: XR CHEST PORT 1 VIEW  LOCATION: [facility]  DATE: 6/23/2025    INDICATION: s p right middle lobectomy  COMPARISON: 6/22/2025      Impression    IMPRESSION:   1. Postoperative changes in the right midlung with  resultant volume loss. Unchanged nodular consolidation or postoperative changes in the right parahilar region. Right chest tube is present. Unchanged lateral right chest wall and right base of the neck   subcutaneous emphysema. No definite right pleural effusion or pneumothorax is identified.  2. Clear left hemithorax.  3. Stable normal cardiomediastinal silhouette.  4. Right axillary surgical clips.   XR Chest Port 1 View    Narrative    EXAM: XR CHEST PORT 1 VIEW  LOCATION: [facility]  DATE: 6/24/2025    INDICATION: s p right middle lobectomy  COMPARISON: 06/23/2025.      Impression    IMPRESSION: Postoperative changes in the right midlung with resultant volume loss. Unchanged nodular consolidation or postoperative changes in the right parahilar region. Right chest tube is present. Unchanged lateral right chest wall and right base of   the neck subcutaneous emphysema. No definite right pneumothorax is identified. Probable trace right pleural fluid. Clear left hemithorax. Stable normal cardiomediastinal silhouette. Right axillary surgical clips.    XR Chest Port 1 View    Narrative    EXAM: XR CHEST PORT 1 VIEW  LOCATION: [facility]  DATE: 6/25/2025    INDICATION: Status post right middle lobectomy.  COMPARISON: 06/24/2025      Impression    IMPRESSION: Previously seen chest tube has been removed. No pneumothorax. Subcutaneous gas in the right chest wall and neck is unchanged. Staple line and atelectasis or scarring in the right midlung stable. Small amount of pleural thickening laterally is   unchanged. The left lung remains clear.   XR Chest Port 1 View    Narrative    EXAM: XR CHEST PORT 1 VIEW  LOCATION: [facility]  DATE: 6/26/2025    INDICATION: s p right middle lobectomy  COMPARISON: 6/25/2025.      Impression    IMPRESSION: Post surgical changes from right middle lobe lobectomy with surgical staples seen in the right midlung zone  and hilum again noted. Subcutaneous emphysema is again seen extending along the right chest wall and in the right neck. There is right   perihilar and mid lung zone atelectasis as well as a small right-sided pleural effusion, not significant change from prior exam. No pneumothorax is identified on the current exam. The left lung is clear.   Echocardiogram Complete     Value    LVEF  60-65%    Narrative    [id number]  [id number]  [id number]  [id number]^PAOLA^ESTEBAN^JANEEN     [facility]  Echocardiography Laboratory  [address] [place], [place] [address]     Name: BRIDGER RIVERA  MRN: [id number]  : 1950  Study Date: 2025 07:10 AM  Age: 74 yrs  Gender: Female  Patient Location: [facility]  Reason For Study: Atrial Fibrillation  Ordering Physician: ESTEBAN GUEVARA  Referring Physician: Rafael Mcdonald  Performed By: Fabricio Romo     BSA: 1.9 m2  Height: 66 in  Weight: 178 lb  HR: 69  BP: 147/78 mmHg  ______________________________________________________________________________  Procedure  Echocardiogram with two-dimensional, color and spectral Doppler. Definity (NDC  #02028-508) given intravenously.  ______________________________________________________________________________  Interpretation Summary     The visual ejection fraction is 60-65%.  Grade I or early diastolic dysfunction.  The right ventricle is normal in structure, function and size.  Ascending aorta dilatation is present.  There is no pericardial effusion.  ______________________________________________________________________________  Left Ventricle  The left ventricle is normal in structure, function and size. The visual  ejection fraction is 60-65%. Grade I or early diastolic dysfunction. No  regional wall motion abnormalities noted.     Right Ventricle  The right ventricle is normal in structure, function and size.     Atria  Normal left atrial size. Right atrial size is normal. There is no color  Doppler evidence of an  atrial shunt.     Mitral Valve  There is mild mitral annular calcification. There is trace mitral  regurgitation.     Tricuspid Valve  The tricuspid valve is normal in structure and function. There is trace  tricuspid regurgitation.     Aortic Valve  The aortic valve is normal in structure and function.     Pulmonic Valve  The pulmonic valve is not well seen, but is grossly normal. There is trace  pulmonic valvular regurgitation.     Vessels  Normal size aorta. Ascending aorta dilatation is present.     Pericardium  There is no pericardial effusion.     Rhythm  Sinus rhythm was noted.  ______________________________________________________________________________  MMode/2D Measurements & Calculations  IVSd: 1.0 cm     LVIDd: 3.8 cm  LVIDs: 2.6 cm  LVPWd: 1.2 cm  FS: 31.6 %  LV mass(C)d: 134.7 grams  LV mass(C)dI: 70.7 grams/m2  Ao root diam: 3.0 cm  asc Aorta Diam: 3.8 cm  LVOT diam: 2.1 cm  LVOT area: 3.5 cm2  Ao root diam index Ht(cm/m): 1.8  Ao root diam index BSA (cm/m2): 1.6  Asc Ao diam index BSA (cm/m2): 2.0  Asc Ao diam index Ht(cm/m): 2.3  LA Volume (BP): 44.2 ml     LA Volume Index (BP): 23.3 ml/m2  RV Base: 3.5 cm  RWT: 0.63  TAPSE: 1.4 cm     Doppler Measurements & Calculations  MV E max thomas: 55.0 cm/sec  MV A max thomas: 65.5 cm/sec  MV E/A: 0.84  MV dec time: 0.25 sec  Ao V2 max: 86.6 cm/sec  Ao max PG: 3.0 mmHg  Ao V2 mean: 59.4 cm/sec  Ao mean P.0 mmHg  Ao V2 VTI: 16.9 cm  HUGO(I,D): 2.8 cm2  HUGO(V,D): 2.4 cm2  LV V1 max P.4 mmHg  LV V1 max: 59.7 cm/sec  LV V1 VTI: 13.9 cm  SV(LVOT): 48.1 ml  SI(LVOT): 25.3 ml/m2  PA acc time: 0.10 sec  TR max thomas: 216.0 cm/sec  TR max P.9 mmHg  AV Thomas Ratio (DI): 0.69  HUGO Index (cm2/m2): 1.5     E/E' avg: 10.6  Lateral E/e': 9.7  Medial E/e': 11.5  RV S Thomas: 13.9 cm/sec     ______________________________________________________________________________  Report approved by: Ze Seth MD on 2025 08:19 AM           SURGERY 2025:  Limited right  thoracotomy  Right middle lobectomy  Wedge resection groundglass nodule posterior right upper lobe lung  Mediastinal lymph node dissection

## 2025-06-29 NOTE — PLAN OF CARE
"Patient Name: Maryanne  MRN: [id number]  Date of Admission: 6/20/2025  Reason for Admission: R thoracotomy with resections.   Level of Care: medical surgical.     Vitals:   BP Readings from Last 1 Encounters:   06/29/25 (!) 135/99     Pulse Readings from Last 1 Encounters:   06/29/25 68     Wt Readings from Last 1 Encounters:   06/21/25 80.9 kg (178 lb 4.8 oz)     Ht Readings from Last 1 Encounters:   06/20/25 1.676 m (5' 6\")     Estimated body mass index is 28.78 kg/m  as calculated from the following:    Height as of this encounter: 1.676 m (5' 6\").    Weight as of this encounter: 80.9 kg (178 lb 4.8 oz).  Temp Readings from Last 1 Encounters:   06/29/25 97.7  F (36.5  C) (Axillary)       Pain: Pain goal 0/10 Highest  Pain Rating 3/10 Effective pain medication/regimen scheudled tylenol, ice, heat packs, repositioning.     Assessment    Resp: On RA, LS diminished.   Telemetry: in and out of Afib RVR, pt did not sustain HR over 120 for more than 3 seconds so no IV metoprolol was given this shift- self converted back to NSR.   Neuro: A&O x4, neuros intact. Intermittent forgetfulness.   GI/: BS normoactive, X0 BM this shift. Pt having good urine output. Continent of bowel and bladder. Regular diet, tolerating well.   Skin/Wounds: R thoracotomy site, R chest tube site with small drainage- dressing changes PRN.   Lines/Drains: PIV L hand saline locked.   Activity: A1 GBW  Sleep: pt slept majority of night between cares. Scheduled Seroquel given.     Aggression Stop Light: Green          Patient Care Plan: TCU when medically ready. Cardiology consult placed. Continue to monitor      "

## 2025-06-29 NOTE — CONSULTS
[facility]    Cardiology Consultation     Date of Admission:  6/20/2025    Assessment & Plan   Siomara Hansen is a 74 year old female who was admitted on 6/20/2025.    Adenocarcinoma, right lung. S/P limited R thoracotomy, right middle lobectomy, wedge resection groundglass nodule posterior RUL, mediastinal LN dissection (6/20/25)   Paroxysmal A-fib with RVR, brief episodes often resolving promptly with IV metoprolol.  Normal echocardiogram.  No significant troponin elevation.  Hypertension, on losartan 50 mg p.o. twice daily prior to admission.    Recommendations:    Agree with short-term amiodarone to prevent recurrences of atrial fibrillation while recovering from thoracic surgery.  Start with amiodarone 200 mg p.o. twice daily for 2 weeks, then 200 mg daily for total of 3 months.  Continue Eliquis 5 mg p.o. twice daily long-term.  The risk for recurrent A-fib will remain high indefinitely.  Discontinue carvedilol and losartan, start metoprolol XL 50 mg daily and increase if tolerated prior to discharge.      Reevaluate in a.m. for possible discharge.      High complexity     SULTANA WARD MD, MD    Primary Care Physician   Rafael Mcdonald    Reason for Consult   Reason for consult: I was asked by hospitalist team to evaluate this patient for paroxysmal atrial fibrillation.    History of Present Illness   Siomara Hansen is a 74 year old female who presents with adenocarcinoma of the right lung.  She underwent limited right thoracotomy with right middle lobectomy, wedge resection of posterior right upper lobe nodule, and mediastinal lymph node dissection on 6/20/2025.  Following surgery, she has had recurrences of atrial fibrillation with rapid ventricular response on multiple occasions.  These episodes are relatively asymptomatic but heart rates are high, in the 120s, even after starting low-dose carvedilol.  IV metoprolol given for episodes of rapid A-fib, often with conversion  back to sinus rhythm.    She denies a sensation of palpitations or rapid heart beating.  She has no increased shortness of breath during these episodes.  ECGs have not shown any ischemic changes.  Her troponin levels have been essentially normal.    Echocardiogram demonstrated normal left ventricular size and function with an ejection fraction of 60 to 65% and no valvular abnormality.    She is in normal sinus rhythm today.  Heart rates are in the 70s.  Systolic blood pressures range from 120s to 160s over the last 2 days.  She is tolerating the initiation of Eliquis 5 mg p.o. twice daily for stroke prevention with atrial fibrillation without bleeding complications.      Past Medical History   Past Medical History:   Diagnosis Date    Basal cell carcinoma     Breast cancer ([facility]) 2000    Colon polyp     exam every 5 years    Hypertension     Dont remember date         Past Surgical History   Past Surgical History:   Procedure Laterality Date    ARTHROPLASTY HIP Right 3/5/2024    Procedure: Right Total Hip Arthroplasty;  Surgeon: Ruperto Silvestre MD;  Location: [facility] OR    BIOPSY  December 1999    BREAST SURGERY  February 2000    COLONOSCOPY  2023    GYN SURGERY  01/01/1986    D & C    KYPHOPLASTY N/A 1/11/2024    Procedure: Lumbar 1 kyphoplasty;  Surgeon: Ramon Hudson MD;  Location: [facility] OR    masectomy[  02/01/2000    Bilateral    ORTHOPEDIC SURGERY  2022    THORACOTOMY Right 6/20/2025    Procedure: RIGHT THORACOTOMY, RIGHT MIDDLE LOBECTOMY, WEDGE RESECTION, RIGHT UPPER LOBE LUNG NODULE, MEDIASTINAL LYMPH NODE DISSECTION;  Surgeon: Brady Jasso MD;  Location:  OR         Prior to Admission Medications   Prior to Admission Medications   Prescriptions Last Dose Informant Patient Reported? Taking?   alendronate (FOSAMAX) 10 MG tablet 6/19/2025 Self No Yes   Sig: TAKE 1 TABLET BY MOUTH IN THE MORNING BEFORE BREAKFAST   calcium citrate-vitamin D (CITRACAL) 315-5 MG-MCG TABS per tablet 6/19/2025  Self Yes Yes   Sig: Take 1 tablet by mouth 2 times daily   citalopram (CELEXA) 20 MG tablet 6/19/2025 Self No Yes   Sig: Take 1 tablet by mouth once daily   docusate sodium (COLACE) 100 MG capsule 6/19/2025 Self Yes Yes   Sig: Take 100 mg by mouth every other day.   losartan (COZAAR) 50 MG tablet 6/19/2025 at  8:00 PM Self No No   Sig: Take 1 tablet by mouth twice daily   losartan (COZAAR) 50 MG tablet   Yes No   Sig: Take 50 mg by mouth 2 times daily.   omeprazole (PRILOSEC) 20 MG DR capsule 6/19/2025 Self No Yes   Sig: Take 1 capsule by mouth once daily   valACYclovir (VALTREX) 500 MG tablet 6/19/2025 Self No Yes   Sig: Take 1 tablet by mouth twice daily      Facility-Administered Medications: None     Current Facility-Administered Medications   Medication Dose Route Frequency Provider Last Rate Last Admin    acetaminophen (TYLENOL) tablet 1,000 mg  1,000 mg Oral TID Sharmila Nguyễn PA-C   1,000 mg at 06/29/25 0911    acetaminophen (TYLENOL) tablet 500 mg  500 mg Oral BID PRN Kiko Mcclure APRN CNP   500 mg at 06/27/25 0429    albuterol (PROVENTIL) neb solution 2.5 mg  2.5 mg Nebulization Q4H PRN Brady Jasso MD   2.5 mg at 06/24/25 0011    apixaban ANTICOAGULANT (ELIQUIS) tablet 5 mg  5 mg Oral BID Nitesh Molina MD   5 mg at 06/29/25 0912    bacitracin ointment   Topical ONCE PRN REPEAT PER INSTRUCTIONS Sharmila Nguyễn PA-C   Given at 06/21/25 1715    benzocaine-menthol (CHLORASEPTIC) 6-10 MG lozenge 1 lozenge  1 lozenge Buccal Q1H PRN Brady Jasso MD   1 lozenge at 06/21/25 1354    bisacodyl (DULCOLAX) suppository 10 mg  10 mg Rectal Daily PRN Sharmila Nguyễn PA-C        calcium carbonate (TUMS) chewable tablet 500 mg  500 mg Oral 4x Daily PRN Sharmila Nguyễn PA-C   500 mg at 06/28/25 2318    carvedilol (COREG) tablet 6.25 mg  6.25 mg Oral BID w/meals Nitesh Molina MD   6.25 mg at 06/29/25 0912    citalopram (celeXA) tablet 20 mg  20 mg Oral Daily Andreia Sargent  MD GARRY   20 mg at 06/29/25 0913    docusate sodium (COLACE) capsule 100 mg  100 mg Oral Every Other Day Sharmila Nguyễn PA-C   100 mg at 06/29/25 0913    Lidocaine (LIDOCARE) 4 % Patch 2 patch  2 patch Transdermal Q24H Rox Smith PA-C   2 patch at 06/29/25 0910    lidocaine (LMX4) cream   Topical Q1H PRN Sharmila Nguyễn PA-C        lidocaine 1 % 0.1-1 mL  0.1-1 mL Other Q1H PRN Sharmila Nguyễn PA-C        losartan (COZAAR) tablet 25 mg  25 mg Oral BID Nitesh Molina MD   25 mg at 06/29/25 1337    magnesium hydroxide (MILK OF MAGNESIA) suspension 30 mL  30 mL Oral Daily PRN Sharmila Nguyễn PA-C        methocarbamol (ROBAXIN) tablet 500 mg  500 mg Oral BID PRN Sharmila Nguyễn PA-C   500 mg at 06/29/25 1047    metoprolol (LOPRESSOR) injection 5 mg  5 mg Intravenous Q6H PRN Huber Esquivel APRN CNP   5 mg at 06/28/25 1304    naloxone (NARCAN) injection 0.2 mg  0.2 mg Intravenous Q2 Min PRN Brady Jasso MD        Or    naloxone (NARCAN) injection 0.4 mg  0.4 mg Intravenous Q2 Min PRN Brady Jasso MD        Or    naloxone (NARCAN) injection 0.2 mg  0.2 mg Intramuscular Q2 Min PRN Brady Jasso MD        Or    naloxone (NARCAN) injection 0.4 mg  0.4 mg Intramuscular Q2 Min PRN Brady Jasso MD        OLANZapine (zyPREXA) injection 5 mg  5 mg Intramuscular BID PRN Kiko Mcclure APRN CNP   5 mg at 06/24/25 1959    ondansetron (ZOFRAN ODT) ODT tab 4 mg  4 mg Oral Q6H PRN Sharmila Nguyễn PA-C   4 mg at 06/24/25 0223    Or    ondansetron (ZOFRAN) injection 4 mg  4 mg Intravenous Q6H PRN Sharmila Nguyễn PA-C        pantoprazole (PROTONIX) EC tablet 40 mg  40 mg Oral Daily Sharmila Nguyễn PA-C   40 mg at 06/29/25 0912    polyethylene glycol (MIRALAX) Packet 17 g  17 g Oral Daily Sharmila Nguyễn PA-C   17 g at 06/29/25 0914    prochlorperazine (COMPAZINE) injection 5 mg  5 mg Intravenous Q6H PRN Sharmila Nguyễn PA-C        Or    prochlorperazine  (COMPAZINE) tablet 5 mg  5 mg Oral Q6H PRN Sharmila Nguyễn PA-C        QUEtiapine (SEROquel) tablet 25 mg  25 mg Oral At Bedtime Kiko Mcclure APRN CNP   25 mg at 06/28/25 2312    QUEtiapine (SEROquel) tablet 25 mg  25 mg Oral BID PRN Kiko Mcclure APRN CNP   25 mg at 06/25/25 0052    senna-docusate (SENOKOT-S/PERICOLACE) 8.6-50 MG per tablet 2 tablet  2 tablet Oral BID Domonique Shelley PA-C   2 tablet at 06/29/25 0913    sodium chloride (PF) 0.9% PF flush 3 mL  3 mL Intracatheter Q8H VIKTORIYA Sharmila Nguyễn PA-C   3 mL at 06/29/25 0924    sodium chloride (PF) 0.9% PF flush 3 mL  3 mL Intracatheter q1 min prn Sharmila Nguyễn PA-C        valACYclovir (VALTREX) tablet 500 mg  500 mg Oral BID Sharmila Nguyễn PA-C   500 mg at 06/29/25 0913     Current Facility-Administered Medications   Medication Dose Route Frequency Provider Last Rate Last Admin    acetaminophen (TYLENOL) tablet 1,000 mg  1,000 mg Oral TID Sharmila Nguyễn PA-C   1,000 mg at 06/29/25 0911    acetaminophen (TYLENOL) tablet 500 mg  500 mg Oral BID PRN Kiko Mcclure APRN CNP   500 mg at 06/27/25 0429    albuterol (PROVENTIL) neb solution 2.5 mg  2.5 mg Nebulization Q4H PRN Brady Jasso MD   2.5 mg at 06/24/25 0011    apixaban ANTICOAGULANT (ELIQUIS) tablet 5 mg  5 mg Oral BID Nitesh Molina MD   5 mg at 06/29/25 0912    bacitracin ointment   Topical ONCE PRN REPEAT PER INSTRUCTIONS Sharmila Nguyễn PA-C   Given at 06/21/25 1715    benzocaine-menthol (CHLORASEPTIC) 6-10 MG lozenge 1 lozenge  1 lozenge Buccal Q1H PRN Brady Jasso MD   1 lozenge at 06/21/25 1354    bisacodyl (DULCOLAX) suppository 10 mg  10 mg Rectal Daily PRN Sharmila Nguyễn PA-C        calcium carbonate (TUMS) chewable tablet 500 mg  500 mg Oral 4x Daily PRN Sharmila Nguyễn PA-C   500 mg at 06/28/25 2318    carvedilol (COREG) tablet 6.25 mg  6.25 mg Oral BID w/meals Nitesh Molina MD   6.25 mg at 06/29/25 0912    citalopram  (celeXA) tablet 20 mg  20 mg Oral Daily Andreia Sargent MD   20 mg at 06/29/25 0913    docusate sodium (COLACE) capsule 100 mg  100 mg Oral Every Other Day Sharmila Nguyễn PA-C   100 mg at 06/29/25 0913    Lidocaine (LIDOCARE) 4 % Patch 2 patch  2 patch Transdermal Q24H Rox Smith PA-C   2 patch at 06/29/25 0910    lidocaine (LMX4) cream   Topical Q1H PRN Sharmila Nguyễn PA-C        lidocaine 1 % 0.1-1 mL  0.1-1 mL Other Q1H PRN Sharmila Nguyễn PA-C        losartan (COZAAR) tablet 25 mg  25 mg Oral BID Nitesh Molina MD   25 mg at 06/29/25 1337    magnesium hydroxide (MILK OF MAGNESIA) suspension 30 mL  30 mL Oral Daily PRN Sharmila Nguyễn PA-C        methocarbamol (ROBAXIN) tablet 500 mg  500 mg Oral BID PRN Sharmila Nguyễn PA-C   500 mg at 06/29/25 1047    metoprolol (LOPRESSOR) injection 5 mg  5 mg Intravenous Q6H PRN Huber Esquivel APRN CNP   5 mg at 06/28/25 1304    naloxone (NARCAN) injection 0.2 mg  0.2 mg Intravenous Q2 Min PRN Brady Jasso MD        Or    naloxone (NARCAN) injection 0.4 mg  0.4 mg Intravenous Q2 Min PRN Brady Jasso MD        Or    naloxone (NARCAN) injection 0.2 mg  0.2 mg Intramuscular Q2 Min PRN Brady Jasso MD        Or    naloxone (NARCAN) injection 0.4 mg  0.4 mg Intramuscular Q2 Min PRN Brady Jasso MD        OLANZapine (zyPREXA) injection 5 mg  5 mg Intramuscular BID PRN Kiko Mcclure APRN CNP   5 mg at 06/24/25 1959    ondansetron (ZOFRAN ODT) ODT tab 4 mg  4 mg Oral Q6H PRN Sharmila Nguyễn PA-C   4 mg at 06/24/25 0223    Or    ondansetron (ZOFRAN) injection 4 mg  4 mg Intravenous Q6H PRN Sharmila Nguyễn PA-C        pantoprazole (PROTONIX) EC tablet 40 mg  40 mg Oral Daily Sharmila Nguyễn PA-C   40 mg at 06/29/25 0912    polyethylene glycol (MIRALAX) Packet 17 g  17 g Oral Daily Sharmila Nguyễn PA-C   17 g at 06/29/25 0914    prochlorperazine (COMPAZINE) injection 5 mg  5 mg Intravenous Q6H PRN  Sharmila Nguyễn PA-C        Or    prochlorperazine (COMPAZINE) tablet 5 mg  5 mg Oral Q6H PRN Sharmila Nguyễn PA-C        QUEtiapine (SEROquel) tablet 25 mg  25 mg Oral At Bedtime Ren Kiko Tyesha, APRN CNP   25 mg at 06/28/25 2312    QUEtiapine (SEROquel) tablet 25 mg  25 mg Oral BID PRN RenKiko escobedo, APRN CNP   25 mg at 06/25/25 0052    senna-docusate (SENOKOT-S/PERICOLACE) 8.6-50 MG per tablet 2 tablet  2 tablet Oral BID Domonique Shelley PA-C   2 tablet at 06/29/25 0913    sodium chloride (PF) 0.9% PF flush 3 mL  3 mL Intracatheter Q8H VIKTORIYA Sharmila Nguyễn PA-C   3 mL at 06/29/25 0924    sodium chloride (PF) 0.9% PF flush 3 mL  3 mL Intracatheter q1 min prn Sharmila Nguyễn PA-C        valACYclovir (VALTREX) tablet 500 mg  500 mg Oral BID Sharmila Nguyễn PA-C   500 mg at 06/29/25 0913     Allergies   Allergies   Allergen Reactions    Asa [Aspirin] Swelling    Hydrocortisone      Erythema      Zoster Vaccine Live      Did get red rash at injection site that lasted for about 3 days.       Social History    reports that she quit smoking about 41 years ago. Her smoking use included cigarettes. She started smoking about 60 years ago. She has a 9.5 pack-year smoking history. She has been exposed to tobacco smoke. She has never used smokeless tobacco. She reports that she does not currently use alcohol. She reports that she does not use drugs.      Family History   I have reviewed this patient's family history and updated it with pertinent information if needed.  Family History   Problem Relation Age of Onset    Cancer Mother         Lung    Breast Cancer Mother     Cancer Father         Braine    Cancer Brother         Bladder    Heart Disease Brother     Breast Cancer Sister     Breast Cancer Sister     Cancer Brother         Lung    Melanoma No family hx of           Review of Systems   A comprehensive review of system was performed and is negative other than that noted in the HPI or here.     Physical  "Exam   Vital Signs with Ranges  Temp:  [97.7  F (36.5  C)-98.8  F (37.1  C)] 98.8  F (37.1  C)  Pulse:  [68-74] 71  Resp:  [18-20] 18  BP: (121-154)/(77-99) 121/79  SpO2:  [96 %-97 %] 96 %  Wt Readings from Last 4 Encounters:   06/21/25 80.9 kg (178 lb 4.8 oz)   06/17/25 79.4 kg (175 lb)   06/02/25 78.9 kg (174 lb)   09/04/24 74 kg (163 lb 3.2 oz)     I/O last 3 completed shifts:  In: 720 [P.O.:720]  Out: 3100 [Urine:3100]      Vitals: /79 (BP Location: Right arm)   Pulse 71   Temp 98.8  F (37.1  C) (Oral)   Resp 18   Ht 1.676 m (5' 6\")   Wt 80.9 kg (178 lb 4.8 oz)   LMP  (LMP Unknown)   SpO2 96%   BMI 28.78 kg/m      Physical Exam:   General - Alert and oriented to time place and person in no acute distress  Eyes - No scleral icterus  HEENT - Neck supple, moist mucous membranes  Cardiovascular -regular rhythm without murmur  Extremities - There is no lower extremity edema  Respiratory -clear to auscultation  Skin - No pallor or cyanosis  Gastrointestinal - Non tender and non distended without rebound or guarding  Psych - Appropriate affect   Neurological - No gross motor neurological focal deficits    No lab results found in last 7 days.    Invalid input(s): \"TROPONINIES\"    Recent Labs   Lab 06/29/25  0606 06/28/25  0902 06/26/25  2234   WBC 6.3 8.4 9.4   HGB 12.1 12.8 12.1   MCV 94 91 92    337 293    137 136   POTASSIUM 4.0 3.8 3.7   CHLORIDE 103 103 101   CO2 21* 21* 22   BUN 12.7 11.6 14.1   CR 0.84 0.92 0.97*   GFRESTIMATED 72 65 61   ANIONGAP 14 13 13   VINEET 8.7* 9.0 9.0   * 184* 122*   ALBUMIN  --  3.3*  --    PROTTOTAL  --  6.2*  --    BILITOTAL  --  0.3  --    ALKPHOS  --  99  --    ALT  --  16  --    AST  --  29  --      Recent Labs   Lab Test 06/07/23  1142   CHOL 207*   HDL 57   *   TRIG 109     Recent Labs   Lab 06/29/25  0606 06/28/25  0902 06/26/25  2234   WBC 6.3 8.4 9.4   HGB 12.1 12.8 12.1   HCT 35.9 37.1 35.7   MCV 94 91 92    337 293     No results " "for input(s): \"PH\", \"PHV\", \"PO2\", \"PO2V\", \"SAT\", \"PCO2\", \"PCO2V\", \"HCO3\", \"HCO3V\" in the last 168 hours.  No results for input(s): \"NTBNPI\", \"NTBNP\" in the last 168 hours.  No results for input(s): \"DD\" in the last 168 hours.  No results for input(s): \"SED\", \"CRP\" in the last 168 hours.  Recent Labs   Lab 25  0606 25  0902 25  2234    337 293     No results for input(s): \"TSH\" in the last 168 hours.  Recent Labs   Lab 25  1612   COLOR Brown*   APPEARANCE Cloudy*   URINEGLC Negative   URINEBILI Negative   URINEKETONE Negative   SG 1.012   UBLD Large*   URINEPH 6.0   PROTEIN 70*   NITRITE Positive*   LEUKEST Large*   RBCU 63*   WBCU >182*       Imaging:  Recent Results (from the past 48 hours)   Echocardiogram Complete   Result Value    LVEF  60-65%    Narrative    [id number]  [id number]  [id number]  [id number]^PAOLA^ESTEBAN^JANEEN     [facility]  Echocardiography Laboratory  [address] [place] [address]     Name: BRIDGER RIVERA  MRN: [id number]  : 1950  Study Date: 2025 07:10 AM  Age: 74 yrs  Gender: Female  Patient Location: [facility]  Reason For Study: Atrial Fibrillation  Ordering Physician: ESTEBAN GUEVARA  Referring Physician: Rafael Mcdonald  Performed By: Fabricio Romo     BSA: 1.9 m2  Height: 66 in  Weight: 178 lb  HR: 69  BP: 147/78 mmHg  ______________________________________________________________________________  Procedure  Echocardiogram with two-dimensional, color and spectral Doppler. Definity (NDC  #[id number]) given intravenously.  ______________________________________________________________________________  Interpretation Summary     The visual ejection fraction is 60-65%.  Grade I or early diastolic dysfunction.  The right ventricle is normal in structure, function and size.  Ascending aorta dilatation is present.  There is no pericardial " effusion.  ______________________________________________________________________________  Left Ventricle  The left ventricle is normal in structure, function and size. The visual  ejection fraction is 60-65%. Grade I or early diastolic dysfunction. No  regional wall motion abnormalities noted.     Right Ventricle  The right ventricle is normal in structure, function and size.     Atria  Normal left atrial size. Right atrial size is normal. There is no color  Doppler evidence of an atrial shunt.     Mitral Valve  There is mild mitral annular calcification. There is trace mitral  regurgitation.     Tricuspid Valve  The tricuspid valve is normal in structure and function. There is trace  tricuspid regurgitation.     Aortic Valve  The aortic valve is normal in structure and function.     Pulmonic Valve  The pulmonic valve is not well seen, but is grossly normal. There is trace  pulmonic valvular regurgitation.     Vessels  Normal size aorta. Ascending aorta dilatation is present.     Pericardium  There is no pericardial effusion.     Rhythm  Sinus rhythm was noted.  ______________________________________________________________________________  MMode/2D Measurements & Calculations  IVSd: 1.0 cm     LVIDd: 3.8 cm  LVIDs: 2.6 cm  LVPWd: 1.2 cm  FS: 31.6 %  LV mass(C)d: 134.7 grams  LV mass(C)dI: 70.7 grams/m2  Ao root diam: 3.0 cm  asc Aorta Diam: 3.8 cm  LVOT diam: 2.1 cm  LVOT area: 3.5 cm2  Ao root diam index Ht(cm/m): 1.8  Ao root diam index BSA (cm/m2): 1.6  Asc Ao diam index BSA (cm/m2): 2.0  Asc Ao diam index Ht(cm/m): 2.3  LA Volume (BP): 44.2 ml     LA Volume Index (BP): 23.3 ml/m2  RV Base: 3.5 cm  RWT: 0.63  TAPSE: 1.4 cm     Doppler Measurements & Calculations  MV E max yovani: 55.0 cm/sec  MV A max yovani: 65.5 cm/sec  MV E/A: 0.84  MV dec time: 0.25 sec  Ao V2 max: 86.6 cm/sec  Ao max PG: 3.0 mmHg  Ao V2 mean: 59.4 cm/sec  Ao mean P.0 mmHg  Ao V2 VTI: 16.9 cm  HUGO(I,D): 2.8 cm2  HUGO(V,D): 2.4 cm2  LV V1 max  "P.4 mmHg  LV V1 max: 59.7 cm/sec  LV V1 VTI: 13.9 cm  SV(LVOT): 48.1 ml  SI(LVOT): 25.3 ml/m2  PA acc time: 0.10 sec  TR max thomas: 216.0 cm/sec  TR max P.9 mmHg  AV Thomas Ratio (DI): 0.69  HUGO Index (cm2/m2): 1.5     E/E' avg: 10.6  Lateral E/e': 9.7  Medial E/e': 11.5  RV S Thomas: 13.9 cm/sec     ______________________________________________________________________________  Report approved by: Ze Seth MD on 2025 08:19 AM             Echo:  No results found for this or any previous visit (from the past 4320 hours).    Clinically Significant Risk Factors               # Hypoalbuminemia: Lowest albumin = 3.3 g/dL at 2025  9:02 AM, will monitor as appropriate     # Hypertension: Noted on problem list            # Overweight: Estimated body mass index is 28.78 kg/m  as calculated from the following:    Height as of this encounter: 1.676 m (5' 6\").    Weight as of this encounter: 80.9 kg (178 lb 4.8 oz).            Cardiac Arrhythmia: Atrial fibrillation: Paroxysmal                        Not present on admission                             "

## 2025-06-30 VITALS
RESPIRATION RATE: 20 BRPM | OXYGEN SATURATION: 96 % | HEIGHT: 66 IN | BODY MASS INDEX: 28.66 KG/M2 | HEART RATE: 63 BPM | WEIGHT: 178.3 LBS | TEMPERATURE: 98 F | SYSTOLIC BLOOD PRESSURE: 120 MMHG | DIASTOLIC BLOOD PRESSURE: 70 MMHG

## 2025-06-30 LAB
ANION GAP SERPL CALCULATED.3IONS-SCNC: 13 MMOL/L (ref 7–15)
BUN SERPL-MCNC: 13.3 MG/DL (ref 8–23)
CALCIUM SERPL-MCNC: 8.9 MG/DL (ref 8.8–10.4)
CHLORIDE SERPL-SCNC: 102 MMOL/L (ref 98–107)
CREAT SERPL-MCNC: 0.85 MG/DL (ref 0.51–0.95)
EGFRCR SERPLBLD CKD-EPI 2021: 71 ML/MIN/1.73M2
GLUCOSE SERPL-MCNC: 113 MG/DL (ref 70–99)
HCO3 SERPL-SCNC: 21 MMOL/L (ref 22–29)
POTASSIUM SERPL-SCNC: 4.2 MMOL/L (ref 3.4–5.3)
SODIUM SERPL-SCNC: 136 MMOL/L (ref 135–145)

## 2025-06-30 PROCEDURE — 250N000013 HC RX MED GY IP 250 OP 250 PS 637: Performed by: INTERNAL MEDICINE

## 2025-06-30 PROCEDURE — 250N000013 HC RX MED GY IP 250 OP 250 PS 637: Performed by: PHYSICIAN ASSISTANT

## 2025-06-30 PROCEDURE — 36415 COLL VENOUS BLD VENIPUNCTURE: CPT | Performed by: INTERNAL MEDICINE

## 2025-06-30 PROCEDURE — 99239 HOSP IP/OBS DSCHRG MGMT >30: CPT | Performed by: INTERNAL MEDICINE

## 2025-06-30 PROCEDURE — 80048 BASIC METABOLIC PNL TOTAL CA: CPT | Performed by: INTERNAL MEDICINE

## 2025-06-30 PROCEDURE — 99232 SBSQ HOSP IP/OBS MODERATE 35: CPT | Mod: FS | Performed by: NURSE PRACTITIONER

## 2025-06-30 PROCEDURE — 250N000013 HC RX MED GY IP 250 OP 250 PS 637: Performed by: STUDENT IN AN ORGANIZED HEALTH CARE EDUCATION/TRAINING PROGRAM

## 2025-06-30 PROCEDURE — 250N000013 HC RX MED GY IP 250 OP 250 PS 637: Performed by: NURSE PRACTITIONER

## 2025-06-30 RX ADMIN — METOPROLOL SUCCINATE 50 MG: 50 TABLET, EXTENDED RELEASE ORAL at 08:42

## 2025-06-30 RX ADMIN — PANTOPRAZOLE SODIUM 40 MG: 40 TABLET, DELAYED RELEASE ORAL at 08:42

## 2025-06-30 RX ADMIN — LIDOCAINE 2 PATCH: 4 PATCH TOPICAL at 08:39

## 2025-06-30 RX ADMIN — AMIODARONE HYDROCHLORIDE 200 MG: 200 TABLET ORAL at 08:42

## 2025-06-30 RX ADMIN — VALACYCLOVIR HYDROCHLORIDE 500 MG: 500 TABLET, FILM COATED ORAL at 08:42

## 2025-06-30 RX ADMIN — ACETAMINOPHEN 1000 MG: 500 TABLET, FILM COATED ORAL at 08:42

## 2025-06-30 RX ADMIN — QUETIAPINE FUMARATE 25 MG: 25 TABLET ORAL at 01:43

## 2025-06-30 RX ADMIN — APIXABAN 5 MG: 5 TABLET, FILM COATED ORAL at 08:42

## 2025-06-30 RX ADMIN — CITALOPRAM HYDROBROMIDE 20 MG: 20 TABLET ORAL at 08:42

## 2025-06-30 ASSESSMENT — ACTIVITIES OF DAILY LIVING (ADL)
ADLS_ACUITY_SCORE: 45
ADLS_ACUITY_SCORE: 52
ADLS_ACUITY_SCORE: 45
ADLS_ACUITY_SCORE: 45
ADLS_ACUITY_SCORE: 52
ADLS_ACUITY_SCORE: 45
ADLS_ACUITY_SCORE: 52

## 2025-06-30 NOTE — PLAN OF CARE
Physical Therapy Discharge Summary    Reason for therapy discharge:    Discharged to transitional care facility.    Progress towards therapy goal(s). See goals on Care Plan in [facility] electronic health record for goal details.  Goals not met.  Barriers to achieving goals:   discharge from facility.    Therapy recommendation(s):    Continued therapy is recommended.  Rationale/Recommendations:  to advance indepdence with functional mobility..      Summary completed from chart review patient not seen by documenting therapist

## 2025-06-30 NOTE — PROGRESS NOTES
Care Management Discharge Note    Discharge Date: 06/30/2025       Discharge Disposition: Transitional Care    Discharge Services:      Discharge DME:      Discharge Transportation:      Private pay costs discussed: Not applicable    Does the patient's insurance plan have a 3 day qualifying hospital stay waiver?  No    PAS Confirmation Code: [id number]  Patient/family educated on Medicare website which has current facility and service quality ratings: yes    Education Provided on the Discharge Plan: Yes  Persons Notified of Discharge Plans: Dtr, MD, RN, facillity  Patient/Family in Agreement with the Plan: yes    Handoff Referral Completed: No, handoff not indicated or clinically appropriate    Additional Information:  Pt will discharge today to [facility] via nephew at 1200. Orders faxed and facility updated. Dtr updated and in agreement. Nursing aware.     JEANCARLOS Laws, [facility]  [phone] Desk phone  [phone] Cell/text (Preferred)  [facility]    Available on [employer]

## 2025-06-30 NOTE — PROGRESS NOTES
[facility]    Cardiology Progress Note    Primary Cardiologist: Will be Dr. Jacob    Date of Admission: 6/20/2025  Service Date: 06/30/2025     Summary:  Siomara Hansen is a 74 year old female with a past medical history of adenocarcinoma of the right lung. She underwent limited right thoracotomy with right middle lobectomy, wedge resection of posterior right upper lobe nodule, and mediastinal lymph node dissection on 6/20/2025. Following surgery, she has had recurrences of atrial fibrillation with rapid ventricular response on multiple occasions for which cardiology was consulted.    Interval History   No recurrent episodes of atrial fibrillation overnight or this morning. She is resting comfortable and denies concerns from a cardiac standpoint. Reviewed plan of care as outlined below.     Telemetry: Sinus rhythm with rates around 50-60 bpm range    Assessment:  Adenocarcinoma, right lung. S/P limited R thoracotomy, right middle lobectomy, wedge resection groundglass nodule posterior RUL, mediastinal LN dissection (6/20/25)   Paroxysmal A-fib with RVR, brief episodes often resolving promptly with IV metoprolol.  Normal echocardiogram.  No significant troponin elevation.  Started on metoprolol succinate 50 mg daily as well as amiodarone, and anticoagulation with apixaban 5 mg twice daily.  Hypertension, on losartan 50 mg p.o. twice daily prior to admission. Losartan discontinued here to allow remainder of blood pressure for addition of metoprolol as well noted above.    Plan:   Continue anticoagulation with apixaban 5 mg twice daily.  Continue amiodarone 200 mg p.o. twice daily for 2 weeks, then 200 mg daily for total of 3 months during the recovery period from her recent thoracic surgery.   Continue metoprolol XL 50 mg daily.  Will hold off on increasing further given heart rates occasionally in the 50s on telemetry and as blood pressure is well-controlled.  Orders placed for follow-up  with cardiology in the clinic within about a month post discharge.  Okay from a cardiac standpoint for discharge. We will sign off. Please don't hesitate to call with questions.     Thank you for the opportunity to participate in this pleasant patient's care.     Please note that this document was completed in part using voice recognition software. Although reviewed after completion, some word substitutions may occur. Please contact me if clarification is needed.     MELISSA Valerio, CNP   Nurse Practitioner  [facility]  Securely message via [employer] (8am - 5pm, M-F)    Patient Active Problem List   Diagnosis    CARDIOVASCULAR SCREENING; LDL GOAL LESS THAN 160    Chronic low back pain    Gastroesophageal reflux disease    Mixed hyperlipidemia    Angioma    SK (seborrheic keratosis)    History of skin cancer    Senile sebaceous gland hyperplasia    Lentigo    Basal cell carcinoma (BCC)    Constipation    Dermatochalasis, bilateral    Essential hypertension    History of shingles    Mild emphysema (H)    Mood problem    Nephrolithiasis    Osteopenia    History of vertebral compression fracture    Hyperlipidemia    Cyst of right ovary    Chronic, continuous use of opioids    S/P total hip arthroplasty    Nodule of middle lobe of right lung     Physical Exam   Temp: 98.5  F (36.9  C) Temp src: Oral BP: (!) 147/85 Pulse: 64   Resp: 16 SpO2: 96 % O2 Device: None (Room air)    Vitals:    06/20/25 0921 06/21/25 0545   Weight: 78.6 kg (173 lb 3.2 oz) 80.9 kg (178 lb 4.8 oz)     Vital Signs with Ranges  Temp:  [97.6  F (36.4  C)-98.5  F (36.9  C)] 98.5  F (36.9  C)  Pulse:  [62-72] 64  Resp:  [16-20] 16  BP: (115-147)/(67-85) 147/85  SpO2:  [96 %] 96 %  I/O last 3 completed shifts:  In: 2310 [P.O.:2310]  Out: 2925 [Urine:2925]    General: Appears her stated age, well nourished, and in no acute distress.  Eyes: No scleral icterus.  HEENT: Neck supple. No JVD.  Cardiovascular: Regular rate and rhythm. No  murmur, rub, or gallop.  Extremities: Moves all extremities well and symmetrically. No LE edema bilaterally.  Respiratory: Breathing non-labored on room air and lungs CTAB.  Skin: No pallor or cyanosis.  Gastrointestinal: Non-distended.  Psych: Appropriate affect. Mentation normal.  Neurological: No gross focal deficits.    Medications   Current Facility-Administered Medications   Medication Dose Route Frequency Provider Last Rate Last Admin     Current Facility-Administered Medications   Medication Dose Route Frequency Provider Last Rate Last Admin    acetaminophen (TYLENOL) tablet 1,000 mg  1,000 mg Oral TID Sharmila Nguyễn PA-C   1,000 mg at 06/30/25 0842    amiodarone (PACERONE) tablet 200 mg  200 mg Oral BID Thomas Jacob MD   200 mg at 06/30/25 0842    apixaban ANTICOAGULANT (ELIQUIS) tablet 5 mg  5 mg Oral BID Nitesh Molina MD   5 mg at 06/30/25 0842    citalopram (celeXA) tablet 20 mg  20 mg Oral Daily Andreia Sargent MD   20 mg at 06/30/25 0842    docusate sodium (COLACE) capsule 100 mg  100 mg Oral Every Other Day Sharmila Nguyễn PA-C   100 mg at 06/29/25 0913    Lidocaine (LIDOCARE) 4 % Patch 2 patch  2 patch Transdermal Q24H Rox Smith PA-C   2 patch at 06/30/25 0839    metoprolol succinate ER (TOPROL XL) 24 hr tablet 50 mg  50 mg Oral Daily Thomas Jacob MD   50 mg at 06/30/25 0842    pantoprazole (PROTONIX) EC tablet 40 mg  40 mg Oral Daily Sharmila Nguyễn PA-C   40 mg at 06/30/25 0842    polyethylene glycol (MIRALAX) Packet 17 g  17 g Oral Daily Sharmila Nguyễn PA-C   17 g at 06/29/25 0914    QUEtiapine (SEROquel) tablet 25 mg  25 mg Oral At Bedtime Kiko Mcclure APRN CNP   25 mg at 06/29/25 2257    senna-docusate (SENOKOT-S/PERICOLACE) 8.6-50 MG per tablet 2 tablet  2 tablet Oral BID Domonique Shelley PA-C   2 tablet at 06/29/25 0913    sodium chloride (PF) 0.9% PF flush 3 mL  3 mL Intracatheter Q8H VIKTORIYA Sharmila Nguyễn PA-C   3 mL at 06/30/25 0849     valACYclovir (VALTREX) tablet 500 mg  500 mg Oral BID Sharmila Nguyễn PA-C   500 mg at 06/30/25 0842     Data   Recent Labs   Lab 06/29/25  0606 06/28/25  0902 06/26/25  2234   WBC 6.3 8.4 9.4   HGB 12.1 12.8 12.1   HCT 35.9 37.1 35.7   MCV 94 91 92    337 293     Recent Labs   Lab 06/30/25  0601 06/29/25  0606 06/28/25  0902 06/26/25  2234    138 137 136   POTASSIUM 4.2 4.0 3.8 3.7   CHLORIDE 102 103 103 101   CO2 21* 21* 21* 22   ANIONGAP 13 14 13 13   * 108* 184* 122*   BUN 13.3 12.7 11.6 14.1   CR 0.85 0.84 0.92 0.97*   GFRESTIMATED 71 72 65 61   VINEET 8.9 8.7* 9.0 9.0   MAG  --   --  1.8 1.7   PHOS  --   --   --  4.8*   PROTTOTAL  --   --  6.2*  --    ALBUMIN  --   --  3.3*  --    BILITOTAL  --   --  0.3  --    ALKPHOS  --   --  99  --    AST  --   --  29  --    ALT  --   --  16  --

## 2025-06-30 NOTE — PLAN OF CARE
Occupational Therapy Discharge Summary    Reason for therapy discharge:    Discharged to transitional care facility.    Progress towards therapy goal(s). See goals on Care Plan in [facility] electronic health record for goal details.  Goals partially met.  Barriers to achieving goals:   discharge from facility.    Therapy recommendation(s):    Continued therapy is recommended.  Rationale/Recommendations:  Patient presents below baseline with I/ADLs and cognition. Pt lives alone, and will have limited assistance at home. Home with assist in I/ADLs (medication and finance management, bathing) and home OT and home RN. If this level of assist is not available then TCU should be considered.    **Pt not seen by discharging therapist on this date, note written based on previous treating therapist's notes and recommendations

## 2025-06-30 NOTE — PLAN OF CARE
Goal Outcome Evaluation:    Plan of Care Reviewed With: patient    Overall Patient Progress: improving    Patient discharged at 12:37 PM to Discharged to rehabilitation facility [facility]. IV was discontinued. Pain at time of discharge was 2/10. Belongings returned to patient.  Discharge instructions reviewed with patient and packet printed handed to patient to take to facility. Patient verbalized understanding and all questions were answered. At time of discharge, patient condition was stable and left the unit by wheelchair escorted by nursing aid.

## 2025-06-30 NOTE — PLAN OF CARE
"Patient Name: Maryanne  MRN: [id number]  Date of Admission: 6/20/2025  Reason for Admission: R thoracotomy with resection  Level of Care: Medical Surgical    Vitals:   BP Readings from Last 1 Encounters:   06/30/25 137/84     Pulse Readings from Last 1 Encounters:   06/30/25 62     Wt Readings from Last 1 Encounters:   06/21/25 80.9 kg (178 lb 4.8 oz)     Ht Readings from Last 1 Encounters:   06/20/25 1.676 m (5' 6\")     Estimated body mass index is 28.78 kg/m  as calculated from the following:    Height as of this encounter: 1.676 m (5' 6\").    Weight as of this encounter: 80.9 kg (178 lb 4.8 oz).  Temp Readings from Last 1 Encounters:   06/30/25 98  F (36.7  C) (Oral)     Pain: Pain goal 0/10 Highest Pain Rating 3/10 Effective pain medication/regimen scheduled tylenol, heat packs and repositioning.       Assessment    Resp: On RA,  LS diminished. Dyspnea with exertion.   Telemetry: NSR. Pt did not go into Afib tonight. Scheduled Amio given.   Neuro: A&O x4, neuros intact.   GI/: BS normoactive, X2 loose BM this shift. Pt having good urine output. Continent of bowel and bladder. Regular diet, tolerating well. Denies nausea.   Skin/Wounds: R thoracotomy site, R CT site- small to moderate drainage, dry guaze dressing with bacitracin applied PRN.   Lines/Drains: PIV L hand saline locked.  Activity: SBA GBW  Sleep: Pt had trouble falling asleep despite scheduled Seroquel being given. Pharmacy okayed to give additional PRN Seroquel x1 dose. Pt slept rest of the night between cares.    Aggression Stop Light: Green          Patient Care Plan: When medically ready, discharge to TCU. Cardiology and thoracic surgery following.       "

## 2025-07-03 DIAGNOSIS — I10 ESSENTIAL HYPERTENSION: Primary | ICD-10-CM

## 2025-07-03 DIAGNOSIS — I48.91 ATRIAL FIBRILLATION (H): ICD-10-CM

## 2025-07-09 ENCOUNTER — TRANSFERRED RECORDS (OUTPATIENT)
Dept: HEALTH INFORMATION MANAGEMENT | Facility: CLINIC | Age: 75
End: 2025-07-09

## 2025-07-10 ENCOUNTER — TELEPHONE (OUTPATIENT)
Dept: FAMILY MEDICINE | Facility: CLINIC | Age: 75
End: 2025-07-10

## 2025-07-10 ENCOUNTER — OFFICE VISIT (OUTPATIENT)
Dept: FAMILY MEDICINE | Facility: CLINIC | Age: 75
End: 2025-07-10
Payer: COMMERCIAL

## 2025-07-10 VITALS
BODY MASS INDEX: 27.32 KG/M2 | HEART RATE: 58 BPM | HEIGHT: 66 IN | OXYGEN SATURATION: 98 % | RESPIRATION RATE: 16 BRPM | SYSTOLIC BLOOD PRESSURE: 110 MMHG | DIASTOLIC BLOOD PRESSURE: 68 MMHG | TEMPERATURE: 97.8 F | WEIGHT: 170 LBS

## 2025-07-10 DIAGNOSIS — Z90.2 HISTORY OF LOBECTOMY OF LUNG: ICD-10-CM

## 2025-07-10 DIAGNOSIS — Z92.89 HISTORY OF RECENT HOSPITALIZATION: Primary | ICD-10-CM

## 2025-07-10 DIAGNOSIS — R82.90 ABNORMAL FINDING ON URINALYSIS: ICD-10-CM

## 2025-07-10 DIAGNOSIS — Z87.898 HISTORY OF URINARY RETENTION: ICD-10-CM

## 2025-07-10 DIAGNOSIS — G47.00 INSOMNIA, UNSPECIFIED TYPE: ICD-10-CM

## 2025-07-10 DIAGNOSIS — I48.0 PAROXYSMAL ATRIAL FIBRILLATION (H): ICD-10-CM

## 2025-07-10 DIAGNOSIS — C34.2 MALIGNANT NEOPLASM OF MIDDLE LOBE OF RIGHT LUNG (H): ICD-10-CM

## 2025-07-10 LAB
ALBUMIN UR-MCNC: 30 MG/DL
AMORPH CRY #/AREA URNS HPF: ABNORMAL /HPF
APPEARANCE UR: ABNORMAL
BACTERIA #/AREA URNS HPF: ABNORMAL /HPF
BILIRUB UR QL STRIP: NEGATIVE
COLOR UR AUTO: YELLOW
GLUCOSE UR STRIP-MCNC: NEGATIVE MG/DL
HGB UR QL STRIP: ABNORMAL
KETONES UR STRIP-MCNC: NEGATIVE MG/DL
LEUKOCYTE ESTERASE UR QL STRIP: ABNORMAL
NITRATE UR QL: POSITIVE
PH UR STRIP: 5.5 [PH] (ref 5–7)
RBC #/AREA URNS AUTO: ABNORMAL /HPF
SP GR UR STRIP: 1.02 (ref 1–1.03)
UROBILINOGEN UR STRIP-ACNC: 0.2 E.U./DL
WBC #/AREA URNS AUTO: >100 /HPF
WBC CLUMPS #/AREA URNS HPF: PRESENT /HPF

## 2025-07-10 RX ORDER — NITROFURANTOIN 25; 75 MG/1; MG/1
100 CAPSULE ORAL 2 TIMES DAILY
Qty: 14 CAPSULE | Refills: 0 | Status: SHIPPED | OUTPATIENT
Start: 2025-07-10 | End: 2025-07-17

## 2025-07-10 RX ORDER — TRAZODONE HYDROCHLORIDE 50 MG/1
50 TABLET ORAL AT BEDTIME
Qty: 60 TABLET | Refills: 3 | Status: SHIPPED | OUTPATIENT
Start: 2025-07-10

## 2025-07-10 RX ORDER — CHOLECALCIFEROL (VITAMIN D3) 50 MCG
2 TABLET ORAL DAILY
COMMUNITY

## 2025-07-10 NOTE — PROGRESS NOTES
Assessment & Plan   History of recent hospitalization  Malignant neoplasm of middle lobe of right lung (H)  History of lobectomy of lung  Patient was hospitalized recently, underwent right middle lobectomy, biopsy findings consistent with adenocarcinoma.  Recommended to continue following thoracic surgery    PAF (paroxysmal atrial fibrillation) (H)  Diagnosed with atrial fibrillation during hospitalization.  Will continue Eliquis, amiodarone and metoprolol  - apixaban ANTICOAGULANT (ELIQUIS) 5 MG tablet; Take 1 tablet (5 mg) by mouth 2 times daily.    Insomnia, unspecified type  Trazodone refilled  - traZODone (DESYREL) 50 MG tablet; Take 1 tablet (50 mg) by mouth at bedtime.    History of urinary retention  Patient did experience some urinary retention during hospitalization.  Complains of dysuria, cloudy urine.  UA findings suggestive of acute cystitis.  Macrobid prescribed  - UA with Microscopic reflex to Culture - lab collect; Future      MED REC REQUIRED  Post Medication Reconciliation Status: discharge medications reconciled, continue medications without change      Subjective   Siomara is a 74 year old, presenting for the following health issues:  Surgical Followup        7/10/2025    10:40 AM   Additional Questions   Roomed by Karol     History of Present Illness       Back Pain:  She presents for follow up of back pain.       Since patient first noticed back pain, pain is: unchanged       Hypertension: She presents for follow up of hypertension.  She does not check blood pressure  regularly outside of the clinic. Outpatient blood pressures have not been over 140/90. She does not follow a low salt diet.     Reason for visit:  Follow up after Lung surgery    She eats 0-1 servings of fruits and vegetables daily.She consumes 0 sweetened beverage(s) daily.She exercises with enough effort to increase her heart rate 9 or less minutes per day.  She exercises with enough effort to increase her heart rate 3 or less  "days per week.   She is taking medications regularly.          Review of Systems  Constitutional, neuro, ENT, endocrine, pulmonary, cardiac, gastrointestinal, genitourinary, musculoskeletal, integument and psychiatric systems are negative, except as otherwise noted.      Objective    /68   Pulse 58   Temp 97.8  F (36.6  C) (Tympanic)   Resp 16   Ht 1.676 m (5' 6\")   Wt 77.1 kg (170 lb)   LMP  (LMP Unknown)   SpO2 98%   BMI 27.44 kg/m    Body mass index is 27.44 kg/m .  Physical Exam   GENERAL: alert, no distress, and elderly  EYES: Eyes grossly normal to inspection, PERRL and conjunctivae and sclerae normal  HENT: normal cephalic/atraumatic, nose and mouth without ulcers or lesions, oropharynx clear, and oral mucous membranes moist  NECK: no adenopathy, no asymmetry, masses, or scars  RESP: lungs clear to auscultation - no rales, rhonchi or wheezes  CV: regular rates and rhythm, normal S1 S2, no S3 or S4, and no murmur, click or rub  ABDOMEN: soft, nontender  MS: no gross musculoskeletal defects noted, no edema  SKIN: surgical scars C/D/I   NEURO: Normal strength and tone, mentation intact and speech normal  PSYCH: mentation appears normal, affect normal/bright    Results for orders placed or performed in visit on 07/10/25   UA with Microscopic reflex to Culture - lab collect     Status: Abnormal    Specimen: Urine, Clean Catch   Result Value Ref Range    Color Urine Yellow Colorless, Straw, Light Yellow, Yellow    Appearance Urine Cloudy (A) Clear    Glucose Urine Negative Negative mg/dL    Bilirubin Urine Negative Negative    Ketones Urine Negative Negative mg/dL    Specific Gravity Urine 1.025 1.003 - 1.035    Blood Urine Moderate (A) Negative    pH Urine 5.5 5.0 - 7.0    Protein Albumin Urine 30 (A) Negative mg/dL    Urobilinogen Urine 0.2 0.2, 1.0 E.U./dL    Nitrite Urine Positive (A) Negative    Leukocyte Esterase Urine Small (A) Negative   Urine Microscopic Exam     Status: Abnormal   Result " Value Ref Range    Bacteria Urine Many (A) None Seen /HPF    RBC Urine 0-2 0-2 /HPF /HPF    WBC Urine >100 (A) 0-5 /HPF /HPF    WBC Clumps Urine Present (A) None Seen /HPF    Amorphous Crystals Urine Many (A) None Seen /HPF         Signed Electronically by: Rafael Mcdonald MD

## 2025-07-10 NOTE — TELEPHONE ENCOUNTER
Notified patient suggested that she contact her insurance to see if there is another med that they would cover more, and to look into discount prescription cards would help her with the cost.    Gabby Gutiérrez/ Patient

## 2025-07-10 NOTE — TELEPHONE ENCOUNTER
Patient was transferred to the RN. She stated the pharmacy has her insurance information and she was still told that it would be $90.   She wants to see if there is something comparable that is cheaper. This is the first anticoagulant she has tried. Put on the med in the hospital.    Routing to Dr. Mcdonald as high priority so we do not interrupt patients anticoagulation therapy.     Preferred pharmacy is [facility]  Caro Putnam RN on 7/10/2025 at 3:13 PM       no

## 2025-07-10 NOTE — TELEPHONE ENCOUNTER
General Call      Reason for Call:  Pt says she saw Dr Mcdonald today and he ordered Eliquis. She said this is very expensive so is asking if there is something cheaper.     Date of last appointment with provider: Today    Could we [facility] or would you prefer to receive a phone call?:   Patient would prefer a phone call   Okay to leave a detailed message?: Yes at Home number on file [phone] (home)

## 2025-07-13 ENCOUNTER — HEALTH MAINTENANCE LETTER (OUTPATIENT)
Age: 75
End: 2025-07-13

## 2025-07-14 ENCOUNTER — NURSE TRIAGE (OUTPATIENT)
Dept: FAMILY MEDICINE | Facility: CLINIC | Age: 75
End: 2025-07-14
Payer: COMMERCIAL

## 2025-07-14 NOTE — TELEPHONE ENCOUNTER
"Reason for Disposition   Last bowel movement (BM) > 4 days ago    Answer Assessment - Initial Assessment Questions  1. STOOL PATTERN OR FREQUENCY: \"How often do you have a bowel movement (BM)?\"  (Normal range: 3 times a day to every 3 days)  \"When was your last BM?\"        Last was 4 days ago - normally constipation  2. STRAINING: \"Do you have to strain to have a BM?\"       Usually  3. ONSET: \"When did the constipation begin?\"      chronic  4. RECTAL PAIN: \"Does your rectum hurt when the stool comes out?\" If Yes, ask: \"Do you have hemorrhoids? How bad is the pain?\"  (Scale 1-10; or mild, moderate, severe)      She tried a suppository, and had a very small amount of stool then.   5. BM COMPOSITION: \"Are the stools hard?\"       Yes  6. BLOOD ON STOOLS: \"Has there been any blood on the toilet tissue or on the surface of the BM?\" If Yes, ask: \"When was the last time?\"      No  7. CHRONIC CONSTIPATION: \"Is this a new problem for you?\"  If No, ask: \"How long have you had this problem?\" (days, weeks, months)       Always takes Senna and drinking a lot more water - months  8. CHANGES IN DIET OR HYDRATION: \"Have there been any recent changes in your diet?\" \"How much fluids are you drinking on a daily basis?\"  \"How much have you had to drink today?\"      Decreased fluid intake and activity since surgery  9. MEDICINES: \"Have you been taking any new medicines?\" \"Are you taking any narcotic pain medicines?\" (e.g., Dilaudid, morphine, Percocet, Vicodin)      Yes - after surgery  10. LAXATIVES: \"Have you been using any stool softeners, laxatives, or enemas?\"  If Yes, ask \"What, how often, and when was the last time?\"        Sennakot, and suppositry, Miralax gummies took 4 today.   11. ACTIVITY:  \"How much walking do you do every day?\"  \"Has your activity level decreased in the past week?\"         Trying to talk more  12. CAUSE: \"What do you think is causing the constipation?\"         narcotic  13. MEDICAL HISTORY: \"Do you have a " "history of hemorrhoids, rectal fissures, rectal surgery, or rectal abscess?\"          Yes does have external hemorrhoid,   14. OTHER SYMPTOMS: \"Do you have any other symptoms?\" (e.g., abdomen pain, bloating, fever, vomiting)        Abdominal pain - 2-3/10, bloating  15. PREGNANCY: \"Is there any chance you are pregnant?\" \"When was your last menstrual period?\"        no    Protocols used: Constipation-A-OH    Fells like a stool is right there but there is nothing there to push it out like \"my push power is gone\" Pain rates 2/10, feels more bloated. She has tried suppositories, miralax gummies, and senna and still continues to feel like she has stool right at her rectum and is unable to pass it. Advised she should be evaluated today, but no same day appointments open. Advised she can be evaluated in urgent care for further evaluation. She verbalizes understanding and will present to her local UC.  Sonya Fall RN on 7/14/2025 at 2:17 PM    "

## 2025-07-17 ENCOUNTER — TELEPHONE (OUTPATIENT)
Dept: LAB | Facility: CLINIC | Age: 75
End: 2025-07-17

## 2025-07-17 ENCOUNTER — ALLIED HEALTH/NURSE VISIT (OUTPATIENT)
Dept: UROLOGY | Facility: CLINIC | Age: 75
End: 2025-07-17
Payer: COMMERCIAL

## 2025-07-17 DIAGNOSIS — R30.0 DYSURIA: ICD-10-CM

## 2025-07-17 DIAGNOSIS — R33.9 URINARY RETENTION: Primary | ICD-10-CM

## 2025-07-17 LAB
ALBUMIN UR-MCNC: NEGATIVE MG/DL
APPEARANCE UR: CLEAR
BILIRUB UR QL STRIP: NEGATIVE
COLOR UR AUTO: YELLOW
GLUCOSE UR STRIP-MCNC: NEGATIVE MG/DL
HGB UR QL STRIP: NEGATIVE
KETONES UR STRIP-MCNC: NEGATIVE MG/DL
LEUKOCYTE ESTERASE UR QL STRIP: NEGATIVE
NITRATE UR QL: NEGATIVE
PH UR STRIP: 7 [PH] (ref 5–7)
SP GR UR STRIP: 1.01 (ref 1–1.03)
UROBILINOGEN UR STRIP-ACNC: 0.2 E.U./DL

## 2025-07-17 NOTE — TELEPHONE ENCOUNTER
----- Message from Gabriele Dallas sent at 6/24/2025 12:41 PM CDT -----  Nurse visit in 2-6 wks for PVR

## 2025-07-17 NOTE — NURSING NOTE
PVR by scanner= 50mL    Pt co dysuria and a UAUC was done.    Siomara Hansen comes into clinic today at the request of Gabriele Dallas Ordering Provider for PVR.      This service provided today was under the supervising provider of the day Gabriele Dallas, who was available if needed.      JODI Vega CMA

## 2025-07-21 ENCOUNTER — TELEPHONE (OUTPATIENT)
Dept: FAMILY MEDICINE | Facility: CLINIC | Age: 75
End: 2025-07-21
Payer: COMMERCIAL

## 2025-07-21 NOTE — TELEPHONE ENCOUNTER
MTM referral from: [facility] visit (referral by provider)    MTM referral outreach attempt #1 on July 21, 2025 at 12:02 PM      Outcome: Spoke with patient says she declined through mychart and doesn't want to see MTM    Use [facility] for the carrier/Plan on the flowsheet          JOLENE Flores   [phone]

## 2025-07-23 ENCOUNTER — TELEPHONE (OUTPATIENT)
Dept: PEDIATRICS | Facility: CLINIC | Age: 75
End: 2025-07-23
Payer: COMMERCIAL

## 2025-07-23 ENCOUNTER — NURSE TRIAGE (OUTPATIENT)
Dept: FAMILY MEDICINE | Facility: CLINIC | Age: 75
End: 2025-07-23
Payer: COMMERCIAL

## 2025-07-23 NOTE — TELEPHONE ENCOUNTER
Called to schedule with [facility] and patient did not hear about us for a referral. She called her surgery team and spoke to nursing staff after speaking with BRENDA Hassan about her symptoms of pain at drain tube site. Per her surgery team the pain she is still experiencing is normal after surgery. She does not need an appointment for this or any further follow up unless she experiences worsening symptoms. She has had this pain since her surgery and it is not new or worsening. She just wanted to make sure everything is as to be expected. No appointment scheduled.    Amanda ALONSO MA

## 2025-07-23 NOTE — TELEPHONE ENCOUNTER
"Nurse Triage SBAR    Is this a 2nd Level Triage? YES, LICENSED PRACTITIONER REVIEW IS REQUIRED    Situation:  Patient is having 4/10 pain where her drain WAS from her lung cancer surgery.    Background:   Patient had lung cancer surgery on 6/20/2025 and had a drain placed, right side, for about 8 days per patient.   She is having constant right sided flank pain, 4/10, where the drain was.  She has not called her thoracic surgeon and her concerns have not been addressed by anyone as of now  She does not see redness/drainage in the area but when she touches it there is significant pain.   There is a lot of pain in that area when she coughs.   She has breast implants from breast cancer on 2000.   She will have dizziness when she first stands up and this is not new for her. It will go away if she stands still for a few seconds  She feels groggy from day 1. She thought it was from a medication.  She does have some weakness but can do her ADLs.  She can't walk for any distance.   She is not using her incentive spirometer due to laziness per patient.    When she coughs it really hurts.  She has not taking anything for it. Acetaminophen does not work for her and made her \"dizzier than crap\"   Coughing and being active makes it worse. Sitting still makes it better. It is a gnawing pain.   Denies: fever, trouble breathing more than expected, chest pain more than expected, , SOB more than expected,  redness, swelling, drainage, streaking.    Assessment: Patient may need to be seen in clinic or ADS    Protocol Recommended Disposition: Provider recommendation    Recommendation:   Does patient need to be seen in clinic or ADS today for her symptoms?      Routed to provider    Does the patient meet one of the following criteria for ADS visit consideration? 16+ years old, with an [facility] PCP     TIP  Providers, please consider if this condition is appropriate for management at one of our Acute and Diagnostic Services sites.     If " "patient is a good candidate, please use dotphrase <dot>triageresponse and select Refer to ADS to document.      Patient reports that she has lung caner surgery on 6/20/2025. She is calling today to report that the area where the drain was still has pain 4/10. Not the incision for the lung surgery but the area where the drain WAS.  She reports that she only had the drain in for about 8 days. The pain she is experiencing is on her right flank/rib area.   She does not see redness/drainage in the area but when she touches it there is significant pain. There is a lot of pain in that area when she coughs.      She has not called her thoracic surgeon and her concerns have not been addresses by anyone as of now  She is having pain behind her right breast - this has been from day 1 - it is not any worse.   She has breast implants from breast cancer on 2000.   She will have dizziness when she first stands up and this is not new for her. It will go away if she stands still for a few seconds  She feels groggy from day 1. She thought it was from a medication. She does have some weakness but can do her ADLs.  She can't walk for any distance.   She is not using her incentive spirometer due to laziness per patient.    When she coughs it really hurts.  The pain is constant.   She has not taking anything for it. Acetaminophen does not work for her and made her \"dizzier than crap\"   Coughing and being active makes it worse. Sitting still makes it better. It is a gnawing pain.     Denies: fever, trouble breathing more than expected, chest pain more than expected, , SOB more than expected,  redness, swelling, drainage, streaking.    Nursing advice: A second level triage will be sent to her provider.  She was also asked to call her thoracic surgeon, after we are done with this call, to inform them of her symptoms. She was asked to speak with a nurse and not just leave a message. If she does not hear back from Dr. Mcdonald's team by 11:30 " am she is to call back and follow up on this message.  If she does not hear form anyone and is not getting a plan by noon today (7/23/2025) she is to be seen in urgent care.  Patient was given signs and symptoms to call 911. Patient verbalizes good understanding, agrees with plan and states she needs no further support. Sonya Max R.N.    Reason for Disposition   Caller has URGENT question and triager unable to answer question    Additional Information   Negative: Major abdominal surgical incision and wound gaping open with visible internal organs   Negative: Sounds like a life-threatening emergency to the triager   Negative: Bleeding from incision and won't stop after 10 minutes of direct pressure   Negative: Bleeding (more than a few drops) from incision and after tracheostomy or blood vessel surgery (e.g., carotid endarterectomy, femoral bypass graft, kidney dialysis fistula)   Negative: Bright red, wide-spread, sunburn-like rash   Negative: SEVERE pain in the incision   Negative: Incision gaping open and < 2 days (48 hours) since wound re-opened   Negative: Incision gaping open and length of opening > 2 inches (5 cm)   Negative: Patient sounds very sick or weak to the triager   Negative: Sounds like a serious complication to the triager   Negative: Fever > 100.4 F (38.0 C)   Negative: Incision looks infected (e.g., spreading redness, pus)   Negative: Red streak runs from the incision and longer than 1 inch (2.5 cm)   Negative: Pus or bad-smelling fluid draining from incision   Negative: Dressing soaked with blood or body fluid (e.g., drainage)   Negative: Raised bruise and size > 2 inches (5 cm) and expanding   Negative: Scant bleeding (e.g., few drops) from incision and after tracheostomy or blood vessel surgery (e.g., carotid endarterectomy, femoral bypass graft, kidney dialysis fistula    Protocols used: Post-Op Incision Symptoms and [id number]

## 2025-07-23 NOTE — TELEPHONE ENCOUNTER
Spoke to Dr. Flannery at [facility] clinic who accepted patient. [facility] will call patient to set up appointment there.   Alicia Bansal RN

## 2025-08-18 ENCOUNTER — OFFICE VISIT (OUTPATIENT)
Dept: CARDIOLOGY | Facility: CLINIC | Age: 75
End: 2025-08-18
Attending: NURSE PRACTITIONER
Payer: COMMERCIAL

## 2025-08-18 VITALS
HEART RATE: 54 BPM | SYSTOLIC BLOOD PRESSURE: 129 MMHG | OXYGEN SATURATION: 91 % | WEIGHT: 170.6 LBS | RESPIRATION RATE: 16 BRPM | DIASTOLIC BLOOD PRESSURE: 64 MMHG | HEIGHT: 66 IN | BODY MASS INDEX: 27.42 KG/M2

## 2025-08-18 DIAGNOSIS — I48.0 PAROXYSMAL ATRIAL FIBRILLATION (H): ICD-10-CM

## 2025-08-18 DIAGNOSIS — I10 ESSENTIAL HYPERTENSION: ICD-10-CM

## 2025-08-18 PROCEDURE — 3074F SYST BP LT 130 MM HG: CPT | Performed by: NURSE PRACTITIONER

## 2025-08-18 PROCEDURE — 3078F DIAST BP <80 MM HG: CPT | Performed by: NURSE PRACTITIONER

## 2025-08-18 PROCEDURE — 99203 OFFICE O/P NEW LOW 30 MIN: CPT | Mod: 24 | Performed by: NURSE PRACTITIONER

## 2025-08-18 PROCEDURE — 93000 ELECTROCARDIOGRAM COMPLETE: CPT | Performed by: NURSE PRACTITIONER

## 2025-08-18 RX ORDER — AMIODARONE HYDROCHLORIDE 200 MG/1
200 TABLET ORAL DAILY
Qty: 43 TABLET | Refills: 0 | Status: SHIPPED | OUTPATIENT
Start: 2025-08-18 | End: 2025-09-30

## 2025-08-24 ASSESSMENT — PATIENT HEALTH QUESTIONNAIRE - PHQ9
SUM OF ALL RESPONSES TO PHQ QUESTIONS 1-9: 2
10. IF YOU CHECKED OFF ANY PROBLEMS, HOW DIFFICULT HAVE THESE PROBLEMS MADE IT FOR YOU TO DO YOUR WORK, TAKE CARE OF THINGS AT HOME, OR GET ALONG WITH OTHER PEOPLE: NOT DIFFICULT AT ALL
SUM OF ALL RESPONSES TO PHQ QUESTIONS 1-9: 2

## 2025-08-25 ENCOUNTER — OFFICE VISIT (OUTPATIENT)
Dept: FAMILY MEDICINE | Facility: CLINIC | Age: 75
End: 2025-08-25
Payer: COMMERCIAL

## 2025-08-25 VITALS
HEART RATE: 52 BPM | DIASTOLIC BLOOD PRESSURE: 68 MMHG | WEIGHT: 170.4 LBS | OXYGEN SATURATION: 94 % | RESPIRATION RATE: 18 BRPM | SYSTOLIC BLOOD PRESSURE: 126 MMHG | HEIGHT: 66 IN | BODY MASS INDEX: 27.38 KG/M2 | TEMPERATURE: 97.7 F

## 2025-08-25 DIAGNOSIS — G89.29 CHRONIC LOW BACK PAIN, UNSPECIFIED BACK PAIN LATERALITY, UNSPECIFIED WHETHER SCIATICA PRESENT: ICD-10-CM

## 2025-08-25 DIAGNOSIS — R05.9 COUGH, UNSPECIFIED TYPE: ICD-10-CM

## 2025-08-25 DIAGNOSIS — M54.50 CHRONIC LOW BACK PAIN, UNSPECIFIED BACK PAIN LATERALITY, UNSPECIFIED WHETHER SCIATICA PRESENT: ICD-10-CM

## 2025-08-25 DIAGNOSIS — I48.0 PAROXYSMAL ATRIAL FIBRILLATION (H): Primary | ICD-10-CM

## 2025-08-25 DIAGNOSIS — Z90.2 S/P LOBECTOMY OF LUNG: ICD-10-CM

## 2025-08-25 PROCEDURE — 99213 OFFICE O/P EST LOW 20 MIN: CPT | Performed by: FAMILY MEDICINE

## 2025-08-25 PROCEDURE — G2211 COMPLEX E/M VISIT ADD ON: HCPCS | Performed by: FAMILY MEDICINE

## 2025-08-25 PROCEDURE — 3074F SYST BP LT 130 MM HG: CPT | Performed by: FAMILY MEDICINE

## 2025-08-25 PROCEDURE — 3078F DIAST BP <80 MM HG: CPT | Performed by: FAMILY MEDICINE

## 2025-08-25 PROCEDURE — 1125F AMNT PAIN NOTED PAIN PRSNT: CPT | Performed by: FAMILY MEDICINE

## 2025-08-25 ASSESSMENT — ANXIETY QUESTIONNAIRES
4. TROUBLE RELAXING: NOT AT ALL
5. BEING SO RESTLESS THAT IT IS HARD TO SIT STILL: NOT AT ALL
GAD7 TOTAL SCORE: 0
GAD7 TOTAL SCORE: 0
2. NOT BEING ABLE TO STOP OR CONTROL WORRYING: NOT AT ALL
6. BECOMING EASILY ANNOYED OR IRRITABLE: NOT AT ALL
3. WORRYING TOO MUCH ABOUT DIFFERENT THINGS: NOT AT ALL
7. FEELING AFRAID AS IF SOMETHING AWFUL MIGHT HAPPEN: NOT AT ALL
GAD7 TOTAL SCORE: 0
IF YOU CHECKED OFF ANY PROBLEMS ON THIS QUESTIONNAIRE, HOW DIFFICULT HAVE THESE PROBLEMS MADE IT FOR YOU TO DO YOUR WORK, TAKE CARE OF THINGS AT HOME, OR GET ALONG WITH OTHER PEOPLE: SOMEWHAT DIFFICULT
1. FEELING NERVOUS, ANXIOUS, OR ON EDGE: NOT AT ALL
7. FEELING AFRAID AS IF SOMETHING AWFUL MIGHT HAPPEN: NOT AT ALL
8. IF YOU CHECKED OFF ANY PROBLEMS, HOW DIFFICULT HAVE THESE MADE IT FOR YOU TO DO YOUR WORK, TAKE CARE OF THINGS AT HOME, OR GET ALONG WITH OTHER PEOPLE?: SOMEWHAT DIFFICULT

## 2025-08-25 ASSESSMENT — PAIN SCALES - GENERAL: PAINLEVEL_OUTOF10: MILD PAIN (3)

## (undated) DEVICE — ESU PENCIL SMOKE EVAC W/ROCKER SWITCH 0703-047-000

## (undated) DEVICE — SYR 20ML LL W/O NDL

## (undated) DEVICE — NEEDLE HYPO MONOJECT STANDARD 22GA 1 1/2IN BLUE 1188822112

## (undated) DEVICE — DRAPE SHEET REV FOLD 3/4 9349

## (undated) DEVICE — GLOVE BIOGEL PI MICRO SZ 8.0 48580

## (undated) DEVICE — NDL 18GA 1.5" 305196

## (undated) DEVICE — GOWN LG DISP 9515

## (undated) DEVICE — STPL ENDO ARTICULATING 60MM EC60A

## (undated) DEVICE — PREP DURAPREP 26ML APL 8630

## (undated) DEVICE — STPL POWERED ECHELON VASC 35MM PVE35A

## (undated) DEVICE — SYR BULB IRRIG DOVER 60 ML LATEX FREE 67000

## (undated) DEVICE — SUTURE ABSORBABLE VICRYL CT-B1 L36 IN BRAID VIOLET JB947H

## (undated) DEVICE — DRSG GAUZE 4X4" 3033

## (undated) DEVICE — GLOVE BIOGEL PI MICRO INDICATOR UNDERGLOVE SZ 6.5 48965

## (undated) DEVICE — SUCTION MANIFOLD NEPTUNE 2 SYS 4 PORT 0702-020-000

## (undated) DEVICE — BAG DECANTER STERILE WHITE DYNJDEC09

## (undated) DEVICE — TAPE DURAPORE 3" SILK 1538-3

## (undated) DEVICE — Device

## (undated) DEVICE — DECANTER BAG 2002S

## (undated) DEVICE — PREP CHLORAPREP 26ML TINTED HI-LITE ORANGE 930815

## (undated) DEVICE — SU ETHIBOND 5 V-37 4X30" MB66G

## (undated) DEVICE — DRAPE POUCH INSTRUMENT 1018

## (undated) DEVICE — SOL NACL 0.9% IRRIG 3000ML BAG 07972-08

## (undated) DEVICE — SU MONOCRYL 2-0 CT-1 36" UND Y945H

## (undated) DEVICE — DRAPE IOBAN INCISE 23X17" 6650EZ

## (undated) DEVICE — SOL NACL 0.9% IRRIG 1000ML BOTTLE 2F7124

## (undated) DEVICE — GOWN IMPERVIOUS BREATHABLE SMART LG 89015

## (undated) DEVICE — SU VICRYL 1 CT 36" J959H

## (undated) DEVICE — TAPE MEDIPORE 6"X10YD 2966

## (undated) DEVICE — SU VICRYL 2-0 CT-1 27" J339H

## (undated) DEVICE — LINEN TOWEL PACK X5 5464

## (undated) DEVICE — CATH ON-Q PAIN SILVER SKR 2.5" PM010-A

## (undated) DEVICE — CLIP APPLIER 11" MED LIGACLIP MCM20

## (undated) DEVICE — GLOVE BIOGEL PI MICRO INDICATOR UNDERGLOVE SZ 8.0 48980

## (undated) DEVICE — BLADE SAW SAGITTAL STRK 13X90X1.19MM HD SYS 6 6113-119-090

## (undated) DEVICE — DRSG KERLIX 4 1/2"X4YDS ROLL 6715

## (undated) DEVICE — KIT TURNOVER FAIRVIEW SOUTHDALE FULL SP3889

## (undated) DEVICE — SUCTION DRY CHEST DRAIN OASIS 3600-100

## (undated) DEVICE — SU PROLENE 4-0 RB-1DA 36" 8557H

## (undated) DEVICE — BLADE KNIFE SURG 10 371110

## (undated) DEVICE — SU SILK 2 REEL 60" SA8H

## (undated) DEVICE — GOWN IMPERVIOUS SPECIALTY XLG/XLONG 32474

## (undated) DEVICE — PACK HIP LAKES WITH POUCH

## (undated) DEVICE — SUCTION IRR SYSTEM W/TIP INTERPULSE

## (undated) DEVICE — ESU ELEC BLADE 6" COATED/INSULATED E1455-6

## (undated) DEVICE — SU NDL CUT REV MED 3/8 209014

## (undated) DEVICE — TUBING SUCTION MEDI-VAC SOFT 3/16"X20' N520A

## (undated) DEVICE — DRSG STERI STRIP 1/2X4" R1547

## (undated) DEVICE — SUCTION MANIFOLD NEPTUNE SGL

## (undated) DEVICE — NDL SPINAL 18GA 3.5" 405184

## (undated) DEVICE — SU PROLENE 3-0 V-7DA 36" 8976H

## (undated) DEVICE — PACK MINOR SBA15MIFSE

## (undated) DEVICE — DECANTER VIAL 2006S

## (undated) DEVICE — SEALANT PLEURAL AIRLEAK PROGEL 4ML PGPS002

## (undated) DEVICE — SUCTION MINISQUAIR SMOKE EVAC CAPTURE DEVICE SQ20012-01

## (undated) DEVICE — GOWN REINFORCED XXLG 9071

## (undated) DEVICE — SU PDO 1 STRATAFIX 36X36CM CTX TAPERPOINT SXPD2B405

## (undated) DEVICE — SU VICRYL+ 1 CTX 18IN VIO VCP765D

## (undated) DEVICE — ESU GROUND PAD UNIVERSAL W/O CORD

## (undated) DEVICE — SOL WATER IRRIG 1000ML BOTTLE 07139-09

## (undated) DEVICE — SPONGE LAP 18X18" X8435

## (undated) DEVICE — PREP CHLORAPREP 26ML TINTED ORANGE  260815

## (undated) DEVICE — POSITIONER PT PRONESAFE HEAD REST W/DERMAPROX INSERT 40599

## (undated) DEVICE — BONE CLEANING TIP INTERPULSE  0210-010-000

## (undated) DEVICE — SOL WATER IRRIG 1000ML BOTTLE 2F7114

## (undated) DEVICE — DRSG AQUACEL AG 3.5X9.75" HYDROFIBER 412011

## (undated) DEVICE — DRAPE ANTI SKID 10IN X 16IN BRIGHT ORANGE 1151

## (undated) DEVICE — PACK SPINE SM CUSTOM SNE15SSFSK

## (undated) DEVICE — DRAPE BREAST/CHEST 29420

## (undated) DEVICE — ESU PENCIL W/HOLSTER E2350H

## (undated) DEVICE — BONE CEMENT KYPHOPAK XPANDER KYPHX 3 15MM KPT1502

## (undated) DEVICE — SURGICEL NU-KIT ABSORBABLE HEMOSTAT 1943S

## (undated) DEVICE — GLOVE BIOGEL PI MICRO SZ 6.5 48565

## (undated) DEVICE — DRAIN PENROSE 0.75"X18" LATEX FREE GR205

## (undated) DEVICE — DRAIN CHEST TUBE 28FR STR 8028

## (undated) DEVICE — STPL RELOAD REG/THK TISSUE ECHELON 60 X 3.8MM GOLD GST60D

## (undated) DEVICE — SU STRATAFIX MONOCRYL 3-0 SPIRAL PS-2 30CM SXMP1B106

## (undated) RX ORDER — FENTANYL CITRATE 0.05 MG/ML
INJECTION, SOLUTION INTRAMUSCULAR; INTRAVENOUS
Status: DISPENSED
Start: 2025-06-20

## (undated) RX ORDER — EPHEDRINE SULFATE 50 MG/ML
INJECTION, SOLUTION INTRAMUSCULAR; INTRAVENOUS; SUBCUTANEOUS
Status: DISPENSED
Start: 2024-01-11

## (undated) RX ORDER — CEFAZOLIN SODIUM/WATER 2 G/20 ML
SYRINGE (ML) INTRAVENOUS
Status: DISPENSED
Start: 2024-03-05

## (undated) RX ORDER — KETOROLAC TROMETHAMINE 15 MG/ML
INJECTION, SOLUTION INTRAMUSCULAR; INTRAVENOUS
Status: DISPENSED
Start: 2024-01-11

## (undated) RX ORDER — PROPOFOL 10 MG/ML
INJECTION, EMULSION INTRAVENOUS
Status: DISPENSED
Start: 2024-01-11

## (undated) RX ORDER — FENTANYL CITRATE 50 UG/ML
INJECTION, SOLUTION INTRAMUSCULAR; INTRAVENOUS
Status: DISPENSED
Start: 2024-01-11

## (undated) RX ORDER — FENTANYL CITRATE 0.05 MG/ML
INJECTION, SOLUTION INTRAMUSCULAR; INTRAVENOUS
Status: DISPENSED
Start: 2024-01-11

## (undated) RX ORDER — KETOROLAC TROMETHAMINE 15 MG/ML
INJECTION, SOLUTION INTRAMUSCULAR; INTRAVENOUS
Status: DISPENSED
Start: 2024-03-05

## (undated) RX ORDER — DEXAMETHASONE SODIUM PHOSPHATE 10 MG/ML
INJECTION, SOLUTION INTRAMUSCULAR; INTRAVENOUS
Status: DISPENSED
Start: 2024-03-05

## (undated) RX ORDER — ONDANSETRON 2 MG/ML
INJECTION INTRAMUSCULAR; INTRAVENOUS
Status: DISPENSED
Start: 2024-03-05

## (undated) RX ORDER — FENTANYL CITRATE 50 UG/ML
INJECTION, SOLUTION INTRAMUSCULAR; INTRAVENOUS
Status: DISPENSED
Start: 2024-03-05

## (undated) RX ORDER — FENTANYL CITRATE 50 UG/ML
INJECTION, SOLUTION INTRAMUSCULAR; INTRAVENOUS
Status: DISPENSED
Start: 2025-06-20

## (undated) RX ORDER — HEPARIN SODIUM 5000 [USP'U]/.5ML
INJECTION, SOLUTION INTRAVENOUS; SUBCUTANEOUS
Status: DISPENSED
Start: 2025-06-20

## (undated) RX ORDER — LABETALOL HYDROCHLORIDE 5 MG/ML
INJECTION, SOLUTION INTRAVENOUS
Status: DISPENSED
Start: 2025-06-20

## (undated) RX ORDER — ONDANSETRON 2 MG/ML
INJECTION INTRAMUSCULAR; INTRAVENOUS
Status: DISPENSED
Start: 2025-06-20

## (undated) RX ORDER — ONDANSETRON 2 MG/ML
INJECTION INTRAMUSCULAR; INTRAVENOUS
Status: DISPENSED
Start: 2024-01-11

## (undated) RX ORDER — TRANEXAMIC ACID 650 MG/1
TABLET ORAL
Status: DISPENSED
Start: 2024-03-05

## (undated) RX ORDER — PROPOFOL 10 MG/ML
INJECTION, EMULSION INTRAVENOUS
Status: DISPENSED
Start: 2024-03-05

## (undated) RX ORDER — BUPIVACAINE HYDROCHLORIDE 5 MG/ML
INJECTION, SOLUTION EPIDURAL; INTRACAUDAL
Status: DISPENSED
Start: 2024-03-05

## (undated) RX ORDER — MAGNESIUM SULFATE HEPTAHYDRATE 40 MG/ML
INJECTION, SOLUTION INTRAVENOUS
Status: DISPENSED
Start: 2024-03-05

## (undated) RX ORDER — HYDROMORPHONE HYDROCHLORIDE 1 MG/ML
INJECTION, SOLUTION INTRAMUSCULAR; INTRAVENOUS; SUBCUTANEOUS
Status: DISPENSED
Start: 2025-06-20

## (undated) RX ORDER — CEFAZOLIN SODIUM/WATER 2 G/20 ML
SYRINGE (ML) INTRAVENOUS
Status: DISPENSED
Start: 2023-09-29

## (undated) RX ORDER — HYDROMORPHONE HCL IN WATER/PF 6 MG/30 ML
PATIENT CONTROLLED ANALGESIA SYRINGE INTRAVENOUS
Status: DISPENSED
Start: 2025-06-20

## (undated) RX ORDER — ACETAMINOPHEN 325 MG/1
TABLET ORAL
Status: DISPENSED
Start: 2024-03-05